# Patient Record
Sex: FEMALE | Race: WHITE | NOT HISPANIC OR LATINO | Employment: FULL TIME | URBAN - METROPOLITAN AREA
[De-identification: names, ages, dates, MRNs, and addresses within clinical notes are randomized per-mention and may not be internally consistent; named-entity substitution may affect disease eponyms.]

---

## 2024-03-25 DIAGNOSIS — I10 ESSENTIAL (PRIMARY) HYPERTENSION: Primary | ICD-10-CM

## 2024-03-25 DIAGNOSIS — J44.9 CHRONIC OBSTRUCTIVE PULMONARY DISEASE, UNSPECIFIED COPD TYPE (MULTI): ICD-10-CM

## 2024-03-25 RX ORDER — METOPROLOL TARTRATE 50 MG/1
50 TABLET ORAL 2 TIMES DAILY
COMMUNITY
Start: 2014-04-01 | End: 2024-03-25 | Stop reason: SDUPTHER

## 2024-03-25 RX ORDER — ALBUTEROL SULFATE 90 UG/1
1 AEROSOL, METERED RESPIRATORY (INHALATION) EVERY 4 HOURS PRN
COMMUNITY
Start: 2013-11-06 | End: 2024-03-25 | Stop reason: SDUPTHER

## 2024-03-25 RX ORDER — FLUTICASONE PROPIONATE AND SALMETEROL XINAFOATE 230; 21 UG/1; UG/1
2 AEROSOL, METERED RESPIRATORY (INHALATION)
COMMUNITY
Start: 2020-01-31 | End: 2024-03-25 | Stop reason: SDUPTHER

## 2024-03-25 RX ORDER — AMLODIPINE BESYLATE 10 MG/1
10 TABLET ORAL DAILY
COMMUNITY
Start: 2022-07-20 | End: 2024-03-25 | Stop reason: SDUPTHER

## 2024-03-25 NOTE — TELEPHONE ENCOUNTER
PATIENT CALLING IN FOR REFILLS ON AMLODIPINE, METOPROLOL, ALBUTEROL SULFATE, AND ADVAIR SENT TO [facility]

## 2024-03-29 RX ORDER — FLUTICASONE PROPIONATE AND SALMETEROL XINAFOATE 230; 21 UG/1; UG/1
2 AEROSOL, METERED RESPIRATORY (INHALATION)
Qty: 8 G | Refills: 0 | Status: SHIPPED | OUTPATIENT
Start: 2024-03-29 | End: 2024-04-05 | Stop reason: SDUPTHER

## 2024-03-29 RX ORDER — METOPROLOL TARTRATE 50 MG/1
50 TABLET ORAL 2 TIMES DAILY
Qty: 28 TABLET | Refills: 0 | Status: SHIPPED | OUTPATIENT
Start: 2024-03-29 | End: 2024-04-05 | Stop reason: SDUPTHER

## 2024-03-29 RX ORDER — ALBUTEROL SULFATE 90 UG/1
1 AEROSOL, METERED RESPIRATORY (INHALATION) EVERY 8 HOURS PRN
Qty: 6 G | Refills: 0 | Status: SHIPPED | OUTPATIENT
Start: 2024-03-29 | End: 2024-04-05 | Stop reason: SDUPTHER

## 2024-03-29 RX ORDER — AMLODIPINE BESYLATE 10 MG/1
10 TABLET ORAL DAILY
Qty: 14 TABLET | Refills: 0 | Status: SHIPPED | OUTPATIENT
Start: 2024-03-29 | End: 2024-04-05 | Stop reason: SDUPTHER

## 2024-04-05 ENCOUNTER — OFFICE VISIT (OUTPATIENT)
Dept: PRIMARY CARE | Facility: CLINIC | Age: 74
End: 2024-04-05
Payer: COMMERCIAL

## 2024-04-05 VITALS
WEIGHT: 97.4 LBS | SYSTOLIC BLOOD PRESSURE: 124 MMHG | HEIGHT: 62 IN | DIASTOLIC BLOOD PRESSURE: 70 MMHG | TEMPERATURE: 97.6 F | BODY MASS INDEX: 17.92 KG/M2 | OXYGEN SATURATION: 90 % | HEART RATE: 76 BPM

## 2024-04-05 DIAGNOSIS — R79.89 ABNORMAL THYROID BLOOD TEST: ICD-10-CM

## 2024-04-05 DIAGNOSIS — E55.9 VITAMIN D DEFICIENCY: ICD-10-CM

## 2024-04-05 DIAGNOSIS — Z13.29 SCREENING FOR THYROID DISORDER: ICD-10-CM

## 2024-04-05 DIAGNOSIS — Z23 NEED FOR VACCINATION: ICD-10-CM

## 2024-04-05 DIAGNOSIS — E78.2 MIXED HYPERLIPIDEMIA: ICD-10-CM

## 2024-04-05 DIAGNOSIS — R73.9 HYPERGLYCEMIA: ICD-10-CM

## 2024-04-05 DIAGNOSIS — Z00.00 ENCOUNTER FOR MEDICARE ANNUAL WELLNESS EXAM: Primary | ICD-10-CM

## 2024-04-05 DIAGNOSIS — D64.9 ANEMIA, UNSPECIFIED TYPE: ICD-10-CM

## 2024-04-05 DIAGNOSIS — I10 ESSENTIAL (PRIMARY) HYPERTENSION: ICD-10-CM

## 2024-04-05 DIAGNOSIS — J44.9 CHRONIC OBSTRUCTIVE PULMONARY DISEASE, UNSPECIFIED COPD TYPE (MULTI): ICD-10-CM

## 2024-04-05 DIAGNOSIS — D53.9 NUTRITIONAL ANEMIA, UNSPECIFIED: ICD-10-CM

## 2024-04-05 DIAGNOSIS — Z13.1 SCREENING FOR DIABETES MELLITUS: ICD-10-CM

## 2024-04-05 PROCEDURE — 1158F ADVNC CARE PLAN TLK DOCD: CPT | Performed by: INTERNAL MEDICINE

## 2024-04-05 PROCEDURE — 3074F SYST BP LT 130 MM HG: CPT | Performed by: INTERNAL MEDICINE

## 2024-04-05 PROCEDURE — 1170F FXNL STATUS ASSESSED: CPT | Performed by: INTERNAL MEDICINE

## 2024-04-05 PROCEDURE — 99214 OFFICE O/P EST MOD 30 MIN: CPT | Performed by: INTERNAL MEDICINE

## 2024-04-05 PROCEDURE — 1159F MED LIST DOCD IN RCRD: CPT | Performed by: INTERNAL MEDICINE

## 2024-04-05 PROCEDURE — G0439 PPPS, SUBSEQ VISIT: HCPCS | Performed by: INTERNAL MEDICINE

## 2024-04-05 PROCEDURE — G0444 DEPRESSION SCREEN ANNUAL: HCPCS | Performed by: INTERNAL MEDICINE

## 2024-04-05 PROCEDURE — 3078F DIAST BP <80 MM HG: CPT | Performed by: INTERNAL MEDICINE

## 2024-04-05 PROCEDURE — 1123F ACP DISCUSS/DSCN MKR DOCD: CPT | Performed by: INTERNAL MEDICINE

## 2024-04-05 PROCEDURE — 99397 PER PM REEVAL EST PAT 65+ YR: CPT | Performed by: INTERNAL MEDICINE

## 2024-04-05 RX ORDER — METOPROLOL TARTRATE 50 MG/1
50 TABLET ORAL 2 TIMES DAILY
Qty: 200 TABLET | Refills: 1 | Status: SHIPPED | OUTPATIENT
Start: 2024-04-05

## 2024-04-05 RX ORDER — AMLODIPINE BESYLATE 10 MG/1
10 TABLET ORAL DAILY
Qty: 100 TABLET | Refills: 1 | Status: SHIPPED | OUTPATIENT
Start: 2024-04-05

## 2024-04-05 RX ORDER — ALBUTEROL SULFATE 90 UG/1
1 AEROSOL, METERED RESPIRATORY (INHALATION) EVERY 8 HOURS PRN
Qty: 24 G | Refills: 1 | Status: SHIPPED | OUTPATIENT
Start: 2024-04-05

## 2024-04-05 RX ORDER — FLUTICASONE PROPIONATE AND SALMETEROL XINAFOATE 230; 21 UG/1; UG/1
2 AEROSOL, METERED RESPIRATORY (INHALATION)
Qty: 48 G | Refills: 1 | Status: SHIPPED | OUTPATIENT
Start: 2024-04-05

## 2024-04-05 ASSESSMENT — ENCOUNTER SYMPTOMS
OCCASIONAL FEELINGS OF UNSTEADINESS: 0
LOSS OF SENSATION IN FEET: 0
DEPRESSION: 0

## 2024-04-05 ASSESSMENT — COLUMBIA-SUICIDE SEVERITY RATING SCALE - C-SSRS
2. HAVE YOU ACTUALLY HAD ANY THOUGHTS OF KILLING YOURSELF?: NO
1. IN THE PAST MONTH, HAVE YOU WISHED YOU WERE DEAD OR WISHED YOU COULD GO TO SLEEP AND NOT WAKE UP?: NO
6. HAVE YOU EVER DONE ANYTHING, STARTED TO DO ANYTHING, OR PREPARED TO DO ANYTHING TO END YOUR LIFE?: NO

## 2024-04-05 ASSESSMENT — ACTIVITIES OF DAILY LIVING (ADL)
PATIENT'S MEMORY ADEQUATE TO SAFELY COMPLETE DAILY ACTIVITIES?: YES
GROCERY_SHOPPING: NEEDS ASSISTANCE
TOILETING: INDEPENDENT
DOING_HOUSEWORK: INDEPENDENT
USING TELEPHONE: INDEPENDENT
DRESSING: INDEPENDENT
FEEDING YOURSELF: INDEPENDENT
JUDGMENT_ADEQUATE_SAFELY_COMPLETE_DAILY_ACTIVITIES: YES
GROOMING: INDEPENDENT
PREPARING MEALS: INDEPENDENT
TAKING_MEDICATION: INDEPENDENT
STIL DRIVING: NO
ADEQUATE_TO_COMPLETE_ADL: YES
MANAGING_FINANCES: INDEPENDENT
EATING: INDEPENDENT
BATHING: INDEPENDENT
NEEDS ASSISTANCE WITH FOOD: INDEPENDENT

## 2024-04-05 ASSESSMENT — PATIENT HEALTH QUESTIONNAIRE - PHQ9
SUM OF ALL RESPONSES TO PHQ9 QUESTIONS 1 AND 2: 0
2. FEELING DOWN, DEPRESSED OR HOPELESS: NOT AT ALL
1. LITTLE INTEREST OR PLEASURE IN DOING THINGS: NOT AT ALL

## 2024-04-05 NOTE — PROGRESS NOTES
Primary Care Physician: Cruz Randolph MD    Date of Visit: 2024    Chief Complaint:     Chief Complaint   Patient presents with    Medicare Annual Wellness Visit Subsequent       Subjective:  Patient Elsa Hinson is a 74 y.o. female  that presents for Medicare Wellness    Her grandson is with her today.   HPI:  HPI   States that she is sometimes depressed b/c she is not able to do things she once did.  Due to copd, she does not go out much and she no longer drives.  Feels that she now has to be dependent on her daughter/grandchildren.   Her grandson, told her that she has done a lot for them. He will take her anywhere she needs to go and do whatever he needs to do to help her.    Does not drive  Living will/poa daughter    Past Medical History:  Past Medical History:   Diagnosis Date    Atherosclerotic heart disease of native coronary artery without angina pectoris 2021    CAD in native artery    Essential (primary) hypertension 2021    Hypertension    Hyperlipidemia, unspecified 2022    Hyperlipidemia    Old myocardial infarction 2020    Past myocardial infarction    Other abnormalities of breathing 2013    Difficulty breathing        Surgical History:  Past Surgical History:   Procedure Laterality Date     SECTION, CLASSIC  2013     Section    OTHER SURGICAL HISTORY  2013    Previous Stent Placement    TONSILLECTOMY  2013    Tonsillectomy        Immunizations:   Immunization History   Administered Date(s) Administered    Pfizer Gray Cap SARS-CoV-2 2022    Pfizer Purple Cap SARS-CoV-2 2021, 04/10/2021        Family History:  No family history on file.     Social History:  Social History     Socioeconomic History    Marital status:      Spouse name: Not on file    Number of children: Not on file    Years of education: Not on file    Highest education level: Not on file   Occupational History    Not on file   Tobacco Use     Smoking status: Former     Types: Cigarettes    Smokeless tobacco: Never   Substance and Sexual Activity    Alcohol use: Not on file    Drug use: Not on file    Sexual activity: Not on file   Other Topics Concern    Not on file   Social History Narrative    Not on file     Social Determinants of Health     Financial Resource Strain: Not on file   Food Insecurity: Not on file   Transportation Needs: Not on file   Physical Activity: Not on file   Stress: Not on file   Social Connections: Not on file   Intimate Partner Violence: Not on file   Housing Stability: Not on file        Medications:  Current Outpatient Medications   Medication Instructions    albuterol (Ventolin HFA) 90 mcg/actuation inhaler 1 puff, inhalation, Every 8 hours PRN    amLODIPine (NORVASC) 10 mg, oral, Daily    fluticasone propion-salmeteroL (Advair HFA) 230-21 mcg/actuation inhaler 2 puffs, inhalation, 2 times daily RT    metoprolol tartrate (LOPRESSOR) 50 mg, oral, 2 times daily        Allergies:  No Known Allergies     ROS:  Review of Systems   Constitutional:  Negative for activity change, chills, fatigue, fever and unexpected weight change.   HENT:  Negative for congestion, dental problem, ear pain, sinus pressure, sinus pain, sore throat and trouble swallowing.    Eyes:  Negative for photophobia, discharge, redness and visual disturbance.   Respiratory:  Positive for shortness of breath. Negative for cough, chest tightness and wheezing.    Cardiovascular:  Negative for chest pain, palpitations and leg swelling.   Gastrointestinal:  Negative for abdominal pain, blood in stool, constipation, diarrhea, nausea and vomiting.   Endocrine: Negative for cold intolerance, heat intolerance, polydipsia, polyphagia and polyuria.   Genitourinary:  Negative for difficulty urinating, dysuria, flank pain, frequency and urgency.   Musculoskeletal:  Negative for arthralgias, joint swelling and myalgias.   Skin:  Negative for color change and rash.  "  Allergic/Immunologic: Negative for environmental allergies, food allergies and immunocompromised state.   Neurological:  Negative for dizziness, weakness, light-headedness, numbness and headaches.   Hematological:  Does not bruise/bleed easily.   Psychiatric/Behavioral:  Negative for dysphoric mood and sleep disturbance. The patient is not nervous/anxious.         No anxiety.  No depression        Vitals:  /70 (BP Location: Left arm, Patient Position: Sitting, BP Cuff Size: Adult)   Pulse 76   Temp 36.4 °C (97.6 °F) (Temporal)   Ht 1.562 m (5' 1.5\")   Wt (!) 44.2 kg (97 lb 6.4 oz)   SpO2 90%   BMI 18.11 kg/m²   Wt Readings from Last 2 Encounters:   04/05/24 (!) 44.2 kg (97 lb 6.4 oz)       Physical Exam:  Physical Exam  Constitutional:       General: She is not in acute distress.     Appearance: Normal appearance. She is well-developed, well-groomed and underweight.   HENT:      Head: Normocephalic and atraumatic.      Right Ear: Tympanic membrane and ear canal normal.      Left Ear: Tympanic membrane and ear canal normal.      Nose: Nose normal.      Mouth/Throat:      Mouth: Mucous membranes are moist.      Pharynx: Oropharynx is clear.   Eyes:      Conjunctiva/sclera: Conjunctivae normal.      Pupils: Pupils are equal, round, and reactive to light.   Neck:      Thyroid: No thyromegaly.   Cardiovascular:      Rate and Rhythm: Normal rate and regular rhythm.      Heart sounds: Normal heart sounds, S1 normal and S2 normal. No murmur heard.  Pulmonary:      Effort: Pulmonary effort is normal.      Breath sounds: Normal breath sounds and air entry. No wheezing, rhonchi or rales.   Abdominal:      General: Bowel sounds are normal. There is no distension.      Palpations: Abdomen is soft.      Tenderness: There is no abdominal tenderness. There is no guarding or rebound.   Musculoskeletal:         General: No swelling or tenderness. Normal range of motion.      Cervical back: Normal, full passive range of " motion without pain, normal range of motion and neck supple.      Thoracic back: Normal.      Lumbar back: Normal.      Right lower leg: No edema.      Left lower leg: No edema.      Comments: Upper and lower extrems are wnl--inspection and palpation.   Strength is normal and symmetric.   Lymphadenopathy:      Cervical: No cervical adenopathy.   Skin:     General: Skin is warm and dry.      Findings: No bruising, erythema, lesion or rash.      Comments: Intact   Neurological:      General: No focal deficit present.      Mental Status: She is alert and oriented to person, place, and time.      Sensory: Sensation is intact.      Motor: Motor function is intact.      Deep Tendon Reflexes: Reflexes are normal and symmetric.   Psychiatric:         Mood and Affect: Mood and affect normal.         Speech: Speech normal.         Behavior: Behavior normal. Behavior is cooperative.           Orders:  Orders Placed This Encounter   Procedures    CBC and Auto Differential    Comprehensive Metabolic Panel    Lipid Panel    Urinalysis with Reflex Microscopic    Vitamin D 25-Hydroxy,Total (for eval of Vitamin D levels)    TSH with reflex to Free T4 if abnormal    Hemoglobin A1C         Future Appointments:  No future appointments.          Assessment/Plan:  Diagnoses and all orders for this visit:  Encounter for Medicare annual wellness exam  -     CBC and Auto Differential; Future  -     Comprehensive Metabolic Panel; Future  -     Lipid Panel; Future  -     Urinalysis with Reflex Microscopic  -     Vitamin D 25-Hydroxy,Total (for eval of Vitamin D levels); Future  -     TSH with reflex to Free T4 if abnormal; Future  -     Hemoglobin A1C; Future  Chronic obstructive pulmonary disease, unspecified COPD type (Multi)  -     CBC and Auto Differential; Future  -     Comprehensive Metabolic Panel; Future  -     Lipid Panel; Future  -     Urinalysis with Reflex Microscopic  -     Vitamin D 25-Hydroxy,Total (for eval of Vitamin D  levels); Future  -     TSH with reflex to Free T4 if abnormal; Future  -     Hemoglobin A1C; Future  -     albuterol (Ventolin HFA) 90 mcg/actuation inhaler; Inhale 1 puff every 8 hours if needed for wheezing.  -     fluticasone propion-salmeteroL (Advair HFA) 230-21 mcg/actuation inhaler; Inhale 2 puffs 2 times a day.  Essential (primary) hypertension  -     amLODIPine (Norvasc) 10 mg tablet; Take 1 tablet (10 mg) by mouth once daily.  -     metoprolol tartrate (Lopressor) 50 mg tablet; Take 1 tablet by mouth 2 times a day.  Anemia, unspecified type  -     CBC and Auto Differential; Future  Nutritional anemia, unspecified  -     TSH with reflex to Free T4 if abnormal; Future  Mixed hyperlipidemia  -     Lipid Panel; Future  Vitamin D deficiency  -     Vitamin D 25-Hydroxy,Total (for eval of Vitamin D levels); Future  Screening for thyroid disorder  -     TSH with reflex to Free T4 if abnormal; Future  Screening for diabetes mellitus  -     Hemoglobin A1C; Future  Hyperglycemia  -     Hemoglobin A1C; Future  Need for vaccination  Abnormal thyroid blood test  -     TSH with reflex to Free T4 if abnormal; Future                Cruz Randolph MD

## 2024-04-30 PROBLEM — I10 ESSENTIAL (PRIMARY) HYPERTENSION: Status: ACTIVE | Noted: 2024-04-30

## 2024-04-30 PROBLEM — D64.9 ANEMIA: Status: ACTIVE | Noted: 2024-04-30

## 2024-04-30 PROBLEM — J44.9 CHRONIC OBSTRUCTIVE PULMONARY DISEASE (MULTI): Status: ACTIVE | Noted: 2024-04-30

## 2024-04-30 ASSESSMENT — ENCOUNTER SYMPTOMS
COLOR CHANGE: 0
DIZZINESS: 0
WHEEZING: 0
DYSURIA: 0
UNEXPECTED WEIGHT CHANGE: 0
BRUISES/BLEEDS EASILY: 0
DIFFICULTY URINATING: 0
POLYDIPSIA: 0
COUGH: 0
NUMBNESS: 0
FREQUENCY: 0
SLEEP DISTURBANCE: 0
DYSPHORIC MOOD: 0
ABDOMINAL PAIN: 0
TROUBLE SWALLOWING: 0
PHOTOPHOBIA: 0
ACTIVITY CHANGE: 0
MYALGIAS: 0
FATIGUE: 0
CHEST TIGHTNESS: 0
ARTHRALGIAS: 0
DIARRHEA: 0
EYE REDNESS: 0
WEAKNESS: 0
CHILLS: 0
NAUSEA: 0
JOINT SWELLING: 0
SINUS PRESSURE: 0
SORE THROAT: 0
HEADACHES: 0
FLANK PAIN: 0
VOMITING: 0
PALPITATIONS: 0
LIGHT-HEADEDNESS: 0
CONSTIPATION: 0
SINUS PAIN: 0
BLOOD IN STOOL: 0
SHORTNESS OF BREATH: 1
EYE DISCHARGE: 0
POLYPHAGIA: 0
NERVOUS/ANXIOUS: 0
FEVER: 0

## 2024-07-22 DIAGNOSIS — J44.9 CHRONIC OBSTRUCTIVE PULMONARY DISEASE, UNSPECIFIED COPD TYPE (MULTI): ICD-10-CM

## 2024-07-23 RX ORDER — ALBUTEROL SULFATE 90 UG/1
AEROSOL, METERED RESPIRATORY (INHALATION)
Qty: 8.5 G | Refills: 0 | OUTPATIENT
Start: 2024-07-23

## 2024-07-23 RX ORDER — FLUTICASONE PROPIONATE AND SALMETEROL XINAFOATE 230; 21 UG/1; UG/1
2 AEROSOL, METERED RESPIRATORY (INHALATION) 2 TIMES DAILY
Qty: 12 G | Refills: 0 | OUTPATIENT
Start: 2024-07-23

## 2024-09-30 DIAGNOSIS — J44.9 CHRONIC OBSTRUCTIVE PULMONARY DISEASE, UNSPECIFIED COPD TYPE (MULTI): ICD-10-CM

## 2024-09-30 DIAGNOSIS — I10 ESSENTIAL (PRIMARY) HYPERTENSION: ICD-10-CM

## 2024-10-02 RX ORDER — FLUTICASONE PROPIONATE AND SALMETEROL XINAFOATE 230; 21 UG/1; UG/1
2 AEROSOL, METERED RESPIRATORY (INHALATION)
Qty: 48 G | Refills: 1 | Status: SHIPPED | OUTPATIENT
Start: 2024-10-02

## 2024-10-02 RX ORDER — ALBUTEROL SULFATE 90 UG/1
INHALANT RESPIRATORY (INHALATION)
Qty: 18 G | Refills: 1 | Status: SHIPPED | OUTPATIENT
Start: 2024-10-02

## 2024-10-02 RX ORDER — AMLODIPINE BESYLATE 10 MG/1
10 TABLET ORAL DAILY
Qty: 100 TABLET | Refills: 1 | Status: SHIPPED | OUTPATIENT
Start: 2024-10-02

## 2024-10-02 RX ORDER — METOPROLOL TARTRATE 50 MG/1
50 TABLET ORAL 2 TIMES DAILY
Qty: 200 TABLET | Refills: 1 | Status: SHIPPED | OUTPATIENT
Start: 2024-10-02

## 2025-01-01 ENCOUNTER — TELEPHONE (OUTPATIENT)
Dept: PULMONOLOGY | Facility: HOSPITAL | Age: 75
End: 2025-01-01
Payer: COMMERCIAL

## 2025-01-01 ENCOUNTER — APPOINTMENT (OUTPATIENT)
Dept: RADIOLOGY | Facility: HOSPITAL | Age: 75
End: 2025-01-01
Payer: COMMERCIAL

## 2025-01-01 ENCOUNTER — HOSPITAL ENCOUNTER (EMERGENCY)
Facility: HOSPITAL | Age: 75
End: 2025-06-15
Attending: EMERGENCY MEDICINE
Payer: COMMERCIAL

## 2025-01-01 ENCOUNTER — APPOINTMENT (OUTPATIENT)
Dept: CARDIOLOGY | Facility: HOSPITAL | Age: 75
End: 2025-01-01
Payer: COMMERCIAL

## 2025-01-01 VITALS
RESPIRATION RATE: 20 BRPM | TEMPERATURE: 97.5 F | SYSTOLIC BLOOD PRESSURE: 47 MMHG | BODY MASS INDEX: 16.07 KG/M2 | OXYGEN SATURATION: 76 % | WEIGHT: 85.1 LBS | HEIGHT: 61 IN | DIASTOLIC BLOOD PRESSURE: 31 MMHG | HEART RATE: 118 BPM

## 2025-01-01 DIAGNOSIS — J96.02 ACUTE RESPIRATORY FAILURE WITH HYPOXIA AND HYPERCAPNIA: ICD-10-CM

## 2025-01-01 DIAGNOSIS — J96.01 ACUTE RESPIRATORY FAILURE WITH HYPOXIA AND HYPERCAPNIA: ICD-10-CM

## 2025-01-01 DIAGNOSIS — J44.1 COPD EXACERBATION (MULTI): Primary | ICD-10-CM

## 2025-01-01 DIAGNOSIS — I46.9 CARDIAC ARREST: ICD-10-CM

## 2025-01-01 LAB
ALBUMIN SERPL BCP-MCNC: 3.7 G/DL (ref 3.4–5)
ALP SERPL-CCNC: 90 U/L (ref 33–136)
ALT SERPL W P-5'-P-CCNC: 21 U/L (ref 7–45)
ANION GAP BLDA CALCULATED.4IONS-SCNC: -6 MMO/L (ref 10–25)
ANION GAP SERPL CALC-SCNC: ABNORMAL MMOL/L
AST SERPL W P-5'-P-CCNC: 34 U/L (ref 9–39)
BASE EXCESS BLDA CALC-SCNC: 39.2 MMOL/L (ref -2–3)
BASOPHILS # BLD AUTO: 0.06 X10*3/UL (ref 0–0.1)
BASOPHILS NFR BLD AUTO: 0.3 %
BILIRUB SERPL-MCNC: 0.9 MG/DL (ref 0–1.2)
BODY TEMPERATURE: 37 DEGREES CELSIUS
BUN SERPL-MCNC: 20 MG/DL (ref 6–23)
CA-I BLDA-SCNC: 0.98 MMOL/L (ref 1.1–1.33)
CALCIUM SERPL-MCNC: 9.3 MG/DL (ref 8.6–10.3)
CHLORIDE BLDA-SCNC: 85 MMOL/L (ref 98–107)
CHLORIDE SERPL-SCNC: 80 MMOL/L (ref 98–107)
CO2 SERPL-SCNC: >45 MMOL/L (ref 21–32)
CREAT SERPL-MCNC: 0.53 MG/DL (ref 0.5–1.05)
EGFRCR SERPLBLD CKD-EPI 2021: >90 ML/MIN/1.73M*2
EOSINOPHIL # BLD AUTO: 0.05 X10*3/UL (ref 0–0.4)
EOSINOPHIL NFR BLD AUTO: 0.2 %
ERYTHROCYTE [DISTWIDTH] IN BLOOD BY AUTOMATED COUNT: 13.1 % (ref 11.5–14.5)
GLUCOSE BLDA-MCNC: 264 MG/DL (ref 74–99)
GLUCOSE SERPL-MCNC: 257 MG/DL (ref 74–99)
HCO3 BLDA-SCNC: 68.6 MMOL/L (ref 22–26)
HCT VFR BLD AUTO: 41.4 % (ref 36–46)
HCT VFR BLD EST: 33 % (ref 36–46)
HGB BLD-MCNC: 11.6 G/DL (ref 12–16)
HGB BLDA-MCNC: 11 G/DL (ref 12–16)
IMM GRANULOCYTES # BLD AUTO: 0.19 X10*3/UL (ref 0–0.5)
IMM GRANULOCYTES NFR BLD AUTO: 0.8 % (ref 0–0.9)
INHALED O2 CONCENTRATION: 100 %
INR PPP: 1 (ref 0.9–1.1)
LACTATE BLDA-SCNC: 10.8 MMOL/L (ref 0.4–2)
LYMPHOCYTES # BLD AUTO: 5.09 X10*3/UL (ref 0.8–3)
LYMPHOCYTES NFR BLD AUTO: 21.8 %
MAGNESIUM SERPL-MCNC: 2.52 MG/DL (ref 1.6–2.4)
MCH RBC QN AUTO: 30.2 PG (ref 26–34)
MCHC RBC AUTO-ENTMCNC: 28 G/DL (ref 32–36)
MCV RBC AUTO: 108 FL (ref 80–100)
MONOCYTES # BLD AUTO: 1.64 X10*3/UL (ref 0.05–0.8)
MONOCYTES NFR BLD AUTO: 7 %
NEUTROPHILS # BLD AUTO: 16.3 X10*3/UL (ref 1.6–5.5)
NEUTROPHILS NFR BLD AUTO: 69.9 %
NRBC BLD-RTO: 0 /100 WBCS (ref 0–0)
OXYHGB MFR BLDA: 98.1 % (ref 94–98)
PCO2 BLDA: 86 MM HG (ref 38–42)
PH BLDA: 7.51 PH (ref 7.38–7.42)
PHOSPHATE SERPL-MCNC: 4.5 MG/DL (ref 2.5–4.9)
PLATELET # BLD AUTO: 294 X10*3/UL (ref 150–450)
PO2 BLDA: 378 MM HG (ref 85–95)
POTASSIUM BLDA-SCNC: 3.4 MMOL/L (ref 3.5–5.3)
POTASSIUM SERPL-SCNC: 2.6 MMOL/L (ref 3.5–5.3)
PROT SERPL-MCNC: 6.2 G/DL (ref 6.4–8.2)
PROTHROMBIN TIME: 11.2 SECONDS (ref 9.8–12.4)
RBC # BLD AUTO: 3.84 X10*6/UL (ref 4–5.2)
SAO2 % BLDA: 100 % (ref 94–100)
SODIUM BLDA-SCNC: 144 MMOL/L (ref 136–145)
SODIUM SERPL-SCNC: 147 MMOL/L (ref 136–145)
WBC # BLD AUTO: 23.3 X10*3/UL (ref 4.4–11.3)

## 2025-01-01 PROCEDURE — 96360 HYDRATION IV INFUSION INIT: CPT

## 2025-01-01 PROCEDURE — 83735 ASSAY OF MAGNESIUM: CPT | Performed by: EMERGENCY MEDICINE

## 2025-01-01 PROCEDURE — 94002 VENT MGMT INPAT INIT DAY: CPT | Mod: 59

## 2025-01-01 PROCEDURE — 2500000005 HC RX 250 GENERAL PHARMACY W/O HCPCS: Performed by: EMERGENCY MEDICINE

## 2025-01-01 PROCEDURE — 85025 COMPLETE CBC W/AUTO DIFF WBC: CPT | Performed by: EMERGENCY MEDICINE

## 2025-01-01 PROCEDURE — 2500000004 HC RX 250 GENERAL PHARMACY W/ HCPCS (ALT 636 FOR OP/ED): Performed by: EMERGENCY MEDICINE

## 2025-01-01 PROCEDURE — 84132 ASSAY OF SERUM POTASSIUM: CPT | Mod: 59 | Performed by: EMERGENCY MEDICINE

## 2025-01-01 PROCEDURE — 36415 COLL VENOUS BLD VENIPUNCTURE: CPT | Performed by: EMERGENCY MEDICINE

## 2025-01-01 PROCEDURE — 99291 CRITICAL CARE FIRST HOUR: CPT | Mod: 25 | Performed by: EMERGENCY MEDICINE

## 2025-01-01 PROCEDURE — 31500 INSERT EMERGENCY AIRWAY: CPT | Performed by: EMERGENCY MEDICINE

## 2025-01-01 PROCEDURE — 85610 PROTHROMBIN TIME: CPT | Performed by: EMERGENCY MEDICINE

## 2025-01-01 PROCEDURE — 92960 CARDIOVERSION ELECTRIC EXT: CPT | Performed by: EMERGENCY MEDICINE

## 2025-01-01 PROCEDURE — 85018 HEMOGLOBIN: CPT | Mod: 59 | Performed by: EMERGENCY MEDICINE

## 2025-01-01 PROCEDURE — 93005 ELECTROCARDIOGRAM TRACING: CPT | Mod: 59

## 2025-01-01 PROCEDURE — 84100 ASSAY OF PHOSPHORUS: CPT | Performed by: EMERGENCY MEDICINE

## 2025-01-01 PROCEDURE — 92950 HEART/LUNG RESUSCITATION CPR: CPT

## 2025-01-01 RX ORDER — ROCURONIUM BROMIDE 10 MG/ML
INJECTION, SOLUTION INTRAVENOUS CODE/TRAUMA/SEDATION MEDICATION
Status: COMPLETED | OUTPATIENT
Start: 2025-01-01 | End: 2025-01-01

## 2025-01-01 RX ORDER — ETOMIDATE 2 MG/ML
INJECTION INTRAVENOUS
Status: COMPLETED
Start: 2025-01-01 | End: 2025-01-01

## 2025-01-01 RX ORDER — ETOMIDATE 2 MG/ML
INJECTION INTRAVENOUS CODE/TRAUMA/SEDATION MEDICATION
Status: COMPLETED | OUTPATIENT
Start: 2025-01-01 | End: 2025-01-01

## 2025-01-01 RX ORDER — INDOMETHACIN 25 MG/1
CAPSULE ORAL CODE/TRAUMA/SEDATION MEDICATION
Status: COMPLETED | OUTPATIENT
Start: 2025-01-01 | End: 2025-01-01

## 2025-01-01 RX ORDER — EPINEPHRINE 0.1 MG/ML
INJECTION INTRACARDIAC; INTRAVENOUS CODE/TRAUMA/SEDATION MEDICATION
Status: COMPLETED | OUTPATIENT
Start: 2025-01-01 | End: 2025-01-01

## 2025-01-01 RX ORDER — ROCURONIUM BROMIDE 10 MG/ML
INJECTION, SOLUTION INTRAVENOUS
Status: COMPLETED
Start: 2025-01-01 | End: 2025-01-01

## 2025-01-01 RX ADMIN — ETOMIDATE 20 MG: 2 INJECTION INTRAVENOUS at 09:15

## 2025-01-01 RX ADMIN — EPINEPHRINE 1 MG: 0.1 INJECTION INTRAVENOUS at 09:22

## 2025-01-01 RX ADMIN — EPINEPHRINE 1 MG: 0.1 INJECTION INTRAVENOUS at 09:17

## 2025-01-01 RX ADMIN — ROCURONIUM BROMIDE 70 MG: 10 INJECTION, SOLUTION INTRAVENOUS at 09:16

## 2025-01-01 RX ADMIN — AMIODARONE HYDROCHLORIDE 1 MG/MIN: 1.8 INJECTION, SOLUTION INTRAVENOUS at 09:41

## 2025-01-01 RX ADMIN — EPINEPHRINE 1 MG: 0.1 INJECTION INTRAVENOUS at 09:20

## 2025-01-01 RX ADMIN — SODIUM CHLORIDE 1000 ML: 0.9 INJECTION, SOLUTION INTRAVENOUS at 09:33

## 2025-01-01 RX ADMIN — SODIUM BICARBONATE 50 MEQ: 84 INJECTION, SOLUTION INTRAVENOUS at 09:20

## 2025-02-10 DIAGNOSIS — J44.9 CHRONIC OBSTRUCTIVE PULMONARY DISEASE, UNSPECIFIED COPD TYPE (MULTI): ICD-10-CM

## 2025-02-13 ENCOUNTER — HOSPITAL ENCOUNTER (INPATIENT)
Facility: HOSPITAL | Age: 75
DRG: 291 | End: 2025-02-13
Attending: EMERGENCY MEDICINE | Admitting: INTERNAL MEDICINE
Payer: COMMERCIAL

## 2025-02-13 ENCOUNTER — APPOINTMENT (OUTPATIENT)
Dept: RADIOLOGY | Facility: HOSPITAL | Age: 75
End: 2025-02-13
Payer: COMMERCIAL

## 2025-02-13 ENCOUNTER — APPOINTMENT (OUTPATIENT)
Dept: CARDIOLOGY | Facility: HOSPITAL | Age: 75
End: 2025-02-13
Payer: COMMERCIAL

## 2025-02-13 DIAGNOSIS — J44.9 CHRONIC OBSTRUCTIVE PULMONARY DISEASE, UNSPECIFIED COPD TYPE (MULTI): ICD-10-CM

## 2025-02-13 DIAGNOSIS — E78.5 HYPERLIPIDEMIA, UNSPECIFIED HYPERLIPIDEMIA TYPE: ICD-10-CM

## 2025-02-13 DIAGNOSIS — I48.0 PAROXYSMAL ATRIAL FIBRILLATION (MULTI): ICD-10-CM

## 2025-02-13 DIAGNOSIS — J96.01 ACUTE HYPOXIC RESPIRATORY FAILURE (MULTI): Primary | ICD-10-CM

## 2025-02-13 DIAGNOSIS — I10 ESSENTIAL (PRIMARY) HYPERTENSION: ICD-10-CM

## 2025-02-13 DIAGNOSIS — R06.09 DYSPNEA ON EXERTION: ICD-10-CM

## 2025-02-13 PROBLEM — I25.10 ATHEROSCLEROTIC HEART DISEASE OF NATIVE CORONARY ARTERY WITHOUT ANGINA PECTORIS: Status: ACTIVE | Noted: 2025-02-13

## 2025-02-13 PROBLEM — I25.2 PAST MYOCARDIAL INFARCTION: Status: ACTIVE | Noted: 2025-02-13

## 2025-02-13 PROBLEM — R91.1 LUNG NODULE: Status: ACTIVE | Noted: 2025-02-13

## 2025-02-13 PROBLEM — I73.9 PAD (PERIPHERAL ARTERY DISEASE) (CMS-HCC): Status: ACTIVE | Noted: 2025-02-13

## 2025-02-13 LAB
ALBUMIN SERPL BCP-MCNC: 4.2 G/DL (ref 3.4–5)
ALP SERPL-CCNC: 108 U/L (ref 33–136)
ALT SERPL W P-5'-P-CCNC: 11 U/L (ref 7–45)
ANION GAP BLDV CALCULATED.4IONS-SCNC: 7 MMOL/L (ref 10–25)
ANION GAP SERPL CALC-SCNC: 11 MMOL/L (ref 10–20)
AST SERPL W P-5'-P-CCNC: 18 U/L (ref 9–39)
BASE EXCESS BLDV CALC-SCNC: 7.3 MMOL/L (ref -2–3)
BASOPHILS # BLD AUTO: 0.05 X10*3/UL (ref 0–0.1)
BASOPHILS NFR BLD AUTO: 0.5 %
BILIRUB SERPL-MCNC: 0.9 MG/DL (ref 0–1.2)
BNP SERPL-MCNC: 137 PG/ML (ref 0–99)
BODY TEMPERATURE: 37 DEGREES CELSIUS
BUN SERPL-MCNC: 19 MG/DL (ref 6–23)
CA-I BLDV-SCNC: 1.17 MMOL/L (ref 1.1–1.33)
CALCIUM SERPL-MCNC: 8.9 MG/DL (ref 8.6–10.3)
CARDIAC TROPONIN I PNL SERPL HS: 8 NG/L (ref 0–13)
CARDIAC TROPONIN I PNL SERPL HS: 9 NG/L (ref 0–13)
CHLORIDE BLDV-SCNC: 98 MMOL/L (ref 98–107)
CHLORIDE SERPL-SCNC: 100 MMOL/L (ref 98–107)
CO2 SERPL-SCNC: 33 MMOL/L (ref 21–32)
CREAT SERPL-MCNC: 0.71 MG/DL (ref 0.5–1.05)
EGFRCR SERPLBLD CKD-EPI 2021: 89 ML/MIN/1.73M*2
EOSINOPHIL # BLD AUTO: 0.04 X10*3/UL (ref 0–0.4)
EOSINOPHIL NFR BLD AUTO: 0.4 %
ERYTHROCYTE [DISTWIDTH] IN BLOOD BY AUTOMATED COUNT: 15.2 % (ref 11.5–14.5)
FLUAV RNA RESP QL NAA+PROBE: NOT DETECTED
FLUBV RNA RESP QL NAA+PROBE: NOT DETECTED
GLUCOSE BLDV-MCNC: 117 MG/DL (ref 74–99)
GLUCOSE SERPL-MCNC: 114 MG/DL (ref 74–99)
HCO3 BLDV-SCNC: 35.9 MMOL/L (ref 22–26)
HCT VFR BLD AUTO: 48.7 % (ref 36–46)
HCT VFR BLD EST: 50 % (ref 36–46)
HGB BLD-MCNC: 15.5 G/DL (ref 12–16)
HGB BLDV-MCNC: 16.5 G/DL (ref 12–16)
IMM GRANULOCYTES # BLD AUTO: 0.03 X10*3/UL (ref 0–0.5)
IMM GRANULOCYTES NFR BLD AUTO: 0.3 % (ref 0–0.9)
INHALED O2 CONCENTRATION: 36 %
INR PPP: 1.1 (ref 0.9–1.1)
LACTATE BLDV-SCNC: 1.1 MMOL/L (ref 0.4–2)
LYMPHOCYTES # BLD AUTO: 1.29 X10*3/UL (ref 0.8–3)
LYMPHOCYTES NFR BLD AUTO: 13.3 %
MAGNESIUM SERPL-MCNC: 2.17 MG/DL (ref 1.6–2.4)
MCH RBC QN AUTO: 29.5 PG (ref 26–34)
MCHC RBC AUTO-ENTMCNC: 31.8 G/DL (ref 32–36)
MCV RBC AUTO: 93 FL (ref 80–100)
MONOCYTES # BLD AUTO: 0.6 X10*3/UL (ref 0.05–0.8)
MONOCYTES NFR BLD AUTO: 6.2 %
NEUTROPHILS # BLD AUTO: 7.7 X10*3/UL (ref 1.6–5.5)
NEUTROPHILS NFR BLD AUTO: 79.3 %
NRBC BLD-RTO: 0 /100 WBCS (ref 0–0)
OXYHGB MFR BLDV: 77.2 % (ref 45–75)
PCO2 BLDV: 65 MM HG (ref 41–51)
PH BLDV: 7.35 PH (ref 7.33–7.43)
PLATELET # BLD AUTO: 246 X10*3/UL (ref 150–450)
PO2 BLDV: 51 MM HG (ref 35–45)
POTASSIUM BLDV-SCNC: 3.8 MMOL/L (ref 3.5–5.3)
POTASSIUM SERPL-SCNC: 3.8 MMOL/L (ref 3.5–5.3)
PROT SERPL-MCNC: 7 G/DL (ref 6.4–8.2)
PROTHROMBIN TIME: 12.3 SECONDS (ref 9.8–12.8)
RBC # BLD AUTO: 5.25 X10*6/UL (ref 4–5.2)
SAO2 % BLDV: 79 % (ref 45–75)
SARS-COV-2 RNA RESP QL NAA+PROBE: NOT DETECTED
SODIUM BLDV-SCNC: 137 MMOL/L (ref 136–145)
SODIUM SERPL-SCNC: 140 MMOL/L (ref 136–145)
WBC # BLD AUTO: 9.7 X10*3/UL (ref 4.4–11.3)

## 2025-02-13 PROCEDURE — 82947 ASSAY GLUCOSE BLOOD QUANT: CPT | Performed by: EMERGENCY MEDICINE

## 2025-02-13 PROCEDURE — 82810 BLOOD GASES O2 SAT ONLY: CPT | Performed by: EMERGENCY MEDICINE

## 2025-02-13 PROCEDURE — 93005 ELECTROCARDIOGRAM TRACING: CPT

## 2025-02-13 PROCEDURE — 36415 COLL VENOUS BLD VENIPUNCTURE: CPT | Performed by: EMERGENCY MEDICINE

## 2025-02-13 PROCEDURE — 2500000001 HC RX 250 WO HCPCS SELF ADMINISTERED DRUGS (ALT 637 FOR MEDICARE OP)

## 2025-02-13 PROCEDURE — 2550000001 HC RX 255 CONTRASTS: Performed by: EMERGENCY MEDICINE

## 2025-02-13 PROCEDURE — 71275 CT ANGIOGRAPHY CHEST: CPT | Performed by: RADIOLOGY

## 2025-02-13 PROCEDURE — 99285 EMERGENCY DEPT VISIT HI MDM: CPT | Mod: 25 | Performed by: EMERGENCY MEDICINE

## 2025-02-13 PROCEDURE — 2500000004 HC RX 250 GENERAL PHARMACY W/ HCPCS (ALT 636 FOR OP/ED): Performed by: EMERGENCY MEDICINE

## 2025-02-13 PROCEDURE — 87636 SARSCOV2 & INF A&B AMP PRB: CPT | Performed by: EMERGENCY MEDICINE

## 2025-02-13 PROCEDURE — 2500000004 HC RX 250 GENERAL PHARMACY W/ HCPCS (ALT 636 FOR OP/ED)

## 2025-02-13 PROCEDURE — 84484 ASSAY OF TROPONIN QUANT: CPT | Performed by: EMERGENCY MEDICINE

## 2025-02-13 PROCEDURE — 83735 ASSAY OF MAGNESIUM: CPT | Performed by: EMERGENCY MEDICINE

## 2025-02-13 PROCEDURE — 99223 1ST HOSP IP/OBS HIGH 75: CPT

## 2025-02-13 PROCEDURE — 96374 THER/PROPH/DIAG INJ IV PUSH: CPT | Mod: 59

## 2025-02-13 PROCEDURE — 85610 PROTHROMBIN TIME: CPT | Performed by: EMERGENCY MEDICINE

## 2025-02-13 PROCEDURE — 96372 THER/PROPH/DIAG INJ SC/IM: CPT

## 2025-02-13 PROCEDURE — 94640 AIRWAY INHALATION TREATMENT: CPT

## 2025-02-13 PROCEDURE — 93308 TTE F-UP OR LMTD: CPT | Performed by: EMERGENCY MEDICINE

## 2025-02-13 PROCEDURE — 83880 ASSAY OF NATRIURETIC PEPTIDE: CPT | Performed by: EMERGENCY MEDICINE

## 2025-02-13 PROCEDURE — 85025 COMPLETE CBC W/AUTO DIFF WBC: CPT | Performed by: EMERGENCY MEDICINE

## 2025-02-13 PROCEDURE — 70450 CT HEAD/BRAIN W/O DYE: CPT

## 2025-02-13 PROCEDURE — 71275 CT ANGIOGRAPHY CHEST: CPT

## 2025-02-13 PROCEDURE — 70450 CT HEAD/BRAIN W/O DYE: CPT | Performed by: RADIOLOGY

## 2025-02-13 RX ORDER — METOPROLOL TARTRATE 50 MG/1
50 TABLET ORAL 2 TIMES DAILY
Status: DISCONTINUED | OUTPATIENT
Start: 2025-02-13 | End: 2025-02-15

## 2025-02-13 RX ORDER — ASPIRIN 81 MG/1
1 TABLET ORAL DAILY
COMMUNITY
Start: 2022-06-21

## 2025-02-13 RX ORDER — ONDANSETRON HYDROCHLORIDE 2 MG/ML
4 INJECTION, SOLUTION INTRAVENOUS EVERY 6 HOURS PRN
Status: DISCONTINUED | OUTPATIENT
Start: 2025-02-13 | End: 2025-02-19 | Stop reason: HOSPADM

## 2025-02-13 RX ORDER — IPRATROPIUM BROMIDE AND ALBUTEROL SULFATE 2.5; .5 MG/3ML; MG/3ML
3 SOLUTION RESPIRATORY (INHALATION)
Status: DISCONTINUED | OUTPATIENT
Start: 2025-02-14 | End: 2025-02-14

## 2025-02-13 RX ORDER — AMLODIPINE BESYLATE 5 MG/1
10 TABLET ORAL DAILY
Status: DISCONTINUED | OUTPATIENT
Start: 2025-02-14 | End: 2025-02-13

## 2025-02-13 RX ORDER — TALC
3 POWDER (GRAM) TOPICAL NIGHTLY PRN
Status: DISCONTINUED | OUTPATIENT
Start: 2025-02-13 | End: 2025-02-19 | Stop reason: HOSPADM

## 2025-02-13 RX ORDER — PANTOPRAZOLE SODIUM 40 MG/10ML
40 INJECTION, POWDER, LYOPHILIZED, FOR SOLUTION INTRAVENOUS
Status: DISCONTINUED | OUTPATIENT
Start: 2025-02-14 | End: 2025-02-19 | Stop reason: HOSPADM

## 2025-02-13 RX ORDER — POLYETHYLENE GLYCOL 3350 17 G/17G
17 POWDER, FOR SOLUTION ORAL DAILY PRN
Status: DISCONTINUED | OUTPATIENT
Start: 2025-02-13 | End: 2025-02-19 | Stop reason: HOSPADM

## 2025-02-13 RX ORDER — ACETAMINOPHEN 325 MG/1
650 TABLET ORAL EVERY 4 HOURS PRN
Status: DISCONTINUED | OUTPATIENT
Start: 2025-02-13 | End: 2025-02-19 | Stop reason: HOSPADM

## 2025-02-13 RX ORDER — ALBUTEROL SULFATE 90 UG/1
2 INHALANT RESPIRATORY (INHALATION) EVERY 6 HOURS PRN
Status: DISCONTINUED | OUTPATIENT
Start: 2025-02-13 | End: 2025-02-19 | Stop reason: HOSPADM

## 2025-02-13 RX ORDER — FUROSEMIDE 10 MG/ML
40 INJECTION INTRAMUSCULAR; INTRAVENOUS DAILY
Status: DISCONTINUED | OUTPATIENT
Start: 2025-02-14 | End: 2025-02-14

## 2025-02-13 RX ORDER — ASPIRIN 81 MG/1
81 TABLET ORAL DAILY
Status: DISCONTINUED | OUTPATIENT
Start: 2025-02-14 | End: 2025-02-19 | Stop reason: HOSPADM

## 2025-02-13 RX ORDER — PANTOPRAZOLE SODIUM 40 MG/1
40 TABLET, DELAYED RELEASE ORAL
Status: DISCONTINUED | OUTPATIENT
Start: 2025-02-14 | End: 2025-02-19 | Stop reason: HOSPADM

## 2025-02-13 RX ORDER — FLUTICASONE FUROATE AND VILANTEROL 200; 25 UG/1; UG/1
1 POWDER RESPIRATORY (INHALATION)
Status: DISCONTINUED | OUTPATIENT
Start: 2025-02-14 | End: 2025-02-19 | Stop reason: HOSPADM

## 2025-02-13 RX ORDER — FUROSEMIDE 10 MG/ML
40 INJECTION INTRAMUSCULAR; INTRAVENOUS ONCE
Status: COMPLETED | OUTPATIENT
Start: 2025-02-13 | End: 2025-02-13

## 2025-02-13 RX ORDER — HEPARIN SODIUM 5000 [USP'U]/ML
5000 INJECTION, SOLUTION INTRAVENOUS; SUBCUTANEOUS EVERY 8 HOURS SCHEDULED
Status: DISCONTINUED | OUTPATIENT
Start: 2025-02-13 | End: 2025-02-19 | Stop reason: HOSPADM

## 2025-02-13 RX ORDER — LISINOPRIL 20 MG/1
20 TABLET ORAL DAILY
Status: DISCONTINUED | OUTPATIENT
Start: 2025-02-14 | End: 2025-02-18

## 2025-02-13 RX ADMIN — METOPROLOL TARTRATE 50 MG: 50 TABLET, FILM COATED ORAL at 22:21

## 2025-02-13 RX ADMIN — HEPARIN SODIUM 5000 UNITS: 5000 INJECTION, SOLUTION INTRAVENOUS; SUBCUTANEOUS at 22:21

## 2025-02-13 RX ADMIN — IOHEXOL 50 ML: 350 INJECTION, SOLUTION INTRAVENOUS at 19:59

## 2025-02-13 RX ADMIN — FUROSEMIDE 40 MG: 10 INJECTION, SOLUTION INTRAVENOUS at 21:34

## 2025-02-13 SDOH — SOCIAL STABILITY: SOCIAL INSECURITY: WERE YOU ABLE TO COMPLETE ALL THE BEHAVIORAL HEALTH SCREENINGS?: YES

## 2025-02-13 SDOH — SOCIAL STABILITY: SOCIAL INSECURITY: HAVE YOU HAD THOUGHTS OF HARMING ANYONE ELSE?: NO

## 2025-02-13 SDOH — SOCIAL STABILITY: SOCIAL INSECURITY: ABUSE: ADULT

## 2025-02-13 ASSESSMENT — COGNITIVE AND FUNCTIONAL STATUS - GENERAL
PATIENT BASELINE BEDBOUND: NO
MOBILITY SCORE: 24
DAILY ACTIVITIY SCORE: 24

## 2025-02-13 ASSESSMENT — LIFESTYLE VARIABLES
AUDIT-C TOTAL SCORE: 0
HOW MANY STANDARD DRINKS CONTAINING ALCOHOL DO YOU HAVE ON A TYPICAL DAY: PATIENT DOES NOT DRINK
HOW OFTEN DO YOU HAVE A DRINK CONTAINING ALCOHOL: NEVER
AUDIT-C TOTAL SCORE: 0
HOW OFTEN DO YOU HAVE 6 OR MORE DRINKS ON ONE OCCASION: NEVER
SKIP TO QUESTIONS 9-10: 1

## 2025-02-13 ASSESSMENT — ACTIVITIES OF DAILY LIVING (ADL)
HEARING - RIGHT EAR: FUNCTIONAL
FEEDING YOURSELF: INDEPENDENT
HEARING - LEFT EAR: FUNCTIONAL
DRESSING YOURSELF: INDEPENDENT
PATIENT'S MEMORY ADEQUATE TO SAFELY COMPLETE DAILY ACTIVITIES?: YES
BATHING: INDEPENDENT
TOILETING: INDEPENDENT
WALKS IN HOME: INDEPENDENT
ADEQUATE_TO_COMPLETE_ADL: YES
GROOMING: INDEPENDENT
JUDGMENT_ADEQUATE_SAFELY_COMPLETE_DAILY_ACTIVITIES: YES

## 2025-02-13 ASSESSMENT — ENCOUNTER SYMPTOMS
FEVER: 0
LIGHT-HEADEDNESS: 1
HEADACHES: 0
ABDOMINAL PAIN: 0
FLANK PAIN: 0
PALPITATIONS: 0
VOMITING: 0
FATIGUE: 0
WEAKNESS: 0
JOINT SWELLING: 0
HEMATURIA: 0
CONSTIPATION: 0
WOUND: 0
DIARRHEA: 0
CHEST TIGHTNESS: 0
COUGH: 1
CHILLS: 1
TREMORS: 0
APPETITE CHANGE: 1
NAUSEA: 1
SHORTNESS OF BREATH: 1
BACK PAIN: 0
WHEEZING: 0

## 2025-02-13 ASSESSMENT — PAIN - FUNCTIONAL ASSESSMENT: PAIN_FUNCTIONAL_ASSESSMENT: 0-10

## 2025-02-13 ASSESSMENT — COLUMBIA-SUICIDE SEVERITY RATING SCALE - C-SSRS
1. IN THE PAST MONTH, HAVE YOU WISHED YOU WERE DEAD OR WISHED YOU COULD GO TO SLEEP AND NOT WAKE UP?: NO
6. HAVE YOU EVER DONE ANYTHING, STARTED TO DO ANYTHING, OR PREPARED TO DO ANYTHING TO END YOUR LIFE?: NO
2. HAVE YOU ACTUALLY HAD ANY THOUGHTS OF KILLING YOURSELF?: NO

## 2025-02-13 ASSESSMENT — PATIENT HEALTH QUESTIONNAIRE - PHQ9
SUM OF ALL RESPONSES TO PHQ9 QUESTIONS 1 & 2: 0
2. FEELING DOWN, DEPRESSED OR HOPELESS: NOT AT ALL
1. LITTLE INTEREST OR PLEASURE IN DOING THINGS: NOT AT ALL

## 2025-02-13 ASSESSMENT — PAIN SCALES - GENERAL: PAINLEVEL_OUTOF10: 0 - NO PAIN

## 2025-02-13 NOTE — ED TRIAGE NOTES
Pt presents from home with c/o SOB and pulse ox at home 50-60% on room air x3 weeks. Pt speaking in 3-4 word sentences, MM cyanotic. Placed on 6L NC in triage

## 2025-02-13 NOTE — ED PROVIDER NOTES
HPI   Chief Complaint   Patient presents with    Shortness of Breath       HPI    HISTORY OF PRESENT ILLNESS:  Patient is a 74-year-old female history of COPD not on baseline oxygen, hypertension hyperlipidemia, prior heart attack presenting to the emergency department for shortness of breath.  Patient has been feeling short of breath since the past 3 weeks.  This is in setting of having lower extremity swelling since [place].  Pulse ox at home has been reading in the 60s.  The patient has not had a new cough, fever, chills.  Has been compliant with medications.  States that she was having some chest discomfort that reminds her of her prior heart attack decades ago.    Past Medical History: CAD, hypertension hyperlipidemia, prior MI, COPD  Past Surgical History: Heart attack with PCI to circumflex artery    __________________________________________________________  PHYSICAL EXAM:    Appearance: Alert, oriented , cooperative   Skin: Scalp lesion measuring approximately 4 x 4 cm, irregular margins, picture below.  No surrounding erythema or cellulitis.      Eyes: PERRLA, EOMs intact,  Conjunctiva pink with no redness or exudates.    HENT: Normocephalic, atraumatic. Nares patent   Neck: Supple. Trachea at midline.   Pulmonary: Lung sounds are clear bilaterally.  There is no rales, rhonchi, or wheezing.  Cardiac: Regular rate and rhythm, no rubs, murmurs, or gallops. No JVD,   Abdomen: Abdomen is soft, nontender, and nondistended.  No palpable organomegaly.  No rebound or guarding.  No CVA tenderness. Nonsurgical abdomen  Genitourinary: Exam deferred.  Musculoskeletal: no edema, pain, cyanosis, or deformity in extremities. Pulses full and equal.   Neurological:  Cranial nerves are grossly intact, grossly normal sensation, no weakness, no focal findings identified.    __________________________________________________________  MEDICAL DECISION MAKING:    Patient was seen and examined. Differential diagnosis for  hypoxic respiratory failure includes COPD exacerbation, asthma exacerbation, pneumonia.  The patient has been having worsening lower extremity swelling.  She has not sought care.  She is on a new oxygen requirement.  Lung sounds were fairly clear.  The patient also has had a scalp lesion that has been present for a number of years had not sought care.  This was concerning for possible primary cancer with metastases.  This could also increase the patient's cancer risk.  Patient ordered cardiac labs, viral swabs, and CT head/CT angio.    Patient workup results showed no evidence of leukocytosis, hypercapnic respiratory failure.  Troponins 9 x 2.  Troponin mildly elevated at 137 indeterminant.  The patient CT head scan was showing a scalp lesion but otherwise negative.  The patient CT angio of the chest showed pulmonary nodules, no pulmonary embolisms.  I did independently review and did not note any obvious filling defect concerning for a saddle pulmonary embolism.  At this time, suspect patient may have a COPD/possible new heart failure.  Case discussed with IMS and patient was admitted for further evaluation hypoxic respiratory failure.      Chronic Medical Conditions Significantly Affecting Care: CAD, hypertension hyperlipidemia, prior MI, COPD    External Records Reviewed: I reviewed recent and relevant outside records including: Primary care physician visit from 2024    [facility]  Emergency Medicine    Patient History   Past Medical History:   Diagnosis Date    Atherosclerotic heart disease of native coronary artery without angina pectoris 2021    CAD in native artery    Essential (primary) hypertension 2021    Hypertension    Hyperlipidemia, unspecified 2022    Hyperlipidemia    Old myocardial infarction 2020    Past myocardial infarction    Other abnormalities of breathing 2013    Difficulty breathing     Past Surgical History:   Procedure Laterality Date      SECTION, CLASSIC  2013     Section    OTHER SURGICAL HISTORY  2013    Previous Stent Placement    TONSILLECTOMY  2013    Tonsillectomy     No family history on file.  Social History     Tobacco Use    Smoking status: Former     Types: Cigarettes    Smokeless tobacco: Never   Substance Use Topics    Alcohol use: Never    Drug use: Never       Physical Exam   ED Triage Vitals [25 1642]   Temperature Heart Rate Respirations BP   36 °C (96.8 °F) 85 (!) 24 116/69      Pulse Ox Temp Source Heart Rate Source Patient Position   (!) 66 % Tympanic Monitor Sitting      BP Location FiO2 (%)     Right arm --       Physical Exam      ED Course & [facility]   ED Course as of 25 1241   Thu 2025   1843 Patient twelve-lead EKG interpreted by myself shows sinus rhythm, sugar is 97, normal [place] interval, normal axis, normal QRS duration, normal QT, no STEMI. [WJ]      ED Course User Index  [WJ] Harrison Fernandes DO         Diagnoses as of 25 1241   Acute hypoxic respiratory failure (Multi)   Essential (primary) hypertension   Hyperlipidemia, unspecified hyperlipidemia type   Dyspnea on exertion                 No data recorded     [place] Coma Scale Score: 15 (25 1643 : Stella Crocker RN)                           Medical Decision Making      Procedure    Performed by: Harrison Fernandes DO  Authorized by: Harrison Fernandes DO                Respiratory Indications: shortness of breath and hypoxia  Procedure: Cardiac Ultrasound    Findings:   Views: parasternal long, parasternal short and subxiphoid  The pericardial space was visualized and was NEGATIVE for a significant pericardial effusion.  Activity: Ventricular contractions were visualized.  LV: LV systolic function was NORMAL.      Impression:  Cardiac: The focused cardiac ultrasound exam was NORMAL.    Comments: Difficult to obtain apical four-chamber view to evaluate right heart.  Appears grossly normal ejection fraction.        Harrison Fernandes,   02/14/25 1248

## 2025-02-14 ENCOUNTER — APPOINTMENT (OUTPATIENT)
Dept: CARDIOLOGY | Facility: HOSPITAL | Age: 75
DRG: 291 | End: 2025-02-14
Payer: COMMERCIAL

## 2025-02-14 LAB
ALBUMIN SERPL BCP-MCNC: 3.9 G/DL (ref 3.4–5)
ALP SERPL-CCNC: 102 U/L (ref 33–136)
ALT SERPL W P-5'-P-CCNC: 9 U/L (ref 7–45)
ANION GAP SERPL CALC-SCNC: 13 MMOL/L (ref 10–20)
AORTIC VALVE MEAN GRADIENT: 2 MMHG
AORTIC VALVE PEAK VELOCITY: 0.96 M/S
AST SERPL W P-5'-P-CCNC: 19 U/L (ref 9–39)
ATRIAL RATE: 77 BPM
ATRIAL RATE: 77 BPM
AV PEAK GRADIENT: 4 MMHG
AVA (PEAK VEL): 2.37 CM2
AVA (VTI): 2.41 CM2
BILIRUB SERPL-MCNC: 0.7 MG/DL (ref 0–1.2)
BUN SERPL-MCNC: 17 MG/DL (ref 6–23)
CALCIUM SERPL-MCNC: 8.3 MG/DL (ref 8.6–10.3)
CHLORIDE SERPL-SCNC: 97 MMOL/L (ref 98–107)
CHOLEST SERPL-MCNC: 183 MG/DL (ref 0–199)
CHOLESTEROL/HDL RATIO: 5.5
CO2 SERPL-SCNC: 36 MMOL/L (ref 21–32)
CREAT SERPL-MCNC: 0.69 MG/DL (ref 0.5–1.05)
EGFRCR SERPLBLD CKD-EPI 2021: >90 ML/MIN/1.73M*2
EJECTION FRACTION APICAL 4 CHAMBER: 59.4
EJECTION FRACTION: 63 %
ERYTHROCYTE [DISTWIDTH] IN BLOOD BY AUTOMATED COUNT: 15.1 % (ref 11.5–14.5)
EST. AVERAGE GLUCOSE BLD GHB EST-MCNC: 114 MG/DL
GLUCOSE SERPL-MCNC: 94 MG/DL (ref 74–99)
HBA1C MFR BLD: 5.6 %
HCT VFR BLD AUTO: 48 % (ref 36–46)
HDLC SERPL-MCNC: 33.2 MG/DL
HGB BLD-MCNC: 15 G/DL (ref 12–16)
LDLC SERPL CALC-MCNC: 125 MG/DL
LEFT ATRIUM VOLUME AREA LENGTH INDEX BSA: 13.7 ML/M2
LEFT VENTRICULAR OUTFLOW TRACT DIAMETER: 1.82 CM
LV EJECTION FRACTION BIPLANE: 68 %
MCH RBC QN AUTO: 29.6 PG (ref 26–34)
MCHC RBC AUTO-ENTMCNC: 31.3 G/DL (ref 32–36)
MCV RBC AUTO: 95 FL (ref 80–100)
MITRAL VALVE E/A RATIO: 0.9
MITRAL VALVE E/E' RATIO: 11.23
NON HDL CHOLESTEROL: 150 MG/DL (ref 0–149)
NRBC BLD-RTO: 0 /100 WBCS (ref 0–0)
P AXIS: 77 DEGREES
P AXIS: 78 DEGREES
PLATELET # BLD AUTO: 254 X10*3/UL (ref 150–450)
POTASSIUM SERPL-SCNC: 3.7 MMOL/L (ref 3.5–5.3)
PR INTERVAL: 119 MS
PR INTERVAL: 121 MS
PROT SERPL-MCNC: 6.6 G/DL (ref 6.4–8.2)
Q ONSET: 249 MS
Q ONSET: 249 MS
QRS COUNT: 12 BEATS
QRS COUNT: 12 BEATS
QRS DURATION: 156 MS
QRS DURATION: 92 MS
QT INTERVAL: 404 MS
QT INTERVAL: 444 MS
QTC CALCULATION(BAZETT): 458 MS
QTC CALCULATION(BAZETT): 503 MS
QTC FREDERICIA: 439 MS
QTC FREDERICIA: 482 MS
R AXIS: 86 DEGREES
R AXIS: 87 DEGREES
RBC # BLD AUTO: 5.07 X10*6/UL (ref 4–5.2)
RIGHT VENTRICLE FREE WALL PEAK S': 12.24 CM/S
RIGHT VENTRICLE PEAK SYSTOLIC PRESSURE: 47.4 MMHG
SODIUM SERPL-SCNC: 142 MMOL/L (ref 136–145)
T AXIS: 64 DEGREES
T AXIS: 80 DEGREES
T OFFSET: 451 MS
T OFFSET: 471 MS
TRICUSPID ANNULAR PLANE SYSTOLIC EXCURSION: 1.5 CM
TRIGL SERPL-MCNC: 124 MG/DL (ref 0–149)
TSH SERPL-ACNC: 3.29 MIU/L (ref 0.44–3.98)
VENTRICULAR RATE: 77 BPM
VENTRICULAR RATE: 77 BPM
VLDL: 25 MG/DL (ref 0–40)
WBC # BLD AUTO: 11.2 X10*3/UL (ref 4.4–11.3)

## 2025-02-14 PROCEDURE — 85027 COMPLETE CBC AUTOMATED: CPT

## 2025-02-14 PROCEDURE — 80053 COMPREHEN METABOLIC PANEL: CPT

## 2025-02-14 PROCEDURE — 99223 1ST HOSP IP/OBS HIGH 75: CPT | Performed by: STUDENT IN AN ORGANIZED HEALTH CARE EDUCATION/TRAINING PROGRAM

## 2025-02-14 PROCEDURE — 96376 TX/PRO/DX INJ SAME DRUG ADON: CPT

## 2025-02-14 PROCEDURE — 83036 HEMOGLOBIN GLYCOSYLATED A1C: CPT | Mod: PORLAB

## 2025-02-14 PROCEDURE — 2500000001 HC RX 250 WO HCPCS SELF ADMINISTERED DRUGS (ALT 637 FOR MEDICARE OP)

## 2025-02-14 PROCEDURE — 96372 THER/PROPH/DIAG INJ SC/IM: CPT

## 2025-02-14 PROCEDURE — 2500000002 HC RX 250 W HCPCS SELF ADMINISTERED DRUGS (ALT 637 FOR MEDICARE OP, ALT 636 FOR OP/ED)

## 2025-02-14 PROCEDURE — 80061 LIPID PANEL: CPT

## 2025-02-14 PROCEDURE — 1200000002 HC GENERAL ROOM WITH TELEMETRY DAILY

## 2025-02-14 PROCEDURE — 36415 COLL VENOUS BLD VENIPUNCTURE: CPT

## 2025-02-14 PROCEDURE — 84443 ASSAY THYROID STIM HORMONE: CPT

## 2025-02-14 PROCEDURE — 99233 SBSQ HOSP IP/OBS HIGH 50: CPT | Performed by: INTERNAL MEDICINE

## 2025-02-14 PROCEDURE — 93306 TTE W/DOPPLER COMPLETE: CPT | Performed by: STUDENT IN AN ORGANIZED HEALTH CARE EDUCATION/TRAINING PROGRAM

## 2025-02-14 PROCEDURE — 2500000004 HC RX 250 GENERAL PHARMACY W/ HCPCS (ALT 636 FOR OP/ED)

## 2025-02-14 PROCEDURE — 93306 TTE W/DOPPLER COMPLETE: CPT

## 2025-02-14 RX ORDER — IPRATROPIUM BROMIDE AND ALBUTEROL SULFATE 2.5; .5 MG/3ML; MG/3ML
3 SOLUTION RESPIRATORY (INHALATION) EVERY 2 HOUR PRN
Status: DISCONTINUED | OUTPATIENT
Start: 2025-02-14 | End: 2025-02-19 | Stop reason: HOSPADM

## 2025-02-14 RX ORDER — FUROSEMIDE 20 MG/1
20 TABLET ORAL DAILY
Status: DISCONTINUED | OUTPATIENT
Start: 2025-02-15 | End: 2025-02-16

## 2025-02-14 RX ORDER — ALBUTEROL SULFATE 90 UG/1
INHALANT RESPIRATORY (INHALATION)
Qty: 18 G | Refills: 0 | Status: ON HOLD | OUTPATIENT
Start: 2025-02-14

## 2025-02-14 RX ADMIN — IPRATROPIUM BROMIDE AND ALBUTEROL SULFATE 3 ML: 2.5; .5 SOLUTION RESPIRATORY (INHALATION) at 06:41

## 2025-02-14 RX ADMIN — LISINOPRIL 20 MG: 20 TABLET ORAL at 08:47

## 2025-02-14 RX ADMIN — HEPARIN SODIUM 5000 UNITS: 5000 INJECTION, SOLUTION INTRAVENOUS; SUBCUTANEOUS at 23:00

## 2025-02-14 RX ADMIN — PANTOPRAZOLE SODIUM 40 MG: 40 TABLET, DELAYED RELEASE ORAL at 06:35

## 2025-02-14 RX ADMIN — ALBUTEROL SULFATE 2 PUFF: 90 AEROSOL, METERED RESPIRATORY (INHALATION) at 00:41

## 2025-02-14 RX ADMIN — IPRATROPIUM BROMIDE AND ALBUTEROL SULFATE 3 ML: 2.5; .5 SOLUTION RESPIRATORY (INHALATION) at 00:29

## 2025-02-14 RX ADMIN — FLUTICASONE FUROATE AND VILANTEROL TRIFENATATE 1 PUFF: 200; 25 POWDER RESPIRATORY (INHALATION) at 07:29

## 2025-02-14 RX ADMIN — METOPROLOL TARTRATE 50 MG: 50 TABLET, FILM COATED ORAL at 20:37

## 2025-02-14 RX ADMIN — METOPROLOL TARTRATE 50 MG: 50 TABLET, FILM COATED ORAL at 08:47

## 2025-02-14 RX ADMIN — ASPIRIN 81 MG: 81 TABLET, COATED ORAL at 08:47

## 2025-02-14 RX ADMIN — ALBUTEROL SULFATE 2 PUFF: 90 AEROSOL, METERED RESPIRATORY (INHALATION) at 06:35

## 2025-02-14 RX ADMIN — EMPAGLIFLOZIN 10 MG: 10 TABLET, FILM COATED ORAL at 08:47

## 2025-02-14 RX ADMIN — HEPARIN SODIUM 5000 UNITS: 5000 INJECTION, SOLUTION INTRAVENOUS; SUBCUTANEOUS at 06:35

## 2025-02-14 RX ADMIN — FUROSEMIDE 40 MG: 10 INJECTION, SOLUTION INTRAVENOUS at 08:47

## 2025-02-14 RX ADMIN — HEPARIN SODIUM 5000 UNITS: 5000 INJECTION, SOLUTION INTRAVENOUS; SUBCUTANEOUS at 14:56

## 2025-02-14 SDOH — ECONOMIC STABILITY: HOUSING INSECURITY: IN THE PAST 12 MONTHS, HOW MANY TIMES HAVE YOU MOVED WHERE YOU WERE LIVING?: 0

## 2025-02-14 SDOH — SOCIAL STABILITY: SOCIAL INSECURITY
WITHIN THE LAST YEAR, HAVE YOU BEEN KICKED, HIT, SLAPPED, OR OTHERWISE PHYSICALLY HURT BY YOUR PARTNER OR EX-PARTNER?: NO

## 2025-02-14 SDOH — ECONOMIC STABILITY: HOUSING INSECURITY: AT ANY TIME IN THE PAST 12 MONTHS, WERE YOU HOMELESS OR LIVING IN A SHELTER (INCLUDING NOW)?: NO

## 2025-02-14 SDOH — ECONOMIC STABILITY: FOOD INSECURITY: WITHIN THE PAST 12 MONTHS, THE FOOD YOU BOUGHT JUST DIDN'T LAST AND YOU DIDN'T HAVE MONEY TO GET MORE.: NEVER TRUE

## 2025-02-14 SDOH — ECONOMIC STABILITY: FOOD INSECURITY: WITHIN THE PAST 12 MONTHS, YOU WORRIED THAT YOUR FOOD WOULD RUN OUT BEFORE YOU GOT THE MONEY TO BUY MORE.: NEVER TRUE

## 2025-02-14 SDOH — ECONOMIC STABILITY: FOOD INSECURITY: HOW HARD IS IT FOR YOU TO PAY FOR THE VERY BASICS LIKE FOOD, HOUSING, MEDICAL CARE, AND HEATING?: NOT VERY HARD

## 2025-02-14 SDOH — ECONOMIC STABILITY: INCOME INSECURITY: IN THE PAST 12 MONTHS HAS THE ELECTRIC, GAS, OIL, OR WATER COMPANY THREATENED TO SHUT OFF SERVICES IN YOUR HOME?: NO

## 2025-02-14 SDOH — SOCIAL STABILITY: SOCIAL INSECURITY
WITHIN THE LAST YEAR, HAVE YOU BEEN RAPED OR FORCED TO HAVE ANY KIND OF SEXUAL ACTIVITY BY YOUR PARTNER OR EX-PARTNER?: NO

## 2025-02-14 SDOH — SOCIAL STABILITY: SOCIAL INSECURITY: WITHIN THE LAST YEAR, HAVE YOU BEEN HUMILIATED OR EMOTIONALLY ABUSED IN OTHER WAYS BY YOUR PARTNER OR EX-PARTNER?: NO

## 2025-02-14 SDOH — SOCIAL STABILITY: SOCIAL INSECURITY: WITHIN THE LAST YEAR, HAVE YOU BEEN AFRAID OF YOUR PARTNER OR EX-PARTNER?: NO

## 2025-02-14 SDOH — ECONOMIC STABILITY: HOUSING INSECURITY: IN THE LAST 12 MONTHS, WAS THERE A TIME WHEN YOU WERE NOT ABLE TO PAY THE MORTGAGE OR RENT ON TIME?: NO

## 2025-02-14 SDOH — ECONOMIC STABILITY: TRANSPORTATION INSECURITY: IN THE PAST 12 MONTHS, HAS LACK OF TRANSPORTATION KEPT YOU FROM MEDICAL APPOINTMENTS OR FROM GETTING MEDICATIONS?: NO

## 2025-02-14 ASSESSMENT — COGNITIVE AND FUNCTIONAL STATUS - GENERAL
CLIMB 3 TO 5 STEPS WITH RAILING: A LITTLE
MOBILITY SCORE: 24
DAILY ACTIVITIY SCORE: 24
WALKING IN HOSPITAL ROOM: A LITTLE
MOBILITY SCORE: 20
MOVING TO AND FROM BED TO CHAIR: A LITTLE
DAILY ACTIVITIY SCORE: 24
STANDING UP FROM CHAIR USING ARMS: A LITTLE

## 2025-02-14 ASSESSMENT — PAIN SCALES - GENERAL
PAINLEVEL_OUTOF10: 0 - NO PAIN

## 2025-02-14 ASSESSMENT — PAIN - FUNCTIONAL ASSESSMENT: PAIN_FUNCTIONAL_ASSESSMENT: 0-10

## 2025-02-14 ASSESSMENT — ACTIVITIES OF DAILY LIVING (ADL)
LACK_OF_TRANSPORTATION: NO

## 2025-02-14 NOTE — PROGRESS NOTES
Occupational Therapy                 Therapy Communication Note    Patient Name: Elsa Hinson  MRN: [id number]  Department: [facility] 2 E  Room: [address] [place] [address]  Today's Date: 2/14/2025     Discipline: Occupational Therapy    OT Missed Visit: Yes     Missed Visit Reason: Missed Visit Reason: Patient placed on medical hold (RN just admitting pt to floor from ED, low BP, RN advised to defer therapy until pt stabilized)    Missed Time: Attempt    Comment:

## 2025-02-14 NOTE — CARE PLAN
The patient's goals for the shift include      The clinical goals for the shift include Pt will remain free from falls/injuries throughout shift    Over the shift, the patient did not make progress toward the following goals. Barriers to progression include   . Recommendations to address these barriers include   .

## 2025-02-14 NOTE — CONSULTS
Inpatient consult to Cardiology  Consult performed by: Cameron Benitez MD  Consult ordered by: Marino Reveles PA-C  Reason for consult: CHF  Assessment/Recommendations: See full note         History Of Present Illness:     Elsa Hinson is a 74 y.o. female with PMHx s/f CAD s/p PCI of mid LCx, HTN, HLD, COPD (not on baseline O2) presenting with shortness of breath.  She has a family history of pulmonary fibrosis.  She states she gets extremely short of breath with minimal exertion, such as ambulating from bed to bathroom or simply putting on a coat. Her shortness of breath is progressively worsening for the past couple of years and has been the worst past few days.  Her oxygen saturation was in 50s prior to ED arrival.  She has a pulse ox at home and has been taking it regularly.  She states she has been needing to use her emergency inhaler albuterol more than usual because her long-acting home inhaler ran out of prescription.  Patient is not following with pulmonary.  The inhaler has been out of prescription for a week and she admits that she has not seen a PCP for past 4 to 5 years.  She also is not following with cardiology.  She takes metoprolol and amlodipine for her blood pressure.  Her family also reports that she has decreased appetite/poor oral intake and has not been sleeping at all.   .     On arrival to hospital BNP elevated at 137, troponin flat at 8-9   VBG-pH 7.35, pCO2 65, pO2's 51, HCO3 35.9, lactate 1.1     CTA for PE-no PE. Severe emphysematous change suggestive of COPD. To 1.1 cm nodule opacities in the posterior left lower lobe. 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 Multiple solid noncalcified pulmonary nodules up to 8 mm.     Cardiology consulted due to concern for heart failure.    NSR, left atrial enlargement,     Last Recorded Vitals:  Vitals:    02/14/25 0330 02/14/25 0644 02/14/25 0724 02/14/25 1000   BP:  95/63  "117/70 (!) 107/43   Pulse: 67 67 86 74   Resp: 13 16 (!) 28 (!) 22   Temp:       TempSrc:       SpO2: 99% 99% 100% 96%   Weight:       Height:           Last Labs:  CBC - 2/14/2025:  4:25 AM  11.2 15.0 254    48.0      CMP - 2/14/2025:  4:25 AM  8.3 6.6 19 --- 0.7   _ 3.9 9 102      PTT - No results in last year.  1.1   12.3 _     Troponin I, High Sensitivity   Date/Time Value Ref Range Status   02/13/2025 06:46 PM 9 0 - 13 ng/L Final   02/13/2025 05:33 PM 8 0 - 13 ng/L Final     BNP   Date/Time Value Ref Range Status   02/13/2025 05:33  (H) 0 - 99 pg/mL Final     Hemoglobin A1C   Date/Time Value Ref Range Status   02/14/2025 04:25 AM 5.6 See comment % Final     LDL Calculated   Date/Time Value Ref Range Status   02/14/2025 04:25  (H) <=99 mg/dL Final     Comment:                                 Near   Borderline      AGE      Desirable  Optimal    High     High     Very High     0-19 Y     0 - 109     ---    110-129   >/= 130     ----    20-24 Y     0 - 119     ---    120-159   >/= 160     ----      >24 Y     0 -  99   100-129  130-159   160-189     >/=190       VLDL   Date/Time Value Ref Range Status   02/14/2025 04:25 AM 25 0 - 40 mg/dL Final   07/20/2022 03:07 PM 18 0 - 40 mg/dL Final   11/04/2019 12:06 PM 11 0 - 40 mg/dL Final   09/26/2018 03:09 PM 17 0 - 40 mg/dL Final      Last I/O:  No intake/output data recorded.    Past Cardiology Tests (Last 3 Years):  EKG:  ECG 12 Lead 02/13/2025 (Preliminary)      ECG 12 lead 02/13/2025 (Preliminary)    Echo:  No results found for this or any previous visit from the past 1095 days.    Ejection Fractions:  No results found for: \"EF\"  Cath:  No results found for this or any previous visit from the past 1095 days.    Stress Test:  No results found for this or any previous visit from the past 1095 days.    Cardiac Imaging:  No results found for this or any previous visit from the past 1095 days.      Past Medical History:  She has a past medical history of " Atherosclerotic heart disease of native coronary artery without angina pectoris (2021), Essential (primary) hypertension (2021), Hyperlipidemia, unspecified (2022), Old myocardial infarction (2020), and Other abnormalities of breathing (2013).    Past Surgical History:  She has a past surgical history that includes Other surgical history (2013);  section, classic (2013); and Tonsillectomy (2013).      Social History:  She reports that she has quit smoking. Her smoking use included cigarettes. She has never used smokeless tobacco. She reports that she does not drink alcohol and does not use drugs.    Family History:  No family history on file.     Allergies:  Sulfa (sulfonamide antibiotics)    Inpatient Medications:  Scheduled medications   Medication Dose Route Frequency    aspirin  81 mg oral Daily    empagliflozin  10 mg oral Daily    influenza  0.5 mL intramuscular During hospitalization    fluticasone furoate-vilanteroL  1 puff inhalation Daily    furosemide  40 mg intravenous Daily    heparin (porcine)  5,000 Units subcutaneous q8h USHA    ipratropium-albuteroL  3 mL nebulization q6h    lisinopril  20 mg oral Daily    metoprolol tartrate  50 mg oral BID    pantoprazole  40 mg oral Daily before breakfast    Or    pantoprazole  40 mg intravenous Daily before breakfast    perflutren protein A microsphere  0.5 mL intravenous Once in imaging     PRN medications   Medication    acetaminophen    albuterol    melatonin    ondansetron    polyethylene glycol     Continuous Medications   Medication Dose Last Rate     Outpatient Medications:  Current Outpatient Medications   Medication Instructions    albuterol 90 mcg/actuation inhaler INHALE ONE PUFF BY MOUTH EVERY 8 HOURS AS NEEDED FOR WHEEZING    amLODIPine (NORVASC) 10 mg, oral, Daily    aspirin (Aftab Low Dose Aspirin) 81 mg EC tablet 1 tablet, Daily    fluticasone propion-salmeteroL (Advair HFA) 230-21  mcg/actuation inhaler 2 puffs, inhalation, 2 times daily RT    metoprolol tartrate (LOPRESSOR) 50 mg, oral, 2 times daily       Physical Exam:  General: Alert and Oriented, No distress, cooperative  Head: Normocephalic without obvious abnormality, atraumatic  Eyes: Conjunctiva/corneas clear, EOM's grossly intact  Neck: Supple, trachea midline, No thyroid enlargement/tenderness/nodules; No JVD  Lungs: Reduced air entry bilaterally, no rhonchi, no wheeze, fine crackles lung base  Chest Wall: Barrel chest  Heart: Regular rhythm, normal S1/S2, no murmur appreciated  Abdomen: Soft, non-tender, Non-distended, bowel sounds active  Extremities: No edema, no cyanosis, no clubbing  Skin: Skin color, texture, turgor normal.  No rashes or lesions noted  Neurologic: Alert and oriented x 3, grossly moving all extremities, speech intact       Assessment/Plan   Worsening shortness of breath/COPD  Acute hypoxic respiratory failure  Concern for heart failure  CAD status post PCI  Essential hypertension    Plan:  -Presentation suggestive of COPD worsening possible undiagnosed pulm fibrosis. Obtain pulmonary consult.  There may be a component of underlying HFpEF.  -Will recommend to obtain TTE to rule out any major stress abnormalities.  -I will recommend to continue aspirin 81 mg daily.    -Will recommend to start on statin given history of CAD.  -Continue metoprolol tartrate as taking.  -Hold further diuretics, reassess volume status daily.  -Cardiology will follow please call with any questions    Peripheral IV 02/13/25 20 G Right Antecubital (Active)   Site Assessment Clean;Dry;Intact 02/14/25 1036   Line Status Blood return noted 02/14/25 1036   Dressing Intervention Dressing changed 02/14/25 1036   Number of days: 1       Code Status:  Full Code            Cameron Benitez MD

## 2025-02-14 NOTE — PROGRESS NOTES
Physical Therapy                 Therapy Communication Note    Patient Name: Elsa Hinson  MRN: [id number]  Department: [facility] 2 E  Room: [address] [place] [address]  Today's Date: 2/14/2025     Discipline: Physical Therapy          Missed Visit Reason: Patient placed on medical hold (RN just admitting pt to floor from ED, low BP, RN advised to defer PT until pt stabilized)    Missed Time: Attempt    Comment:

## 2025-02-14 NOTE — PROGRESS NOTES
Elsa Hinson is a 74 y.o. female admitted for Acute hypoxic respiratory failure (Multi). Pharmacy reviewed the patient's [id number] medications and allergies for accuracy.    The list below reflects the PTA list prior to pharmacy medication history. A summary a changes to the PTA medication list has been listed below. Please review each medication in order reconciliation for additional clarification and justification.    Source of information:  Pharmacy  No changes!  Medications added:    Medications modified:    Medications to be removed:    Medications of concern:      Prior to Admission Medications   Prescriptions Last Dose Informant Patient Reported? Taking?   albuterol 90 mcg/actuation inhaler   No No   Sig: INHALE ONE PUFF BY MOUTH EVERY 8 HOURS AS NEEDED FOR WHEEZING   amLODIPine (Norvasc) 10 mg tablet   No No   Sig: Take 1 tablet (10 mg) by mouth once daily.   aspirin (Aftab Low Dose Aspirin) 81 mg EC tablet   Yes Yes   Sig: Take 1 tablet (81 mg) by mouth once daily.   fluticasone propion-salmeteroL (Advair HFA) 230-21 mcg/actuation inhaler   No No   Sig: Inhale 2 puffs 2 times a day.   metoprolol tartrate (Lopressor) 50 mg tablet   No No   Sig: Take 1 tablet by mouth 2 times a day.      Facility-Administered Medications: None       Albertina Ford

## 2025-02-14 NOTE — H&P
[facility] - GENERAL MEDICINE HISTORY AND PHYSICAL    History Obtained From (Primary Source): Patient, family  Collateral History (Secondary Sources): D/w ED provider, chart review    History Of Present Illness (HPI):  Elsa Hinson is a 74 y.o. female with PMHx s/f CAD s/p PCI of mid LCx, history of STEMI, HTN, HLD, COPD (not on baseline O2) presenting with shortness of breath. She states she gets extremely short of breath with minimal exertion, such as ambulating from bed to bathroom or simply putting on a coat. Her shortness of breath is progressively worsening for the past couple of years and has been the worst past few days.  Her oxygen saturation was in 50s prior to ED arrival.  She states she has been needing to use her emergency inhaler albuterol more than usual because her long-acting home inhaler ran out of prescription.  The inhaler has been out of prescription for a week and she admits that she has not seen a PCP for past 4 to 5 years.  She takes metoprolol and amlodipine for her blood pressure.  Her family also reports that she has decreased appetite/poor oral intake and has not been sleeping at all. She takes naps throughout the day and never really laid down on the bed. Mostly spent her day in recliner. She notes of chills, nausea, lightheadedness, productive cough of clear sputum. Has bilateral LE edema for a couple weeks. She lives alone and her daughter has been urging her to go to the hospital but she has been refusing until today. Denies fever, vomiting, chest pain, abd pain, changes in urinary or bowel symptoms.    ED Course (Summary - please note all labs, imaging studies, and interventions noted below have been personally reviewed and/or interpreted on day of admission):   Vitals on presentation: 96.8 F, 85 bpm, 24 RR, 116/69, 66% on RA  >> 98% on 6 L nasal cannula >> 100% on 4 L nasal cannula  Labs: CMP largely unremarkable  , troponin 8-9  CBC-largely  unremarkable  Viral studies negative  VBG-pH 7.35, pCO2 65, pO2's 51, HCO3 35.9, lactate 1.1  EKG: Sinus rhythm at 77 bpm, no acute ST elevation/depression.  .  QTc 458.  Imaging: CTH - Approximately 4 x 4 cm irregular scalp thickening and ulceration involving the right parietal scalp.  No acute intracranial abnormality  CTA for PE-no PE.  Severe emphysematous change and pulmonary hyperexpansion suggestive of COPD.  To 1.1 cm nodule opacities in the posterior left lower lobe.  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  Multiple solid noncalcified pulmonary nodules up to 8 mm.  Interventions: Lasix 40 mg, admission for further management    12-point ROS reviewed and found to be negative aside from aforementioned positives in HPI and/or noted in dedicated ROS section below.     ED Course (From ED Provider):  ED Course as of 02/13/25 2158   Thu Feb 13, 2025   1843 Patient twelve-lead EKG interpreted by myself shows sinus rhythm, sugar is 97, normal CT interval, normal axis, normal QRS duration, normal QT, no STEMI. [WJ]      ED Course User Index  [WJ] Harrison Fernandes DO         Diagnoses as of 02/13/25 2158   Acute hypoxic respiratory failure (Multi)   Essential (primary) hypertension   Hyperlipidemia, unspecified hyperlipidemia type   Dyspnea on exertion     Relevant Results  Results for orders placed or performed during the hospital encounter of 02/13/25 (from the past 24 hours)   CBC and Auto Differential   Result Value Ref Range    WBC 9.7 4.4 - 11.3 x10*3/uL    nRBC 0.0 0.0 - 0.0 /100 WBCs    RBC 5.25 (H) 4.00 - 5.20 x10*6/uL    Hemoglobin 15.5 12.0 - 16.0 g/dL    Hematocrit 48.7 (H) 36.0 - 46.0 %    MCV 93 80 - 100 fL    MCH 29.5 26.0 - 34.0 pg    MCHC 31.8 (L) 32.0 - 36.0 g/dL    RDW 15.2 (H) 11.5 - 14.5 %    Platelets 246 150 - 450 x10*3/uL    Neutrophils % 79.3 40.0 - 80.0 %    Immature Granulocytes %, Automated 0.3 0.0 - 0.9 %     Lymphocytes % 13.3 13.0 - 44.0 %    Monocytes % 6.2 2.0 - 10.0 %    Eosinophils % 0.4 0.0 - 6.0 %    Basophils % 0.5 0.0 - 2.0 %    Neutrophils Absolute 7.70 (H) 1.60 - 5.50 x10*3/uL    Immature Granulocytes Absolute, Automated 0.03 0.00 - 0.50 x10*3/uL    Lymphocytes Absolute 1.29 0.80 - 3.00 x10*3/uL    Monocytes Absolute 0.60 0.05 - 0.80 x10*3/uL    Eosinophils Absolute 0.04 0.00 - 0.40 x10*3/uL    Basophils Absolute 0.05 0.00 - 0.10 x10*3/uL   Protime-INR   Result Value Ref Range    Protime 12.3 9.8 - 12.8 seconds    INR 1.1 0.9 - 1.1   Comprehensive Metabolic Panel   Result Value Ref Range    Glucose 114 (H) 74 - 99 mg/dL    Sodium 140 136 - 145 mmol/L    Potassium 3.8 3.5 - 5.3 mmol/L    Chloride 100 98 - 107 mmol/L    Bicarbonate 33 (H) 21 - 32 mmol/L    Anion Gap 11 10 - 20 mmol/L    Urea Nitrogen 19 6 - 23 mg/dL    Creatinine 0.71 0.50 - 1.05 mg/dL    eGFR 89 >60 mL/min/1.73m*2    Calcium 8.9 8.6 - 10.3 mg/dL    Albumin 4.2 3.4 - 5.0 g/dL    Alkaline Phosphatase 108 33 - 136 U/L    Total Protein 7.0 6.4 - 8.2 g/dL    AST 18 9 - 39 U/L    Bilirubin, Total 0.9 0.0 - 1.2 mg/dL    ALT 11 7 - 45 U/L   Magnesium   Result Value Ref Range    Magnesium 2.17 1.60 - 2.40 mg/dL   B-Type Natriuretic Peptide   Result Value Ref Range     (H) 0 - 99 pg/mL   BLOOD GAS VENOUS FULL PANEL   Result Value Ref Range    POCT pH, Venous 7.35 7.33 - 7.43 pH    POCT pCO2, Venous 65 (H) 41 - 51 mm Hg    POCT pO2, Venous 51 (H) 35 - 45 mm Hg    POCT SO2, Venous 79 (H) 45 - 75 %    POCT Oxy Hemoglobin, Venous 77.2 (H) 45.0 - 75.0 %    POCT Hematocrit Calculated, Venous 50.0 (H) 36.0 - 46.0 %    POCT Sodium, Venous 137 136 - 145 mmol/L    POCT Potassium, Venous 3.8 3.5 - 5.3 mmol/L    POCT Chloride, Venous 98 98 - 107 mmol/L    POCT Ionized Calicum, Venous 1.17 1.10 - 1.33 mmol/L    POCT Glucose, Venous 117 (H) 74 - 99 mg/dL    POCT Lactate, Venous 1.1 0.4 - 2.0 mmol/L    POCT Base Excess, Venous 7.3 (H) -2.0 - 3.0 mmol/L    POCT  HCO3 Calculated, Venous 35.9 (H) 22.0 - 26.0 mmol/L    POCT Hemoglobin, Venous 16.5 (H) 12.0 - 16.0 g/dL    POCT Anion Gap, Venous 7.0 (L) 10.0 - 25.0 mmol/L    Patient Temperature 37.0 degrees Celsius    FiO2 36 %   Sars-CoV-2 and Influenza A/B PCR   Result Value Ref Range    Flu A Result Not Detected Not Detected    Flu B Result Not Detected Not Detected    Coronavirus 2019, PCR Not Detected Not Detected   Troponin I, High Sensitivity, Initial   Result Value Ref Range    Troponin I, High Sensitivity 8 0 - 13 ng/L   Troponin, High Sensitivity, 1 Hour   Result Value Ref Range    Troponin I, High Sensitivity 9 0 - 13 ng/L      CT angio chest for pulmonary embolism    Result Date: 2/13/2025  Interpreted By:  Pancho Driscoll, STUDY: CT ANGIO CHEST FOR PULMONARY EMBOLISM;  2/13/2025 8:10 pm   INDICATION: Signs/Symptoms:leg swelling, hypoxia, sob, prior heart attack.     COMPARISON: CT chest 01/22/2018   ACCESSION NUMBER(S): [id number]   ORDERING CLINICIAN: ANYA VILLALTA   TECHNIQUE: Contiguous axial images of the chest were obtained after the intravenous administration of 50 mL Omnipaque 350 contrast using angiographic PE protocol. Coronal and sagittal reformatted images were reconstructed from the axial data. MIP images were created on an independent workstation and reviewed.   FINDINGS: PULMONARY ARTERIES: Adequate opacification to the level of the subsegmental arteries. No filling defect to suggest pulmonary embolus in the visualized pulmonary arteries. Borderline enlargement of the main pulmonary artery measuring up to 3 cm in diameter. No CT evidence of right heart strain.   HEART: Normal in size. Moderate triple-vessel coronary vascular calcifications. No significant pericardial effusion.   VESSELS: Normal caliber aorta without dissection. Small contour irregularity along the left posterior wall of the descending thoracic aorta at the 5 o'clock position (series 5, image 198) suggesting a small saccular aneurysm.  Heavy atherosclerotic calcifications of the aortic arch and moderate to heavy calcifications of the descending thoracic aorta.   MEDIASTINUM AND LYMPH NODES: Visualized thyroid is within normal limits. No enlarged intrathoracic or axillary lymph nodes by imaging criteria. No pneumomediastinum. The esophagus appears within normal limits.   LUNG, AIRWAYS, AND PLEURA: Trachea and proximal mainstem bronchi are patent. Saber sheath appearance of the trachea. Severe emphysematous changes are noted diffusely throughout the bilateral lungs with apparent centrilobular predominance in the mid to lower lung zones. The upper lobe and apical normal architecture is essentially replaced with bullous change. The lungs are hyperexpanded with increased AP diameter of the chest. 1.1 cm sub pleural nodule posteriorly in the left lower lobe. Slightly more inferiorly and medially there is a 1.2 cm subpleural nodular opacity. Left lower lobe calcified granuloma.   OSSEOUS STRUCTURES: No acute osseous abnormality.   CHEST WALL SOFT TISSUES: No discernible abnormality.   UPPER ABDOMEN/OTHER: No acute abnormality.       1. No pulmonary embolism identified. 2. Severe emphysematous changes and pulmonary hyperexpansion suggestive of obstructive pulmonary disease. 3. Two 1.1 cm nodular opacities in the posterior left lower lobe. 4. Contour irregularity suggestive of small saccular aneurysm along the left posterior wall of the descending thoracic aorta.   MACRO: Incidental Finding:  Multiple solid non-calcified pulmonary nodules measuring up to greater than 8 mm.  (**-YCF-**)   Instructions:  Consider follow up non contrast chest CT at 3-6 months, then consider CT chest at 18-24 months. (Gene Pritchett et al., Guidelines for management of incidental pulmonary nodules detected on CT images: From the Fleischner Society 2017, Radiology. 2017 Jul;284 (1):228-243.) [email or web address]   Signed by: Pancho Driscoll 2/13/2025 8:53 PM Dictation workstation:    [id number]    CT head wo IV contrast    Result Date: 2/13/2025  Interpreted By:  Frida Hill, STUDY: CT HEAD WO IV CONTRAST;  2/13/2025 8:10 pm   INDICATION: Signs/Symptoms:lesion of skin at top of head, suspect cancer, eval depth.   COMPARISON: None.   ACCESSION NUMBER(S): [id number]   ORDERING CLINICIAN: ANYA VILLALTA   TECHNIQUE: Noncontrast axial CT scan of head was performed. Angled reformats in brain and bone windows were generated. The images were reviewed in bone, brain, blood and soft tissue windows.   FINDINGS: CSF Spaces: The ventricles, sulci and cisterns are within normal limits for patient's age.  There is no extraaxial fluid collection.   Parenchyma: Confluent areas of subcortical and periventricular white matter changes which given patient's age are suggestive of chronic small vessel ischemic disease. The grey-white differentiation is intact. There is no mass effect or midline shift.  There is no intracranial hemorrhage.   Calvarium: There is irregular skin thickening within the right frontoparietal scalp in an area measuring up to 4 cm in diameter. No underlying osseous erosions are seen.   Paranasal sinuses and mastoids: Visualized paranasal sinuses and mastoids are clear.       1. Approximately 4 x 4 cm irregular scalp thickening and ulceration involving the right parietal scalp. The underlying skull appears intact. Further evaluation with contrast enhanced MRI can be performed for better assessment as clinically warranted.   2.       No evidence of acute cortical infarct or intracranial hemorrhage. No CT evidence for intracranial masses within limitation of this unenhanced exam.   MACRO: None   Signed by: Frida Hill 2/13/2025 8:41 PM Dictation workstation:   [id number]    Scheduled medications:  influenza, 0.5 mL, intramuscular, During hospitalization      Continuous medications:     PRN medications:       Past Medical History  She has a past medical history of Atherosclerotic heart  disease of native coronary artery without angina pectoris (2021), Essential (primary) hypertension (2021), Hyperlipidemia, unspecified (2022), Old myocardial infarction (2020), and Other abnormalities of breathing (2013).    Surgical History  She has a past surgical history that includes Other surgical history (2013);  section, classic (2013); and Tonsillectomy (2013).     Social History  She reports that she has quit smoking. Her smoking use included cigarettes. She has never used smokeless tobacco. She reports that she does not drink alcohol and does not use drugs.    Family History  No family history on file.    Allergies  Sulfa (sulfonamide antibiotics)    Code Status  No Order     Review of Systems   Constitutional:  Positive for appetite change and chills. Negative for fatigue and fever.   Respiratory:  Positive for cough and shortness of breath. Negative for chest tightness and wheezing.    Cardiovascular:  Positive for leg swelling. Negative for chest pain and palpitations.   Gastrointestinal:  Positive for nausea. Negative for abdominal pain, constipation, diarrhea and vomiting.   Genitourinary:  Negative for flank pain and hematuria.   Musculoskeletal:  Negative for back pain and joint swelling.   Skin:  Negative for rash and wound.   Neurological:  Positive for light-headedness. Negative for tremors, syncope, weakness and headaches.       Last Recorded Vitals  /61   Pulse 85   Temp 36 °C (96.8 °F) (Tympanic)   Resp (!) 25   Wt (!) 44.5 kg (98 lb)   SpO2 100%      Physical Exam:  Vital signs and nursing notes reviewed.   Constitutional: Pleasant and cooperative. Laying in bed in no acute distress. Conversant.   Skin: Warm and dry; lesion noted at the top of her head, see media section for better visualization.  Eyes: EOMI. Anicteric sclera.   ENT: Mucous membranes moist; no obvious injury or deformity appreciated.   Head and Neck:  Normocephalic, atraumatic. ROM preserved. Trachea midline. No appreciable JVD.   Respiratory: Nonlabored on 4L NC. Lungs diminished bilaterally without obvious adventitious sounds. Chest rise is equal.  Cardiovascular: RRR. No gross murmur, gallop, or rub. Extremities are warm and well-perfused with good capillary refill (< 3 seconds). No chest wall tenderness.   GI: Abdomen soft, nontender, nondistended. No obvious organomegaly appreciated. Bowel sounds are present.  : No CVA tenderness.   MSK: No gross abnormalities appreciated. No limitations to AROM/PROM appreciated.   Extremities: Edema in BLE. No cyanosis, or clubbing evident. Neurovascularly intact.   Neuro: A&Ox3. CN 2-12 grossly intact. Able to respond to questions appropriately and clearly. No acute focal neurologic deficits appreciated.  Psych: Appropriate mood and behavior.    Assessment/Plan     74 y.o. female with PMHx s/f CAD s/p PCI of mid LCx, history of STEMI, HTN, HLD, COPD (not on baseline O2) presenting with shortness of breath.    Acute CHF  Elevated BNP  -Evidenced by presenting sxs: NOVA, orthopnea, BLE edema  -TTE ordered, BNP of 137  -Initiate lasix 40 mg in ED, discontinued amlodipine  -started on jardiance 10 mg daily, lasix 40 mg daily, and lisinopril 20 mg   -Lipid panel, A1c, TSH pending  -Monitor renal function and fluid status closely with diuresis   -2g salt restriction, 2L fluid restriction   -Cardiology consult    COPD  -does not appear to be in acute exacerbation; seems to be progressing COPD  -Duonebs  -continue home inhalers  -to schedule with a pulmonology outpatient    Acute hypoxic respiratory failure  -2/2 above  -Currently requiring 4 L nasal cannula  -VBG largely unremarkable  -continue to monitor, might be the new baseline for her    CAD s/p PCI of mid Lcx  -denies anginal chest discomfort  -Continue home aspirin    HTN  -Stable and controlled  -Continue antihypertensives as outlined above    HLD  -Lipid panel  pending    1.1 cm nodule opacities in the posterior left lower lobe  Multiple solid noncalcified pulmonary nodules up to 8 mm  -follow-up with non contrast chest CT at 3-6 months  -to schedule with a pulmonology outpatient    Diet: Cardiac, fluid restriction  DVT Prophylaxis: Heparin  Code Status: Full code  Case Discussed With: ED provider  Additional Sources Reviewed:     Anticipated Length of Stay (LOS): Patient will require      Marino Reveles PA-C    Dragon dictation software was used to dictate this note and thus there may be minor errors in translation/transcription including garbled speech or misspellings. Please contact for clarification if needed.

## 2025-02-14 NOTE — PROGRESS NOTES
Elsa Hinson is a 74 y.o. female on day 0 of admission presenting with Acute hypoxic respiratory failure (Multi).    Subjective   Elsa Hinson is a 74 y.o. female with PMHx s/f CAD s/p PCI of mid LCx, history of STEMI, HTN, HLD, COPD (not on baseline O2) presenting with shortness of breath. She states she gets extremely short of breath with minimal exertion, such as ambulating from bed to bathroom or simply putting on a coat. Her shortness of breath is progressively worsening for the past couple of years and has been the worst past few days.  Her oxygen saturation was in 50s prior to ED arrival.  She states she has been needing to use her emergency inhaler albuterol more than usual because her long-acting home inhaler ran out of prescription.  The inhaler has been out of prescription for a week and she admits that she has not seen a PCP for past 4 to 5 years.  She takes metoprolol and amlodipine for her blood pressure.  Her family also reports that she has decreased appetite/poor oral intake and has not been sleeping at all. She takes naps throughout the day and never really laid down on the bed. Mostly spent her day in recliner. She notes of chills, nausea, lightheadedness, productive cough of clear sputum. Has bilateral LE edema for a couple weeks. She lives alone and her daughter has been urging her to go to the hospital but she has been refusing until today. Denies fever, vomiting, chest pain, abd pain, changes in urinary or bowel symptoms.     ED Course (Summary - please note all labs, imaging studies, and interventions noted below have been personally reviewed and/or interpreted on day of admission):   Vitals on presentation: 96.8 F, 85 bpm, 24 RR, 116/69, 66% on RA  >> 98% on 6 L nasal cannula >> 100% on 4 L nasal cannula  Labs: CMP largely unremarkable  , troponin 8-9  CBC-largely unremarkable  Viral studies negative  VBG-pH 7.35, pCO2 65, pO2's 51, HCO3 35.9, lactate 1.1  EKG: Sinus rhythm at 77  bpm, no acute ST elevation/depression.  .  QTc 458.  Imaging: CTH - Approximately 4 x 4 cm irregular scalp thickening and ulceration involving the right parietal scalp.  No acute intracranial abnormality  CTA for PE-no PE.  Severe emphysematous change and pulmonary hyperexpansion suggestive of COPD.  To 1.1 cm nodule opacities in the posterior left lower lobe.  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  Multiple solid noncalcified pulmonary nodules up to 8 mm.  Interventions: Lasix 40 mg, admission for further management     12-point ROS reviewed and found to be negative aside from aforementioned positives in HPI and/or noted in dedicated ROS section below.     2/14: SOB improving. Edema resolved.        Objective     onstitutional: Pleasant and cooperative. Laying in bed in no acute distress. Conversant.   Skin: Warm and dry; lesion noted at the top of her head, see media section for better visualization.  Eyes: EOMI. Anicteric sclera.   ENT: Mucous membranes moist; no obvious injury or deformity appreciated.   Head and Neck: Normocephalic, atraumatic. ROM preserved. Trachea midline. No appreciable JVD.   Respiratory: Nonlabored on 4L NC. Lungs diminished bilaterally without obvious adventitious sounds. Chest rise is equal.  Cardiovascular: RRR. No gross murmur, gallop, or rub. Extremities are warm and well-perfused with good capillary refill (< 3 seconds). No chest wall tenderness.   GI: Abdomen soft, nontender, nondistended. No obvious organomegaly appreciated. Bowel sounds are present.  : No CVA tenderness.   MSK: No gross abnormalities appreciated. No limitations to AROM/PROM appreciated.   Extremities: No Edema in BLE. No cyanosis, or clubbing evident. Neurovascularly intact.   Neuro: A&Ox3. CN 2-12 grossly intact. Able to respond to questions appropriately and clearly. No acute focal neurologic deficits appreciated.  Psych:  Appropriate mood and behavior.    Last Recorded Vitals  Blood pressure (!) 107/43, pulse 61, temperature 36 °C (96.8 °F), temperature source Tympanic, resp. rate 19, height 1.524 m (5'), weight (!) 44.5 kg (98 lb), SpO2 96%.  Intake/Output last 3 Shifts:  No intake/output data recorded.    Relevant Results                              Assessment/Plan   Acute CHF  Elevated BNP  -Evidenced by presenting sxs: NVOA, orthopnea, BLE edema  -TTE ordered, BNP of 137  -LE edema has resolved and SOB is improving (she remains on 4L but is saturating at 100% so her needs are likely less)   -started on jardiance 10 mg daily, lasix 20 mg PO daily, and lisinopril 20 mg   -Monitor renal function and fluid status closely with diuresis   -2g salt restriction, 2L fluid restriction   -Cardiology consult     COPD  -does not appear to be in acute exacerbation; seems to be progressing COPD  -Duonebs  -continue home inhalers  -to schedule with a pulmonology outpatient     Acute hypoxic respiratory failure  -2/2 above  -Currently on 4 L nasal cannula     CAD s/p PCI of mid Lcx  -denies anginal chest discomfort  -Continue home aspirin     HTN  -Stable and controlled  -Continue antihypertensives as outlined above     1.1 cm nodule opacities in the posterior left lower lobe  Multiple solid noncalcified pulmonary nodules up to 8 mm  -follow-up with non contrast chest CT at 3-6 months  -to schedule with a pulmonology outpatient    Kyle Baltazar MD PhD

## 2025-02-14 NOTE — PROGRESS NOTES
02/14/25 1535   Discharge Planning   Living Arrangements Alone   Support Systems Family members;Children   Assistance Needed Independent   Type of Residence Private residence   Home or Post Acute Services In home services   Type of Home Care Services Home nursing visits;Home OT;Home PT   Expected Discharge Disposition Home H   Does the patient need discharge transport arranged? Yes   RoundTrip coordination needed? Yes   Has discharge transport been arranged? No   Financial Resource Strain   How hard is it for you to pay for the very basics like food, housing, medical care, and heating? Not hard   Housing Stability   In the last 12 months, was there a time when you were not able to pay the mortgage or rent on time? N   At any time in the past 12 months, were you homeless or living in a shelter (including now)? N   Transportation Needs   In the past 12 months, has lack of transportation kept you from medical appointments or from getting medications? no   In the past 12 months, has lack of transportation kept you from meetings, work, or from getting things needed for daily living? No     PCP is Cruz Randolph MD. Patient is from home alone with support from her children and grandchildren. She states that she is independent with her cane but that is limited by her shortness of breath. She sponge baths due to limited endurance for a bath. She is able to cook and lightly clean. Her family assists with transportation. Patient is on oxygen, none at baseline, if unable to wean to room air, patient will need O2 evaluation prior to discharge. PT/ OT pending. Patient is open to C but uncertain regarding SNF placement if recommended. TCC  to follow.

## 2025-02-15 LAB
ANION GAP SERPL CALC-SCNC: 10 MMOL/L (ref 10–20)
BUN SERPL-MCNC: 19 MG/DL (ref 6–23)
CALCIUM SERPL-MCNC: 8.4 MG/DL (ref 8.6–10.3)
CHLORIDE SERPL-SCNC: 97 MMOL/L (ref 98–107)
CO2 SERPL-SCNC: 36 MMOL/L (ref 21–32)
CREAT SERPL-MCNC: 0.73 MG/DL (ref 0.5–1.05)
EGFRCR SERPLBLD CKD-EPI 2021: 86 ML/MIN/1.73M*2
GLUCOSE SERPL-MCNC: 77 MG/DL (ref 74–99)
MAGNESIUM SERPL-MCNC: 2.18 MG/DL (ref 1.6–2.4)
MAGNESIUM SERPL-MCNC: NORMAL MG/DL
POTASSIUM SERPL-SCNC: 3.3 MMOL/L (ref 3.5–5.3)
SODIUM SERPL-SCNC: 140 MMOL/L (ref 136–145)

## 2025-02-15 PROCEDURE — 99233 SBSQ HOSP IP/OBS HIGH 50: CPT | Performed by: STUDENT IN AN ORGANIZED HEALTH CARE EDUCATION/TRAINING PROGRAM

## 2025-02-15 PROCEDURE — 83735 ASSAY OF MAGNESIUM: CPT | Performed by: INTERNAL MEDICINE

## 2025-02-15 PROCEDURE — 36415 COLL VENOUS BLD VENIPUNCTURE: CPT | Performed by: INTERNAL MEDICINE

## 2025-02-15 PROCEDURE — 2500000004 HC RX 250 GENERAL PHARMACY W/ HCPCS (ALT 636 FOR OP/ED)

## 2025-02-15 PROCEDURE — 80048 BASIC METABOLIC PNL TOTAL CA: CPT | Performed by: INTERNAL MEDICINE

## 2025-02-15 PROCEDURE — 2500000001 HC RX 250 WO HCPCS SELF ADMINISTERED DRUGS (ALT 637 FOR MEDICARE OP): Performed by: INTERNAL MEDICINE

## 2025-02-15 PROCEDURE — 99233 SBSQ HOSP IP/OBS HIGH 50: CPT | Performed by: INTERNAL MEDICINE

## 2025-02-15 PROCEDURE — 2500000002 HC RX 250 W HCPCS SELF ADMINISTERED DRUGS (ALT 637 FOR MEDICARE OP, ALT 636 FOR OP/ED): Performed by: INTERNAL MEDICINE

## 2025-02-15 PROCEDURE — 1200000002 HC GENERAL ROOM WITH TELEMETRY DAILY

## 2025-02-15 PROCEDURE — 97163 PT EVAL HIGH COMPLEX 45 MIN: CPT | Mod: GP

## 2025-02-15 PROCEDURE — 97165 OT EVAL LOW COMPLEX 30 MIN: CPT | Mod: GO

## 2025-02-15 PROCEDURE — 2500000001 HC RX 250 WO HCPCS SELF ADMINISTERED DRUGS (ALT 637 FOR MEDICARE OP)

## 2025-02-15 RX ORDER — POTASSIUM CHLORIDE 750 MG/1
40 TABLET, FILM COATED, EXTENDED RELEASE ORAL ONCE
Status: COMPLETED | OUTPATIENT
Start: 2025-02-15 | End: 2025-02-15

## 2025-02-15 RX ORDER — METOPROLOL TARTRATE 25 MG/1
12.5 TABLET, FILM COATED ORAL 2 TIMES DAILY
Status: DISCONTINUED | OUTPATIENT
Start: 2025-02-15 | End: 2025-02-19 | Stop reason: HOSPADM

## 2025-02-15 RX ADMIN — METOPROLOL TARTRATE 50 MG: 50 TABLET, FILM COATED ORAL at 08:25

## 2025-02-15 RX ADMIN — METOPROLOL TARTRATE 12.5 MG: 25 TABLET, FILM COATED ORAL at 21:30

## 2025-02-15 RX ADMIN — FUROSEMIDE 20 MG: 20 TABLET ORAL at 08:25

## 2025-02-15 RX ADMIN — EMPAGLIFLOZIN 10 MG: 10 TABLET, FILM COATED ORAL at 08:25

## 2025-02-15 RX ADMIN — HEPARIN SODIUM 5000 UNITS: 5000 INJECTION, SOLUTION INTRAVENOUS; SUBCUTANEOUS at 21:31

## 2025-02-15 RX ADMIN — LISINOPRIL 20 MG: 20 TABLET ORAL at 08:25

## 2025-02-15 RX ADMIN — PANTOPRAZOLE SODIUM 40 MG: 40 TABLET, DELAYED RELEASE ORAL at 06:09

## 2025-02-15 RX ADMIN — POTASSIUM CHLORIDE 40 MEQ: 750 TABLET, FILM COATED, EXTENDED RELEASE ORAL at 10:58

## 2025-02-15 RX ADMIN — HEPARIN SODIUM 5000 UNITS: 5000 INJECTION, SOLUTION INTRAVENOUS; SUBCUTANEOUS at 06:46

## 2025-02-15 RX ADMIN — HEPARIN SODIUM 5000 UNITS: 5000 INJECTION, SOLUTION INTRAVENOUS; SUBCUTANEOUS at 14:02

## 2025-02-15 RX ADMIN — ASPIRIN 81 MG: 81 TABLET, COATED ORAL at 08:25

## 2025-02-15 RX ADMIN — FLUTICASONE FUROATE AND VILANTEROL TRIFENATATE 1 PUFF: 200; 25 POWDER RESPIRATORY (INHALATION) at 06:09

## 2025-02-15 ASSESSMENT — COGNITIVE AND FUNCTIONAL STATUS - GENERAL
HELP NEEDED FOR BATHING: A LOT
TOILETING: A LITTLE
MOVING TO AND FROM BED TO CHAIR: A LITTLE
MOBILITY SCORE: 20
EATING MEALS: A LITTLE
DRESSING REGULAR LOWER BODY CLOTHING: A LOT
STANDING UP FROM CHAIR USING ARMS: TOTAL
MOVING TO AND FROM BED TO CHAIR: TOTAL
MOBILITY SCORE: 10
DAILY ACTIVITIY SCORE: 15
CLIMB 3 TO 5 STEPS WITH RAILING: A LITTLE
WALKING IN HOSPITAL ROOM: A LITTLE
STANDING UP FROM CHAIR USING ARMS: A LITTLE
MOBILITY SCORE: 20
DAILY ACTIVITIY SCORE: 22
WALKING IN HOSPITAL ROOM: A LITTLE
DRESSING REGULAR UPPER BODY CLOTHING: A LITTLE
TOILETING: A LOT
HELP NEEDED FOR BATHING: A LITTLE
MOVING TO AND FROM BED TO CHAIR: A LITTLE
HELP NEEDED FOR BATHING: A LITTLE
PERSONAL GROOMING: A LITTLE
WALKING IN HOSPITAL ROOM: TOTAL
CLIMB 3 TO 5 STEPS WITH RAILING: A LITTLE
TOILETING: A LITTLE
CLIMB 3 TO 5 STEPS WITH RAILING: TOTAL
TURNING FROM BACK TO SIDE WHILE IN FLAT BAD: A LITTLE
MOVING FROM LYING ON BACK TO SITTING ON SIDE OF FLAT BED WITH BEDRAILS: A LITTLE
STANDING UP FROM CHAIR USING ARMS: A LITTLE
DAILY ACTIVITIY SCORE: 22

## 2025-02-15 ASSESSMENT — PAIN - FUNCTIONAL ASSESSMENT
PAIN_FUNCTIONAL_ASSESSMENT: 0-10

## 2025-02-15 ASSESSMENT — PAIN SCALES - GENERAL
PAINLEVEL_OUTOF10: 0 - NO PAIN

## 2025-02-15 ASSESSMENT — ACTIVITIES OF DAILY LIVING (ADL)
ADL_ASSISTANCE: INDEPENDENT
ADL_ASSISTANCE: INDEPENDENT
BATHING_ASSISTANCE: MODERATE

## 2025-02-15 NOTE — PROGRESS NOTES
Physical Therapy    Physical Therapy Evaluation    Patient Name: Elsa Hinson  MRN: [id number]  Department: [facility] E  Room: [address] [place] [address]  Today's Date: 2/15/2025   Time Calculation  Start Time: 1011  Stop Time: 1037  Time Calculation (min): 26 min    Assessment/Plan   PT Assessment  PT Assessment Results: Decreased strength, Decreased mobility, Impaired balance, Decreased endurance (Transfers/gait will need assessed when appropriate)  Rehab Prognosis: Fair  Evaluation/Treatment Tolerance: Other (Comment) (drop in O2 saturation)  Medical Staff Made Aware: Yes  End of Session Communication: Bedside nurse  End of Session Patient Position: Bed, 3 rail up, Alarm on  IP OR SWING BED PT PLAN  Inpatient or Swing Bed: Inpatient  PT Plan  Treatment/Interventions: Bed mobility, Transfer training, Gait training, Balance training, Strengthening, Therapeutic exercise  PT Plan: Ongoing PT  PT Frequency: 4 times per week  PT Discharge Recommendations: Moderate intensity level of continued care  PT - OK to Discharge: Yes    Subjective   General Visit Information:  General  Reason for Referral: impaired mobility/gait training  Referred By: Marino Reveles PA-C  Past Medical History Relevant to Rehab: Ad Dx: acute hypoxic respiratory failure, essential HTN, HLD, NOVA -- PMHx: HTN, HLD, CAD, previous MI, COPD  Family/Caregiver Present: Yes  Caregiver Feedback: niece present at end of session  Co-Treatment: OT  Co-Treatment Reason: to ensure patient safety  Prior to Session Communication: Bedside nurse  Patient Position Received: Alarm on, Bed, 3 rail up  Home Living:  Home Living  Type of Home: House  Lives With: Alone  Home Adaptive Equipment: Cane  Home Layout: One level  Home Access: No concerns  Prior Level of Function:  Prior Function Per Pt/Caregiver Report  Level of [place]: Independent with ADLs and functional transfers  Receives Help From: Family  ADL Assistance: Independent  Ambulatory Assistance:  "Independent  Precautions:  Precautions  Medical Precautions: Oxygen therapy device and L/min, Fall precautions (2L)       Vital Signs Comment: see next column  VS monitored throughout session; pt spO2 88% supine at rest while talking. RN informed and requested to maintain pt on 2L and agreeable to EOB sitting. able to improve to 91% with cuing. Seated EOB maintained between 88-89% x1 minute, improved to 91% then slow destating to 82% despite cuing. Requested pt to lay down, observed spO2 at 79% when returned to supine. Once supine spO2 observed at 65%, RN present at bedside. Increased to 3L. Requiring 2-3 minutes supine rest to improve to 90%.     Objective   Pain:  Pain Assessment  Pain Assessment: 0-10  0-10 (Numeric) Pain Score: 0 - No pain  Cognition:  Cognition  Overall Cognitive Status: Within Functional Limits  Orientation Level: Oriented X4    General Assessments:  General Observation  General Observation: IV connected, 2L O2 NC     Activity Tolerance  Endurance: Decreased tolerance for upright activites  Activity Tolerance Comments: see next column  EOB sitting ~ 4 minutes with CGA, destating to 82-79% while seated. Pt endorses \"anxiety symptoms\" which indicated to her the drop in spO2 initally at home. See vital signs/comments for details. RN aware during session and present at end of session due to dropping to 65% once returned to supine.     Functional Assessments:  Bed Mobility  Bed Mobility: Yes  Bed Mobility 1  Bed Mobility 1: Supine to sitting, Sitting to supine  Level of Assistance 1: Minimum assistance    Transfers  Transfer: No  Extremity/Trunk Assessments:  Strength RLE  R Knee Flexion: 3+/5  R Knee Extension: 3+/5  Strength LLE  L Knee Flexion: 3+/5  L Knee Extension: 3+/5  Outcome Measures:  [facility] Basic Mobility  Turning from your back to your side while in a flat bed without using bedrails: A little  Moving from lying on your back to sitting on the side of a flat bed without using bedrails: A " little  Moving to and from bed to chair (including a wheelchair): Total  Standing up from a chair using your arms (e.g. wheelchair or bedside chair): Total  To walk in hospital room: Total  Climbing 3-5 steps with railing: Total  Basic Mobility - Total Score: 10    Encounter Problems       Encounter Problems (Active)       Balance       STG - Maintains static sitting balance without upper extremity support (Progressing)       Start:  02/15/25    Expected End:  03/01/25       For >15 minutes for eating meals while maintain O2 saturation >90%            Mobility       Goal 1 (Progressing)       Start:  02/15/25    Expected End:  03/01/25       Patient will be able to perform all bed mobility while maintaining >90% O2 saturation            Pain - Adult          Safety       Goal 1 (Progressing)       Start:  02/15/25    Expected End:  03/01/25       Increase MINI knee strength to >4/5 to ease capability to perform transfers                Education Documentation  Precautions, taught by Adolfo Castorena, PT at 2/15/2025 12:52 PM.  Learner: Patient  Readiness: Acceptance  Method: Explanation  Response: Verbalizes Understanding, Demonstrated Understanding, Needs Reinforcement    ADL Training, taught by Adolfo Castorena, PT at 2/15/2025 12:52 PM.  Learner: Patient  Readiness: Acceptance  Method: Explanation  Response: Verbalizes Understanding, Demonstrated Understanding, Needs Reinforcement    Education Comments  No comments found.

## 2025-02-15 NOTE — PROGRESS NOTES
Elsa Hinson is a 74 y.o. female on day 1 of admission presenting with Acute hypoxic respiratory failure (Multi).    Subjective   Elsa Hinson is a 74 y.o. female with PMHx s/f CAD s/p PCI of mid LCx, history of STEMI, HTN, HLD, COPD (not on baseline O2) presenting with shortness of breath. She states she gets extremely short of breath with minimal exertion, such as ambulating from bed to bathroom or simply putting on a coat. Her shortness of breath is progressively worsening for the past couple of years and has been the worst past few days.  Her oxygen saturation was in 50s prior to ED arrival.  She states she has been needing to use her emergency inhaler albuterol more than usual because her long-acting home inhaler ran out of prescription.  The inhaler has been out of prescription for a week and she admits that she has not seen a PCP for past 4 to 5 years.  She takes metoprolol and amlodipine for her blood pressure.  Her family also reports that she has decreased appetite/poor oral intake and has not been sleeping at all. She takes naps throughout the day and never really laid down on the bed. Mostly spent her day in recliner. She notes of chills, nausea, lightheadedness, productive cough of clear sputum. Has bilateral LE edema for a couple weeks. She lives alone and her daughter has been urging her to go to the hospital but she has been refusing until today. Denies fever, vomiting, chest pain, abd pain, changes in urinary or bowel symptoms.     ED Course (Summary - please note all labs, imaging studies, and interventions noted below have been personally reviewed and/or interpreted on day of admission):   Vitals on presentation: 96.8 F, 85 bpm, 24 RR, 116/69, 66% on RA  >> 98% on 6 L nasal cannula >> 100% on 4 L nasal cannula  Labs: CMP largely unremarkable  , troponin 8-9  CBC-largely unremarkable  Viral studies negative  VBG-pH 7.35, pCO2 65, pO2's 51, HCO3 35.9, lactate 1.1  EKG: Sinus rhythm at 77  bpm, no acute ST elevation/depression.  .  QTc 458.  Imaging: CTH - Approximately 4 x 4 cm irregular scalp thickening and ulceration involving the right parietal scalp.  No acute intracranial abnormality  CTA for PE-no PE.  Severe emphysematous change and pulmonary hyperexpansion suggestive of COPD.  To 1.1 cm nodule opacities in the posterior left lower lobe.  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  Multiple solid noncalcified pulmonary nodules up to 8 mm.  Interventions: Lasix 40 mg, admission for further management     12-point ROS reviewed and found to be negative aside from aforementioned positives in HPI and/or noted in dedicated ROS section below.     2/14: SOB improving. Edema resolved.     2/15: She remains dyspneic and its likely from underlying chronic lung dz. She is euvolemic. She quickly desats with movement. BP was low after AM meds so I am holding lasix and lisinopril and substantially decreased metoprolol dose.          Objective     onstitutional: Pleasant and cooperative. Laying in bed in no acute distress. Conversant.   Skin: Warm and dry; lesion noted at the top of her head, see media section for better visualization.  Eyes: EOMI. Anicteric sclera.   ENT: Mucous membranes moist; no obvious injury or deformity appreciated.   Head and Neck: Normocephalic, atraumatic. ROM preserved. Trachea midline. No appreciable JVD.   Respiratory: Nonlabored on 4L NC. Lungs diminished bilaterally without obvious adventitious sounds. Chest rise is equal.  Cardiovascular: RRR. No gross murmur, gallop, or rub. Extremities are warm and well-perfused with good capillary refill (< 3 seconds). No chest wall tenderness.   GI: Abdomen soft, nontender, nondistended. No obvious organomegaly appreciated. Bowel sounds are present.  : No CVA tenderness.   MSK: No gross abnormalities appreciated. No limitations to AROM/PROM appreciated.    Extremities: No Edema in BLE. No cyanosis, or clubbing evident. Neurovascularly intact.   Neuro: A&Ox3. CN 2-12 grossly intact. Able to respond to questions appropriately and clearly. No acute focal neurologic deficits appreciated.  Psych: Appropriate mood and behavior.    Last Recorded Vitals  Blood pressure (!) 86/45, pulse 64, temperature 36.6 °C (97.8 °F), temperature source Temporal, resp. rate 18, height 1.524 m (5'), weight (!) 44.2 kg (97 lb 8 oz), SpO2 90%.  Intake/Output last 3 Shifts:  I/O last 3 completed shifts:  In: 237 (5.4 mL/kg) [P.O.:237]  Out: - (0 mL/kg)   Weight: 44.2 kg     Relevant Results                              Assessment/Plan   Acute Hypoxemic Respiratory Failure   Acute HFpEF/pHTN  Elevated BNP  -Evidenced by presenting sxs: NOVA, orthopnea, BLE edema  -TTE HFpEF mild/mod elevated RVSP, BNP of 137  -LE edema has resolved and SOB is improving (she remains on 2L and desalts with any movement, she likely has chronic need 2/2 COPD)   -started on jardiance 10 mg daily  -BP was low after AM meds 2/15 so I am holding lasix and lisinopril and substantially decreased metoprolol dose.   -Monitor renal function and fluid status closely with diuresis   -2g salt restriction, 2L fluid restriction   -Cardiology consult     COPD  -does not appear to be in acute exacerbation; seems to be progressing COPD  -Duonebs  -continue home inhalers  -she remains on 2L and desalts with any movement, she likely has chronic need 2/2 COPD  -to schedule with a pulmonology outpatient     CAD s/p PCI of mid Lcx  -denies anginal chest discomfort  -Continue home aspirin     HTN  -Stable and controlled  -Continue antihypertensives as outlined above     1.1 cm nodule opacities in the posterior left lower lobe  Multiple solid noncalcified pulmonary nodules up to 8 mm  -follow-up with non contrast chest CT at 3-6 months  -to schedule with a pulmonology outpatient    Kyle Baltazar MD PhD

## 2025-02-15 NOTE — CARE PLAN
The patient's goals for the shift include      The clinical goals for the shift include Safety managment and remain HDS throughout the shift.

## 2025-02-15 NOTE — CARE PLAN
Problem: Balance  Goal: STG - Maintains static sitting balance without upper extremity support  Description: For >15 minutes for eating meals while maintain O2 saturation >90%  Outcome: Progressing     Problem: Mobility  Goal: Goal 1  Description: Patient will be able to perform all bed mobility while maintaining >90% O2 saturation  Outcome: Progressing     Problem: Safety  Goal: Goal 1  Description: Increase MINI knee strength to >4/5 to ease capability to perform transfers  Outcome: Progressing

## 2025-02-15 NOTE — PROGRESS NOTES
Occupational Therapy    Evaluation    Patient Name: Elsa Hinson  MRN: [id number]  Department: [facility] E  Room: [address] [place] [address]-  Today's Date: 2/15/2025  Time Calculation  Start Time: 1012  Stop Time: 1037  Time Calculation (min): 25 min        Assessment:  OT Assessment: Pt is 74 year old female admitted with dyspnea and HTN. Pt requiring MIN A to seat EOB. Session limited due to destating. Pt would benefit from skilled OT services during hospital stay to address endurance, balance, mobility and ADLs.  Prognosis: Good  Barriers to Discharge Home: Caregiver assistance, Physical needs  Caregiver Assistance: Patient lives alone and/or does not have reliable caregiver assistance  Physical Needs: Ambulating household distances limited by function/safety, 24hr ADL assistance needed, Intermittent mobility assistance needed, High falls risk due to function or environment  Evaluation/Treatment Tolerance: Treatment limited secondary to medical complications (Comment) ([place]at[place] spO2)  End of Session Communication: Bedside nurse  End of Session Patient Position: Bed, 3 rail up, Alarm on  OT Assessment Results: Decreased ADL status, Decreased cognition, Decreased endurance, Decreased functional mobility, Decreased IADLs, Decreased upper extremity strength  Prognosis: Good  Evaluation/Treatment Tolerance: Treatment limited secondary to medical complications (Comment) ([place] spO2)  Plan:  Treatment Interventions: ADL retraining, Functional transfer training, UE strengthening/ROM, Endurance training, Patient/family training, Equipment evaluation/education, Compensatory technique education  OT Frequency: 3 times per week  OT Discharge Recommendations: Moderate intensity level of continued care  Equipment Recommended upon Discharge:  (TBD)  OT Recommended Transfer Status: Assist of 1  OT - OK to Discharge: Yes (When medically appropriate)  Treatment Interventions: ADL retraining, Functional transfer training, UE strengthening/ROM,  Endurance training, Patient/family training, Equipment evaluation/education, Compensatory technique education    Subjective   Current Problem:  1. Acute hypoxic respiratory failure (Multi)  Transthoracic Echo (TTE) Complete    Transthoracic Echo (TTE) Complete      2. Essential (primary) hypertension  Transthoracic Echo (TTE) Complete    Transthoracic Echo (TTE) Complete      3. Hyperlipidemia, unspecified hyperlipidemia type  Transthoracic Echo (TTE) Complete    Transthoracic Echo (TTE) Complete      4. Dyspnea on exertion  Transthoracic Echo (TTE) Complete    Transthoracic Echo (TTE) Complete      5. Paroxysmal atrial fibrillation (Multi)  Transthoracic Echo (TTE) Complete        General:  General  Reason for Referral: Dyspnea, HTN  Referred By: Abdirizak  Past Medical History Relevant to Rehab: PCI of mid LCx, history of STEMI, HTN, HLD, COPD (not on baseline O2)  Family/Caregiver Present: Yes  Caregiver Feedback: Niece present at end of session  Co-Treatment: PT  Co-Treatment Reason: to optimize mobility and safety while focusing on discipline specific needs  Prior to Session Communication: Bedside nurse (Cleared for therapy)  Patient Position Received: Bed, 3 rail up, Alarm on  General Comment: Pt supine with HOB elevated; Pleasant and cooperative with therapy.  Precautions:  Medical Precautions: Fall precautions, Oxygen therapy device and L/min  Precautions Comment: Destating with limited mobility; Requires Max cuing for breathing techniques     Date/Time Vitals Session Patient Position Pulse Resp SpO2 BP MAP (mmHg)    02/15/25 1031 --  --  --  --  68 %  --  --     02/15/25 1035 --  --  --  --  90 %  --  --           Vital Signs Comment: VS monitored throughout session; pt spO2 88% supine at rest while talking. RN informed and requested to maintain pt on 2L and agreeable to EOB sitting. able to improve to 91% with cuing. Seated EOB maintained between 88-89% x1 minute, improved to 91% then slow destating to 82%  "despite cuing. Requested pt to lay down, observed spO2 at 79% when returned to supine. Once supine spO2 observed at 65%, RN present at bedside. Increased to 3L. Requiring 2-3 minutes supine rest to improve to 90%.     Pain:  Pain Assessment  Pain Assessment: 0-10  0-10 (Numeric) Pain Score: 0 - No pain    Objective   Cognition:  Overall Cognitive Status: Within Functional Limits  Orientation Level: Oriented X4           Home Living:  Type of Home: House  Lives With: Alone  Home Adaptive Equipment: Cane  Home Layout: One level  Home Access: No concerns  Bathroom Shower/Tub: Tub/shower unit  Bathroom Toilet: Standard  Bathroom Equipment: None  Bathroom Accessibility: Sponge bathes at baseline  Home Living Comments: Sleeps on couch  Prior Function:  Level of [place]: Independent with ADLs and functional transfers  Receives Help From: Family  ADL Assistance: Independent  Homemaking Assistance: Needs assistance (Family comes over to assist with heavier household tasks; performs light tasks as able)  Ambulatory Assistance: Independent (INd without device in the house, intermittent use of surfaces for support depending on endurance; MOD I with cane in community)  Prior Function Comments: (-) falls  IADL History:  IADL Comments: (-) drive  ADL:  Eating Assistance: Stand by  Grooming Assistance: Stand by (Anticipated (seated))  Bathing Assistance: Moderate (Anticipated)  UE Dressing Assistance: Minimal (Anticipated)  LE Dressing Assistance: Total  LE Dressing Deficit: Don/doff R sock, Don/doff L sock  Toileting Assistance with Device: Moderate (Anticipated)  Activity Tolerance:  Endurance: Decreased tolerance for upright activites  Activity Tolerance Comments: EOB sitting ~ 4 minutes with CGA, destating to 82-79% while seated. Pt endorses \"anxiety symptoms\" which indicated to her the drop in spO2 initally at home. See vital signs/comments for details. RN aware during session and present at end of session due to dropping " to 65% once returned to supine.  Bed Mobility/Transfers: Bed Mobility  Bed Mobility: Yes  Bed Mobility 1  Bed Mobility 1: Supine to sitting, Sitting to supine  Level of Assistance 1: Minimum assistance  Bed Mobility Comments 1: HOB elevated    Transfers  Transfer: No    Sitting Balance:  Static Sitting Balance  Static Sitting-Balance Support: Bilateral upper extremity supported  Static Sitting-Level of Assistance: Contact guard  Static Sitting-Comment/Number of Minutes: EOB sitting ~ 4 minutes with CGA, spO2 monitored closely throughout session; MAX cuing for breathing techniques to promote VS   Modalities:     Vision:Vision - Basic Assessment  Current Vision: No visual deficits  Sensation:  Light Touch: No apparent deficits  Strength:  Strength Comments: BUE grossly 3+/5  Perception:     Coordination:      Hand Function:  Gross Grasp: Functional  Coordination: Functional      Outcome Measures:[facility] Daily Activity  Putting on and taking off regular lower body clothing: A lot  Bathing (including washing, rinsing, drying): A lot  Putting on and taking off regular upper body clothing: A little  Toileting, which includes using toilet, bedpan or urinal: A lot  Taking care of personal grooming such as brushing teeth: A little  Eating Meals: A little  Daily Activity - Total Score: 15        Education Documentation  Precautions, taught by Stephanie Todd OT at 2/15/2025 12:15 PM.  Learner: Patient  Readiness: Acceptance  Method: Explanation  Response: Verbalizes Understanding    ADL Training, taught by Stephanie Todd OT at 2/15/2025 12:15 PM.  Learner: Patient  Readiness: Acceptance  Method: Explanation  Response: Verbalizes Understanding    Education Comments  No comments found.        OP EDUCATION:  Education  Education Comment: Education on pursed lip breathing and cuing for inhale through nose. Pt tends to mouth breath.    Goals:  Encounter Problems       Encounter Problems (Active)       ADLs       Patient with  complete lower body dressing with modified stand by level of assistance  with PRN adaptive equipment (Progressing)       Start:  02/15/25    Expected End:  03/01/25            Patient will complete toileting including hygiene clothing management/hygiene with stand by assist level of assistance. (Progressing)       Start:  02/15/25    Expected End:  03/01/25               COGNITION/SAFETY       Pt able to integrate 1-2 energy conservation techniques into ADL or therapeutic activities without verbal cuing  (Progressing)       Start:  02/15/25    Expected End:  03/01/25            Pt demo IND integration of breathing techniques during ADL or therapeutic activity to promote VS with no more than 1 verbal cue (Progressing)       Start:  02/15/25    Expected End:  03/01/25               TRANSFERS       Patient will perform bed mobility stand by assist level of assistance and bed rails in order to improve safety and independence with mobility (Progressing)       Start:  02/15/25    Expected End:  03/01/25            Patient will complete functional transfers with least restrictive device with stand by assist level of assistance. (Progressing)       Start:  02/15/25    Expected End:  03/01/25

## 2025-02-15 NOTE — PROGRESS NOTES
Subjective Data:  Patient seen and evaluated today, reports feeling better but not at goal.    Overnight Events:    No acute event overnight.  Echocardiogram was done and reviewed.     Objective Data:  Last Recorded Vitals:  Vitals:    02/14/25 1731 02/14/25 1944 02/15/25 0137 02/15/25 0519   BP: 96/50 95/57 102/63 109/71   BP Location: Left arm Left arm Left arm Left arm   Patient Position:  Lying Lying Lying   Pulse:  73 65 70   Resp:  19 18 17   Temp:  36.5 °C (97.7 °F) 36.1 °C (97 °F) 36.2 °C (97.1 °F)   TempSrc:  Temporal Temporal Temporal   SpO2:  90% 93% (!) 88%   Weight:       Height:           Last Labs:  CBC - 2/14/2025:  4:25 AM  11.2 15.0 254    48.0      CMP - 2/15/2025:  6:42 AM  8.4 6.6 19 --- 0.7   _ 3.9 9 102      PTT - No results in last year.  1.1   12.3 _     TROPHS   Date/Time Value Ref Range Status   02/13/2025 06:46 PM 9 0 - 13 ng/L Final   02/13/2025 05:33 PM 8 0 - 13 ng/L Final     BNP   Date/Time Value Ref Range Status   02/13/2025 05:33  0 - 99 pg/mL Final     HGBA1C   Date/Time Value Ref Range Status   02/14/2025 04:25 AM 5.6 See comment % Final     LDLCALC   Date/Time Value Ref Range Status   02/14/2025 04:25  <=99 mg/dL Final     Comment:                                 Near   Borderline      AGE      Desirable  Optimal    High     High     Very High     0-19 Y     0 - 109     ---    110-129   >/= 130     ----    20-24 Y     0 - 119     ---    120-159   >/= 160     ----      >24 Y     0 -  99   100-129  130-159   160-189     >/=190       VLDL   Date/Time Value Ref Range Status   02/14/2025 04:25 AM 25 0 - 40 mg/dL Final   07/20/2022 03:07 PM 18 0 - 40 mg/dL Final   11/04/2019 12:06 PM 11 0 - 40 mg/dL Final   09/26/2018 03:09 PM 17 0 - 40 mg/dL Final      Last I/O:  I/O last 3 completed shifts:  In: 237 (5.4 mL/kg) [P.O.:237]  Out: - (0 mL/kg)   Weight: 44.2 kg     Past Cardiology Tests (Last 3 Years):  EKG:  ECG 12 Lead 02/13/2025 (Preliminary)      ECG 12 lead 02/13/2025  (Preliminary)    Echo:  Transthoracic Echo (TTE) Complete 02/14/2025    Ejection Fractions:  EF   Date/Time Value Ref Range Status   02/14/2025 10:46 AM 63 %    ONCLUSIONS:   1. Left ventricular ejection fraction is normal, by visual estimate at 60-65%.   2. Spectral Doppler shows a Grade I (impaired relaxation pattern) of left ventricular diastolic filling with normal left atrial filling pressure.   3. There is low normal right ventricular systolic function.   4. Mild to moderately elevated right ventricular systolic pressure.  Cath:  No results found for this or any previous visit from the past 1095 days.    Stress Test:  No results found for this or any previous visit from the past 1095 days.    Cardiac Imaging:  No results found for this or any previous visit from the past 1095 days.      Inpatient Medications:  Scheduled medications   Medication Dose Route Frequency    aspirin  81 mg oral Daily    empagliflozin  10 mg oral Daily    influenza  0.5 mL intramuscular During hospitalization    fluticasone furoate-vilanteroL  1 puff inhalation Daily    furosemide  20 mg oral Daily    heparin (porcine)  5,000 Units subcutaneous q8h USHA    lisinopril  20 mg oral Daily    metoprolol tartrate  50 mg oral BID    pantoprazole  40 mg oral Daily before breakfast    Or    pantoprazole  40 mg intravenous Daily before breakfast    perflutren protein A microsphere  0.5 mL intravenous Once in imaging     PRN medications   Medication    acetaminophen    albuterol    ipratropium-albuteroL    melatonin    ondansetron    polyethylene glycol     Continuous Medications   Medication Dose Last Rate       Physical Exam:  General: Alert and Oriented, No distress, cooperative  Head: Normocephalic without obvious abnormality, atraumatic  Eyes: Conjunctiva/corneas clear, EOM's grossly intact  Neck: Supple, trachea midline, No thyroid enlargement/tenderness/nodules; No JVD  Lungs: Reduced air entry bilaterally, no rhonchi, no wheeze, fine  crackles lung base  Chest Wall: Barrel chest  Heart: Regular rhythm, normal S1/S2, no murmur appreciated  Abdomen: Soft, non-tender, Non-distended, bowel sounds active  Extremities: No edema, no cyanosis, no clubbing  Skin: Skin color, texture, turgor normal.  No rashes or lesions noted  Neurologic: Alert and oriented x 3, grossly moving all extremities, speech intact        Assessment/Plan   Worsening shortness of breath/COPD  Acute hypoxic respiratory failure  Concern for heart failure  CAD status post PCI  Essential hypertension     Plan:  -Echocardiogram was reviewed.  There is mild RV dysfunction, pulmonary hypertension and mild diastolic dysfunction.  Filling pressures are not elevated based on E/e' ratio.  Right atrial pressure is normal and IVC is collapsible and small.      Suspect RV dysfunction/pulmonary hypertension in setting of COPD/lung disease.  Her current presentation suggestive of COPD worsening possible undiagnosed pulm fibrosis and not related to decompensated heart failure.  Please obtain pulmonary consult.      -I will recommend to continue aspirin 81 mg daily.      -Will recommend to start on statin given history of CAD.    -Continue metoprolol tartrate as taking. Recommend holding parameters for BP <90 SBP and <50 DBP or HR of <50      -I will recommend against any further diuretic at this time given euvolemic state, normal RA pressure, collapsible RA and normal filling pressure as seen on echocardiogram.      -Cardiology will sign off.  Further management as per primary team.        Peripheral IV 02/13/25 20 G Right Antecubital (Active)   Site Assessment Dry;Intact;Clean 02/14/25 2001   Dressing Type Tape;Transparent 02/14/25 2001   Line Status Blood return noted;Saline locked 02/14/25 2001   Dressing Status Clean;Dry 02/14/25 2001   Number of days: 2       Code Status:  Full Code            Cameron Benitez MD

## 2025-02-15 NOTE — PROGRESS NOTES
2/15/25 1343   02/15/25 1343   Discharge Planning   Home or Post Acute Services Post acute facilities (Rehab/SNF/etc)   Type of Post Acute Facility Services Skilled nursing   Expected Discharge Disposition SNF   Patient Choice   Provider Choice list and CMS website (https://medicare.gov/care-compare#search) for post-acute Quality and Resource Measure Data were provided and reviewed with: Family;Patient   Patient / Family choosing to utilize agency / facility established prior to hospitalization No   Intensity of Service   Intensity of Service 0-30 min     Spoke with pt and family at bedside. Discussed recommendation with pt and family. Pt very resistant to SNF at this time but is willing to consider after speaking further with pt and family. Family in agreement at this time and is strongly encouraging SNF. Provided skilled nursing list to PT and family from [facility] directory that includes facilities that are within  Post - Acute Quality Network, as well as meeting patient’s medical needs, and are in-network for patient’s insurance; while also in discharge geographic area patient prefers, and identifies each facilities CMS star rating. Pt and family will review list and has this CT Supervisors contact information.   Yuliya Mendoza RN, BSN, David/ Nikki CT Supervisor

## 2025-02-15 NOTE — CARE PLAN
Problem: Pain - Adult  Goal: Verbalizes/displays adequate comfort level or baseline comfort level  Outcome: Progressing     Problem: Safety - Adult  Goal: Free from fall injury  Outcome: Progressing     Problem: Discharge Planning  Goal: Discharge to home or other facility with appropriate resources  Outcome: Progressing     Problem: Chronic Conditions and Co-morbidities  Goal: Patient's chronic conditions and co-morbidity symptoms are monitored and maintained or improved  Outcome: Progressing     Problem: Nutrition  Goal: Nutrient intake appropriate for maintaining nutritional needs  Outcome: Progressing     Problem: Fall/Injury  Goal: Not fall by end of shift  Outcome: Progressing  Goal: Be free from injury by end of the shift  Outcome: Progressing  Goal: Verbalize understanding of personal risk factors for fall in the hospital  Outcome: Progressing  Goal: Verbalize understanding of risk factor reduction measures to prevent injury from fall in the home  Outcome: Progressing  Goal: Use assistive devices by end of the shift  Outcome: Progressing  Goal: Pace activities to prevent fatigue by end of the shift  Outcome: Progressing     Problem: Skin  Goal: Decreased wound size/increased tissue granulation at next dressing change  Outcome: Progressing  Flowsheets (Taken 2/15/2025 0904)  Decreased wound size/increased tissue granulation at next dressing change: Promote sleep for wound healing  Goal: Participates in plan/prevention/treatment measures  Outcome: Progressing  Flowsheets (Taken 2/15/2025 0904)  Participates in plan/prevention/treatment measures:   Discuss with provider PT/OT consult   Increase activity/out of bed for meals  Goal: Prevent/manage excess moisture  Outcome: Progressing  Goal: Prevent/minimize sheer/friction injuries  Outcome: Progressing  Flowsheets (Taken 2/15/2025 0904)  Prevent/minimize sheer/friction injuries:   Increase activity/out of bed for meals   Use pull sheet  Goal: Promote/optimize  nutrition  Outcome: Progressing  Flowsheets (Taken 2/15/2025 0904)  Promote/optimize nutrition:   Offer water/supplements/favorite foods   Consume > 50% meals/supplements   Monitor/record intake including meals  Goal: Promote skin healing  Outcome: Progressing  Flowsheets (Taken 2/15/2025 0904)  Promote skin healing: Protective dressings over bony prominences       The clinical goals for the shift include patient will have no falls or injuries throughout this shift.

## 2025-02-16 VITALS
BODY MASS INDEX: 19.14 KG/M2 | WEIGHT: 97.5 LBS | OXYGEN SATURATION: 99 % | SYSTOLIC BLOOD PRESSURE: 108 MMHG | DIASTOLIC BLOOD PRESSURE: 53 MMHG | RESPIRATION RATE: 20 BRPM | TEMPERATURE: 98 F | HEART RATE: 90 BPM | HEIGHT: 60 IN

## 2025-02-16 LAB
ANION GAP SERPL CALC-SCNC: 9 MMOL/L (ref 10–20)
BUN SERPL-MCNC: 16 MG/DL (ref 6–23)
CALCIUM SERPL-MCNC: 8.4 MG/DL (ref 8.6–10.3)
CHLORIDE SERPL-SCNC: 96 MMOL/L (ref 98–107)
CO2 SERPL-SCNC: 40 MMOL/L (ref 21–32)
CREAT SERPL-MCNC: 0.81 MG/DL (ref 0.5–1.05)
EGFRCR SERPLBLD CKD-EPI 2021: 76 ML/MIN/1.73M*2
GLUCOSE SERPL-MCNC: 88 MG/DL (ref 74–99)
MAGNESIUM SERPL-MCNC: 2.15 MG/DL (ref 1.6–2.4)
POTASSIUM SERPL-SCNC: 3.2 MMOL/L (ref 3.5–5.3)
SODIUM SERPL-SCNC: 142 MMOL/L (ref 136–145)

## 2025-02-16 PROCEDURE — 83735 ASSAY OF MAGNESIUM: CPT | Performed by: INTERNAL MEDICINE

## 2025-02-16 PROCEDURE — 2500000002 HC RX 250 W HCPCS SELF ADMINISTERED DRUGS (ALT 637 FOR MEDICARE OP, ALT 636 FOR OP/ED): Performed by: INTERNAL MEDICINE

## 2025-02-16 PROCEDURE — 2500000004 HC RX 250 GENERAL PHARMACY W/ HCPCS (ALT 636 FOR OP/ED)

## 2025-02-16 PROCEDURE — 2500000001 HC RX 250 WO HCPCS SELF ADMINISTERED DRUGS (ALT 637 FOR MEDICARE OP): Performed by: INTERNAL MEDICINE

## 2025-02-16 PROCEDURE — 80048 BASIC METABOLIC PNL TOTAL CA: CPT | Performed by: INTERNAL MEDICINE

## 2025-02-16 PROCEDURE — 2500000001 HC RX 250 WO HCPCS SELF ADMINISTERED DRUGS (ALT 637 FOR MEDICARE OP)

## 2025-02-16 PROCEDURE — 36415 COLL VENOUS BLD VENIPUNCTURE: CPT | Performed by: INTERNAL MEDICINE

## 2025-02-16 PROCEDURE — 99233 SBSQ HOSP IP/OBS HIGH 50: CPT | Performed by: INTERNAL MEDICINE

## 2025-02-16 PROCEDURE — 1200000002 HC GENERAL ROOM WITH TELEMETRY DAILY

## 2025-02-16 RX ORDER — POTASSIUM CHLORIDE 750 MG/1
40 TABLET, FILM COATED, EXTENDED RELEASE ORAL ONCE
Status: COMPLETED | OUTPATIENT
Start: 2025-02-16 | End: 2025-02-16

## 2025-02-16 RX ADMIN — PANTOPRAZOLE SODIUM 40 MG: 40 TABLET, DELAYED RELEASE ORAL at 05:25

## 2025-02-16 RX ADMIN — HEPARIN SODIUM 5000 UNITS: 5000 INJECTION, SOLUTION INTRAVENOUS; SUBCUTANEOUS at 13:01

## 2025-02-16 RX ADMIN — HEPARIN SODIUM 5000 UNITS: 5000 INJECTION, SOLUTION INTRAVENOUS; SUBCUTANEOUS at 21:37

## 2025-02-16 RX ADMIN — EMPAGLIFLOZIN 10 MG: 10 TABLET, FILM COATED ORAL at 08:13

## 2025-02-16 RX ADMIN — FLUTICASONE FUROATE AND VILANTEROL TRIFENATATE 1 PUFF: 200; 25 POWDER RESPIRATORY (INHALATION) at 08:14

## 2025-02-16 RX ADMIN — METOPROLOL TARTRATE 12.5 MG: 25 TABLET, FILM COATED ORAL at 08:13

## 2025-02-16 RX ADMIN — METOPROLOL TARTRATE 12.5 MG: 25 TABLET, FILM COATED ORAL at 21:37

## 2025-02-16 RX ADMIN — POTASSIUM CHLORIDE 40 MEQ: 750 TABLET, FILM COATED, EXTENDED RELEASE ORAL at 12:17

## 2025-02-16 RX ADMIN — ASPIRIN 81 MG: 81 TABLET, COATED ORAL at 08:13

## 2025-02-16 RX ADMIN — HEPARIN SODIUM 5000 UNITS: 5000 INJECTION, SOLUTION INTRAVENOUS; SUBCUTANEOUS at 05:25

## 2025-02-16 ASSESSMENT — PAIN SCALES - GENERAL
PAINLEVEL_OUTOF10: 0 - NO PAIN
PAINLEVEL_OUTOF10: 0 - NO PAIN

## 2025-02-16 ASSESSMENT — PAIN - FUNCTIONAL ASSESSMENT: PAIN_FUNCTIONAL_ASSESSMENT: 0-10

## 2025-02-16 ASSESSMENT — COGNITIVE AND FUNCTIONAL STATUS - GENERAL
HELP NEEDED FOR BATHING: A LITTLE
WALKING IN HOSPITAL ROOM: A LITTLE
CLIMB 3 TO 5 STEPS WITH RAILING: A LOT
TOILETING: A LITTLE
DAILY ACTIVITIY SCORE: 22
MOBILITY SCORE: 19
HELP NEEDED FOR BATHING: A LITTLE
DAILY ACTIVITIY SCORE: 22
STANDING UP FROM CHAIR USING ARMS: A LITTLE
TOILETING: A LITTLE
MOVING TO AND FROM BED TO CHAIR: A LITTLE
CLIMB 3 TO 5 STEPS WITH RAILING: A LOT
WALKING IN HOSPITAL ROOM: A LOT
STANDING UP FROM CHAIR USING ARMS: A LITTLE
MOBILITY SCORE: 18
MOVING TO AND FROM BED TO CHAIR: A LITTLE

## 2025-02-16 NOTE — PROGRESS NOTES
Elsa Hinson is a 74 y.o. female on day 2 of admission presenting with Acute hypoxic respiratory failure (Multi).    Subjective   Elsa Hinson is a 74 y.o. female with PMHx s/f CAD s/p PCI of mid LCx, history of STEMI, HTN, HLD, COPD (not on baseline O2) presenting with shortness of breath. She states she gets extremely short of breath with minimal exertion, such as ambulating from bed to bathroom or simply putting on a coat. Her shortness of breath is progressively worsening for the past couple of years and has been the worst past few days.  Her oxygen saturation was in 50s prior to ED arrival.  She states she has been needing to use her emergency inhaler albuterol more than usual because her long-acting home inhaler ran out of prescription.  The inhaler has been out of prescription for a week and she admits that she has not seen a PCP for past 4 to 5 years.  She takes metoprolol and amlodipine for her blood pressure.  Her family also reports that she has decreased appetite/poor oral intake and has not been sleeping at all. She takes naps throughout the day and never really laid down on the bed. Mostly spent her day in recliner. She notes of chills, nausea, lightheadedness, productive cough of clear sputum. Has bilateral LE edema for a couple weeks. She lives alone and her daughter has been urging her to go to the hospital but she has been refusing until today. Denies fever, vomiting, chest pain, abd pain, changes in urinary or bowel symptoms.     ED Course (Summary - please note all labs, imaging studies, and interventions noted below have been personally reviewed and/or interpreted on day of admission):   Vitals on presentation: 96.8 F, 85 bpm, 24 RR, 116/69, 66% on RA  >> 98% on 6 L nasal cannula >> 100% on 4 L nasal cannula  Labs: CMP largely unremarkable  , troponin 8-9  CBC-largely unremarkable  Viral studies negative  VBG-pH 7.35, pCO2 65, pO2's 51, HCO3 35.9, lactate 1.1  EKG: Sinus rhythm at 77  bpm, no acute ST elevation/depression.  .  QTc 458.  Imaging: CTH - Approximately 4 x 4 cm irregular scalp thickening and ulceration involving the right parietal scalp.  No acute intracranial abnormality  CTA for PE-no PE.  Severe emphysematous change and pulmonary hyperexpansion suggestive of COPD.  To 1.1 cm nodule opacities in the posterior left lower lobe.  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  Multiple solid noncalcified pulmonary nodules up to 8 mm.  Interventions: Lasix 40 mg, admission for further management     12-point ROS reviewed and found to be negative aside from aforementioned positives in HPI and/or noted in dedicated ROS section below.     2/14: SOB improving. Edema resolved.     2/15: She remains dyspneic and its likely from underlying chronic lung dz. She is euvolemic. She quickly desats with movement. BP was low after AM meds so I am holding lasix and lisinopril and substantially decreased metoprolol dose.     2/16: Unchanged. POT/OT rec SNF. She reluctantly agreed yesterday as her kids wanted this for her.          Objective     onstitutional: Pleasant and cooperative. Laying in bed in no acute distress. Conversant.   Skin: Warm and dry; lesion noted at the top of her head, see media section for better visualization.  Eyes: EOMI. Anicteric sclera.   ENT: Mucous membranes moist; no obvious injury or deformity appreciated.   Head and Neck: Normocephalic, atraumatic. ROM preserved. Trachea midline. No appreciable JVD.   Respiratory: Nonlabored on 4L NC. Lungs diminished bilaterally without obvious adventitious sounds. Chest rise is equal.  Cardiovascular: RRR. No gross murmur, gallop, or rub. Extremities are warm and well-perfused with good capillary refill (< 3 seconds). No chest wall tenderness.   GI: Abdomen soft, nontender, nondistended. No obvious organomegaly appreciated. Bowel sounds are present.  : No CVA  tenderness.   MSK: No gross abnormalities appreciated. No limitations to AROM/PROM appreciated.   Extremities: No Edema in BLE. No cyanosis, or clubbing evident. Neurovascularly intact.   Neuro: A&Ox3. CN 2-12 grossly intact. Able to respond to questions appropriately and clearly. No acute focal neurologic deficits appreciated.  Psych: Appropriate mood and behavior.    Last Recorded Vitals  Blood pressure 117/63, pulse 85, temperature 36.4 °C (97.5 °F), temperature source Temporal, resp. rate 17, height 1.524 m (5'), weight (!) 44.2 kg (97 lb 8 oz), SpO2 95%.  Intake/Output last 3 Shifts:  I/O last 3 completed shifts:  In: 120 (2.7 mL/kg) [P.O.:120]  Out: 0 (0 mL/kg)   Weight: 44.2 kg     Relevant Results                              Assessment/Plan   Acute Hypoxemic Respiratory Failure   Acute HFpEF/pHTN  Elevated BNP  -Evidenced by presenting sxs: NOVA, orthopnea, BLE edema  -TTE HFpEF mild/mod elevated RVSP, BNP of 137  -LE edema has resolved and SOB is improving (she remains on 2L and desalts with any movement, she likely has chronic need 2/2 COPD)   -started on jardiance 10 mg daily  -BP was low after AM meds 2/15 so I am holding lisinopril and substantially decreased metoprolol dose.   -2g salt restriction, 2L fluid restriction   -Cardiology consult     COPD  -does not appear to be in acute exacerbation; seems to be progressing COPD  -Duonebs  -continue home inhalers  -she remains on 2L and desalts with any movement, she likely has chronic need 2/2 COPD  -to schedule with a pulmonology outpatient     CAD s/p PCI of mid Lcx  -denies anginal chest discomfort  -Continue home aspirin     HTN  -Stable and controlled  -Continue antihypertensives as outlined above     1.1 cm nodule opacities in the posterior left lower lobe  Multiple solid noncalcified pulmonary nodules up to 8 mm  -follow-up with non contrast chest CT at 3-6 months  -to schedule with a pulmonology outpatient    Kyle Baltazar MD PhD

## 2025-02-16 NOTE — PROGRESS NOTES
SW following up for SNF choices. SW met with pt and pt's daughter, Neris at bedside to assess needs and provide support, introduced self and my role as  with care transition team. Pt refusing SNF at this time and prefers to return home with HHC. SW provided HHC choice list from [facility] that is network with pt's insurance, displays CMS star rating and in preferred discharge geographic location. Pt interested in POA and Living Will paperwork. SW to follow up tomorrow and provide requested POA/LW documents.    ANTON Porter ([id number])   Care Transitions

## 2025-02-16 NOTE — CARE PLAN
The patient's goals for the shift include      Problem: Pain - Adult  Goal: Verbalizes/displays adequate comfort level or baseline comfort level  Outcome: Progressing     Problem: Safety - Adult  Goal: Free from fall injury  Outcome: Progressing     Problem: Chronic Conditions and Co-morbidities  Goal: Patient's chronic conditions and co-morbidity symptoms are monitored and maintained or improved  Outcome: Progressing     Problem: Nutrition  Goal: Nutrient intake appropriate for maintaining nutritional needs  Outcome: Progressing     Problem: Discharge Planning  Goal: Discharge to home or other facility with appropriate resources  Outcome: Progressing     The clinical goals for the shift include Maintain pt safety/comfort; monitor labs/vitals

## 2025-02-16 NOTE — CARE PLAN
Problem: Pain - Adult  Goal: Verbalizes/displays adequate comfort level or baseline comfort level  Outcome: Progressing     Problem: Safety - Adult  Goal: Free from fall injury  Outcome: Progressing     Problem: Discharge Planning  Goal: Discharge to home or other facility with appropriate resources  Outcome: Progressing     Problem: Chronic Conditions and Co-morbidities  Goal: Patient's chronic conditions and co-morbidity symptoms are monitored and maintained or improved  Outcome: Progressing     Problem: Nutrition  Goal: Nutrient intake appropriate for maintaining nutritional needs  Outcome: Progressing     Problem: Fall/Injury  Goal: Not fall by end of shift  Outcome: Progressing  Goal: Be free from injury by end of the shift  Outcome: Progressing  Goal: Verbalize understanding of personal risk factors for fall in the hospital  Outcome: Progressing  Goal: Verbalize understanding of risk factor reduction measures to prevent injury from fall in the home  Outcome: Progressing  Goal: Use assistive devices by end of the shift  Outcome: Progressing  Goal: Pace activities to prevent fatigue by end of the shift  Outcome: Progressing     Problem: Skin  Goal: Decreased wound size/increased tissue granulation at next dressing change  Outcome: Progressing  Flowsheets (Taken 2/16/2025 0927)  Decreased wound size/increased tissue granulation at next dressing change: Promote sleep for wound healing  Goal: Participates in plan/prevention/treatment measures  Outcome: Progressing  Flowsheets (Taken 2/16/2025 0927)  Participates in plan/prevention/treatment measures:   Discuss with provider PT/OT consult   Increase activity/out of bed for meals  Goal: Prevent/manage excess moisture  Outcome: Progressing  Flowsheets (Taken 2/16/2025 0927)  Prevent/manage excess moisture:   Follow provider orders for dressing changes   Cleanse incontinence/protect with barrier cream  Goal: Prevent/minimize sheer/friction injuries  Outcome:  Progressing  Flowsheets (Taken 2/16/2025 0927)  Prevent/minimize sheer/friction injuries:   HOB 30 degrees or less   Increase activity/out of bed for meals   Turn/reposition every 2 hours/use positioning/transfer devices   Use pull sheet  Goal: Promote/optimize nutrition  Outcome: Progressing  Flowsheets (Taken 2/16/2025 0927)  Promote/optimize nutrition:   Offer water/supplements/favorite foods   Consume > 50% meals/supplements   Monitor/record intake including meals  Goal: Promote skin healing  Outcome: Progressing  Flowsheets (Taken 2/16/2025 0927)  Promote skin healing:   Turn/reposition every 2 hours/use positioning/transfer devices   Protective dressings over bony prominences       The clinical goals for the shift include patient will remain free from falls or injuries throughout this shift.

## 2025-02-17 ENCOUNTER — PHARMACY VISIT (OUTPATIENT)
Dept: PHARMACY | Facility: CLINIC | Age: 75
End: 2025-02-17
Payer: COMMERCIAL

## 2025-02-17 PROBLEM — J96.01 ACUTE HYPOXIC RESPIRATORY FAILURE (MULTI): Status: RESOLVED | Noted: 2025-02-13 | Resolved: 2025-02-17

## 2025-02-17 LAB
ANION GAP SERPL CALC-SCNC: <7 MMOL/L (ref 10–20)
BUN SERPL-MCNC: 13 MG/DL (ref 6–23)
CALCIUM SERPL-MCNC: 8.7 MG/DL (ref 8.6–10.3)
CHLORIDE SERPL-SCNC: 98 MMOL/L (ref 98–107)
CO2 SERPL-SCNC: 41 MMOL/L (ref 21–32)
CREAT SERPL-MCNC: 0.66 MG/DL (ref 0.5–1.05)
EGFRCR SERPLBLD CKD-EPI 2021: >90 ML/MIN/1.73M*2
GLUCOSE SERPL-MCNC: 100 MG/DL (ref 74–99)
MAGNESIUM SERPL-MCNC: 2.37 MG/DL (ref 1.6–2.4)
POTASSIUM SERPL-SCNC: 3.5 MMOL/L (ref 3.5–5.3)
SODIUM SERPL-SCNC: 141 MMOL/L (ref 136–145)

## 2025-02-17 PROCEDURE — 2500000005 HC RX 250 GENERAL PHARMACY W/O HCPCS: Performed by: INTERNAL MEDICINE

## 2025-02-17 PROCEDURE — 99233 SBSQ HOSP IP/OBS HIGH 50: CPT | Performed by: INTERNAL MEDICINE

## 2025-02-17 PROCEDURE — 94761 N-INVAS EAR/PLS OXIMETRY MLT: CPT

## 2025-02-17 PROCEDURE — 97530 THERAPEUTIC ACTIVITIES: CPT | Mod: GP,CQ | Performed by: PHYSICAL THERAPY ASSISTANT

## 2025-02-17 PROCEDURE — 80048 BASIC METABOLIC PNL TOTAL CA: CPT | Performed by: INTERNAL MEDICINE

## 2025-02-17 PROCEDURE — 1210000001 HC SEMI-PRIVATE ROOM DAILY

## 2025-02-17 PROCEDURE — 2500000004 HC RX 250 GENERAL PHARMACY W/ HCPCS (ALT 636 FOR OP/ED)

## 2025-02-17 PROCEDURE — RXMED WILLOW AMBULATORY MEDICATION CHARGE

## 2025-02-17 PROCEDURE — 2500000001 HC RX 250 WO HCPCS SELF ADMINISTERED DRUGS (ALT 637 FOR MEDICARE OP): Performed by: INTERNAL MEDICINE

## 2025-02-17 PROCEDURE — 36415 COLL VENOUS BLD VENIPUNCTURE: CPT | Performed by: INTERNAL MEDICINE

## 2025-02-17 PROCEDURE — 97116 GAIT TRAINING THERAPY: CPT | Mod: GP,CQ | Performed by: PHYSICAL THERAPY ASSISTANT

## 2025-02-17 PROCEDURE — 83735 ASSAY OF MAGNESIUM: CPT | Performed by: INTERNAL MEDICINE

## 2025-02-17 PROCEDURE — 2500000001 HC RX 250 WO HCPCS SELF ADMINISTERED DRUGS (ALT 637 FOR MEDICARE OP)

## 2025-02-17 RX ORDER — ATORVASTATIN CALCIUM 40 MG/1
40 TABLET, FILM COATED ORAL DAILY
Qty: 30 TABLET | Refills: 11 | Status: SHIPPED | OUTPATIENT
Start: 2025-02-17 | End: 2025-02-19

## 2025-02-17 RX ORDER — FLUTICASONE FUROATE, UMECLIDINIUM BROMIDE AND VILANTEROL TRIFENATATE 100; 62.5; 25 UG/1; UG/1; UG/1
1 POWDER RESPIRATORY (INHALATION) DAILY
Qty: 60 EACH | Refills: 11 | Status: SHIPPED | OUTPATIENT
Start: 2025-02-17 | End: 2025-02-19

## 2025-02-17 RX ORDER — METOPROLOL TARTRATE 25 MG/1
12.5 TABLET, FILM COATED ORAL 2 TIMES DAILY
Qty: 30 TABLET | Refills: 11 | Status: SHIPPED | OUTPATIENT
Start: 2025-02-17 | End: 2025-02-19

## 2025-02-17 RX ORDER — ALBUTEROL SULFATE 90 UG/1
2 INHALANT RESPIRATORY (INHALATION) EVERY 4 HOURS PRN
Qty: 6.7 G | Refills: 3 | Status: SHIPPED | OUTPATIENT
Start: 2025-02-17 | End: 2025-02-19

## 2025-02-17 RX ADMIN — FLUTICASONE FUROATE AND VILANTEROL TRIFENATATE 1 PUFF: 200; 25 POWDER RESPIRATORY (INHALATION) at 06:22

## 2025-02-17 RX ADMIN — Medication 4 L/MIN: at 20:49

## 2025-02-17 RX ADMIN — EMPAGLIFLOZIN 10 MG: 10 TABLET, FILM COATED ORAL at 10:47

## 2025-02-17 RX ADMIN — ASPIRIN 81 MG: 81 TABLET, COATED ORAL at 10:46

## 2025-02-17 RX ADMIN — Medication 4 L/MIN: at 11:17

## 2025-02-17 RX ADMIN — Medication 4 L/MIN: at 12:18

## 2025-02-17 RX ADMIN — HEPARIN SODIUM 5000 UNITS: 5000 INJECTION, SOLUTION INTRAVENOUS; SUBCUTANEOUS at 20:41

## 2025-02-17 RX ADMIN — HEPARIN SODIUM 5000 UNITS: 5000 INJECTION, SOLUTION INTRAVENOUS; SUBCUTANEOUS at 06:22

## 2025-02-17 RX ADMIN — PANTOPRAZOLE SODIUM 40 MG: 40 TABLET, DELAYED RELEASE ORAL at 06:22

## 2025-02-17 RX ADMIN — METOPROLOL TARTRATE 12.5 MG: 25 TABLET, FILM COATED ORAL at 20:41

## 2025-02-17 RX ADMIN — METOPROLOL TARTRATE 12.5 MG: 25 TABLET, FILM COATED ORAL at 10:46

## 2025-02-17 ASSESSMENT — COGNITIVE AND FUNCTIONAL STATUS - GENERAL
STANDING UP FROM CHAIR USING ARMS: A LITTLE
HELP NEEDED FOR BATHING: A LITTLE
DAILY ACTIVITIY SCORE: 22
MOVING FROM LYING ON BACK TO SITTING ON SIDE OF FLAT BED WITH BEDRAILS: A LITTLE
CLIMB 3 TO 5 STEPS WITH RAILING: TOTAL
TOILETING: A LITTLE
MOVING TO AND FROM BED TO CHAIR: A LITTLE
MOBILITY SCORE: 14
TOILETING: A LITTLE
CLIMB 3 TO 5 STEPS WITH RAILING: A LOT
DAILY ACTIVITIY SCORE: 22
STANDING UP FROM CHAIR USING ARMS: A LITTLE
CLIMB 3 TO 5 STEPS WITH RAILING: A LOT
WALKING IN HOSPITAL ROOM: A LOT
WALKING IN HOSPITAL ROOM: A LOT
STANDING UP FROM CHAIR USING ARMS: A LITTLE
TURNING FROM BACK TO SIDE WHILE IN FLAT BAD: A LITTLE
MOBILITY SCORE: 18
MOVING TO AND FROM BED TO CHAIR: A LITTLE
MOVING TO AND FROM BED TO CHAIR: A LOT
HELP NEEDED FOR BATHING: A LITTLE
WALKING IN HOSPITAL ROOM: A LOT
MOBILITY SCORE: 18

## 2025-02-17 ASSESSMENT — PAIN SCALES - GENERAL
PAINLEVEL_OUTOF10: 0 - NO PAIN
PAINLEVEL_OUTOF10: 0 - NO PAIN

## 2025-02-17 ASSESSMENT — PAIN - FUNCTIONAL ASSESSMENT: PAIN_FUNCTIONAL_ASSESSMENT: 0-10

## 2025-02-17 NOTE — CARE PLAN
The patient's goals for the shift include      The clinical goals for the shift include Patient safety management and HDS throughout the shift.

## 2025-02-17 NOTE — CONSULTS
Nutrition Initial Assessment:   Nutrition Assessment    Reason for Assessment: Admission nursing screening    Medical history per chart:   Elsa Hinson is a 74 y.o. female with PMHx s/f CAD s/p PCI of mid LCx, history of STEMI, HTN, HLD, COPD (not on baseline O2) presenting with shortness of breath. She states she gets extremely short of breath with minimal exertion, such as ambulating from bed to bathroom or simply putting on a coat. Her shortness of breath is progressively worsening for the past couple of years and has been the worst past few days. Her oxygen saturation was in 50s prior to ED arrival. She states she has been needing to use her emergency inhaler albuterol more than usual because her long-acting home inhaler ran out of prescription. The inhaler has been out of prescription for a week and she admits that she has not seen a PCP for past 4 to 5 years. She takes metoprolol and amlodipine for her blood pressure. Her family also reports that she has decreased appetite/poor oral intake and has not been sleeping at all. She takes naps throughout the day and never really laid down on the bed. Mostly spent her day in recliner. She notes of chills, nausea, lightheadedness, productive cough of clear sputum. Has bilateral LE edema for a couple weeks. She lives alone and her daughter has been urging her to go to the hospital but she has been refusing until today. Denies fever, vomiting, chest pain, abd pain, changes in urinary or bowel symptoms.   ~Cardio on consult: Suspect RV dysfunction/pulmonary hypertension in setting of COPD/lung disease.  Her current presentation suggestive of COPD worsening possible undiagnosed pulm fibrosis and not related to decompensated heart failure.  Please obtain pulmonary consult.   ~LE edema resolved   ~Discussed with MD about removing sodium/fluid restriction and to allow for Regular diet, which he agreed-as long as patient does not consume excess amounts (conveyed this with  patient)      Nutrition History:  Energy Intake: Poor < 50 %  Food and Nutrient History: Patient reports she eats small meals throughout the day, but meal preparation has been difficult due to breathing issues, fatigue. Discussed ways to increase calories and protein; COPD Nutrition Therapy handout was provided. She was agreeable to Magic cup, but not ensure. Discussed other ONS to trial once home. CBW: 97 lbs, reports she has been this way for a long time.       Dietary Orders (From admission, onward)       Start     Ordered    02/17/25 1401  Oral nutritional supplements  Until discontinued        Comments: Chocolate at lunch  Wildberry at dinner   Question Answer Comment   Deliver with Lunch    Deliver with Dinner    Select supplement: Magic Cup        02/17/25 1401    02/17/25 0923  Adult diet Regular  Diet effective now        Question:  Diet type  Answer:  Regular    02/17/25 0922    02/13/25 2126  May Participate in Room Service  ( ROOM SERVICE MAY PARTICIPATE)  Once        Question:  .  Answer:  Yes    02/13/25 2125                    Anthropometrics:  Height: 152.4 cm (5')   Weight: (!) 44.2 kg (97 lb 8 oz)   BMI (Calculated): 19.04           Weight History:   Wt Readings from Last 10 Encounters:   02/14/25 (!) 44.2 kg (97 lb 8 oz)   04/05/24 (!) 44.2 kg (97 lb 6.4 oz)       Weight Change %:  Weight History / % Weight Change: Pt reports UBW: 97 lbs, denies weight loss  Significant Weight Loss: No    Nutrition Focused Physical Exam Findings:  Subcutaneous Fat Loss:   Orbital Fat Pads: Mild-Moderate (slight dark circles and slight hollowing)  Buccal Fat Pads: Mild-Moderate (flat cheeks, minimal bounce)  Triceps: Mild-Moderate (less than ample fat tissue)  Muscle Wasting:  Temporalis: Mild-Moderate (slight depression)  Pectoralis (Clavicular Region): Mild-Moderate (some protrusion of clavicle)  Deltoid/Trapezius: Mild-Moderate (slight protrusion of acromion process)  Edema:  Edema: none  Physical  Findings:  Respiratory : Negative    Nutrition Significant Labs:  Results from last 7 days   Lab Units 02/17/25  0431 02/16/25  0539 02/15/25  0642   GLUCOSE mg/dL 100* 88 77   SODIUM mmol/L 141 142 140   POTASSIUM mmol/L 3.5 3.2* 3.3*   CHLORIDE mmol/L 98 96* 97*   CO2 mmol/L 41* 40* 36*   BUN mg/dL 13 16 19   CREATININE mg/dL 0.66 0.81 0.73   EGFR mL/min/1.73m*2 >90 76 86   CALCIUM mg/dL 8.7 8.4* 8.4*   MAGNESIUM mg/dL 2.37 2.15 2.18     Lab Results   Component Value Date    HGBA1C 5.6 02/14/2025           Nutrition Specific Medications:  Meds reviewed/noted.   aspirin, 81 mg, oral, Daily  empagliflozin, 10 mg, oral, Daily  influenza, 0.5 mL, intramuscular, During hospitalization  fluticasone furoate-vilanteroL, 1 puff, inhalation, Daily  heparin (porcine), 5,000 Units, subcutaneous, q8h USHA  [Held by provider] lisinopril, 20 mg, oral, Daily  metoprolol tartrate, 12.5 mg, oral, BID  oxygen, , inhalation, Continuous - Inhalation  pantoprazole, 40 mg, oral, Daily before breakfast   Or  pantoprazole, 40 mg, intravenous, Daily before breakfast  perflutren protein A microsphere, 0.5 mL, intravenous, Once in imaging               Estimated Needs:   Total Energy Estimated Needs in 24 hours (kCal): 1550 kCal  Method for Estimating Needs: 35 kcal/kg CBW  Total Protein Estimated Needs in 24 Hours (g):  (53+)  Method for Estimating 24 Hour Protein Needs: 1.2 g/kg  Total Fluid Estimated Needs in 24 Hours (mL):  (other)  Method for Estimating 24 Hour Fluid Needs: 1 mL/kcal or per physician        Nutrition Diagnosis   Malnutrition Diagnosis  Patient has Malnutrition Diagnosis: Yes  Diagnosis Status: New  Malnutrition Diagnosis: Moderate malnutrition related to chronic disease or condition  Related to: COPD  As Evidenced by: mild to moderate muscl/fat losses, <75% of estimated needs x 1 month    Nutrition Diagnosis  Patient has Nutrition Diagnosis: Yes  Diagnosis Status (1): New  Nutrition Diagnosis 1: Inadequate oral  "intake  Related to (1): COPD  As Evidenced by (1): pt reports, muscle and fat losses       Nutrition Interventions/Recommendations   Nutrition prescription for oral nutrition    Nutrition Recommendations:  Individualized Nutrition Prescription Provided for : Continue Regular diet. Added magic cup BID    Nutrition Interventions/Goals:   Interventions: Meals and snacks, Medical food supplement  Meals and Snacks: General healthful diet  Goal: consume >50%  Medical Food Supplement: Commercial beverage medical food supplement therapy  Goal: consume >75%      Education Documentation  Nutrition Care Manual, taught by Deanna M McGuire Lombardo, RDN, LD at 2/17/2025  2:01 PM.  Learner: Patient  Readiness: Acceptance  Method: Explanation, Handout  Response: Verbalizes Understanding  Comment: Provided \"COPD Nutrition Therapy\" handout              Nutrition Monitoring and Evaluation   Food/Nutrient Related History Monitoring  Monitoring and Evaluation Plan: Estimated Energy Intake  Estimated Energy Intake: Energy intake greater or equal to 75% of estimated energy needs    Anthropometric Measurements  Monitoring and Evaluation Plan: Body weight  Body Weight: Body weight - Maintain stable weight    Biochemical Data, Medical Tests and Procedures  Monitoring and Evaluation Plan: Electrolyte/renal panel, Glucose/endocrine profile  Electrolyte and Renal Panel: Electrolytes within normal limits  Glucose/Endocrine Profile: Glucose within normal limits ( mg/dL)    Physical Exam Findings  Monitoring and Evaluation Plan: Skin  Skin Finding: Promote intact skin - Promote skin integrity    Goal Status: New goal(s) identified    Time Spent (min): 45 minutes         "

## 2025-02-17 NOTE — CARE PLAN
The patient's goals for the shift include      The clinical goals for the shift include Patient safety management and HDS throughout the shift.    Over the shift, the patient did not make progress toward the following goals. Barriers to progression include n/a. Recommendations to address these barriers include n/a.

## 2025-02-17 NOTE — DISCHARGE SUMMARY
DISCHARGE SUMMARY     Discharge Diagnosis  Acute hypoxic respiratory failure (Multi)    This discharge took greater than 35 minutes.    Test Results Pending At Discharge  Pending Labs       No current pending labs.            Hospital Course  Elsa Hinson is a 74 y.o. female with PMHx s/f CAD s/p PCI of mid LCx, history of STEMI, HTN, HLD, COPD (not on baseline O2) presenting with shortness of breath. She states she gets extremely short of breath with minimal exertion, such as ambulating from bed to bathroom or simply putting on a coat. Her shortness of breath is progressively worsening for the past couple of years and has been the worst past few days.  Her oxygen saturation was in 50s prior to ED arrival.  She states she has been needing to use her emergency inhaler albuterol more than usual because her long-acting home inhaler ran out of prescription.  The inhaler has been out of prescription for a week and she admits that she has not seen a PCP for past 4 to 5 years.  She takes metoprolol and amlodipine for her blood pressure.  Her family also reports that she has decreased appetite/poor oral intake and has not been sleeping at all. She takes naps throughout the day and never really laid down on the bed. Mostly spent her day in recliner. She notes of chills, nausea, lightheadedness, productive cough of clear sputum. Has bilateral LE edema for a couple weeks. She lives alone and her daughter has been urging her to go to the hospital but she has been refusing until today. Denies fever, vomiting, chest pain, abd pain, changes in urinary or bowel symptoms.     ED Course (Summary - please note all labs, imaging studies, and interventions noted below have been personally reviewed and/or interpreted on day of admission):   Vitals on presentation: 96.8 F, 85 bpm, 24 RR, 116/69, 66% on RA  >> 98% on 6 L nasal cannula >> 100% on 4 L nasal cannula  Labs: CMP largely unremarkable  , troponin 8-9  CBC-largely  unremarkable  Viral studies negative  VBG-pH 7.35, pCO2 65, pO2's 51, HCO3 35.9, lactate 1.1  EKG: Sinus rhythm at 77 bpm, no acute ST elevation/depression.  .  QTc 458.  Imaging: CTH - Approximately 4 x 4 cm irregular scalp thickening and ulceration involving the right parietal scalp.  No acute intracranial abnormality  CTA for PE-no PE.  Severe emphysematous change and pulmonary hyperexpansion suggestive of COPD.  To 1.1 cm nodule opacities in the posterior left lower lobe.  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  Multiple solid noncalcified pulmonary nodules up to 8 mm.  Interventions: Lasix 40 mg, admission for further management     12-point ROS reviewed and found to be negative aside from aforementioned positives in HPI and/or noted in dedicated ROS section below.      2/14: SOB improving. Edema resolved.      2/15: She remains dyspneic and its likely from underlying chronic lung dz. She is euvolemic. She quickly desats with movement. BP was low after AM meds so I am holding lasix and lisinopril and substantially decreased metoprolol dose.      2/16: Unchanged. POT/OT rec SNF. She reluctantly agreed yesterday as her kids wanted this for her.     Of note: myself, the physical therapist, the occupational therapist, multiple , the , multiple nurses and patients children worked to convince her of the need for aggressive rehabilitation at a SNF to try to regain her independence. She refused and insisted upon going home. When she no longer had inpatient needs she was discharged and then she appealed the discharge.     Acute Hypoxemic Respiratory Failure   Acute HFpEF/pHTN  Elevated BNP  -Evidenced by presenting sxs: NOVA, orthopnea, BLE edema  -TTE HFpEF mild/mod elevated RVSP, BNP of 137  -LE edema has resolved entirely and SOB is improving (she remains on 2L and desalts with any movement, this is a chronic  need 2/2 COPD)   -started on jardiance 10 mg daily  -BP was low after AM meds 2/15 so I decreased home metoprolol 12.5 mg BID.   -2g salt restriction, 2L fluid restriction   -Cardiology consult     COPD  -she is not wheezing and is not in exacerbation  -start Trelegy  -home O2 eval revealed chronic need of 3 L NC at rest and 6 L NC with ambulation  -OP pulmonology referral was placed     CAD s/p PCI of mid Lcx  -denies anginal chest discomfort  -Continue home aspirin and start HI statin     HTN  -Stable and controlled  -Continue antihypertensives as outlined above     1.1 cm nodule opacities in the posterior left lower lobe  Multiple solid noncalcified pulmonary nodules up to 8 mm  -follow-up with non contrast chest CT at 3-6 months  -OP pulmonology referral was placed    Pertinent Physical Exam At Time of Discharge  Constitutional: Pleasant and cooperative. Laying in bed in no acute distress. Conversant.   Skin: Warm and dry; lesion noted at the top of her head, see media section for better visualization.  Eyes: EOMI. Anicteric sclera.   ENT: Mucous membranes moist; no obvious injury or deformity appreciated.   Head and Neck: Normocephalic, atraumatic. ROM preserved. Trachea midline. No appreciable JVD.   Respiratory: Nonlabored on 4L NC. Lungs diminished bilaterally without obvious adventitious sounds. Chest rise is equal.  Cardiovascular: RRR. No gross murmur, gallop, or rub. Extremities are warm and well-perfused with good capillary refill (< 3 seconds). No chest wall tenderness.   GI: Abdomen soft, nontender, nondistended. No obvious organomegaly appreciated. Bowel sounds are present.  : No CVA tenderness.   MSK: No gross abnormalities appreciated. No limitations to AROM/PROM appreciated.   Extremities: No Edema in BLE. No cyanosis, or clubbing evident. Neurovascularly intact.   Neuro: A&Ox3. CN 2-12 grossly intact. Able to respond to questions appropriately and clearly. No acute focal neurologic deficits  appreciated.  Psych: Appropriate mood and behavior.    Home Medications     Medication List      START taking these medications     atorvastatin 40 mg tablet; Commonly known as: Lipitor; Take 1 tablet (40   mg) by mouth once daily.   empagliflozin 10 mg; Commonly known as: Jardiance; Take 1 tablet (10 mg)   by mouth once daily.; Start taking on: February 18, 2025   Trelegy Ellipta 100-62.5-25 mcg blister with device; Generic drug:   fluticasone-umeclidin-vilanter; Inhale 1 puff once daily.     CHANGE how you take these medications     metoprolol tartrate 25 mg tablet; Commonly known as: Lopressor; Take 0.5   tablets (12.5 mg) by mouth 2 times a day.; What changed: medication   strength, how much to take     CONTINUE taking these medications     albuterol 90 mcg/actuation inhaler; INHALE ONE PUFF BY MOUTH EVERY 8   HOURS AS NEEDED FOR WHEEZING   Aftab Low Dose Aspirin 81 mg EC tablet; Generic drug: aspirin     STOP taking these medications     amLODIPine 10 mg tablet; Commonly known as: Norvasc   fluticasone propion-salmeteroL 230-21 mcg/actuation inhaler; Commonly   known as: Advair HFA       Outpatient Follow-Up  No follow-ups on file.     yKle Baltazar MD PhD  2/17/2025  12:40 PM

## 2025-02-17 NOTE — CARE PLAN
Home O2 Evaluation:    SpO2 on room air at rest:     80%    SpO2 on room air with exertion:     %    SpO2 on oxygen at rest:     94% 3l AT REST.    SpO2 on oxygen with exertion:     90%    SpO2 on 4L/min O2 with exertion:    86%    Ambulation distance:      20ft    Recommended O2 device: 3L @ REST, 6L WHEN AMBULATING    Recommended O2 L/min: 3L @ REST 6L WHEN AMBULATING    Recommended FiO2 %:    (Jennifer Leo at [facility] going O2.)      Pt siting in chair, on RA at rest 80% HR 70.  When ambulating to an approximation of how much she would walk in one single period at home, which wasn't much per the pt, pt required 6l to maintain >88%.  Approx 20 ft pt low spo2 on 90% and maximal HR was 108.  Pt reported mild dyspnea with ambulation recovered with rest.

## 2025-02-17 NOTE — PROGRESS NOTES
Physical Therapy    Physical Therapy Treatment    Patient Name: Elsa Hinson  MRN: [id number]  Department: [facility]  Room: [address] [place] [address]  Today's Date: 2/18/2025  Time Calculation  Start Time: 1041  Stop Time: 1104  Time Calculation (min): 23 min         Assessment/Plan   PT Assessment  End of Session Communication: Bedside nurse  Assessment Comment: pt is impulsive and unsteady. She would benefit from AD if agreeable. pt would benefit from further skilled PT services.  End of Session Patient Position: Bed, 3 rail up, Alarm on     PT Plan  Treatment/Interventions: Bed mobility, Transfer training, Gait training, Balance training, Strengthening, Therapeutic exercise  PT Plan: Ongoing PT  PT Frequency: 4 times per week  PT Discharge Recommendations: Moderate intensity level of continued care  PT - OK to Discharge: Yes      General Visit Information:   PT  Visit  PT Received On: 02/17/25  Response to Previous Treatment: Patient with no complaints from previous session.  General  Reason for Referral: Dyspnea, HTN  Referred By: Abdirizak  Past Medical History Relevant to Rehab: PCI of mid LCx, history of STEMI, HTN, HLD, COPD (not on baseline O2)  Prior to Session Communication: Bedside nurse  Patient Position Received: Bed, 3 rail up, Alarm on  Preferred Learning Style: verbal  General Comment: pt agreeable to treatment and cleared by RN. pt reporting she hopes to dc soon.    Precautions:  Precautions  Medical Precautions: Fall precautions, Oxygen therapy device and L/min  Precautions Comment: Destating with limited mobility; Requires Max cuing for breathing techniques            Pain:  Pain Assessment  Pain Assessment: 0-10  0-10 (Numeric) Pain Score: 0 - No pain  Cognition:  Cognition  Overall Cognitive Status: Within Functional Limits            Treatments:  Bed Mobility  Bed Mobility: Yes  Bed Mobility 1  Bed Mobility 1: Supine to sitting  Level of Assistance 1: Minimum assistance  Bed Mobility Comments 1: cues for safety and  improved technique  Bed Mobility 2  Bed Mobility  2: Scooting  Level of Assistance 2: Minimum assistance  Bed Mobility Comments 2: cues for technique    Ambulation/Gait Training  Ambulation/Gait Training Performed: Yes  Ambulation/Gait Training 1  Surface 1: Level tile  Assistance 1: Moderate assistance, Moderate verbal cues, Arm in arm assistance  Quality of Gait 1: Narrow base of support, Inconsistent stride length, Diminished heel strike  Comments/Distance (ft) 1: 10 feet with very unsteady gait and pt reaching for objects for support.  Transfers  Transfer: Yes  Transfer 1  Technique 1: Sit to stand, Stand to sit  Transfer Level of Assistance 1: Arm in arm assistance, Minimum assistance, Minimal verbal cues  Trials/Comments 1: cues for hand placement  Transfers 2  Technique 2: Stand pivot  Transfer Level of Assistance 2: Arm in arm assistance, Moderate assistance, Minimal verbal cues  Trials/Comments 2: pt with cues for safety. pt unsteady and at fall risk.      [facility] Basic Mobility  Turning from your back to your side while in a flat bed without using bedrails: A little  Moving from lying on your back to sitting on the side of a flat bed without using bedrails: A little  Moving to and from bed to chair (including a wheelchair): A lot  Standing up from a chair using your arms (e.g. wheelchair or bedside chair): A little  To walk in hospital room: A lot  Climbing 3-5 steps with railing: Total  Basic Mobility - Total Score: 14    Education Documentation  No documentation found.  Education Comments  No comments found.               Encounter Problems       Encounter Problems (Active)       Balance       STG - Maintains static sitting balance without upper extremity support (Progressing)       Start:  02/15/25    Expected End:  03/01/25       For >15 minutes for eating meals while maintain O2 saturation >90%            Mobility       Goal 1 (Progressing)       Start:  02/15/25    Expected End:  03/01/25       Patient  will be able to perform all bed mobility while maintaining >90% O2 saturation            Pain - Adult          Safety       Goal 1 (Progressing)       Start:  02/15/25    Expected End:  03/01/25       Increase MINI knee strength to >4/5 to ease capability to perform transfers

## 2025-02-17 NOTE — PROGRESS NOTES
Chart reviewed, patient was seen by onsite  Frida Mathis yesterday afternoon in regards to SNF choice, patient was refusing at that time. She was given [facility] list. SW to follow this patient for discharge planning.

## 2025-02-17 NOTE — PROGRESS NOTES
SW following up with pt regarding HHC choice. Pt requested referral to [facility]Critical access hospital [facility]. SW to request provider to place external HHC order. SW following.    1540  Pt discharge order in. Pt interested in POA and Living Will paperwork. SW provided requested paperwork and educated pt re: utilization and completion, including needing a notary or two witnesses for signatures. This writer and ANTON Perez witnessed for signatures. Pt named her daughter, Neris, as healthcare agent. Copy placed in chart and original returned to pt. Pt expressed feeling anxious about returning home today and asked if able to stay an extra day. Provided IMM, pt declined to sign at this time.

## 2025-02-18 PROCEDURE — 99233 SBSQ HOSP IP/OBS HIGH 50: CPT | Performed by: INTERNAL MEDICINE

## 2025-02-18 PROCEDURE — 1210000001 HC SEMI-PRIVATE ROOM DAILY

## 2025-02-18 PROCEDURE — 2500000004 HC RX 250 GENERAL PHARMACY W/ HCPCS (ALT 636 FOR OP/ED)

## 2025-02-18 PROCEDURE — 2500000005 HC RX 250 GENERAL PHARMACY W/O HCPCS: Performed by: INTERNAL MEDICINE

## 2025-02-18 PROCEDURE — 97112 NEUROMUSCULAR REEDUCATION: CPT | Mod: GP,CQ | Performed by: PHYSICAL THERAPY ASSISTANT

## 2025-02-18 PROCEDURE — 97116 GAIT TRAINING THERAPY: CPT | Mod: GP,CQ | Performed by: PHYSICAL THERAPY ASSISTANT

## 2025-02-18 PROCEDURE — 97530 THERAPEUTIC ACTIVITIES: CPT | Mod: GO,CO

## 2025-02-18 PROCEDURE — 2500000001 HC RX 250 WO HCPCS SELF ADMINISTERED DRUGS (ALT 637 FOR MEDICARE OP)

## 2025-02-18 PROCEDURE — 97535 SELF CARE MNGMENT TRAINING: CPT | Mod: GO,CO

## 2025-02-18 PROCEDURE — 2500000001 HC RX 250 WO HCPCS SELF ADMINISTERED DRUGS (ALT 637 FOR MEDICARE OP): Performed by: INTERNAL MEDICINE

## 2025-02-18 RX ADMIN — METOPROLOL TARTRATE 12.5 MG: 25 TABLET, FILM COATED ORAL at 21:04

## 2025-02-18 RX ADMIN — PANTOPRAZOLE SODIUM 40 MG: 40 TABLET, DELAYED RELEASE ORAL at 06:10

## 2025-02-18 RX ADMIN — HEPARIN SODIUM 5000 UNITS: 5000 INJECTION, SOLUTION INTRAVENOUS; SUBCUTANEOUS at 14:31

## 2025-02-18 RX ADMIN — HEPARIN SODIUM 5000 UNITS: 5000 INJECTION, SOLUTION INTRAVENOUS; SUBCUTANEOUS at 21:04

## 2025-02-18 RX ADMIN — HEPARIN SODIUM 5000 UNITS: 5000 INJECTION, SOLUTION INTRAVENOUS; SUBCUTANEOUS at 06:10

## 2025-02-18 RX ADMIN — METOPROLOL TARTRATE 12.5 MG: 25 TABLET, FILM COATED ORAL at 09:56

## 2025-02-18 RX ADMIN — FLUTICASONE FUROATE AND VILANTEROL TRIFENATATE 1 PUFF: 200; 25 POWDER RESPIRATORY (INHALATION) at 06:10

## 2025-02-18 RX ADMIN — ASPIRIN 81 MG: 81 TABLET, COATED ORAL at 09:56

## 2025-02-18 RX ADMIN — Medication 3 L/MIN: at 20:00

## 2025-02-18 RX ADMIN — EMPAGLIFLOZIN 10 MG: 10 TABLET, FILM COATED ORAL at 09:56

## 2025-02-18 RX ADMIN — Medication 3 L/MIN: at 10:00

## 2025-02-18 ASSESSMENT — COGNITIVE AND FUNCTIONAL STATUS - GENERAL
STANDING UP FROM CHAIR USING ARMS: A LITTLE
STANDING UP FROM CHAIR USING ARMS: A LITTLE
TOILETING: A LITTLE
EATING MEALS: A LITTLE
MOVING TO AND FROM BED TO CHAIR: A LOT
WALKING IN HOSPITAL ROOM: A LOT
TURNING FROM BACK TO SIDE WHILE IN FLAT BAD: A LITTLE
MOVING TO AND FROM BED TO CHAIR: A LITTLE
TOILETING: A LOT
MOBILITY SCORE: 18
DAILY ACTIVITIY SCORE: 15
CLIMB 3 TO 5 STEPS WITH RAILING: A LOT
MOVING FROM LYING ON BACK TO SITTING ON SIDE OF FLAT BED WITH BEDRAILS: A LITTLE
MOBILITY SCORE: 15
WALKING IN HOSPITAL ROOM: A LOT
PERSONAL GROOMING: A LITTLE
DRESSING REGULAR UPPER BODY CLOTHING: A LITTLE
HELP NEEDED FOR BATHING: A LOT
DAILY ACTIVITIY SCORE: 22
CLIMB 3 TO 5 STEPS WITH RAILING: A LOT
DRESSING REGULAR LOWER BODY CLOTHING: A LOT
HELP NEEDED FOR BATHING: A LITTLE

## 2025-02-18 ASSESSMENT — PAIN - FUNCTIONAL ASSESSMENT
PAIN_FUNCTIONAL_ASSESSMENT: 0-10

## 2025-02-18 ASSESSMENT — ACTIVITIES OF DAILY LIVING (ADL): HOME_MANAGEMENT_TIME_ENTRY: 10

## 2025-02-18 ASSESSMENT — PAIN SCALES - GENERAL
PAINLEVEL_OUTOF10: 0 - NO PAIN

## 2025-02-18 NOTE — PROGRESS NOTES
Elsa Hinson is a 74 y.o. female on day 4 of admission presenting with Acute hypoxic respiratory failure (Multi).    Subjective   Elsa Hinson is a 74 y.o. female with PMHx s/f CAD s/p PCI of mid LCx, history of STEMI, HTN, HLD, COPD (not on baseline O2) presenting with shortness of breath. She states she gets extremely short of breath with minimal exertion, such as ambulating from bed to bathroom or simply putting on a coat. Her shortness of breath is progressively worsening for the past couple of years and has been the worst past few days.  Her oxygen saturation was in 50s prior to ED arrival.  She states she has been needing to use her emergency inhaler albuterol more than usual because her long-acting home inhaler ran out of prescription.  The inhaler has been out of prescription for a week and she admits that she has not seen a PCP for past 4 to 5 years.  She takes metoprolol and amlodipine for her blood pressure.  Her family also reports that she has decreased appetite/poor oral intake and has not been sleeping at all. She takes naps throughout the day and never really laid down on the bed. Mostly spent her day in recliner. She notes of chills, nausea, lightheadedness, productive cough of clear sputum. Has bilateral LE edema for a couple weeks. She lives alone and her daughter has been urging her to go to the hospital but she has been refusing until today. Denies fever, vomiting, chest pain, abd pain, changes in urinary or bowel symptoms.     ED Course (Summary - please note all labs, imaging studies, and interventions noted below have been personally reviewed and/or interpreted on day of admission):   Vitals on presentation: 96.8 F, 85 bpm, 24 RR, 116/69, 66% on RA  >> 98% on 6 L nasal cannula >> 100% on 4 L nasal cannula  Labs: CMP largely unremarkable  , troponin 8-9  CBC-largely unremarkable  Viral studies negative  VBG-pH 7.35, pCO2 65, pO2's 51, HCO3 35.9, lactate 1.1  EKG: Sinus rhythm at 77  bpm, no acute ST elevation/depression.  .  QTc 458.  Imaging: CTH - Approximately 4 x 4 cm irregular scalp thickening and ulceration involving the right parietal scalp.  No acute intracranial abnormality  CTA for PE-no PE.  Severe emphysematous change and pulmonary hyperexpansion suggestive of COPD.  To 1.1 cm nodule opacities in the posterior left lower lobe.  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  Multiple solid noncalcified pulmonary nodules up to 8 mm.  Interventions: Lasix 40 mg, admission for further management     12-point ROS reviewed and found to be negative aside from aforementioned positives in HPI and/or noted in dedicated ROS section below.      2/14: SOB improving. Edema resolved.      2/15: She remains dyspneic and its likely from underlying chronic lung dz. She is euvolemic. She quickly desats with movement. BP was low after AM meds so I am holding lasix and lisinopril and substantially decreased metoprolol dose.      2/16: Unchanged. POT/OT rec SNF. She reluctantly agreed yesterday as her kids wanted this for her.     2/17: Patient refused SNF. I Dcd her. She appealed.     2/18: She changed her mind and is now going to SNF.       Objective     Constitutional: Pleasant and cooperative. Laying in bed in no acute distress. Conversant.   Skin: Warm and dry; lesion noted at the top of her head, see media section for better visualization.  Eyes: EOMI. Anicteric sclera.   ENT: Mucous membranes moist; no obvious injury or deformity appreciated.   Head and Neck: Normocephalic, atraumatic. ROM preserved. Trachea midline. No appreciable JVD.   Respiratory: Nonlabored on 3L NC. Lungs diminished bilaterally without obvious adventitious sounds. Chest rise is equal.  Cardiovascular: RRR. No gross murmur, gallop, or rub. Extremities are warm and well-perfused with good capillary refill (< 3 seconds). No chest wall tenderness.   GI:  Abdomen soft, nontender, nondistended. No obvious organomegaly appreciated. Bowel sounds are present.  : No CVA tenderness.   MSK: No gross abnormalities appreciated. No limitations to AROM/PROM appreciated.   Extremities: No Edema in BLE. No cyanosis, or clubbing evident. Neurovascularly intact.   Neuro: A&Ox3. CN 2-12 grossly intact. Able to respond to questions appropriately and clearly. No acute focal neurologic deficits appreciated.  Psych: Appropriate mood and behavior.    Last Recorded Vitals  Blood pressure 121/69, pulse 71, temperature 36.6 °C (97.8 °F), temperature source Temporal, resp. rate 17, height 1.524 m (5'), weight (!) 44.2 kg (97 lb 8 oz), SpO2 100%.  Intake/Output last 3 Shifts:  I/O last 3 completed shifts:  In: 600 (13.6 mL/kg) [P.O.:600]  Out: 200 (4.5 mL/kg) [Urine:200 (0.1 mL/kg/hr)]  Weight: 44.2 kg     Relevant Results                          Malnutrition Diagnosis Status: New  Malnutrition Diagnosis: Moderate malnutrition related to chronic disease or condition  Related to: COPD  As Evidenced by: mild to moderate muscl/fat losses, <75% of estimated needs x 1 month  I agree with the dietitian's malnutrition diagnosis.      Assessment/Plan      Acute Hypoxemic Respiratory Failure   Acute HFpEF/pHTN  Elevated BNP  -Evidenced by presenting sxs: NOVA, orthopnea, BLE edema  -TTE HFpEF mild/mod elevated RVSP, BNP of 137  -LE edema has resolved entirely and SOB is improving (she remains on 2L and desalts with any movement, this is a chronic need 2/2 COPD)   -started on jardiance 10 mg daily  -BP was low after AM meds 2/15 so I decreased home metoprolol 12.5 mg BID.   -2g salt restriction, 2L fluid restriction   -Cardiology consult     COPD  -she is not wheezing and is not in exacerbation  -start Trelegy  -home O2 eval revealed chronic need of 3 L NC at rest and 6 L NC with ambulation  -OP pulmonology referral was placed     CAD s/p PCI of mid Lcx  -denies anginal chest discomfort  -Continue  home aspirin and start HI statin     HTN  -Stable and controlled  -Continue antihypertensives as outlined above     1.1 cm nodule opacities in the posterior left lower lobe  Multiple solid noncalcified pulmonary nodules up to 8 mm  -follow-up with non contrast chest CT at 3-6 months  -OP pulmonology referral was placed      Kyle Baltazar MD PhD

## 2025-02-18 NOTE — DOCUMENTATION CLARIFICATION NOTE
"    PATIENT:               SARAH LANGFORD  ACCT #:                  [id number]  MRN:                       [id number]  :                       1950  ADMIT DATE:       2025 4:50 PM  DISCH DATE:  RESPONDING PROVIDER #:        [id number]          PROVIDER RESPONSE TEXT:    I agree with dietician diagnosis of Moderate malnutrition on 25    CDI QUERY TEXT:    Clarification    Instruction:    Based on your assessment of the patient and the clinical information, please provide the requested documentation by clicking on the appropriate radio button and enter any additional information if prompted.    Question: Please further clarify this patient nutritional status as    When answering this query, please exercise your independent professional judgment. The fact that a question is being asked, does not imply that any particular answer is desired or expected.    The patient's clinical indicators include:  Clinical Information: Patient with COPD admitted with NOVA and acute respiratory failure    Clinical Indicators: Per nutrition consult, \"Patient has Malnutrition Diagnosis: Yes  Diagnosis Status: New  Malnutrition Diagnosis: Moderate malnutrition related to chronic disease or condition  Related to: COPD  As Evidenced by: mild to moderate muscl/fat losses, <75% of estimated needs x 1 month.\"    Treatment: nutrition consult, magic cup BID    Risk Factors: 74yof with COPD admitted with NOVA and acute respiratory failure  Options provided:  -- I agree with dietician diagnosis of Moderate malnutrition on 25  -- Other - I will add my own diagnosis  -- Refer to Clinical Documentation Reviewer    Query created by: Ninoska Beckman on 2025 3:50 PM      Electronically signed by:  MARSHA WHITE MD PhD 2025 7:06 AM          "

## 2025-02-18 NOTE — PROGRESS NOTES
Physical Therapy    Physical Therapy Treatment    Patient Name: Elsa Hinson  MRN: [id number]  Department: [facility]  Room: [address] [place] [address]  Today's Date: 2/18/2025  Time Calculation  Start Time: 1020  Stop Time: 1045  Time Calculation (min): 25 min         Assessment/Plan   PT Assessment  End of Session Communication: Bedside nurse  Assessment Comment: pt demonstrating improved balance with walker. pt requiring 2 assist with stand pivot and gait She would benefit from further skilled PT services.  End of Session Patient Position: Up in chair, Alarm on     PT Plan  Treatment/Interventions: Bed mobility, Transfer training, Gait training, Balance training, Strengthening, Therapeutic exercise  PT Plan: Ongoing PT  PT Frequency: 4 times per week  PT Discharge Recommendations: Moderate intensity level of continued care  PT - OK to Discharge: Yes      General Visit Information:   PT  Visit  PT Received On: 02/18/25  Response to Previous Treatment: Patient with no complaints from previous session.  General  Reason for Referral: Dyspnea, HTN  Referred By: Abdirizak  Past Medical History Relevant to Rehab: PCI of mid LCx, history of STEMI, HTN, HLD, COPD (not on baseline O2)  Prior to Session Communication: Bedside nurse  Patient Position Received: Bed, 3 rail up, Alarm on  Preferred Learning Style: verbal  General Comment: pt agreeable to treatment and cleared by RN. pt reporting she hopes to dc soon.    Precautions:  Precautions  Medical Precautions: Fall precautions, Oxygen therapy device and L/min      Pain:  Pain Assessment  Pain Assessment: 0-10  0-10 (Numeric) Pain Score: 0 - No pain  Cognition:  Cognition  Overall Cognitive Status: Within Functional Limits            Treatments:  Balance/Neuromuscular Re-Education  Balance/Neuromuscular Re-Education Activity Performed: Yes  Balance/Neuromuscular Re-Education Activity 1: pt stood multiple attempts 1>2 minutes with Min Ax 1 and FWW. pt with no overt LOB but is unsteady at times. pt  sat EOB x CGA x 8 minutes with no LOB.    Bed Mobility  Bed Mobility: Yes  Bed Mobility 1  Bed Mobility 1: Supine to sitting  Level of Assistance 1: Minimum assistance  Bed Mobility Comments 1: cues for improved technique.  Bed Mobility 2  Bed Mobility  2: Scooting  Level of Assistance 2: Minimum assistance    Ambulation/Gait Training  Ambulation/Gait Training Performed: Yes  Ambulation/Gait Training 1  Surface 1: Level tile  Device 1: Rolling walker  Assistance 1: Minimum assistance, Minimal verbal cues (Ax 2 with cues for walker safety and technique)  Quality of Gait 1: Narrow base of support, Inconsistent stride length, Diminished heel strike  Comments/Distance (ft) 1: 25'1 with cues for walker safety and technique. pt is impulsive at times.  Transfers  Transfer: Yes  Transfer 1  Technique 1: Sit to stand, Stand to sit  Transfer Device 1: Walker  Transfer Level of Assistance 1: Minimum assistance  Trials/Comments 1: cues for safety and hand placement.  Transfers 2  Technique 2: Stand pivot  Transfer Device 2: Walker  Transfer Level of Assistance 2: Minimum assistance, +2, Minimal verbal cues  Trials/Comments 2: cues for walker safety and technique    Outcome Measures:  [facility] Basic Mobility  Turning from your back to your side while in a flat bed without using bedrails: A little  Moving from lying on your back to sitting on the side of a flat bed without using bedrails: A little  Moving to and from bed to chair (including a wheelchair): A lot  Standing up from a chair using your arms (e.g. wheelchair or bedside chair): A little  To walk in hospital room: A lot  Climbing 3-5 steps with railing: A lot  Basic Mobility - Total Score: 15    Education Documentation  Precautions, taught by Leora Freire PTA at 2/18/2025 11:50 AM.  Learner: Patient  Readiness: Acceptance  Method: Explanation  Response: Verbalizes Understanding, Needs Reinforcement    Education Comments  No comments found.               Encounter Problems        Encounter Problems (Active)       Balance       STG - Maintains static sitting balance without upper extremity support (Progressing)       Start:  02/15/25    Expected End:  03/01/25       For >15 minutes for eating meals while maintain O2 saturation >90%            Mobility       Goal 1 (Progressing)       Start:  02/15/25    Expected End:  03/01/25       Patient will be able to perform all bed mobility while maintaining >90% O2 saturation            Pain - Adult          Safety       Goal 1 (Progressing)       Start:  02/15/25    Expected End:  03/01/25       Increase MINI knee strength to >4/5 to ease capability to perform transfers

## 2025-02-18 NOTE — PROGRESS NOTES
02/18/25 0843   Discharge Planning   Home or Post Acute Services Post acute facilities (Rehab/SNF/etc)   Type of Post Acute Facility Services Skilled nursing   Expected Discharge Disposition SNF   Does the patient need discharge transport arranged? Yes   RoundTrip coordination needed? Yes   Patient Choice   Provider Choice list and CMS website (https://medicare.gov/care-compare#search) for post-acute Quality and Resource Measure Data were provided and reviewed with: Patient   Intensity of Service   Intensity of Service >30 min     Patient has appealed her DC to home yesterday afternoon. Frida WALTON met with patient at bedside and inquired on her concerns so that we can start the appeal process with Medicare. Patient now wanting to go to SNF[facility]-for her rehab stay. Dr Baltazar is aware, DISCHARGE ORDER REMOVED at this time.  Will start working on that process now. Patient will call Desirae and explain that she no longer wants to appeal the discharge, as we are now changing the DC plan. CNC to send [facility] referral. If able to accept, will have auth started today.

## 2025-02-18 NOTE — CARE PLAN
The patient's goals for the shift include      The clinical goals for the shift include patient will remian safe and free from falls during this shift      Problem: Pain - Adult  Goal: Verbalizes/displays adequate comfort level or baseline comfort level  Outcome: Progressing     Problem: Safety - Adult  Goal: Free from fall injury  Outcome: Progressing     Problem: Discharge Planning  Goal: Discharge to home or other facility with appropriate resources  Outcome: Progressing     Problem: Chronic Conditions and Co-morbidities  Goal: Patient's chronic conditions and co-morbidity symptoms are monitored and maintained or improved  Outcome: Progressing     Problem: Nutrition  Goal: Nutrient intake appropriate for maintaining nutritional needs  Outcome: Progressing     Problem: Fall/Injury  Goal: Not fall by end of shift  Outcome: Progressing  Goal: Be free from injury by end of the shift  Outcome: Progressing  Goal: Verbalize understanding of personal risk factors for fall in the hospital  Outcome: Progressing  Goal: Verbalize understanding of risk factor reduction measures to prevent injury from fall in the home  Outcome: Progressing  Goal: Use assistive devices by end of the shift  Outcome: Progressing  Goal: Pace activities to prevent fatigue by end of the shift  Outcome: Progressing     Problem: Skin  Goal: Decreased wound size/increased tissue granulation at next dressing change  Outcome: Progressing  Flowsheets (Taken 2/17/2025 2050)  Decreased wound size/increased tissue granulation at next dressing change: Promote sleep for wound healing  Goal: Participates in plan/prevention/treatment measures  Outcome: Progressing  Flowsheets (Taken 2/17/2025 2050)  Participates in plan/prevention/treatment measures: Elevate heels  Goal: Prevent/manage excess moisture  Outcome: Progressing  Flowsheets (Taken 2/17/2025 2050)  Prevent/manage excess moisture:   Cleanse incontinence/protect with barrier cream   Moisturize dry  skin  Goal: Prevent/minimize sheer/friction injuries  Outcome: Progressing  Goal: Promote/optimize nutrition  Outcome: Progressing  Goal: Promote skin healing  Outcome: Progressing

## 2025-02-18 NOTE — CARE PLAN
The patient's goals for the shift include      The clinical goals for the shift include patient will remian safe and free from falls during this shift    Over the shift, the patient did not make progress toward the following goals. Barriers to progression include   . Recommendations to address these barriers include   .

## 2025-02-18 NOTE — PROGRESS NOTES
Elsa Hinson is a 74 y.o. female on day 4 of admission presenting with Acute hypoxic respiratory failure (Multi).    Subjective   Elsa Hinson is a 74 y.o. female with PMHx s/f CAD s/p PCI of mid LCx, history of STEMI, HTN, HLD, COPD (not on baseline O2) presenting with shortness of breath. She states she gets extremely short of breath with minimal exertion, such as ambulating from bed to bathroom or simply putting on a coat. Her shortness of breath is progressively worsening for the past couple of years and has been the worst past few days.  Her oxygen saturation was in 50s prior to ED arrival.  She states she has been needing to use her emergency inhaler albuterol more than usual because her long-acting home inhaler ran out of prescription.  The inhaler has been out of prescription for a week and she admits that she has not seen a PCP for past 4 to 5 years.  She takes metoprolol and amlodipine for her blood pressure.  Her family also reports that she has decreased appetite/poor oral intake and has not been sleeping at all. She takes naps throughout the day and never really laid down on the bed. Mostly spent her day in recliner. She notes of chills, nausea, lightheadedness, productive cough of clear sputum. Has bilateral LE edema for a couple weeks. She lives alone and her daughter has been urging her to go to the hospital but she has been refusing until today. Denies fever, vomiting, chest pain, abd pain, changes in urinary or bowel symptoms.     ED Course (Summary - please note all labs, imaging studies, and interventions noted below have been personally reviewed and/or interpreted on day of admission):   Vitals on presentation: 96.8 F, 85 bpm, 24 RR, 116/69, 66% on RA  >> 98% on 6 L nasal cannula >> 100% on 4 L nasal cannula  Labs: CMP largely unremarkable  , troponin 8-9  CBC-largely unremarkable  Viral studies negative  VBG-pH 7.35, pCO2 65, pO2's 51, HCO3 35.9, lactate 1.1  EKG: Sinus rhythm at 77  bpm, no acute ST elevation/depression.  .  QTc 458.  Imaging: CTH - Approximately 4 x 4 cm irregular scalp thickening and ulceration involving the right parietal scalp.  No acute intracranial abnormality  CTA for PE-no PE.  Severe emphysematous change and pulmonary hyperexpansion suggestive of COPD.  To 1.1 cm nodule opacities in the posterior left lower lobe.  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  Multiple solid noncalcified pulmonary nodules up to 8 mm.  Interventions: Lasix 40 mg, admission for further management     12-point ROS reviewed and found to be negative aside from aforementioned positives in HPI and/or noted in dedicated ROS section below.      2/14: SOB improving. Edema resolved.      2/15: She remains dyspneic and its likely from underlying chronic lung dz. She is euvolemic. She quickly desats with movement. BP was low after AM meds so I am holding lasix and lisinopril and substantially decreased metoprolol dose.      2/16: Unchanged. POT/OT rec SNF. She reluctantly agreed yesterday as her kids wanted this for her.     2/17: Patient refused SNF. I Dcd her. She appealed.                Objective     Constitutional: Pleasant and cooperative. Laying in bed in no acute distress. Conversant.   Skin: Warm and dry; lesion noted at the top of her head, see media section for better visualization.  Eyes: EOMI. Anicteric sclera.   ENT: Mucous membranes moist; no obvious injury or deformity appreciated.   Head and Neck: Normocephalic, atraumatic. ROM preserved. Trachea midline. No appreciable JVD.   Respiratory: Nonlabored on 3L NC. Lungs diminished bilaterally without obvious adventitious sounds. Chest rise is equal.  Cardiovascular: RRR. No gross murmur, gallop, or rub. Extremities are warm and well-perfused with good capillary refill (< 3 seconds). No chest wall tenderness.   GI: Abdomen soft, nontender, nondistended. No  obvious organomegaly appreciated. Bowel sounds are present.  : No CVA tenderness.   MSK: No gross abnormalities appreciated. No limitations to AROM/PROM appreciated.   Extremities: No Edema in BLE. No cyanosis, or clubbing evident. Neurovascularly intact.   Neuro: A&Ox3. CN 2-12 grossly intact. Able to respond to questions appropriately and clearly. No acute focal neurologic deficits appreciated.  Psych: Appropriate mood and behavior.    Last Recorded Vitals  Blood pressure 121/69, pulse 71, temperature 36.6 °C (97.8 °F), temperature source Temporal, resp. rate 17, height 1.524 m (5'), weight (!) 44.2 kg (97 lb 8 oz), SpO2 100%.  Intake/Output last 3 Shifts:  I/O last 3 completed shifts:  In: 600 (13.6 mL/kg) [P.O.:600]  Out: 200 (4.5 mL/kg) [Urine:200 (0.1 mL/kg/hr)]  Weight: 44.2 kg     Relevant Results                         Malnutrition Diagnosis Status: New  Malnutrition Diagnosis: Moderate malnutrition related to chronic disease or condition  Related to: COPD  As Evidenced by: mild to moderate muscl/fat losses, <75% of estimated needs x 1 month  I agree with the dietitian's malnutrition diagnosis.      Assessment/Plan      Acute Hypoxemic Respiratory Failure   Acute HFpEF/pHTN  Elevated BNP  -Evidenced by presenting sxs: NOVA, orthopnea, BLE edema  -TTE HFpEF mild/mod elevated RVSP, BNP of 137  -LE edema has resolved entirely and SOB is improving (she remains on 2L and desalts with any movement, this is a chronic need 2/2 COPD)   -started on jardiance 10 mg daily  -BP was low after AM meds 2/15 so I decreased home metoprolol 12.5 mg BID.   -2g salt restriction, 2L fluid restriction   -Cardiology consult     COPD  -she is not wheezing and is not in exacerbation  -start Trelegy  -home O2 eval revealed chronic need of 3 L NC at rest and 6 L NC with ambulation  -OP pulmonology referral was placed     CAD s/p PCI of mid Lcx  -denies anginal chest discomfort  -Continue home aspirin and start HI statin      HTN  -Stable and controlled  -Continue antihypertensives as outlined above     1.1 cm nodule opacities in the posterior left lower lobe  Multiple solid noncalcified pulmonary nodules up to 8 mm  -follow-up with non contrast chest CT at 3-6 months  -OP pulmonology referral was placed      Kyle Baltazar MD PhD

## 2025-02-18 NOTE — DOCUMENTATION CLARIFICATION NOTE
"    PATIENT:               SARAH LANGFORD  ACCT #:                  [id number]  MRN:                       [id number]  :                       1950  ADMIT DATE:       2025 4:50 PM  DISCH DATE:  RESPONDING PROVIDER #:        [id number]          PROVIDER RESPONSE TEXT:    Acute Diastolic CHF ruled in for this admission    CDI QUERY TEXT:    Clarification    Instruction:    Based on your assessment of the patient and the clinical information, please provide the requested documentation by clicking on the appropriate radio button and enter any additional information if prompted.    Question: Please further clarify the diagnosis of Acute Diastolic CHF as    When answering this query, please exercise your independent professional judgment. The fact that a question is being asked, does not imply that any particular answer is desired or expected.    The patient's clinical indicators include:  Clinical Information: Patient presenting with NOVA, BLE edema, and elevated BNP    Clinical Indicators: Per ED, \"Dyspnea on exertion.\"    Per H and P, \"Acute CHF  Elevated BNP  -Evidenced by presenting sxs: NOVA, orthopnea, BLE edema.\"    Per cardiac consult, \"Concern for heart failure...  There may be a component of underlying HFpEF.\"    Per 02/15 cardiac PN, \"Concern for heart failure  Echocardiogram was reviewed.  There is mild RV dysfunction, pulmonary hypertension and mild diastolic dysfunction.  Filling pressures are not elevated based on E/e' ratio.  Right atrial pressure is normal and IVC is collapsible and small.  Suspect RV dysfunction/pulmonary hypertension in setting of COPD/lung disease.  Her current presentation suggestive of COPD worsening possible undiagnosed pulm fibrosis and not related to decompensated heart failure.  Please obtain pulmonary consult.\"    Per d/c sum, \"Acute HFpEF/pHTN  Elevated BNP  -Evidenced by presenting sxs: NOVA, orthopnea, BLE edema  -TTE HFpEF mild/mod elevated RVSP, BNP of 137  -LE edema has " "resolved and SOB is improving (she remains on 2L and desalts with any movement, she likely has chronic need 2/2 COPD)  -started on jardiance 10 mg daily  -BP was low after AM meds 2/15 so I decreased home metoprolol 12.5 mg BID.  -2g salt restriction, 2L fluid restriction  -Cardiology consult.\"    02/13 BNP: 137    02/14 echo, \"Left ventricular ejection fraction is normal, by visual estimate at 60-65%.\"    Treatment: echo, cardiac consult, IV lasix 40, CTA, BNP    Risk Factors: 74yof presenting with NOVA, BLE edema, and elevated BNP  Options provided:  -- Acute Diastolic CHF ruled in for this admission  -- Acute Diastolic CHF ruled out after workup  -- Other - I will add my own diagnosis  -- Refer to Clinical Documentation Reviewer    Query created by: Ninoska Beckman on 2/17/2025 3:47 PM      Electronically signed by:  MARSHA WHITE MD PhD 2/18/2025 7:06 AM          "

## 2025-02-18 NOTE — PROGRESS NOTES
[facility] able to accept. Facility to start auth. Unlikely auth will come back today. SW to follow.

## 2025-02-18 NOTE — PROGRESS NOTES
Occupational Therapy    OT Treatment    Patient Name: Elsa Hinson  MRN: [id number]  Department: [facility]  Room: [address] [place] [address]  Today's Date: 2/18/2025  Time Calculation  Start Time: 1021  Stop Time: 1045  Time Calculation (min): 24 min        Assessment:  OT Assessment: Pt progressed activity tolerance to completing functional mobility min household distance using FWW. Pt fatigues and becomes SOB quickly with activity requiring frequent rest breaks and additional time to recover.  Barriers to Discharge Home: Caregiver assistance, Physical needs  End of Session Communication: Bedside nurse  End of Session Patient Position: Up in chair, Alarm on     Plan:  Treatment Interventions: ADL retraining, Functional transfer training, UE strengthening/ROM, Endurance training, Patient/family training, Equipment evaluation/education, Compensatory technique education  OT Frequency: 3 times per week  OT Discharge Recommendations: Moderate intensity level of continued care  Equipment Recommended upon Discharge:  (TBD)  OT Recommended Transfer Status: Assist of 1  OT - OK to Discharge: Yes (When medically appropriate)  Treatment Interventions: ADL retraining, Functional transfer training, UE strengthening/ROM, Endurance training, Patient/family training, Equipment evaluation/education, Compensatory technique education    Subjective   Previous Visit Info:  OT Last Visit  OT Received On: 02/18/25  General:  General  Prior to Session Communication: Bedside nurse  Patient Position Received: Bed, 3 rail up, Alarm on  General Comment: Pt agreeable and cooperative to therapy.  Precautions:  Medical Precautions: Fall precautions, Oxygen therapy device and L/min      Pain:  Pain Assessment  Pain Assessment: 0-10  0-10 (Numeric) Pain Score: 0 - No pain    Objective    Cognition:  Cognition  Overall Cognitive Status: Within Functional Limits    Activities of Daily Living:             LE Dressing  LE Dressing: Yes  Adult Briefs Level of  Assistance: Moderate assistance, Minimal verbal cues       Functional Standing Tolerance:  Activity: Pt completed x3 stands tolerating for 1-2 minutes with intervals of minimally dyn activity and min A using FWW for stability.  Bed Mobility/Transfers: Bed Mobility 1  Bed Mobility 1: Supine to sitting  Level of Assistance 1: Minimum assistance    Transfer 1  Technique 1: Sit to stand, Stand to sit  Transfer Device 1: Walker  Transfer Level of Assistance 1: Minimum assistance  Transfers 2  Transfer From 2: Bed to  Transfer to 2: Chair with arms  Technique 2: Sit to stand, Stand to sit  Transfer Device 2: Walker  Transfer Level of Assistance 2: Minimum assistance, +2, Minimal verbal cues            Functional Mobility:  Functional Mobility  Functional Mobility Performed: Yes  Functional Mobility 1  Comments 1: Pt participated in functional mobility min household distance x2 trials with min A x2 using FWW.       Therapy/Activity:      Therapeutic Activity  Therapeutic Activity Performed: Yes  Therapeutic Activity 1: Pt tolerated sitting EOB for 8 minutes with SBA.      Outcome Measures:[facility] Daily Activity  Putting on and taking off regular lower body clothing: A lot  Bathing (including washing, rinsing, drying): A lot  Putting on and taking off regular upper body clothing: A little  Toileting, which includes using toilet, bedpan or urinal: A lot  Taking care of personal grooming such as brushing teeth: A little  Eating Meals: A little  Daily Activity - Total Score: 15        Education Documentation  Body Mechanics, taught by JAMES Wilson at 2/18/2025 12:00 PM.  Learner: Patient  Readiness: Acceptance  Method: Explanation  Response: Needs Reinforcement    ADL Training, taught by JAMES Wilson at 2/18/2025 12:00 PM.  Learner: Patient  Readiness: Acceptance  Method: Explanation  Response: Needs Reinforcement    Education Comments  No comments found.          Goals:  Encounter Problems       Encounter  Problems (Active)       ADLs       Patient with complete lower body dressing with modified stand by level of assistance  with PRN adaptive equipment (Progressing)       Start:  02/15/25    Expected End:  03/01/25            Patient will complete toileting including hygiene clothing management/hygiene with stand by assist level of assistance. (Progressing)       Start:  02/15/25    Expected End:  03/01/25               COGNITION/SAFETY       Pt able to integrate 1-2 energy conservation techniques into ADL or therapeutic activities without verbal cuing  (Progressing)       Start:  02/15/25    Expected End:  03/01/25            Pt demo IND integration of breathing techniques during ADL or therapeutic activity to promote VS with no more than 1 verbal cue (Progressing)       Start:  02/15/25    Expected End:  03/01/25               TRANSFERS       Patient will perform bed mobility stand by assist level of assistance and bed rails in order to improve safety and independence with mobility (Progressing)       Start:  02/15/25    Expected End:  03/01/25            Patient will complete functional transfers with least restrictive device with stand by assist level of assistance. (Progressing)       Start:  02/15/25    Expected End:  03/01/25

## 2025-02-19 VITALS
BODY MASS INDEX: 19.14 KG/M2 | HEIGHT: 60 IN | OXYGEN SATURATION: 95 % | WEIGHT: 97.5 LBS | HEART RATE: 96 BPM | SYSTOLIC BLOOD PRESSURE: 103 MMHG | TEMPERATURE: 98.8 F | DIASTOLIC BLOOD PRESSURE: 64 MMHG | RESPIRATION RATE: 16 BRPM

## 2025-02-19 PROCEDURE — 2500000001 HC RX 250 WO HCPCS SELF ADMINISTERED DRUGS (ALT 637 FOR MEDICARE OP)

## 2025-02-19 PROCEDURE — 99239 HOSP IP/OBS DSCHRG MGMT >30: CPT | Performed by: INTERNAL MEDICINE

## 2025-02-19 PROCEDURE — 2500000005 HC RX 250 GENERAL PHARMACY W/O HCPCS: Performed by: INTERNAL MEDICINE

## 2025-02-19 PROCEDURE — 2500000004 HC RX 250 GENERAL PHARMACY W/ HCPCS (ALT 636 FOR OP/ED)

## 2025-02-19 PROCEDURE — 2500000001 HC RX 250 WO HCPCS SELF ADMINISTERED DRUGS (ALT 637 FOR MEDICARE OP): Performed by: INTERNAL MEDICINE

## 2025-02-19 RX ORDER — ATORVASTATIN CALCIUM 40 MG/1
40 TABLET, FILM COATED ORAL DAILY
Start: 2025-02-19

## 2025-02-19 RX ORDER — FLUTICASONE FUROATE, UMECLIDINIUM BROMIDE AND VILANTEROL TRIFENATATE 100; 62.5; 25 UG/1; UG/1; UG/1
1 POWDER RESPIRATORY (INHALATION) DAILY
Start: 2025-02-19

## 2025-02-19 RX ORDER — METOPROLOL TARTRATE 25 MG/1
12.5 TABLET, FILM COATED ORAL 2 TIMES DAILY
Start: 2025-02-19

## 2025-02-19 RX ORDER — ALBUTEROL SULFATE 90 UG/1
2 INHALANT RESPIRATORY (INHALATION) EVERY 4 HOURS PRN
Start: 2025-02-19

## 2025-02-19 RX ADMIN — PANTOPRAZOLE SODIUM 40 MG: 40 TABLET, DELAYED RELEASE ORAL at 05:41

## 2025-02-19 RX ADMIN — ASPIRIN 81 MG: 81 TABLET, COATED ORAL at 09:51

## 2025-02-19 RX ADMIN — FLUTICASONE FUROATE AND VILANTEROL TRIFENATATE 1 PUFF: 200; 25 POWDER RESPIRATORY (INHALATION) at 09:51

## 2025-02-19 RX ADMIN — METOPROLOL TARTRATE 12.5 MG: 25 TABLET, FILM COATED ORAL at 09:51

## 2025-02-19 RX ADMIN — HEPARIN SODIUM 5000 UNITS: 5000 INJECTION, SOLUTION INTRAVENOUS; SUBCUTANEOUS at 05:41

## 2025-02-19 RX ADMIN — ACETAMINOPHEN 650 MG: 325 TABLET ORAL at 09:52

## 2025-02-19 RX ADMIN — Medication 3 L/MIN: at 09:59

## 2025-02-19 RX ADMIN — EMPAGLIFLOZIN 10 MG: 10 TABLET, FILM COATED ORAL at 09:51

## 2025-02-19 ASSESSMENT — COGNITIVE AND FUNCTIONAL STATUS - GENERAL
MOVING TO AND FROM BED TO CHAIR: A LITTLE
HELP NEEDED FOR BATHING: A LITTLE
WALKING IN HOSPITAL ROOM: A LOT
DAILY ACTIVITIY SCORE: 22
MOBILITY SCORE: 18
TOILETING: A LITTLE
CLIMB 3 TO 5 STEPS WITH RAILING: A LOT
STANDING UP FROM CHAIR USING ARMS: A LITTLE

## 2025-02-19 ASSESSMENT — PAIN SCALES - GENERAL: PAINLEVEL_OUTOF10: 4

## 2025-02-19 ASSESSMENT — PAIN - FUNCTIONAL ASSESSMENT: PAIN_FUNCTIONAL_ASSESSMENT: 0-10

## 2025-02-19 NOTE — DISCHARGE SUMMARY
DISCHARGE SUMMARY     Discharge Diagnosis  Acute hypoxic respiratory failure (Multi)    This discharge took greater than 35 minutes.    Test Results Pending At Discharge  Pending Labs       No current pending labs.            Hospital Course   Elsa Hinson is a 74 y.o. female with PMHx s/f CAD s/p PCI of mid LCx, history of STEMI, HTN, HLD, COPD (not on baseline O2) presenting with shortness of breath. She states she gets extremely short of breath with minimal exertion, such as ambulating from bed to bathroom or simply putting on a coat. Her shortness of breath is progressively worsening for the past couple of years and has been the worst past few days.  Her oxygen saturation was in 50s prior to ED arrival.  She states she has been needing to use her emergency inhaler albuterol more than usual because her long-acting home inhaler ran out of prescription.  The inhaler has been out of prescription for a week and she admits that she has not seen a PCP for past 4 to 5 years.  She takes metoprolol and amlodipine for her blood pressure.  Her family also reports that she has decreased appetite/poor oral intake and has not been sleeping at all. She takes naps throughout the day and never really laid down on the bed. Mostly spent her day in recliner. She notes of chills, nausea, lightheadedness, productive cough of clear sputum. Has bilateral LE edema for a couple weeks. She lives alone and her daughter has been urging her to go to the hospital but she has been refusing until today. Denies fever, vomiting, chest pain, abd pain, changes in urinary or bowel symptoms.     ED Course (Summary - please note all labs, imaging studies, and interventions noted below have been personally reviewed and/or interpreted on day of admission):   Vitals on presentation: 96.8 F, 85 bpm, 24 RR, 116/69, 66% on RA  >> 98% on 6 L nasal cannula >> 100% on 4 L nasal cannula  Labs: CMP largely unremarkable  , troponin 8-9  CBC-largely  unremarkable  Viral studies negative  VBG-pH 7.35, pCO2 65, pO2's 51, HCO3 35.9, lactate 1.1  EKG: Sinus rhythm at 77 bpm, no acute ST elevation/depression.  .  QTc 458.  Imaging: CTH - Approximately 4 x 4 cm irregular scalp thickening and ulceration involving the right parietal scalp.  No acute intracranial abnormality  CTA for PE-no PE.  Severe emphysematous change and pulmonary hyperexpansion suggestive of COPD.  To 1.1 cm nodule opacities in the posterior left lower lobe.  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  Multiple solid noncalcified pulmonary nodules up to 8 mm.  Interventions: Lasix 40 mg, admission for further management     12-point ROS reviewed and found to be negative aside from aforementioned positives in HPI and/or noted in dedicated ROS section below.      2/14: SOB improving. Edema resolved.      2/15: She remains dyspneic and its likely from underlying chronic lung dz. She is euvolemic. She quickly desats with movement. BP was low after AM meds so I am holding lasix and lisinopril and substantially decreased metoprolol dose.      2/16: Unchanged. POT/OT rec SNF. She reluctantly agreed yesterday as her kids wanted this for her.      2/17: Patient refused SNF. I Dcd her. She appealed.      2/18: She changed her mind and is now going to SNF    Malnutrition Diagnosis Status: New  Malnutrition Diagnosis: Moderate malnutrition related to chronic disease or condition  Related to: COPD  As Evidenced by: mild to moderate muscl/fat losses, <75% of estimated needs x 1 month  I agree with the dietitian's malnutrition diagnosis.           Assessment/Plan  Acute Hypoxemic Respiratory Failure   Acute HFpEF/pHTN  Elevated BNP  -Evidenced by presenting sxs: NOVA, orthopnea, BLE edema  -TTE HFpEF mild/mod elevated RVSP, BNP of 137  -LE edema has resolved entirely and SOB is improving (she remains on 2L and desalts with any movement,  this is a chronic need 2/2 COPD)   -started on jardiance 10 mg daily  -BP was low after AM meds 2/15 so I decreased home metoprolol 12.5 mg BID.   -2g salt restriction, 2L fluid restriction   -Cardiology consult     COPD  -she is not wheezing and is not in exacerbation  -start Trelegy  -home O2 eval revealed chronic need of 3 L NC at rest and 6 L NC with ambulation  -OP pulmonology referral was placed     CAD s/p PCI of mid Lcx  -denies anginal chest discomfort  -Continue home aspirin and start HI statin     HTN  -Stable and controlled  -Continue antihypertensives as outlined above     1.1 cm nodule opacities in the posterior left lower lobe  Multiple solid noncalcified pulmonary nodules up to 8 mm  -follow-up with non contrast chest CT at 3-6 months  -OP pulmonology referral was placed    Pertinent Physical Exam At Time of Discharge  Constitutional: Pleasant and cooperative. Laying in bed in no acute distress. Conversant.   Skin: Warm and dry; lesion noted at the top of her head, see media section for better visualization.  Eyes: EOMI. Anicteric sclera.   ENT: Mucous membranes moist; no obvious injury or deformity appreciated.   Head and Neck: Normocephalic, atraumatic. ROM preserved. Trachea midline. No appreciable JVD.   Respiratory: Nonlabored on 3L NC. Lungs diminished bilaterally without obvious adventitious sounds. Chest rise is equal.  Cardiovascular: RRR. No gross murmur, gallop, or rub. Extremities are warm and well-perfused with good capillary refill (< 3 seconds). No chest wall tenderness.   GI: Abdomen soft, nontender, nondistended. No obvious organomegaly appreciated. Bowel sounds are present.  : No CVA tenderness.   MSK: No gross abnormalities appreciated. No limitations to AROM/PROM appreciated.   Extremities: No Edema in BLE. No cyanosis, or clubbing evident. Neurovascularly intact.   Neuro: A&Ox3. CN 2-12 grossly intact. Able to respond to questions appropriately and clearly. No acute focal  neurologic deficits appreciated.  Psych: Appropriate mood and behavior.    Home Medications     Medication List      START taking these medications     atorvastatin 40 mg tablet; Commonly known as: Lipitor; Take 1 tablet (40   mg) by mouth once daily.   empagliflozin 10 mg; Commonly known as: Jardiance; Take 1 tablet (10 mg)   by mouth once daily.   Trelegy Ellipta 100-62.5-25 mcg blister with device; Generic drug:   fluticasone-umeclidin-vilanter; Inhale 1 puff by mouth once daily.     CHANGE how you take these medications     albuterol 90 mcg/actuation inhaler; Commonly known as: ProAir HFA;   Inhale 2 puffs every 4 hours if needed for wheezing or shortness of   breath.; What changed: See the new instructions.   metoprolol tartrate 25 mg tablet; Commonly known as: Lopressor; Take 0.5   tablets (12.5 mg) by mouth 2 times a day.; What changed: medication   strength, how much to take     CONTINUE taking these medications     Aftab Low Dose Aspirin 81 mg EC tablet; Generic drug: aspirin     STOP taking these medications     amLODIPine 10 mg tablet; Commonly known as: Norvasc   fluticasone propion-salmeteroL 230-21 mcg/actuation inhaler; Commonly   known as: Advair HFA       Outpatient Follow-Up  No follow-ups on file.     Kyle Baltazar MD PhD  2/19/2025  9:32 AM

## 2025-02-19 NOTE — CARE PLAN
The patient's goals for the shift include      The clinical goals for the shift include Pt will remain HDS throughout shift    Over the shift, the patient did not make progress toward the following goals. Barriers to progression include   . Recommendations to address these barriers include   .

## 2025-02-19 NOTE — CONSULTS
Wound Care Consult     Visit Date: 2/19/2025      Patient Name: Elsa Hinson         MRN: [id number]           YOB: 1950     Patient seen for skin tears and healed healed head wound(present on admission) complicated by PMH: CAD s/p PCI of mid LCx, history of STEMI, HTN, HLD, COPD. Patient getting ready to be discharged. See detailed assessment below from flowsheet. Recommendations below, reviewed with .     Wound location: Arm skin tears    Treatment protocol recommended:  Cleanse with NSS and pat dry.  Apply xeroform and dry dressing wrap with kerlix  Continue to off load, turning at least every 2 hours. Offload heels.     Pertinent Labs:   Albumin   Date Value Ref Range Status   02/14/2025 3.9 3.4 - 5.0 g/dL Final       Wound Assessment:  Wound 02/14/25 Skin Tear Elbow Dorsal;Left (Active)   Wound Image    02/14/25 1347   Shape curved, linear, with skin flap 02/14/25 1347   Wound Length (cm) 3 cm 02/14/25 1347   Wound Width (cm) 0.5 cm 02/14/25 1347   Wound Surface Area (cm^2) 1.5 cm^2 02/14/25 1347   Margins Well-defined edges 02/14/25 1347   Drainage Description Serosanguineous 02/18/25 2101   Drainage Amount Scant 02/18/25 2101   Dressing Non adherent 02/18/25 2101   Dressing Changed New 02/18/25 2101   Dressing Status Clean;Dry;Occlusive 02/18/25 2101       Wound 02/14/25 Skin Tear Arm Distal;Dorsal;Left;Lower (Active)   Wound Image   02/14/25 1350   Shape C-shaped 02/14/25 1347   Wound Length (cm) 2 cm 02/14/25 1347   Wound Width (cm) 2 cm 02/14/25 1347   Wound Surface Area (cm^2) 4 cm^2 02/14/25 1347   Margins Well-defined edges 02/14/25 1347   Drainage Description Serosanguineous 02/18/25 2101   Drainage Amount Scant 02/18/25 2101   Dressing Non adherent;Gauze;Kerlix/rolled gauze 02/18/25 2101   Dressing Changed New 02/18/25 2101   Dressing Status Clean;Dry;Occlusive 02/18/25 2101     Patient to be discharged  back to facility. Patient able to reposition self in bed. Offload heels.       Nursing updated, continue pressure injury preventions, wound care to be completed by nursing per orders. and re-consult wound RN if needed.    See above recommendations for treatment.   Maria Luisa Driscoll RN  2/19/2025  11:09 AM          Maria Luisa Driscoll RN

## 2025-02-19 NOTE — PROGRESS NOTES
SW notified by [facility] auth was approved and good through tomorrow 2/20. SW to update provider.    [id number]  Per provider, patient medically ready for discharge to [facility]. Discharge transport confirmed for 1130AM via [facility]. Transport time and report # sent to nursing via secure chat. SW also updated pt of transport time.

## 2025-02-19 NOTE — NURSING NOTE
Attempted to call report to [facility]. After initial answer, this RN was transferred. Phone rang several times before sending me to a voicemail box. Brief message left with return phone number.   Simple / Intermediate / Complex Repair - Final Wound Length In Cm: 4

## 2025-02-27 LAB
ATRIAL RATE: 77 BPM
ATRIAL RATE: 77 BPM
P AXIS: 77 DEGREES
P AXIS: 78 DEGREES
PR INTERVAL: 119 MS
PR INTERVAL: 121 MS
Q ONSET: 249 MS
Q ONSET: 249 MS
QRS COUNT: 12 BEATS
QRS COUNT: 12 BEATS
QRS DURATION: 156 MS
QRS DURATION: 92 MS
QT INTERVAL: 404 MS
QT INTERVAL: 444 MS
QTC CALCULATION(BAZETT): 458 MS
QTC CALCULATION(BAZETT): 503 MS
QTC FREDERICIA: 439 MS
QTC FREDERICIA: 482 MS
R AXIS: 86 DEGREES
R AXIS: 87 DEGREES
T AXIS: 64 DEGREES
T AXIS: 80 DEGREES
T OFFSET: 451 MS
T OFFSET: 471 MS
VENTRICULAR RATE: 77 BPM
VENTRICULAR RATE: 77 BPM

## 2025-03-06 ENCOUNTER — TELEPHONE (OUTPATIENT)
Dept: PRIMARY CARE | Facility: CLINIC | Age: 75
End: 2025-03-06
Payer: COMMERCIAL

## 2025-03-06 NOTE — TELEPHONE ENCOUNTER
KWAME CALLING FROM [facility] CALLING TO REQUEST VERBAL ORDERS FOR HOME HEALTH CARE  DISCHARGED YESTERDAY FROM [facility] WITH [facility] FOR HOME HEALTH CARE SERVICES, PT, OT, AND  HOME HEALTH AID  PHONE: [phone]   OK TO LEAVE VOICEMAIL

## 2025-03-12 ENCOUNTER — TELEPHONE (OUTPATIENT)
Dept: PRIMARY CARE | Facility: CLINIC | Age: 75
End: 2025-03-12
Payer: COMMERCIAL

## 2025-03-12 NOTE — TELEPHONE ENCOUNTER
Will from pt with [facility]  is calling you to let you know they are going to do 2 x a week for 4 weeks and then 1 x a week for 4 weeks     The patient was unable 03/14/2025 today to do her Evaluate she will do it next week. They just wanted to update you.

## 2025-03-18 ENCOUNTER — TELEPHONE (OUTPATIENT)
Dept: PRIMARY CARE | Facility: CLINIC | Age: 75
End: 2025-03-18
Payer: COMMERCIAL

## 2025-03-18 NOTE — TELEPHONE ENCOUNTER
Patient would benefit from a 3 in 1 commode for her bedside, could you please place an order-      Fax to [facility]- [phone]

## 2025-03-24 ENCOUNTER — APPOINTMENT (OUTPATIENT)
Dept: CARDIOLOGY | Facility: HOSPITAL | Age: 75
DRG: 480 | End: 2025-03-24
Payer: COMMERCIAL

## 2025-03-24 ENCOUNTER — HOSPITAL ENCOUNTER (OUTPATIENT)
Dept: RADIOLOGY | Facility: CLINIC | Age: 75
Discharge: HOME | End: 2025-03-24
Payer: COMMERCIAL

## 2025-03-24 ENCOUNTER — APPOINTMENT (OUTPATIENT)
Dept: RADIOLOGY | Facility: HOSPITAL | Age: 75
DRG: 480 | End: 2025-03-24
Payer: COMMERCIAL

## 2025-03-24 ENCOUNTER — HOSPITAL ENCOUNTER (INPATIENT)
Facility: HOSPITAL | Age: 75
Discharge: NURSING, REHAB OR LONG-TERM CARE | DRG: 480 | End: 2025-03-24
Attending: EMERGENCY MEDICINE | Admitting: INTERNAL MEDICINE
Payer: COMMERCIAL

## 2025-03-24 ENCOUNTER — APPOINTMENT (OUTPATIENT)
Dept: PRIMARY CARE | Facility: CLINIC | Age: 75
End: 2025-03-24
Payer: COMMERCIAL

## 2025-03-24 VITALS
BODY MASS INDEX: 16.99 KG/M2 | WEIGHT: 87 LBS | DIASTOLIC BLOOD PRESSURE: 59 MMHG | SYSTOLIC BLOOD PRESSURE: 94 MMHG | TEMPERATURE: 97.3 F | HEART RATE: 75 BPM | OXYGEN SATURATION: 98 %

## 2025-03-24 DIAGNOSIS — J44.9 CHRONIC OBSTRUCTIVE PULMONARY DISEASE, UNSPECIFIED COPD TYPE (MULTI): Primary | ICD-10-CM

## 2025-03-24 DIAGNOSIS — S72.142A DISPLACED INTERTROCHANTERIC FRACTURE OF LEFT FEMUR, INITIAL ENCOUNTER FOR CLOSED FRACTURE: Primary | ICD-10-CM

## 2025-03-24 DIAGNOSIS — S72.002A CLOSED FRACTURE OF LEFT HIP, INITIAL ENCOUNTER: ICD-10-CM

## 2025-03-24 DIAGNOSIS — I50.42 CHRONIC COMBINED SYSTOLIC (CONGESTIVE) AND DIASTOLIC (CONGESTIVE) HEART FAILURE: ICD-10-CM

## 2025-03-24 DIAGNOSIS — D50.9 IRON DEFICIENCY ANEMIA, UNSPECIFIED IRON DEFICIENCY ANEMIA TYPE: ICD-10-CM

## 2025-03-24 DIAGNOSIS — M25.552 LEFT HIP PAIN: ICD-10-CM

## 2025-03-24 DIAGNOSIS — J44.9 CHRONIC OBSTRUCTIVE PULMONARY DISEASE, UNSPECIFIED COPD TYPE (MULTI): ICD-10-CM

## 2025-03-24 DIAGNOSIS — I25.10 ATHEROSCLEROSIS OF NATIVE CORONARY ARTERY OF NATIVE HEART WITHOUT ANGINA PECTORIS: ICD-10-CM

## 2025-03-24 LAB
ABO GROUP (TYPE) IN BLOOD: NORMAL
ALBUMIN SERPL BCP-MCNC: 3.7 G/DL (ref 3.4–5)
ALP SERPL-CCNC: 142 U/L (ref 33–136)
ALT SERPL W P-5'-P-CCNC: 9 U/L (ref 7–45)
ANION GAP SERPL CALC-SCNC: 12 MMOL/L (ref 10–20)
ANTIBODY SCREEN: NORMAL
AST SERPL W P-5'-P-CCNC: 16 U/L (ref 9–39)
BASOPHILS # BLD AUTO: 0.04 X10*3/UL (ref 0–0.1)
BASOPHILS NFR BLD AUTO: 0.5 %
BILIRUB SERPL-MCNC: 0.8 MG/DL (ref 0–1.2)
BUN SERPL-MCNC: 21 MG/DL (ref 6–23)
CALCIUM SERPL-MCNC: 9.3 MG/DL (ref 8.6–10.3)
CHLORIDE SERPL-SCNC: 97 MMOL/L (ref 98–107)
CO2 SERPL-SCNC: 36 MMOL/L (ref 21–32)
CREAT SERPL-MCNC: 0.64 MG/DL (ref 0.5–1.05)
EGFRCR SERPLBLD CKD-EPI 2021: >90 ML/MIN/1.73M*2
EOSINOPHIL # BLD AUTO: 0.1 X10*3/UL (ref 0–0.4)
EOSINOPHIL NFR BLD AUTO: 1.2 %
ERYTHROCYTE [DISTWIDTH] IN BLOOD BY AUTOMATED COUNT: 15.9 % (ref 11.5–14.5)
GLUCOSE SERPL-MCNC: 108 MG/DL (ref 74–99)
HCT VFR BLD AUTO: 39.6 % (ref 36–46)
HGB BLD-MCNC: 12.5 G/DL (ref 12–16)
IMM GRANULOCYTES # BLD AUTO: 0.02 X10*3/UL (ref 0–0.5)
IMM GRANULOCYTES NFR BLD AUTO: 0.2 % (ref 0–0.9)
INR PPP: 1 (ref 0.9–1.1)
LYMPHOCYTES # BLD AUTO: 1.86 X10*3/UL (ref 0.8–3)
LYMPHOCYTES NFR BLD AUTO: 21.4 %
MCH RBC QN AUTO: 29.5 PG (ref 26–34)
MCHC RBC AUTO-ENTMCNC: 31.6 G/DL (ref 32–36)
MCV RBC AUTO: 93 FL (ref 80–100)
MONOCYTES # BLD AUTO: 0.63 X10*3/UL (ref 0.05–0.8)
MONOCYTES NFR BLD AUTO: 7.3 %
NEUTROPHILS # BLD AUTO: 6.03 X10*3/UL (ref 1.6–5.5)
NEUTROPHILS NFR BLD AUTO: 69.4 %
NRBC BLD-RTO: 0 /100 WBCS (ref 0–0)
PLATELET # BLD AUTO: 211 X10*3/UL (ref 150–450)
POTASSIUM SERPL-SCNC: 4 MMOL/L (ref 3.5–5.3)
PROT SERPL-MCNC: 6.2 G/DL (ref 6.4–8.2)
PROTHROMBIN TIME: 11.5 SECONDS (ref 9.8–12.4)
RBC # BLD AUTO: 4.24 X10*6/UL (ref 4–5.2)
RH FACTOR (ANTIGEN D): NORMAL
SODIUM SERPL-SCNC: 141 MMOL/L (ref 136–145)
WBC # BLD AUTO: 8.7 X10*3/UL (ref 4.4–11.3)

## 2025-03-24 PROCEDURE — 93005 ELECTROCARDIOGRAM TRACING: CPT

## 2025-03-24 PROCEDURE — 1124F ACP DISCUSS-NO DSCNMKR DOCD: CPT | Performed by: INTERNAL MEDICINE

## 2025-03-24 PROCEDURE — 2500000005 HC RX 250 GENERAL PHARMACY W/O HCPCS: Performed by: EMERGENCY MEDICINE

## 2025-03-24 PROCEDURE — 1159F MED LIST DOCD IN RCRD: CPT | Performed by: INTERNAL MEDICINE

## 2025-03-24 PROCEDURE — 3078F DIAST BP <80 MM HG: CPT | Performed by: INTERNAL MEDICINE

## 2025-03-24 PROCEDURE — 86850 RBC ANTIBODY SCREEN: CPT | Performed by: EMERGENCY MEDICINE

## 2025-03-24 PROCEDURE — 73502 X-RAY EXAM HIP UNI 2-3 VIEWS: CPT | Mod: LT

## 2025-03-24 PROCEDURE — 36415 COLL VENOUS BLD VENIPUNCTURE: CPT | Performed by: EMERGENCY MEDICINE

## 2025-03-24 PROCEDURE — G2211 COMPLEX E/M VISIT ADD ON: HCPCS | Performed by: INTERNAL MEDICINE

## 2025-03-24 PROCEDURE — 2500000001 HC RX 250 WO HCPCS SELF ADMINISTERED DRUGS (ALT 637 FOR MEDICARE OP): Performed by: NURSE PRACTITIONER

## 2025-03-24 PROCEDURE — 1100000001 HC PRIVATE ROOM DAILY

## 2025-03-24 PROCEDURE — 85610 PROTHROMBIN TIME: CPT | Performed by: EMERGENCY MEDICINE

## 2025-03-24 PROCEDURE — 99214 OFFICE O/P EST MOD 30 MIN: CPT | Performed by: INTERNAL MEDICINE

## 2025-03-24 PROCEDURE — 96375 TX/PRO/DX INJ NEW DRUG ADDON: CPT

## 2025-03-24 PROCEDURE — 85025 COMPLETE CBC W/AUTO DIFF WBC: CPT | Performed by: EMERGENCY MEDICINE

## 2025-03-24 PROCEDURE — 2500000004 HC RX 250 GENERAL PHARMACY W/ HCPCS (ALT 636 FOR OP/ED): Performed by: NURSE PRACTITIONER

## 2025-03-24 PROCEDURE — 71045 X-RAY EXAM CHEST 1 VIEW: CPT | Performed by: RADIOLOGY

## 2025-03-24 PROCEDURE — 99222 1ST HOSP IP/OBS MODERATE 55: CPT | Performed by: NURSE PRACTITIONER

## 2025-03-24 PROCEDURE — 71045 X-RAY EXAM CHEST 1 VIEW: CPT

## 2025-03-24 PROCEDURE — 3074F SYST BP LT 130 MM HG: CPT | Performed by: INTERNAL MEDICINE

## 2025-03-24 PROCEDURE — 96374 THER/PROPH/DIAG INJ IV PUSH: CPT

## 2025-03-24 PROCEDURE — 99285 EMERGENCY DEPT VISIT HI MDM: CPT | Mod: 25 | Performed by: EMERGENCY MEDICINE

## 2025-03-24 PROCEDURE — 80053 COMPREHEN METABOLIC PANEL: CPT | Performed by: EMERGENCY MEDICINE

## 2025-03-24 PROCEDURE — 2500000004 HC RX 250 GENERAL PHARMACY W/ HCPCS (ALT 636 FOR OP/ED): Performed by: EMERGENCY MEDICINE

## 2025-03-24 PROCEDURE — 1157F ADVNC CARE PLAN IN RCRD: CPT | Performed by: INTERNAL MEDICINE

## 2025-03-24 RX ORDER — METOPROLOL TARTRATE 25 MG/1
25 TABLET, FILM COATED ORAL DAILY
Qty: 90 TABLET | Refills: 1 | Status: ON HOLD | OUTPATIENT
Start: 2025-03-24

## 2025-03-24 RX ORDER — ONDANSETRON HYDROCHLORIDE 2 MG/ML
4 INJECTION, SOLUTION INTRAVENOUS ONCE
Status: COMPLETED | OUTPATIENT
Start: 2025-03-24 | End: 2025-03-24

## 2025-03-24 RX ORDER — FLUTICASONE FUROATE AND VILANTEROL 100; 25 UG/1; UG/1
1 POWDER RESPIRATORY (INHALATION)
Status: DISCONTINUED | OUTPATIENT
Start: 2025-03-25 | End: 2025-03-31

## 2025-03-24 RX ORDER — ACETAMINOPHEN 325 MG/1
650 TABLET ORAL EVERY 4 HOURS PRN
Status: DISCONTINUED | OUTPATIENT
Start: 2025-03-24 | End: 2025-04-02 | Stop reason: HOSPADM

## 2025-03-24 RX ORDER — ATORVASTATIN CALCIUM 40 MG/1
40 TABLET, FILM COATED ORAL NIGHTLY
Status: DISCONTINUED | OUTPATIENT
Start: 2025-03-24 | End: 2025-04-02 | Stop reason: HOSPADM

## 2025-03-24 RX ORDER — TRANEXAMIC ACID 100 MG/ML
1000 INJECTION, SOLUTION INTRAVENOUS ONCE
Status: COMPLETED | OUTPATIENT
Start: 2025-03-24 | End: 2025-03-24

## 2025-03-24 RX ORDER — METOPROLOL TARTRATE 25 MG/1
12.5 TABLET, FILM COATED ORAL 2 TIMES DAILY
Status: DISCONTINUED | OUTPATIENT
Start: 2025-03-24 | End: 2025-04-02 | Stop reason: HOSPADM

## 2025-03-24 RX ORDER — ONDANSETRON HYDROCHLORIDE 2 MG/ML
4 INJECTION, SOLUTION INTRAVENOUS EVERY 6 HOURS PRN
Status: DISCONTINUED | OUTPATIENT
Start: 2025-03-24 | End: 2025-04-02 | Stop reason: HOSPADM

## 2025-03-24 RX ORDER — AMOXICILLIN 250 MG
1 CAPSULE ORAL NIGHTLY
Status: DISCONTINUED | OUTPATIENT
Start: 2025-03-24 | End: 2025-04-02 | Stop reason: HOSPADM

## 2025-03-24 RX ORDER — MORPHINE SULFATE 2 MG/ML
2 INJECTION, SOLUTION INTRAMUSCULAR; INTRAVENOUS EVERY 4 HOURS PRN
Status: DISCONTINUED | OUTPATIENT
Start: 2025-03-24 | End: 2025-04-02 | Stop reason: HOSPADM

## 2025-03-24 RX ORDER — MELOXICAM 15 MG/1
15 TABLET ORAL DAILY
Qty: 30 TABLET | Refills: 11 | Status: ON HOLD | OUTPATIENT
Start: 2025-03-24 | End: 2026-03-24

## 2025-03-24 RX ORDER — FENTANYL CITRATE 50 UG/ML
50 INJECTION, SOLUTION INTRAMUSCULAR; INTRAVENOUS ONCE
Status: COMPLETED | OUTPATIENT
Start: 2025-03-24 | End: 2025-03-24

## 2025-03-24 RX ORDER — ASPIRIN 81 MG/1
81 TABLET ORAL DAILY
Status: ON HOLD
Start: 2025-03-24

## 2025-03-24 RX ORDER — OXYCODONE AND ACETAMINOPHEN 5; 325 MG/1; MG/1
2 TABLET ORAL EVERY 6 HOURS PRN
Status: DISCONTINUED | OUTPATIENT
Start: 2025-03-24 | End: 2025-04-02 | Stop reason: HOSPADM

## 2025-03-24 RX ORDER — HEPARIN SODIUM 5000 [USP'U]/ML
5000 INJECTION, SOLUTION INTRAVENOUS; SUBCUTANEOUS EVERY 8 HOURS SCHEDULED
Status: DISCONTINUED | OUTPATIENT
Start: 2025-03-24 | End: 2025-04-02 | Stop reason: HOSPADM

## 2025-03-24 RX ORDER — FLUTICASONE FUROATE, UMECLIDINIUM BROMIDE AND VILANTEROL TRIFENATATE 100; 62.5; 25 UG/1; UG/1; UG/1
1 POWDER RESPIRATORY (INHALATION) DAILY
Qty: 180 EACH | Refills: 1 | Status: ON HOLD | OUTPATIENT
Start: 2025-03-24

## 2025-03-24 RX ADMIN — TRANEXAMIC ACID 1000 MG: 1 INJECTION, SOLUTION INTRAVENOUS at 18:09

## 2025-03-24 RX ADMIN — ONDANSETRON 4 MG: 2 INJECTION INTRAMUSCULAR; INTRAVENOUS at 17:13

## 2025-03-24 RX ADMIN — FENTANYL CITRATE 50 MCG: 50 INJECTION INTRAMUSCULAR; INTRAVENOUS at 17:14

## 2025-03-24 RX ADMIN — SENNOSIDES AND DOCUSATE SODIUM 1 TABLET: 50; 8.6 TABLET ORAL at 23:56

## 2025-03-24 RX ADMIN — MORPHINE SULFATE 2 MG: 2 INJECTION, SOLUTION INTRAMUSCULAR; INTRAVENOUS at 18:47

## 2025-03-24 RX ADMIN — OXYCODONE HYDROCHLORIDE AND ACETAMINOPHEN 2 TABLET: 5; 325 TABLET ORAL at 23:56

## 2025-03-24 SDOH — ECONOMIC STABILITY: FOOD INSECURITY: WITHIN THE PAST 12 MONTHS, THE FOOD YOU BOUGHT JUST DIDN'T LAST AND YOU DIDN'T HAVE MONEY TO GET MORE.: NEVER TRUE

## 2025-03-24 SDOH — ECONOMIC STABILITY: INCOME INSECURITY: IN THE PAST 12 MONTHS HAS THE ELECTRIC, GAS, OIL, OR WATER COMPANY THREATENED TO SHUT OFF SERVICES IN YOUR HOME?: NO

## 2025-03-24 SDOH — ECONOMIC STABILITY: HOUSING INSECURITY: AT ANY TIME IN THE PAST 12 MONTHS, WERE YOU HOMELESS OR LIVING IN A SHELTER (INCLUDING NOW)?: NO

## 2025-03-24 SDOH — SOCIAL STABILITY: SOCIAL INSECURITY: ARE THERE ANY APPARENT SIGNS OF INJURIES/BEHAVIORS THAT COULD BE RELATED TO ABUSE/NEGLECT?: NO

## 2025-03-24 SDOH — ECONOMIC STABILITY: HOUSING INSECURITY: IN THE PAST 12 MONTHS, HOW MANY TIMES HAVE YOU MOVED WHERE YOU WERE LIVING?: 0

## 2025-03-24 SDOH — SOCIAL STABILITY: SOCIAL INSECURITY: WITHIN THE LAST YEAR, HAVE YOU BEEN HUMILIATED OR EMOTIONALLY ABUSED IN OTHER WAYS BY YOUR PARTNER OR EX-PARTNER?: NO

## 2025-03-24 SDOH — ECONOMIC STABILITY: FOOD INSECURITY: WITHIN THE PAST 12 MONTHS, YOU WORRIED THAT YOUR FOOD WOULD RUN OUT BEFORE YOU GOT THE MONEY TO BUY MORE.: NEVER TRUE

## 2025-03-24 SDOH — HEALTH STABILITY: PHYSICAL HEALTH: ON AVERAGE, HOW MANY MINUTES DO YOU ENGAGE IN EXERCISE AT THIS LEVEL?: 0 MIN

## 2025-03-24 SDOH — ECONOMIC STABILITY: TRANSPORTATION INSECURITY: IN THE PAST 12 MONTHS, HAS LACK OF TRANSPORTATION KEPT YOU FROM MEDICAL APPOINTMENTS OR FROM GETTING MEDICATIONS?: NO

## 2025-03-24 SDOH — ECONOMIC STABILITY: HOUSING INSECURITY: IN THE LAST 12 MONTHS, WAS THERE A TIME WHEN YOU WERE NOT ABLE TO PAY THE MORTGAGE OR RENT ON TIME?: NO

## 2025-03-24 SDOH — HEALTH STABILITY: PHYSICAL HEALTH: ON AVERAGE, HOW MANY DAYS PER WEEK DO YOU ENGAGE IN MODERATE TO STRENUOUS EXERCISE (LIKE A BRISK WALK)?: 0 DAYS

## 2025-03-24 SDOH — SOCIAL STABILITY: SOCIAL INSECURITY: WITHIN THE LAST YEAR, HAVE YOU BEEN AFRAID OF YOUR PARTNER OR EX-PARTNER?: NO

## 2025-03-24 SDOH — SOCIAL STABILITY: SOCIAL INSECURITY: HAS ANYONE EVER THREATENED TO HURT YOUR FAMILY OR YOUR PETS?: NO

## 2025-03-24 SDOH — SOCIAL STABILITY: SOCIAL INSECURITY: DO YOU FEEL ANYONE HAS EXPLOITED OR TAKEN ADVANTAGE OF YOU FINANCIALLY OR OF YOUR PERSONAL PROPERTY?: NO

## 2025-03-24 SDOH — SOCIAL STABILITY: SOCIAL INSECURITY: HAVE YOU HAD ANY THOUGHTS OF HARMING ANYONE ELSE?: NO

## 2025-03-24 SDOH — SOCIAL STABILITY: SOCIAL INSECURITY: HAVE YOU HAD THOUGHTS OF HARMING ANYONE ELSE?: NO

## 2025-03-24 SDOH — SOCIAL STABILITY: SOCIAL INSECURITY: ABUSE: ADULT

## 2025-03-24 SDOH — SOCIAL STABILITY: SOCIAL INSECURITY: ARE YOU OR HAVE YOU BEEN THREATENED OR ABUSED PHYSICALLY, EMOTIONALLY, OR SEXUALLY BY ANYONE?: NO

## 2025-03-24 SDOH — ECONOMIC STABILITY: FOOD INSECURITY: HOW HARD IS IT FOR YOU TO PAY FOR THE VERY BASICS LIKE FOOD, HOUSING, MEDICAL CARE, AND HEATING?: NOT HARD AT ALL

## 2025-03-24 SDOH — SOCIAL STABILITY: SOCIAL INSECURITY: WERE YOU ABLE TO COMPLETE ALL THE BEHAVIORAL HEALTH SCREENINGS?: YES

## 2025-03-24 SDOH — SOCIAL STABILITY: SOCIAL INSECURITY: DO YOU FEEL UNSAFE GOING BACK TO THE PLACE WHERE YOU ARE LIVING?: NO

## 2025-03-24 SDOH — SOCIAL STABILITY: SOCIAL INSECURITY: DOES ANYONE TRY TO KEEP YOU FROM HAVING/CONTACTING OTHER FRIENDS OR DOING THINGS OUTSIDE YOUR HOME?: NO

## 2025-03-24 ASSESSMENT — COGNITIVE AND FUNCTIONAL STATUS - GENERAL
DRESSING REGULAR LOWER BODY CLOTHING: A LITTLE
DRESSING REGULAR UPPER BODY CLOTHING: A LITTLE
CLIMB 3 TO 5 STEPS WITH RAILING: A LITTLE
TURNING FROM BACK TO SIDE WHILE IN FLAT BAD: A LITTLE
TOILETING: A LITTLE
PATIENT BASELINE BEDBOUND: NO
PATIENT BASELINE BEDBOUND: NO
DRESSING REGULAR UPPER BODY CLOTHING: A LITTLE
MOVING FROM LYING ON BACK TO SITTING ON SIDE OF FLAT BED WITH BEDRAILS: A LITTLE
STANDING UP FROM CHAIR USING ARMS: A LITTLE
CLIMB 3 TO 5 STEPS WITH RAILING: A LITTLE
HELP NEEDED FOR BATHING: A LITTLE
MOVING TO AND FROM BED TO CHAIR: A LITTLE
MOVING TO AND FROM BED TO CHAIR: A LITTLE
WALKING IN HOSPITAL ROOM: A LITTLE
DAILY ACTIVITIY SCORE: 20
WALKING IN HOSPITAL ROOM: A LITTLE
TOILETING: A LITTLE
DAILY ACTIVITIY SCORE: 21
MOBILITY SCORE: 18
MOBILITY SCORE: 18
TURNING FROM BACK TO SIDE WHILE IN FLAT BAD: A LITTLE
STANDING UP FROM CHAIR USING ARMS: A LITTLE
MOVING FROM LYING ON BACK TO SITTING ON SIDE OF FLAT BED WITH BEDRAILS: A LITTLE
DRESSING REGULAR LOWER BODY CLOTHING: A LITTLE

## 2025-03-24 ASSESSMENT — LIFESTYLE VARIABLES
HAVE YOU EVER FELT YOU SHOULD CUT DOWN ON YOUR DRINKING: NO
HAVE PEOPLE ANNOYED YOU BY CRITICIZING YOUR DRINKING: NO
EVER HAD A DRINK FIRST THING IN THE MORNING TO STEADY YOUR NERVES TO GET RID OF A HANGOVER: NO
AUDIT-C TOTAL SCORE: 0
EVER FELT BAD OR GUILTY ABOUT YOUR DRINKING: NO
TOTAL SCORE: 0
HOW MANY STANDARD DRINKS CONTAINING ALCOHOL DO YOU HAVE ON A TYPICAL DAY: PATIENT DOES NOT DRINK
AUDIT-C TOTAL SCORE: 0
HOW OFTEN DO YOU HAVE A DRINK CONTAINING ALCOHOL: NEVER
HOW OFTEN DO YOU HAVE 6 OR MORE DRINKS ON ONE OCCASION: NEVER
SKIP TO QUESTIONS 9-10: 1

## 2025-03-24 ASSESSMENT — ACTIVITIES OF DAILY LIVING (ADL)
JUDGMENT_ADEQUATE_SAFELY_COMPLETE_DAILY_ACTIVITIES: YES
LACK_OF_TRANSPORTATION: NO
GROOMING: INDEPENDENT
DRESSING YOURSELF: INDEPENDENT
HEARING - RIGHT EAR: FUNCTIONAL
PATIENT'S MEMORY ADEQUATE TO SAFELY COMPLETE DAILY ACTIVITIES?: YES
HEARING - RIGHT EAR: FUNCTIONAL
HEARING - LEFT EAR: FUNCTIONAL
ADEQUATE_TO_COMPLETE_ADL: YES
BATHING: INDEPENDENT
GROOMING: INDEPENDENT
DRESSING YOURSELF: NEEDS ASSISTANCE
FEEDING YOURSELF: INDEPENDENT
PATIENT'S MEMORY ADEQUATE TO SAFELY COMPLETE DAILY ACTIVITIES?: YES
ADEQUATE_TO_COMPLETE_ADL: YES
HEARING - LEFT EAR: FUNCTIONAL
WALKS IN HOME: INDEPENDENT
LACK_OF_TRANSPORTATION: NO
TOILETING: INDEPENDENT
FEEDING YOURSELF: INDEPENDENT
BATHING: NEEDS ASSISTANCE
TOILETING: NEEDS ASSISTANCE
JUDGMENT_ADEQUATE_SAFELY_COMPLETE_DAILY_ACTIVITIES: YES

## 2025-03-24 ASSESSMENT — PATIENT HEALTH QUESTIONNAIRE - PHQ9
SUM OF ALL RESPONSES TO PHQ9 QUESTIONS 1 AND 2: 2
2. FEELING DOWN, DEPRESSED OR HOPELESS: NOT AT ALL
1. LITTLE INTEREST OR PLEASURE IN DOING THINGS: SEVERAL DAYS
10. IF YOU CHECKED OFF ANY PROBLEMS, HOW DIFFICULT HAVE THESE PROBLEMS MADE IT FOR YOU TO DO YOUR WORK, TAKE CARE OF THINGS AT HOME, OR GET ALONG WITH OTHER PEOPLE: VERY DIFFICULT
SUM OF ALL RESPONSES TO PHQ9 QUESTIONS 1 & 2: 0
2. FEELING DOWN, DEPRESSED OR HOPELESS: SEVERAL DAYS
1. LITTLE INTEREST OR PLEASURE IN DOING THINGS: NOT AT ALL

## 2025-03-24 ASSESSMENT — PAIN SCALES - GENERAL
PAINLEVEL_OUTOF10: 8
PAINLEVEL_OUTOF10: 9
PAINLEVEL_OUTOF10: 9
PAINLEVEL_OUTOF10: 10 - WORST POSSIBLE PAIN

## 2025-03-24 ASSESSMENT — PAIN DESCRIPTION - ORIENTATION
ORIENTATION: LEFT
ORIENTATION: LEFT

## 2025-03-24 ASSESSMENT — PAIN - FUNCTIONAL ASSESSMENT: PAIN_FUNCTIONAL_ASSESSMENT: 0-10

## 2025-03-24 ASSESSMENT — PAIN DESCRIPTION - LOCATION
LOCATION: HIP
LOCATION: HIP

## 2025-03-24 ASSESSMENT — PAIN DESCRIPTION - PAIN TYPE: TYPE: ACUTE PAIN

## 2025-03-24 NOTE — H&P
[facility] MEDICINE HISTORY AND PHYSICAL    History Of Present Illness     Elsa Hinson is a 75 y.o. female with PMHx of COPD on 4L home O2  and CAD s/p PCI of mid LCx who presented with left hip pain, which she has had since leaving her rehab facility close to a month ago. She fell just prior to leaving; no x-ray was done. She has had significant pain with ambulation since then. Today she went to see her PCP who ordered x-rays, which showed a left-sided hip fracture. Ortho surgery recommended surgery tomorrow.   ED labwork was benign. CXR was negative. VSS.  Remainder of ROS reviewed and negative except as indicated in HPI.     Objective     Past Medical History  She has a past medical history of CAD (coronary artery disease), COPD (chronic obstructive pulmonary disease) (Multi), Hyperlipidemia, unspecified, and Old myocardial infarction.    Surgical History  She has a past surgical history that includes  section, classic; Tonsillectomy; and Coronary angioplasty with stent.    Social History     Tobacco Use    Smoking status: Former     Types: Cigarettes    Smokeless tobacco: Never   Substance Use Topics    Alcohol use: Never    Drug use: Never       Family History  No family history on file.    Allergies  Sulfa (sulfonamide antibiotics)    Vitals:    25 1649   BP:    Pulse:    Resp:    SpO2: 100%       Vitals:    25 1637   Weight: (!) 39.5 kg (87 lb)       Scheduled medications  heparin (porcine), 5,000 Units, subcutaneous, q8h USHA  sennosides-docusate sodium, 1 tablet, oral, Nightly  tranexamic acid, 1,000 mg, intravenous, Once      Continuous medications     PRN medications  PRN medications: acetaminophen, morphine, ondansetron, oxyCODONE-acetaminophen    Results for orders placed or performed during the hospital encounter of 25 (from the past 24 hours)   CBC and Auto Differential   Result Value Ref Range    WBC 8.7 4.4 - 11.3 x10*3/uL    nRBC 0.0 0.0 - 0.0 /100 WBCs     RBC 4.24 4.00 - 5.20 x10*6/uL    Hemoglobin 12.5 12.0 - 16.0 g/dL    Hematocrit 39.6 36.0 - 46.0 %    MCV 93 80 - 100 fL    MCH 29.5 26.0 - 34.0 pg    MCHC 31.6 (L) 32.0 - 36.0 g/dL    RDW 15.9 (H) 11.5 - 14.5 %    Platelets 211 150 - 450 x10*3/uL    Neutrophils % 69.4 40.0 - 80.0 %    Immature Granulocytes %, Automated 0.2 0.0 - 0.9 %    Lymphocytes % 21.4 13.0 - 44.0 %    Monocytes % 7.3 2.0 - 10.0 %    Eosinophils % 1.2 0.0 - 6.0 %    Basophils % 0.5 0.0 - 2.0 %    Neutrophils Absolute 6.03 (H) 1.60 - 5.50 x10*3/uL    Immature Granulocytes Absolute, Automated 0.02 0.00 - 0.50 x10*3/uL    Lymphocytes Absolute 1.86 0.80 - 3.00 x10*3/uL    Monocytes Absolute 0.63 0.05 - 0.80 x10*3/uL    Eosinophils Absolute 0.10 0.00 - 0.40 x10*3/uL    Basophils Absolute 0.04 0.00 - 0.10 x10*3/uL   Comprehensive metabolic panel   Result Value Ref Range    Glucose 108 (H) 74 - 99 mg/dL    Sodium 141 136 - 145 mmol/L    Potassium 4.0 3.5 - 5.3 mmol/L    Chloride 97 (L) 98 - 107 mmol/L    Bicarbonate 36 (H) 21 - 32 mmol/L    Anion Gap 12 10 - 20 mmol/L    Urea Nitrogen 21 6 - 23 mg/dL    Creatinine 0.64 0.50 - 1.05 mg/dL    eGFR >90 >60 mL/min/1.73m*2    Calcium 9.3 8.6 - 10.3 mg/dL    Albumin 3.7 3.4 - 5.0 g/dL    Alkaline Phosphatase 142 (H) 33 - 136 U/L    Total Protein 6.2 (L) 6.4 - 8.2 g/dL    AST 16 9 - 39 U/L    Bilirubin, Total 0.8 0.0 - 1.2 mg/dL    ALT 9 7 - 45 U/L   Protime-INR   Result Value Ref Range    Protime 11.5 9.8 - 12.4 seconds    INR 1.0 0.9 - 1.1   Type and Screen   Result Value Ref Range    ABO TYPE A     Rh TYPE POS     ANTIBODY SCREEN NEG          I personally reviewed all pertinent labwork, imaging and vital signs, as well as medications, nursing, therapy, discharge planning and consult notes.     Constitutional: Thin, awake, alert, calm, oriented x4, no acute distress, cooperative   Eyes: EOMI, clear sclerae   ENMT: mucous membranes moist, no lesions seen   Head/Neck: Neck supple, no apparent injury, head  atraumatic   Respiratory/Thorax: CTAB, good chest expansion, respirations even and unlabored, pt on 4L O2   Cardiovascular: Regular rate and rhythm, no murmurs/rubs/gallops, normal S1 and S 2   Gastrointestinal: Abdomen nondistended, soft, nontender, +BS, no bruits   Musculoskeletal: ROM intact, no joint swelling, normal  strength   Extremities: no cyanosis, contusions or clubbing, or edema   Neurological: no focal deficit, pt alert and oriented x4   Psychological: Appropriate affect and behavior, pleasant   Skin: Warm and dry, no lesions, no rashes       Assessment/Plan     Acute left-sided hip fracture 2/2 fall about a month PTA  Consult ortho surgery for planned intervention tomorrow   Continue pain and nausea regimen  Pt denies recent cardiac and respiratory symptoms, COPD is stable, EKG shows no evidence of myocardial ischemia (limited read 2/2 artifact), echo 2/14/25 revealed LVEF 60-65%, mild RVSP and no valvulopathy  Pt has 1 risk factor for this procedure and 0.9% risk of a major cardiac event per the Revised Cardiac Risk Index    COPD on 4L home O2 24/7  Continue home inhalers, pt is not hypoxic below baseline  CXR reveals no acute changes    Hx CAD s/p PCI of mid LCx   Continue Lipitor and metoprolol, will hold ASA preoperatively    DVT ppx: SubQ heparin    Discharge disposition  Pending postop PT/OT louise Mckeon, CNP  [facility]

## 2025-03-24 NOTE — ED TRIAGE NOTES
Pt brought by squad from home. Pt has been walking on a fractured hip x1 month. Complains of left hip pain. Has hx of COPD.   
done

## 2025-03-24 NOTE — ED PROVIDER NOTES
HPI   Chief Complaint   Patient presents with    Hip Pain     Left hip pain since fall from nursing facility x1 month       Patient presents to the emergency department secondary to left hip pain.  Patient has had left hip pain since leaving her rehab facility which is close to a month ago.  The patient states that she fell just prior to leaving.  They did not do an x-ray.  She has had significant pain with ambulation since then.  Today she went to see her primary care doctor because Tylenol was not helping with the pain.  Her primary care doctor ordered outpatient x-rays.  Outpatient x-ray shows evidence of a left-sided hip fracture.  Patient was sent by ambulance to the emergency department.  Patient has no other injuries or complaints.  She does chronically wear oxygen.  She is on 4 L nasal cannula at all time for history of COPD.                          David Coma Scale Score: 15                  Patient History   Past Medical History:   Diagnosis Date    CAD (coronary artery disease)     COPD (chronic obstructive pulmonary disease) (Multi)     Hyperlipidemia, unspecified     Old myocardial infarction      Past Surgical History:   Procedure Laterality Date     SECTION, CLASSIC      CORONARY ANGIOPLASTY WITH STENT PLACEMENT      TONSILLECTOMY       No family history on file.  Social History     Tobacco Use    Smoking status: Former     Types: Cigarettes    Smokeless tobacco: Never   Substance Use Topics    Alcohol use: Never    Drug use: Never       Physical Exam   ED Triage Vitals [25 1637]   Temp Heart Rate Respirations BP   -- 74 16 131/62      Pulse Ox Temp src Heart Rate Source Patient Position   100 % -- Monitor --      BP Location FiO2 (%)     -- --       Physical Exam  Constitutional:       Comments: Thin elderly chronically ill-appearing female.   HENT:      Head: Normocephalic.      Nose: Nose normal.      Mouth/Throat:      Mouth: Mucous membranes are moist.   Eyes:      Extraocular  Movements: Extraocular movements intact.      Pupils: Pupils are equal, round, and reactive to light.   Cardiovascular:      Rate and Rhythm: Normal rate and regular rhythm.      Pulses: Normal pulses.      Heart sounds: Normal heart sounds.   Pulmonary:      Effort: Pulmonary effort is normal.      Breath sounds: No rales.      Comments: Diminished breath sounds throughout.  No rhonchi or rales.  Slight wheezing.  Abdominal:      General: Abdomen is flat. Bowel sounds are normal.      Palpations: Abdomen is soft.   Musculoskeletal:      Cervical back: Normal range of motion.      Comments: Pain with range of motion of the left hip.  Left leg is shortened and externally rotated.   Skin:     General: Skin is warm and dry.      Capillary Refill: Capillary refill takes less than 2 seconds.   Neurological:      General: No focal deficit present.      Mental Status: She is alert and oriented to person, place, and time.   Psychiatric:         Mood and Affect: Mood normal.         Behavior: Behavior normal.       Labs Reviewed   CBC WITH AUTO DIFFERENTIAL - Abnormal       Result Value    WBC 8.7      nRBC 0.0      RBC 4.24      Hemoglobin 12.5      Hematocrit 39.6      MCV 93      MCH 29.5      MCHC 31.6 (*)     RDW 15.9 (*)     Platelets 211      Neutrophils % 69.4      Immature Granulocytes %, Automated 0.2      Lymphocytes % 21.4      Monocytes % 7.3      Eosinophils % 1.2      Basophils % 0.5      Neutrophils Absolute 6.03 (*)     Immature Granulocytes Absolute, Automated 0.02      Lymphocytes Absolute 1.86      Monocytes Absolute 0.63      Eosinophils Absolute 0.10      Basophils Absolute 0.04     COMPREHENSIVE METABOLIC PANEL - Abnormal    Glucose 108 (*)     Sodium 141      Potassium 4.0      Chloride 97 (*)     Bicarbonate 36 (*)     Anion Gap 12      Urea Nitrogen 21      Creatinine 0.64      eGFR >90      Calcium 9.3      Albumin 3.7      Alkaline Phosphatase 142 (*)     Total Protein 6.2 (*)     AST 16       Bilirubin, Total 0.8      ALT 9     PROTIME-INR - Normal    Protime 11.5      INR 1.0     TYPE AND SCREEN    ABO TYPE A      Rh TYPE POS      ANTIBODY SCREEN NEG     BASIC METABOLIC PANEL   CBC     Pain Management Panel           No data to display              XR chest 1 view   Final Result   Hyperinflated lungs with emphysematous changes. No evidence of acute   cardiopulmonary process.             MACRO:   None        Signed by: Evaristo Byrnes 3/24/2025 5:44 PM   Dictation workstation:   [id number]        ED Course & MDM   Diagnoses as of 03/24/25 1918   Displaced intertrochanteric fracture of left femur, initial encounter for closed fracture       Medical Decision Making  I reviewed the patient's outpatient x-ray.  This shows an angulated intertrochanteric femur fracture.  Patient is evaluated for preop clearance.  EKG is obtained at 4:48 PM.  It is sinus rhythm rate of 78.  No acute ST elevation.  NJ interval is 115 and QTc is 463.  Laboratory workup was obtained including CBC CMP PT/INR and type and screen.  Chest x-ray for clearance is obtained.  Patient is given 50 mcg of fentanyl and 4 mg of Zofran for pain and nausea.  Laboratory workup is performed and negative for acute process.  Patient is currently hemodynamically stable.  Patient was discussed with the [facility] physician and will be admitted for definitive treatment of her left femur fracture.        Procedure  Procedures     Lisa Maria MD  03/24/25 1919

## 2025-03-24 NOTE — PROGRESS NOTES
Date of Visit: 03/24/2025     Chief Complaint:   Chief Complaint   Patient presents with    Hospital Follow-up       HPI:  HPI  Subjective:   Elsa Hinson is a 75 y.o. female presents   Grad  prog sob 3.5l/min    Pox down to 57-62% for2 weeks!!!!!   Finally to the ed for eval.   Copd and acute chf (likely 2/2 hypoxemia)  Admitted from 2/13--2/20  Now on 3.5L continuous O2 via nc  States that she is using trelegy daily.  Has not had to use the rescue inhaler since she has been home.      Rehab   2/25/ fell   on on left hip.  Did not do xray.  States that PT did not progress much.     Developed spasms in the thigh muscles. Was given a muscle relaxant and tylenol prn.   3/5/25---d/c'd from [facility].   Pt had not been walking much.  Daughter is here from [place].  She started to help her and pt states that she is moving better in the past 3 days than she had been moving while in rehab.  PT is coming twice a week.    ROS:   Review of Systems   Respiratory:  Positive for shortness of breath. Negative for cough and wheezing.    Musculoskeletal:         Pain left hip and thigh         Outpatient Medications:  Current Outpatient Medications   Medication Instructions    albuterol (ProAir HFA) 90 mcg/actuation inhaler 2 puffs, inhalation, Every 4 hours PRN    aspirin (DAREN LOW DOSE ASPIRIN) 81 mg, oral, Daily    atorvastatin (LIPITOR) 40 mg, oral, Daily    empagliflozin (JARDIANCE) 10 mg, oral, Daily    fluticasone-umeclidin-vilanter (Trelegy Ellipta) 100-62.5-25 mcg blister with device 1 puff, inhalation, Daily    meloxicam (MOBIC) 15 mg, oral, Daily    metoprolol tartrate (LOPRESSOR) 25 mg, oral, Daily       Allergies:  Allergies   Allergen Reactions    Sulfa (Sulfonamide Antibiotics) Anaphylaxis       Past Medical History:   Diagnosis Date    CAD (coronary artery disease)     COPD (chronic obstructive pulmonary disease) (Multi)     Hyperlipidemia, unspecified     Old myocardial infarction         Health Maintenance   Topic  Date Due    Bone Density Scan  Never done    Hepatitis C Screening  Never done    DTaP/Tdap/Td Vaccines (1 - Tdap) Never done    Zoster Vaccines (1 of 2) Never done    RSV High Risk: (Elderly (60+) or Pregnant Population) (1 - 1-dose 75+ series) Never done    Influenza Vaccine (1) 2024    COVID-19 Vaccine (2 - -25 season) 2024    Medicare Annual Wellness Visit (AWV)  2025    Echocardiogram  2026    Diabetes Screening  2026    Creatinine Level  2026    Potassium Level  2026    Lipid Panel  2030    Pneumococcal Vaccine  Completed    HIB Vaccines  Aged Out    Hepatitis B Vaccines  Aged Out    IPV Vaccines  Aged Out    Hepatitis A Vaccines  Aged Out    Meningococcal Vaccine  Aged Out    Rotavirus Vaccines  Aged Out    HPV Vaccines  Aged Out    Welcome to Medicare Visit  Discontinued       Past Surgical History:   Procedure Laterality Date     SECTION, CLASSIC      CORONARY ANGIOPLASTY WITH STENT PLACEMENT      TONSILLECTOMY         No family history on file.  Several sibs had interstitial pulmonary fibrosis      Social History     Socioeconomic History    Marital status:    Tobacco Use    Smoking status: Former     Types: Cigarettes    Smokeless tobacco: Never   Substance and Sexual Activity    Alcohol use: Never    Drug use: Never     Social Drivers of Health     Financial Resource Strain: Low Risk  (3/24/2025)    Overall Financial Resource Strain (CARDIA)     Difficulty of Paying Living Expenses: Not hard at all   Food Insecurity: No Food Insecurity (3/24/2025)    Hunger Vital Sign     Worried About Running Out of Food in the Last Year: Never true     Ran Out of Food in the Last Year: Never true   Transportation Needs: No Transportation Needs (3/24/2025)    PRAPARE - Transportation     Lack of Transportation (Medical): No     Lack of Transportation (Non-Medical): No   Physical Activity: Inactive (3/24/2025)    Exercise Vital Sign     Days of Exercise  per Week: 0 days     Minutes of Exercise per Session: 0 min   Intimate Partner Violence: Not At Risk (3/24/2025)    Humiliation, Afraid, Rape, and Kick questionnaire     Fear of Current or Ex-Partner: No     Emotionally Abused: No     Physically Abused: No     Sexually Abused: No   Housing Stability: Low Risk  (3/24/2025)    Housing Stability Vital Sign     Unable to Pay for Housing in the Last Year: No     Number of Times Moved in the Last Year: 0     Homeless in the Last Year: No          Vitals:  BP 94/59 (BP Location: Left arm, Patient Position: Sitting, BP Cuff Size: Adult)   Pulse 75   Temp 36.3 °C (97.3 °F) (Temporal)   Wt (!) 39.5 kg (87 lb)   SpO2 98%   BMI 16.99 kg/m²   Wt Readings from Last 3 Encounters:   03/24/25 (!) 39.5 kg (87 lb)   03/24/25 (!) 39.5 kg (87 lb)   02/14/25 (!) 44.2 kg (97 lb 8 oz)     BP Readings from Last 3 Encounters:   03/25/25 121/66   03/24/25 94/59   02/19/25 103/64        Physical Exam:  Physical Exam  Constitutional:       Appearance: She is underweight.   Pulmonary:      Effort: No tachypnea, bradypnea, accessory muscle usage or respiratory distress.      Breath sounds: Decreased air movement present. Decreased breath sounds present. No wheezing, rhonchi or rales.      Comments: Bs are very quiet, but clear.  Abdominal:      General: Abdomen is flat. Bowel sounds are normal.      Palpations: Abdomen is soft.   Musculoskeletal:      Right ankle: Swelling present.      Left ankle: Swelling present.      Comments: The left leg is shorter than the right leg.    There is mild swelling of bilat ankles.   Neurological:      Mental Status: She is alert.   Psychiatric:         Behavior: Behavior is cooperative.          Assessment/Plan   Diagnoses and all orders for this visit:  Chronic obstructive pulmonary disease, unspecified COPD type (Multi)  -     fluticasone-umeclidin-vilanter (Trelegy Ellipta) 100-62.5-25 mcg blister with device; Inhale 1 puff once daily.  -     metoprolol  tartrate (Lopressor) 25 mg tablet; Take 1 tablet (25 mg) by mouth once daily.  -     Referral to Pulmonology; Future  Chronic combined systolic (congestive) and diastolic (congestive) heart failure  -     empagliflozin (Jardiance) 10 mg; Take 1 tablet (10 mg) by mouth once daily.  Atherosclerosis of native coronary artery of native heart without angina pectoris  -     aspirin (Aftab Low Dose Aspirin) 81 mg EC tablet; Take 1 tablet (81 mg) by mouth once daily.  Left hip pain  -     XR hip left with pelvis when performed 2 or 3 views; Future  -     meloxicam (Mobic) 15 mg tablet; Take 1 tablet (15 mg) by mouth once daily.  Closed fracture of left hip, initial encounter      Pt had xray or left hip.  +fracture.  Admitted to  [facility].   Orders:  Orders Placed This Encounter   Procedures    XR hip left with pelvis when performed 2 or 3 views        Followup Appts:  No future appointments.        ____________________________________________________________  Cruz Randolph MD

## 2025-03-25 ENCOUNTER — ANESTHESIA (OUTPATIENT)
Dept: OPERATING ROOM | Facility: HOSPITAL | Age: 75
End: 2025-03-25
Payer: COMMERCIAL

## 2025-03-25 ENCOUNTER — APPOINTMENT (OUTPATIENT)
Dept: RADIOLOGY | Facility: HOSPITAL | Age: 75
DRG: 480 | End: 2025-03-25
Payer: COMMERCIAL

## 2025-03-25 ENCOUNTER — ANESTHESIA EVENT (OUTPATIENT)
Dept: OPERATING ROOM | Facility: HOSPITAL | Age: 75
End: 2025-03-25
Payer: COMMERCIAL

## 2025-03-25 PROBLEM — I50.42 CHRONIC COMBINED SYSTOLIC (CONGESTIVE) AND DIASTOLIC (CONGESTIVE) HEART FAILURE: Status: ACTIVE | Noted: 2025-03-25

## 2025-03-25 LAB
ANION GAP SERPL CALC-SCNC: 10 MMOL/L (ref 10–20)
ATRIAL RATE: 79 BPM
BUN SERPL-MCNC: 18 MG/DL (ref 6–23)
CALCIUM SERPL-MCNC: 8.8 MG/DL (ref 8.6–10.3)
CHLORIDE SERPL-SCNC: 99 MMOL/L (ref 98–107)
CO2 SERPL-SCNC: 31 MMOL/L (ref 21–32)
CREAT SERPL-MCNC: 0.64 MG/DL (ref 0.5–1.05)
EGFRCR SERPLBLD CKD-EPI 2021: >90 ML/MIN/1.73M*2
ERYTHROCYTE [DISTWIDTH] IN BLOOD BY AUTOMATED COUNT: 15.9 % (ref 11.5–14.5)
GLUCOSE SERPL-MCNC: 68 MG/DL (ref 74–99)
HCT VFR BLD AUTO: 38.4 % (ref 36–46)
HGB BLD-MCNC: 11.8 G/DL (ref 12–16)
MCH RBC QN AUTO: 29.2 PG (ref 26–34)
MCHC RBC AUTO-ENTMCNC: 30.7 G/DL (ref 32–36)
MCV RBC AUTO: 95 FL (ref 80–100)
NRBC BLD-RTO: 0 /100 WBCS (ref 0–0)
P AXIS: 84 DEGREES
PLATELET # BLD AUTO: 190 X10*3/UL (ref 150–450)
POTASSIUM SERPL-SCNC: 3.6 MMOL/L (ref 3.5–5.3)
PR INTERVAL: 115 MS
Q ONSET: 249 MS
QRS COUNT: 13 BEATS
QRS DURATION: 104 MS
QT INTERVAL: 406 MS
QTC CALCULATION(BAZETT): 463 MS
QTC FREDERICIA: 443 MS
R AXIS: 85 DEGREES
RBC # BLD AUTO: 4.04 X10*6/UL (ref 4–5.2)
SODIUM SERPL-SCNC: 136 MMOL/L (ref 136–145)
T AXIS: 77 DEGREES
T OFFSET: 452 MS
VENTRICULAR RATE: 78 BPM
WBC # BLD AUTO: 8 X10*3/UL (ref 4.4–11.3)

## 2025-03-25 PROCEDURE — 2500000001 HC RX 250 WO HCPCS SELF ADMINISTERED DRUGS (ALT 637 FOR MEDICARE OP): Performed by: NURSE PRACTITIONER

## 2025-03-25 PROCEDURE — C1713 ANCHOR/SCREW BN/BN,TIS/BN: HCPCS | Performed by: ORTHOPAEDIC SURGERY

## 2025-03-25 PROCEDURE — C1769 GUIDE WIRE: HCPCS | Performed by: ORTHOPAEDIC SURGERY

## 2025-03-25 PROCEDURE — 3600000009 HC OR TIME - EACH INCREMENTAL 1 MINUTE - PROCEDURE LEVEL FOUR: Performed by: ORTHOPAEDIC SURGERY

## 2025-03-25 PROCEDURE — 2500000004 HC RX 250 GENERAL PHARMACY W/ HCPCS (ALT 636 FOR OP/ED): Performed by: ORTHOPAEDIC SURGERY

## 2025-03-25 PROCEDURE — 7100000002 HC RECOVERY ROOM TIME - EACH INCREMENTAL 1 MINUTE: Performed by: ORTHOPAEDIC SURGERY

## 2025-03-25 PROCEDURE — 2500000004 HC RX 250 GENERAL PHARMACY W/ HCPCS (ALT 636 FOR OP/ED): Performed by: NURSE ANESTHETIST, CERTIFIED REGISTERED

## 2025-03-25 PROCEDURE — 2500000005 HC RX 250 GENERAL PHARMACY W/O HCPCS: Performed by: NURSE ANESTHETIST, CERTIFIED REGISTERED

## 2025-03-25 PROCEDURE — 3700000002 HC GENERAL ANESTHESIA TIME - EACH INCREMENTAL 1 MINUTE: Performed by: ORTHOPAEDIC SURGERY

## 2025-03-25 PROCEDURE — 0QS736Z REPOSITION LEFT UPPER FEMUR WITH INTRAMEDULLARY INTERNAL FIXATION DEVICE, PERCUTANEOUS APPROACH: ICD-10-PCS | Performed by: ORTHOPAEDIC SURGERY

## 2025-03-25 PROCEDURE — 99233 SBSQ HOSP IP/OBS HIGH 50: CPT | Performed by: FAMILY MEDICINE

## 2025-03-25 PROCEDURE — 2500000004 HC RX 250 GENERAL PHARMACY W/ HCPCS (ALT 636 FOR OP/ED): Performed by: NURSE PRACTITIONER

## 2025-03-25 PROCEDURE — 36415 COLL VENOUS BLD VENIPUNCTURE: CPT | Performed by: NURSE PRACTITIONER

## 2025-03-25 PROCEDURE — 3700000001 HC GENERAL ANESTHESIA TIME - INITIAL BASE CHARGE: Performed by: ORTHOPAEDIC SURGERY

## 2025-03-25 PROCEDURE — 99222 1ST HOSP IP/OBS MODERATE 55: CPT | Performed by: ORTHOPAEDIC SURGERY

## 2025-03-25 PROCEDURE — 80048 BASIC METABOLIC PNL TOTAL CA: CPT | Performed by: NURSE PRACTITIONER

## 2025-03-25 PROCEDURE — 2780000003 HC OR 278 NO HCPCS: Performed by: ORTHOPAEDIC SURGERY

## 2025-03-25 PROCEDURE — 7100000001 HC RECOVERY ROOM TIME - INITIAL BASE CHARGE: Performed by: ORTHOPAEDIC SURGERY

## 2025-03-25 PROCEDURE — 1100000001 HC PRIVATE ROOM DAILY

## 2025-03-25 PROCEDURE — 3600000004 HC OR TIME - INITIAL BASE CHARGE - PROCEDURE LEVEL FOUR: Performed by: ORTHOPAEDIC SURGERY

## 2025-03-25 PROCEDURE — 2720000007 HC OR 272 NO HCPCS: Performed by: ORTHOPAEDIC SURGERY

## 2025-03-25 PROCEDURE — 2500000001 HC RX 250 WO HCPCS SELF ADMINISTERED DRUGS (ALT 637 FOR MEDICARE OP): Performed by: ORTHOPAEDIC SURGERY

## 2025-03-25 PROCEDURE — 2500000004 HC RX 250 GENERAL PHARMACY W/ HCPCS (ALT 636 FOR OP/ED): Performed by: ANESTHESIOLOGY

## 2025-03-25 PROCEDURE — 27245 TREAT THIGH FRACTURE: CPT | Performed by: ORTHOPAEDIC SURGERY

## 2025-03-25 PROCEDURE — 2500000005 HC RX 250 GENERAL PHARMACY W/O HCPCS: Performed by: ORTHOPAEDIC SURGERY

## 2025-03-25 PROCEDURE — 76000 FLUOROSCOPY <1 HR PHYS/QHP: CPT

## 2025-03-25 PROCEDURE — 85027 COMPLETE CBC AUTOMATED: CPT | Performed by: NURSE PRACTITIONER

## 2025-03-25 DEVICE — IMPLANTABLE DEVICE: Type: IMPLANTABLE DEVICE | Site: HIP | Status: FUNCTIONAL

## 2025-03-25 DEVICE — NAIL, TFN ADV SHORT, 170MML, DIAMETER 11, 130 DEG: Type: IMPLANTABLE DEVICE | Site: HIP | Status: FUNCTIONAL

## 2025-03-25 DEVICE — BLADE, TFN-ADVANCED HELICAL, 80MM, STERILE: Type: IMPLANTABLE DEVICE | Site: HIP | Status: FUNCTIONAL

## 2025-03-25 RX ORDER — ONDANSETRON HYDROCHLORIDE 2 MG/ML
INJECTION, SOLUTION INTRAVENOUS AS NEEDED
Status: DISCONTINUED | OUTPATIENT
Start: 2025-03-25 | End: 2025-03-25

## 2025-03-25 RX ORDER — CEFAZOLIN SODIUM 2 G/50ML
2 SOLUTION INTRAVENOUS ONCE
Status: COMPLETED | OUTPATIENT
Start: 2025-03-25 | End: 2025-03-25

## 2025-03-25 RX ORDER — LABETALOL HYDROCHLORIDE 5 MG/ML
5 INJECTION, SOLUTION INTRAVENOUS ONCE AS NEEDED
Status: DISCONTINUED | OUTPATIENT
Start: 2025-03-25 | End: 2025-03-25 | Stop reason: HOSPADM

## 2025-03-25 RX ORDER — MEPERIDINE HYDROCHLORIDE 25 MG/ML
12.5 INJECTION INTRAMUSCULAR; INTRAVENOUS; SUBCUTANEOUS EVERY 10 MIN PRN
Status: DISCONTINUED | OUTPATIENT
Start: 2025-03-25 | End: 2025-03-25 | Stop reason: HOSPADM

## 2025-03-25 RX ORDER — ONDANSETRON HYDROCHLORIDE 2 MG/ML
4 INJECTION, SOLUTION INTRAVENOUS ONCE AS NEEDED
Status: DISCONTINUED | OUTPATIENT
Start: 2025-03-25 | End: 2025-03-25 | Stop reason: HOSPADM

## 2025-03-25 RX ORDER — HYDRALAZINE HYDROCHLORIDE 20 MG/ML
5 INJECTION INTRAMUSCULAR; INTRAVENOUS EVERY 30 MIN PRN
Status: DISCONTINUED | OUTPATIENT
Start: 2025-03-25 | End: 2025-03-25 | Stop reason: HOSPADM

## 2025-03-25 RX ORDER — MIDAZOLAM HYDROCHLORIDE 1 MG/ML
INJECTION, SOLUTION INTRAMUSCULAR; INTRAVENOUS AS NEEDED
Status: DISCONTINUED | OUTPATIENT
Start: 2025-03-25 | End: 2025-03-25

## 2025-03-25 RX ORDER — ALBUTEROL SULFATE 0.83 MG/ML
2.5 SOLUTION RESPIRATORY (INHALATION) ONCE AS NEEDED
Status: DISCONTINUED | OUTPATIENT
Start: 2025-03-25 | End: 2025-03-25 | Stop reason: HOSPADM

## 2025-03-25 RX ORDER — LIDOCAINE HYDROCHLORIDE AND EPINEPHRINE 10; 10 UG/ML; MG/ML
INJECTION, SOLUTION INFILTRATION; PERINEURAL AS NEEDED
Status: DISCONTINUED | OUTPATIENT
Start: 2025-03-25 | End: 2025-03-25

## 2025-03-25 RX ORDER — TRANEXAMIC ACID 100 MG/ML
INJECTION, SOLUTION INTRAVENOUS AS NEEDED
Status: DISCONTINUED | OUTPATIENT
Start: 2025-03-25 | End: 2025-03-25

## 2025-03-25 RX ORDER — CEFAZOLIN SODIUM 2 G/50ML
2 SOLUTION INTRAVENOUS EVERY 8 HOURS
Status: COMPLETED | OUTPATIENT
Start: 2025-03-25 | End: 2025-03-26

## 2025-03-25 RX ORDER — DROPERIDOL 2.5 MG/ML
0.62 INJECTION, SOLUTION INTRAMUSCULAR; INTRAVENOUS ONCE AS NEEDED
Status: DISCONTINUED | OUTPATIENT
Start: 2025-03-25 | End: 2025-03-25 | Stop reason: HOSPADM

## 2025-03-25 RX ORDER — FENTANYL CITRATE 50 UG/ML
INJECTION, SOLUTION INTRAMUSCULAR; INTRAVENOUS AS NEEDED
Status: DISCONTINUED | OUTPATIENT
Start: 2025-03-25 | End: 2025-03-25

## 2025-03-25 RX ORDER — SODIUM CHLORIDE, SODIUM LACTATE, POTASSIUM CHLORIDE, CALCIUM CHLORIDE 600; 310; 30; 20 MG/100ML; MG/100ML; MG/100ML; MG/100ML
100 INJECTION, SOLUTION INTRAVENOUS CONTINUOUS
Status: DISCONTINUED | OUTPATIENT
Start: 2025-03-25 | End: 2025-03-25 | Stop reason: HOSPADM

## 2025-03-25 RX ORDER — LIDOCAINE HYDROCHLORIDE 10 MG/ML
0.1 INJECTION, SOLUTION EPIDURAL; INFILTRATION; INTRACAUDAL; PERINEURAL ONCE
Status: DISCONTINUED | OUTPATIENT
Start: 2025-03-25 | End: 2025-03-25 | Stop reason: HOSPADM

## 2025-03-25 RX ORDER — MORPHINE SULFATE 2 MG/ML
2 INJECTION, SOLUTION INTRAMUSCULAR; INTRAVENOUS EVERY 5 MIN PRN
Status: DISCONTINUED | OUTPATIENT
Start: 2025-03-25 | End: 2025-03-25 | Stop reason: HOSPADM

## 2025-03-25 RX ORDER — LIDOCAINE HCL/PF 100 MG/5ML
SYRINGE (ML) INTRAVENOUS AS NEEDED
Status: DISCONTINUED | OUTPATIENT
Start: 2025-03-25 | End: 2025-03-25

## 2025-03-25 RX ORDER — BUPIVACAINE HCL/EPINEPHRINE 0.5-1:200K
VIAL (ML) INJECTION AS NEEDED
Status: DISCONTINUED | OUTPATIENT
Start: 2025-03-25 | End: 2025-03-25 | Stop reason: HOSPADM

## 2025-03-25 RX ORDER — FAMOTIDINE 10 MG/ML
20 INJECTION, SOLUTION INTRAVENOUS ONCE
Status: COMPLETED | OUTPATIENT
Start: 2025-03-25 | End: 2025-03-25

## 2025-03-25 RX ORDER — BUPIVACAINE HYDROCHLORIDE 2.5 MG/ML
INJECTION, SOLUTION EPIDURAL; INFILTRATION; INTRACAUDAL; PERINEURAL AS NEEDED
Status: DISCONTINUED | OUTPATIENT
Start: 2025-03-25 | End: 2025-03-25

## 2025-03-25 RX ORDER — ASPIRIN 81 MG/1
81 TABLET ORAL DAILY
Status: DISCONTINUED | OUTPATIENT
Start: 2025-03-26 | End: 2025-04-02 | Stop reason: HOSPADM

## 2025-03-25 RX ORDER — SODIUM CHLORIDE, SODIUM LACTATE, POTASSIUM CHLORIDE, CALCIUM CHLORIDE 600; 310; 30; 20 MG/100ML; MG/100ML; MG/100ML; MG/100ML
75 INJECTION, SOLUTION INTRAVENOUS CONTINUOUS
Status: ACTIVE | OUTPATIENT
Start: 2025-03-25 | End: 2025-03-25

## 2025-03-25 RX ORDER — DIPHENHYDRAMINE HYDROCHLORIDE 50 MG/ML
12.5 INJECTION, SOLUTION INTRAMUSCULAR; INTRAVENOUS ONCE AS NEEDED
Status: DISCONTINUED | OUTPATIENT
Start: 2025-03-25 | End: 2025-03-25 | Stop reason: HOSPADM

## 2025-03-25 RX ORDER — PROPOFOL 10 MG/ML
INJECTION, EMULSION INTRAVENOUS AS NEEDED
Status: DISCONTINUED | OUTPATIENT
Start: 2025-03-25 | End: 2025-03-25

## 2025-03-25 RX ADMIN — TRANEXAMIC ACID 1000 MG: 1 INJECTION, SOLUTION INTRAVENOUS at 13:03

## 2025-03-25 RX ADMIN — TRANEXAMIC ACID 1000 MG: 1 INJECTION, SOLUTION INTRAVENOUS at 11:21

## 2025-03-25 RX ADMIN — Medication 4 L/MIN: at 15:25

## 2025-03-25 RX ADMIN — FAMOTIDINE 20 MG: 10 INJECTION, SOLUTION INTRAVENOUS at 10:30

## 2025-03-25 RX ADMIN — CEFAZOLIN SODIUM 2 G: 2 SOLUTION INTRAVENOUS at 11:00

## 2025-03-25 RX ADMIN — FENTANYL CITRATE 25 MCG: 50 INJECTION INTRAMUSCULAR; INTRAVENOUS at 11:34

## 2025-03-25 RX ADMIN — MORPHINE SULFATE 2 MG: 2 INJECTION, SOLUTION INTRAMUSCULAR; INTRAVENOUS at 07:45

## 2025-03-25 RX ADMIN — FENTANYL CITRATE 50 MCG: 50 INJECTION INTRAMUSCULAR; INTRAVENOUS at 11:06

## 2025-03-25 RX ADMIN — FENTANYL CITRATE 50 MCG: 50 INJECTION INTRAMUSCULAR; INTRAVENOUS at 10:11

## 2025-03-25 RX ADMIN — PROPOFOL 100 MG: 10 INJECTION, EMULSION INTRAVENOUS at 11:06

## 2025-03-25 RX ADMIN — TIOTROPIUM BROMIDE INHALATION SPRAY 2 PUFF: 3.12 SPRAY, METERED RESPIRATORY (INHALATION) at 07:45

## 2025-03-25 RX ADMIN — LIDOCAINE HYDROCHLORIDE,EPINEPHRINE BITARTRATE 5 ML: 10; .01 INJECTION, SOLUTION INFILTRATION; PERINEURAL at 10:20

## 2025-03-25 RX ADMIN — ONDANSETRON 4 MG: 2 INJECTION INTRAMUSCULAR; INTRAVENOUS at 12:54

## 2025-03-25 RX ADMIN — LIDOCAINE HYDROCHLORIDE 40 MG: 20 INJECTION INTRAVENOUS at 11:06

## 2025-03-25 RX ADMIN — CEFAZOLIN SODIUM 2 G: 2 SOLUTION INTRAVENOUS at 18:14

## 2025-03-25 RX ADMIN — METOPROLOL TARTRATE 12.5 MG: 25 TABLET, FILM COATED ORAL at 21:06

## 2025-03-25 RX ADMIN — BUPIVACAINE HYDROCHLORIDE 15 ML: 2.5 INJECTION, SOLUTION EPIDURAL; INFILTRATION; INTRACAUDAL; PERINEURAL at 10:20

## 2025-03-25 RX ADMIN — MIDAZOLAM 1 MG: 1 INJECTION INTRAMUSCULAR; INTRAVENOUS at 10:11

## 2025-03-25 RX ADMIN — DEXAMETHASONE SODIUM PHOSPHATE 4 MG: 4 INJECTION, SOLUTION INTRAMUSCULAR; INTRAVENOUS at 11:24

## 2025-03-25 RX ADMIN — SODIUM CHLORIDE, POTASSIUM CHLORIDE, SODIUM LACTATE AND CALCIUM CHLORIDE 75 ML/HR: 600; 310; 30; 20 INJECTION, SOLUTION INTRAVENOUS at 10:31

## 2025-03-25 RX ADMIN — HEPARIN SODIUM 5000 UNITS: 5000 INJECTION, SOLUTION INTRAVENOUS; SUBCUTANEOUS at 21:06

## 2025-03-25 RX ADMIN — DEXAMETHASONE SODIUM PHOSPHATE 4 MG: 4 INJECTION, SOLUTION INTRAMUSCULAR; INTRAVENOUS at 10:20

## 2025-03-25 RX ADMIN — FLUTICASONE FUROATE AND VILANTEROL TRIFENATATE 1 PUFF: 100; 25 POWDER RESPIRATORY (INHALATION) at 07:45

## 2025-03-25 SDOH — HEALTH STABILITY: MENTAL HEALTH: CURRENT SMOKER: 0

## 2025-03-25 ASSESSMENT — COGNITIVE AND FUNCTIONAL STATUS - GENERAL
MOBILITY SCORE: 10
TOILETING: A LOT
DRESSING REGULAR LOWER BODY CLOTHING: A LOT
MOVING TO AND FROM BED TO CHAIR: A LOT
HELP NEEDED FOR BATHING: A LITTLE
DAILY ACTIVITIY SCORE: 17
MOVING FROM LYING ON BACK TO SITTING ON SIDE OF FLAT BED WITH BEDRAILS: A LOT
STANDING UP FROM CHAIR USING ARMS: A LOT
CLIMB 3 TO 5 STEPS WITH RAILING: TOTAL
DRESSING REGULAR UPPER BODY CLOTHING: A LITTLE
WALKING IN HOSPITAL ROOM: TOTAL
PERSONAL GROOMING: A LITTLE
TURNING FROM BACK TO SIDE WHILE IN FLAT BAD: A LOT

## 2025-03-25 ASSESSMENT — PAIN SCALES - GENERAL
PAIN_LEVEL: 0
PAINLEVEL_OUTOF10: 0 - NO PAIN
PAINLEVEL_OUTOF10: 5 - MODERATE PAIN
PAINLEVEL_OUTOF10: 0 - NO PAIN
PAINLEVEL_OUTOF10: 0 - NO PAIN
PAINLEVEL_OUTOF10: 6
PAINLEVEL_OUTOF10: 8
PAINLEVEL_OUTOF10: 0 - NO PAIN
PAINLEVEL_OUTOF10: 0 - NO PAIN
PAINLEVEL_OUTOF10: 8
PAINLEVEL_OUTOF10: 0 - NO PAIN

## 2025-03-25 ASSESSMENT — ENCOUNTER SYMPTOMS
WHEEZING: 0
COUGH: 0
SHORTNESS OF BREATH: 1

## 2025-03-25 ASSESSMENT — PAIN DESCRIPTION - DESCRIPTORS
DESCRIPTORS: ACHING;DISCOMFORT
DESCRIPTORS: ACHING;DISCOMFORT
DESCRIPTORS: ACHING;SHOOTING
DESCRIPTORS: ACHING;DISCOMFORT

## 2025-03-25 ASSESSMENT — PAIN DESCRIPTION - LOCATION: LOCATION: HIP

## 2025-03-25 ASSESSMENT — PAIN DESCRIPTION - ORIENTATION: ORIENTATION: LEFT

## 2025-03-25 NOTE — ANESTHESIA PREPROCEDURE EVALUATION
Patient: Elsa Hinson    Procedure Information       Date/Time: 03/25/25 1147    Procedure: LEFT HIP INTERTROCHANTERIC NAILING (SHORT NAIL) *C-ARM* SYNTHES (Left: Hip)    Location: [facility] OR 07 / Virtual [facility] OR    Surgeons: Olaf العراقي, DO            Relevant Problems   Cardiac   (+) Atherosclerotic heart disease of native coronary artery without angina pectoris   (+) Essential (primary) hypertension   (+) Hyperlipidemia   (+) PAD (peripheral artery disease) (CMS-HCC)      Pulmonary   (+) Chronic obstructive pulmonary disease (Multi)      Hematology   (+) Anemia       Clinical information reviewed:   Tobacco  Allergies  Meds  Problems  Med Hx  Surg Hx   Fam Hx          NPO Detail:  NPO/Void Status  Date of Last Liquid: 03/25/25  Time of Last Liquid: 0000         Physical Exam    Airway  Mallampati: III  TM distance: >3 FB  Neck ROM: full     Cardiovascular - normal exam     Dental   (+) upper dentures, lower dentures     Pulmonary - normal exam     Abdominal - normal exam         Anesthesia Plan    History of general anesthesia?: yes  History of complications of general anesthesia?: no    ASA 3     general and regional   (Left PENG Block  GA)  The patient is not a current smoker.    intravenous induction   Postoperative administration of opioids is intended.  Anesthetic plan and risks discussed with patient.    Plan discussed with CRNA.

## 2025-03-25 NOTE — ANESTHESIA PROCEDURE NOTES
Airway  Date/Time: 3/25/2025 11:09 AM  Urgency: elective      Staffing  Performed: CRNA   Authorized by: KAVEH Baron    Performed by: KAVEH Baron  Patient location during procedure: OR    Indications and Patient Condition  Indications for airway management: anesthesia  Spontaneous Ventilation: absent  Sedation level: deep  Preoxygenated: yes  Patient position: sniffing  Mask difficulty assessment: 0 - not attempted  Planned trial extubation    Final Airway Details  Final airway type: supraglottic airway      Successful airway: Supraglottic airway: I-gel.  Size 3     Number of attempts at approach: 1    Additional Comments  Atraumatic LMA insertion.

## 2025-03-25 NOTE — ANESTHESIA PROCEDURE NOTES
Peripheral Block    Patient location during procedure: pre-op  Start time: 3/25/2025 10:15 AM  End time: 3/25/2025 10:20 AM  Reason for block: procedure for pain, at surgeon's request and post-op pain management  Staffing  Performed: CRNA   Authorized by: KAVEH Cross    Performed by: KAVEH Cross  Preanesthetic Checklist  Completed: patient identified, IV checked, site marked, risks and benefits discussed, surgical consent, monitors and equipment checked, pre-op evaluation and timeout performed   Timeout performed at: 3/25/2025 10:10 AM  Peripheral Block  Patient position: laying flat  Prep: ChloraPrep  Block type: PENG  Laterality: left  Injection technique: single-shot  Local infiltration: bupivicaine  Infiltration strength: 0.3 %  Dose: 20 mL  Needle  Needle gauge: 20 G  Needle localization: ultrasound guidance  Test dose: negative  Assessment  Injection assessment: negative aspiration for heme, no paresthesia on injection, incremental injection and local visualized surrounding nerve on ultrasound  Paresthesia pain: none  Heart rate change: no  Slow fractionated injection: yes  Additional Notes  Block requested by surgeon. Risks benefits discussed. Patient agreeable to DAVID Block. Site marked. Monitors on time out complete. Sterile prep and drape. 1 mg versed and 50 mcg fentanyl iv for sedation. Ultrasound guided. 20g 4 inch stimuplex needle used. Anatomy id. Attempt x1 needle tip visualized entire procedure. 5 ml 1% lidocaine with epinephrine 1:100,000 15 ml 0.25% bupivacaine with 4 mg decadron preservative free administered in divided doses. Aspiration ev jun 3-5ml. Negative heme negative paresthesia. Pt tolerated procedure well.

## 2025-03-25 NOTE — CONSULTS
Wound Care Consult     Visit Date: 3/25/2025      Patient Name: Elsa Hinson         MRN: [id number]           YOB: 1950      Pertinent Labs:   Albumin   Date Value Ref Range Status   03/24/2025 3.7 3.4 - 5.0 g/dL Final     Wound Assessment:  Wound 03/24/25 Pressure Injury Sacrum Medial (Active)   Wound Image   03/25/25 0856   Site Assessment Blanchable erythema;Pink;Red;Purple 03/25/25 0856   Fior-Wound Assessment Blanchable erythema 03/25/25 0856   Wound Length (cm) 2 cm 03/25/25 0856   Wound Width (cm) 1 cm 03/25/25 0856   Wound Surface Area (cm^2) 2 cm^2 03/25/25 0856   Drainage Description None 03/25/25 0856   Drainage Amount None 03/25/25 0856   Dressing Foam 03/25/25 0856   Dressing Changed New 03/25/25 0856   Dressing Status Clean;Dry;Occlusive 03/25/25 0856       Wound 03/25/25 Incision Leg Left;Proximal;Upper;Anterior (Active)   Drainage Amount None 03/25/25 1449   Dressing Other (Comment) 03/25/25 1449   Dressing Status Clean;Dry 03/25/25 1449       Wound 03/25/25 Skin Tear Ankle Left;Anterior (Active)   Wound Image    03/25/25 1615   Site Assessment Pale;Pink;Red;Fragile 03/25/25 1615   Non-staged Wound Description Full thickness 03/25/25 1615   Shape total flap loss 03/25/25 1615   Wound Length (cm) 5 cm 03/25/25 1615   Wound Width (cm) 8.7 cm 03/25/25 1615   Wound Surface Area (cm^2) 43.5 cm^2 03/25/25 1615   Drainage Description Sanguineous 03/25/25 1615   Drainage Amount Small 03/25/25 1615   Dressing Xeroform;ABD;Kerlix/rolled gauze 03/25/25 1615   Dressing Changed Changed 03/25/25 1615     Patient seen for report of sacral pressure injury (present on admission) complicated by PMH: COPD on 4L home O2 24/7 and CAD s/p PCI of mid LCx per chart. Exam conducted with bedside KATHY Shelton. Patients daughters at bedside, they report patient discharged from rehab facility 4 weeks ago, states she fell prior to leaving the SNF, since being home patient has been in a lot of pain causing her to lay  around and has been incontinent. Patient states she is aware of wound to sacrum, states “I have it from sitting on my ass”. Patient refused to allow this RN to assess sacral wound at this time as she returned from surgery earlier and wants to rest. Photo reviewed taken by bedside RN Cat today, suspected cluster of stage 2 pressure injuries noted. Bedside RN states patient obtained skin tear while in surgery (was communicated in report with OR nurse). Large skin tear noted to left ankle with total flap loss.  Skin hygiene and dressing care preformed. See detailed assessment above from flowsheet. Recommendations below, reviewed with Dr. Norman.     Wound Treatment protocols recommended:  Sacrum: Cleanse with NS. Apply mepilex border foam. Change every 3 days and PRN.  Left ankle- Cleanse with vashe, apply xeroform, ABD and wrap with kerlix. Change daily/prn.  Outpatient wound care upon discharge.   Continue to off load, turning at least every 2 hours. Offload heels.    Therapeutic surface: Patient on Centrella standard pressure relieving mattress during exam. Recommend waffle overlay to mattress and waffle cushion when up in chair, placed at bedside as patient refused to turn at this time (bedside staff to place to mattress when patient is willing). Preventative foam placed to right heel, left offloaded. Staff to continue to reposition patient atleast every 2 hours, offload heels, limit layers beneath patient and keep skin clean and dry.       Nursing updated, continue pressure injury preventions, wound care to be completed by nursing per orders and re-consult wound RN if needed as wound care is not following.     Please contact me with questions or changes in patient condition.   Danielle Sadler. RN  Wound/Ostomy Care  [phone]

## 2025-03-25 NOTE — PROGRESS NOTES
Elsa Hinson is a 75 y.o. female on day 1 of admission presenting with Displaced intertrochanteric fracture of left femur, initial encounter for closed fracture.      Subjective   75 y.o. female with PMHx of COPD on 4L home O2 24/7 and CAD s/p PCI of mid LCx who presented with left hip pain, which she has had since leaving her rehab facility close to a month ago. She fell just prior to leaving; no x-ray was done. She has had significant pain with ambulation since then. Today she went to see her PCP who ordered x-rays, which showed a left-sided hip fracture. Ortho surgery recommended surgery tomorrow.   ED labwork was benign. CXR was negative. VSS      3/25:                Today the patient is seen operative day postoperatively from her left hip fracture repair.  Seen in the presence of multiple family members.  She is awake alert and interactive appropriately complaining of only discomfort really in the sacral pressure injury area.  Orthopedic surgery describes WBAT and follow-up in 2 weeks.  At this point the patient appears to be refusing to consider SNF due to her recent experience where she fell before she left the SNF and was not evaluated.  It appears her daughters feel she likely would benefit from SNF however.  Otherwise denies interval new symptoms or complaints including chest pain palpitations pleuritic type pain unusual shortness of breath cough sputum nausea abdominal pain flank pain headache visual disturbances fever or chills.         Objective     Last Recorded Vitals  BP 91/60 (BP Location: Right arm, Patient Position: Lying)   Pulse 70   Temp 37.1 °C (98.8 °F) (Temporal)   Resp 17   Wt (!) 39.5 kg (87 lb)   SpO2 93%   Intake/Output last 3 Shifts:    Intake/Output Summary (Last 24 hours) at 3/25/2025 1834  Last data filed at 3/25/2025 1814  Gross per 24 hour   Intake 1520 ml   Output 615 ml   Net 905 ml       Admission Weight  Weight: (!) 39.5 kg (87 lb) (03/24/25 1637)    Daily  Weight  03/24/25 : (!) 39.5 kg (87 lb)    Image Results  XR hip left with pelvis when performed 2 or 3 views  Narrative: Interpreted By:  Evaristo Byrnes,   STUDY:  XR HIP LEFT WITH PELVIS WHEN PERFORMED 2 OR 3 VIEWS; ;  3/24/2025  3:56 pm      INDICATION:  Signs/Symptoms:left hip pain s/p fall.      ,M25.552 Pain in left hip      COMPARISON:  None.      ACCESSION NUMBER(S):  [id number]      ORDERING CLINICIAN:  KEENAN ORDAZ      FINDINGS:  Left hip, three views      There is a comminuted markedly angulated and moderately displaced the  left femoral intertrochanteric fracture. Subsequent studies  demonstrate fixation of these. Mild degenerative changes in the hips  bilaterally. There is no dislocation      Impression: Markedly angulated and moderately displaced left femoral  intertrochanteric fracture          MACRO:  None      Signed by: Evaristo Byrnes 3/25/2025 6:08 PM  Dictation workstation:   [id number]  FL less than 1 hour  These images are not reportable by radiology and will not be interpreted   by  Radiologists.  ECG 12 lead  Sinus rhythm  Borderline short ME interval  Borderline right axis deviation  ST elevation, consider inferior injury    See ED provider note for full interpretation and clinical correlation  Confirmed by Shey Osorio ([id number]) on 3/25/2025 10:49:51 AM      Physical Exam  Constitutional: Slender elderly  female, awake, alert, calm, oriented x4, no acute distress, cooperative   Eyes: EOMI, clear sclerae   ENMT: mucous membranes moist, no lesions seen   Head/Neck: Neck supple, no apparent injury, head atraumatic   Respiratory/Thorax: CTAB, good chest expansion, respirations even and unlabored, pt on 4L O2   Cardiovascular: Regular rate and rhythm, no murmurs/rubs/gallops, normal S1 and S 2   Gastrointestinal: Abdomen nondistended, soft, nontender, +BS, no bruits   Musculoskeletal: ROM intact except left lower extremity/hip, no joint swelling, normal  strength    Extremities: no cyanosis, contusions or clubbing, or edema   Neurological: no focal deficit, pt alert and oriented x4   Psychological: Appropriate affect and behavior, pleasant   Skin: Warm and dry, pressure injury sacrum, no rashes     Relevant Results               Assessment/Plan          This patient has a urinary catheter   Reason for the urinary catheter remaining today? perioperative use for selected surgical procedures          Assessment & Plan  Displaced intertrochanteric fracture of left femur, initial encounter for closed fracture    Acute left-sided hip fracture 2/2 fall about a month PTA  Consult ortho surgery for planned intervention tomorrow   Continue pain and nausea regimen  Pt denies recent cardiac and respiratory symptoms, COPD is stable, EKG shows no evidence of myocardial ischemia (limited read 2/2 artifact), echo 2/14/25 revealed LVEF 60-65%, mild RVSP and no valvulopathy  Pt has 1 risk factor for this procedure and 0.9% risk of a major cardiac event per the Revised Cardiac Risk Index  3/25: Operative day for left intertrochanteric hip fracture repair.  No apparent perioperative complications and doing well at this point.  However patient presently is fairly adamant about refusing SNF.     COPD on 4L home O2 24/7  Continue home inhalers, pt is not hypoxic below baseline  CXR reveals no acute changes     Hx CAD s/p PCI of mid LCx   Continue Lipitor and metoprolol, will hold ASA preoperatively     DVT ppx: SubQ heparin              Olaf Norman MD

## 2025-03-25 NOTE — DISCHARGE INSTRUCTIONS
Wound Treatment protocols recommended:  Sacrum: Cleanse with NS. Apply mepilex border foam. Change every 3 days and PRN.  Left ankle- Cleanse with vashe, apply xeroform, ABD and wrap with kerlix. Change daily/prn.  Outpatient wound care upon discharge.   Continue to off load, turning at least every 2 hours. Offload heels.

## 2025-03-25 NOTE — CARE PLAN
The patient's goals for the shift include get hip fixed    The clinical goals for the shift include pt remain free from falls and injuries, vitals remain stable, control pain, participate in pressure redistribution, tolerate surgery      Problem: Fall/Injury  Goal: Not fall by end of shift  Outcome: Progressing  Goal: Be free from injury by end of the shift  Outcome: Progressing  Goal: Verbalize understanding of personal risk factors for fall in the hospital  Outcome: Progressing  Goal: Verbalize understanding of risk factor reduction measures to prevent injury from fall in the home  Outcome: Progressing  Goal: Use assistive devices by end of the shift  Outcome: Progressing  Goal: Pace activities to prevent fatigue by end of the shift  Outcome: Progressing     Problem: Pain  Goal: Takes deep breaths with improved pain control throughout the shift  Outcome: Progressing  Goal: Turns in bed with improved pain control throughout the shift  Outcome: Progressing  Goal: Walks with improved pain control throughout the shift  Outcome: Progressing  Goal: Performs ADL's with improved pain control throughout shift  Outcome: Progressing  Goal: Participates in PT with improved pain control throughout the shift  Outcome: Progressing  Goal: Free from opioid side effects throughout the shift  Outcome: Progressing  Goal: Free from acute confusion related to pain meds throughout the shift  Outcome: Progressing     Problem: Pain - Adult  Goal: Verbalizes/displays adequate comfort level or baseline comfort level  Outcome: Progressing     Problem: Safety - Adult  Goal: Free from fall injury  Outcome: Progressing     Problem: Discharge Planning  Goal: Discharge to home or other facility with appropriate resources  Outcome: Progressing     Problem: Chronic Conditions and Co-morbidities  Goal: Patient's chronic conditions and co-morbidity symptoms are monitored and maintained or improved  Outcome: Progressing     Problem: Nutrition  Goal:  Nutrient intake appropriate for maintaining nutritional needs  Outcome: Progressing     Problem: Skin  Goal: Decreased wound size/increased tissue granulation at next dressing change  Outcome: Progressing  Flowsheets (Taken 3/25/2025 0726)  Decreased wound size/increased tissue granulation at next dressing change:   Promote sleep for wound healing   Protective dressings over bony prominences  Goal: Participates in plan/prevention/treatment measures  Outcome: Progressing  Flowsheets (Taken 3/25/2025 0726)  Participates in plan/prevention/treatment measures:   Discuss with provider PT/OT consult   Increase activity/out of bed for meals   Elevate heels  Goal: Prevent/manage excess moisture  Outcome: Progressing  Flowsheets (Taken 3/25/2025 0726)  Prevent/manage excess moisture:   Follow provider orders for dressing changes   Monitor for/manage infection if present  Goal: Prevent/minimize sheer/friction injuries  Outcome: Progressing  Flowsheets (Taken 3/25/2025 0726)  Prevent/minimize sheer/friction injuries:   HOB 30 degrees or less   Increase activity/out of bed for meals   Turn/reposition every 2 hours/use positioning/transfer devices   Use pull sheet  Goal: Promote/optimize nutrition  Outcome: Progressing  Flowsheets (Taken 3/25/2025 0726)  Promote/optimize nutrition:   Consume > 50% meals/supplements   Monitor/record intake including meals   Offer water/supplements/favorite foods  Goal: Promote skin healing  Outcome: Progressing  Flowsheets (Taken 3/25/2025 0726)  Promote skin healing:   Assess skin/pad under line(s)/device(s)   Ensure correct size (line/device) and apply per  instructions   Protective dressings over bony prominences   Rotate device position/do not position patient on device   Turn/reposition every 2 hours/use positioning/transfer devices     Problem: Respiratory  Goal: Clear secretions with interventions this shift  Outcome: Progressing  Goal: Minimize anxiety/maximize coping  throughout shift  Outcome: Progressing  Goal: Minimal/no exertional discomfort or dyspnea this shift  Outcome: Progressing  Goal: No signs of respiratory distress (eg. Use of accessory muscles. Peds grunting)  Outcome: Progressing  Goal: Patent airway maintained this shift  Outcome: Progressing

## 2025-03-25 NOTE — CONSULTS
"Reason For Consult  Left hip fracture    History Of Present Illness  Elsa Hinson is a 75 y.o. female presenting with left hip pain after fall.  Patient states almost a month ago she was in a nursing home fell and landed on her left side.  She states she continued to try to ambulate with the use of a walker but continued to have pain.  Her symptoms have persisted, and worsened.  She came in to [place] yesterday got an x-ray that showed a left intertrochanteric hip fracture.  She was then taken to [facility] and admitted through the emergency room.  Prior to the fall she could walk at times without assistive devices and also use a walker.  And prior to the nursing home she did live at home.  She reports pain worse with motion or use, better at rest.  No previous surgeries, injections, physical therapy to the left hip..     Past Medical History  She has a past medical history of CAD (coronary artery disease), COPD (chronic obstructive pulmonary disease) (Multi), Hyperlipidemia, unspecified, and Old myocardial infarction.    Surgical History  She has a past surgical history that includes  section, classic; Tonsillectomy; and Coronary angioplasty with stent.     Social History  She reports that she has quit smoking. Her smoking use included cigarettes. She has never used smokeless tobacco. She reports that she does not drink alcohol and does not use drugs.    Family History  No family history on file.     Allergies  Sulfa (sulfonamide antibiotics)    Review of Systems  Per H&P     Physical Exam  Left lower extremity  Sensation intact to light touch  Warm foot with brisk cap refill  5 out of 5 plantarflexion, dorsiflexion, EHL  Leg shortened and externally rotated  Painful logroll and heel strike     Last Recorded Vitals  Blood pressure 165/66, pulse 105, temperature 37.5 °C (99.5 °F), temperature source Tympanic, resp. rate 18, height 1.549 m (5' 1\"), weight (!) 39.5 kg (87 lb), SpO2 " 100%.    Relevant Results      Scheduled medications  [Transfer Hold] atorvastatin, 40 mg, oral, Nightly  ceFAZolin, 2 g, intravenous, Once  [Transfer Hold] empagliflozin, 10 mg, oral, Daily  famotidine, 20 mg, intravenous, Once  [Transfer Hold] tiotropium, 2 puff, inhalation, Daily   And  [Transfer Hold] fluticasone furoate-vilanteroL, 1 puff, inhalation, Daily  [Transfer Hold] heparin (porcine), 5,000 Units, subcutaneous, q8h USHA  [Transfer Hold] metoprolol tartrate, 12.5 mg, oral, BID  [Transfer Hold] sennosides-docusate sodium, 1 tablet, oral, Nightly      Continuous medications  lactated Ringer's, 75 mL/hr      PRN medications  PRN medications: [Transfer Hold] acetaminophen, [Transfer Hold] morphine, [Transfer Hold] ondansetron, [Transfer Hold] oxyCODONE-acetaminophen  Results for orders placed or performed during the hospital encounter of 03/24/25 (from the past 24 hours)   CBC and Auto Differential   Result Value Ref Range    WBC 8.7 4.4 - 11.3 x10*3/uL    nRBC 0.0 0.0 - 0.0 /100 WBCs    RBC 4.24 4.00 - 5.20 x10*6/uL    Hemoglobin 12.5 12.0 - 16.0 g/dL    Hematocrit 39.6 36.0 - 46.0 %    MCV 93 80 - 100 fL    MCH 29.5 26.0 - 34.0 pg    MCHC 31.6 (L) 32.0 - 36.0 g/dL    RDW 15.9 (H) 11.5 - 14.5 %    Platelets 211 150 - 450 x10*3/uL    Neutrophils % 69.4 40.0 - 80.0 %    Immature Granulocytes %, Automated 0.2 0.0 - 0.9 %    Lymphocytes % 21.4 13.0 - 44.0 %    Monocytes % 7.3 2.0 - 10.0 %    Eosinophils % 1.2 0.0 - 6.0 %    Basophils % 0.5 0.0 - 2.0 %    Neutrophils Absolute 6.03 (H) 1.60 - 5.50 x10*3/uL    Immature Granulocytes Absolute, Automated 0.02 0.00 - 0.50 x10*3/uL    Lymphocytes Absolute 1.86 0.80 - 3.00 x10*3/uL    Monocytes Absolute 0.63 0.05 - 0.80 x10*3/uL    Eosinophils Absolute 0.10 0.00 - 0.40 x10*3/uL    Basophils Absolute 0.04 0.00 - 0.10 x10*3/uL   Comprehensive metabolic panel   Result Value Ref Range    Glucose 108 (H) 74 - 99 mg/dL    Sodium 141 136 - 145 mmol/L    Potassium 4.0 3.5 - 5.3  mmol/L    Chloride 97 (L) 98 - 107 mmol/L    Bicarbonate 36 (H) 21 - 32 mmol/L    Anion Gap 12 10 - 20 mmol/L    Urea Nitrogen 21 6 - 23 mg/dL    Creatinine 0.64 0.50 - 1.05 mg/dL    eGFR >90 >60 mL/min/1.73m*2    Calcium 9.3 8.6 - 10.3 mg/dL    Albumin 3.7 3.4 - 5.0 g/dL    Alkaline Phosphatase 142 (H) 33 - 136 U/L    Total Protein 6.2 (L) 6.4 - 8.2 g/dL    AST 16 9 - 39 U/L    Bilirubin, Total 0.8 0.0 - 1.2 mg/dL    ALT 9 7 - 45 U/L   Protime-INR   Result Value Ref Range    Protime 11.5 9.8 - 12.4 seconds    INR 1.0 0.9 - 1.1   Type and Screen   Result Value Ref Range    ABO TYPE A     Rh TYPE POS     ANTIBODY SCREEN NEG    CBC   Result Value Ref Range    WBC 8.0 4.4 - 11.3 x10*3/uL    nRBC 0.0 0.0 - 0.0 /100 WBCs    RBC 4.04 4.00 - 5.20 x10*6/uL    Hemoglobin 11.8 (L) 12.0 - 16.0 g/dL    Hematocrit 38.4 36.0 - 46.0 %    MCV 95 80 - 100 fL    MCH 29.2 26.0 - 34.0 pg    MCHC 30.7 (L) 32.0 - 36.0 g/dL    RDW 15.9 (H) 11.5 - 14.5 %    Platelets 190 150 - 450 x10*3/uL   Basic Metabolic Panel   Result Value Ref Range    Glucose 68 (L) 74 - 99 mg/dL    Sodium 136 136 - 145 mmol/L    Potassium 3.6 3.5 - 5.3 mmol/L    Chloride 99 98 - 107 mmol/L    Bicarbonate 31 21 - 32 mmol/L    Anion Gap 10 10 - 20 mmol/L    Urea Nitrogen 18 6 - 23 mg/dL    Creatinine 0.64 0.50 - 1.05 mg/dL    eGFR >90 >60 mL/min/1.73m*2    Calcium 8.8 8.6 - 10.3 mg/dL     XR chest 1 view    Result Date: 3/24/2025  Interpreted By:  Evaristo Byrnes, STUDY: XR CHEST 1 VIEW;  3/24/2025 5:26 pm   INDICATION: Signs/Symptoms:pre op clearance.     COMPARISON: None.   ACCESSION NUMBER(S): [id number]   ORDERING CLINICIAN: YANNA MCCLOUD   FINDINGS:         CARDIOMEDIASTINAL SILHOUETTE: Cardiomediastinal silhouette is normal in size and configuration.   LUNGS: The lungs are hyperinflated. There is biapical pleural thickening. No consolidation or effusion seen. There is no edema   ABDOMEN: No remarkable upper abdominal findings.   BONES: No acute osseous  changes.       Hyperinflated lungs with emphysematous changes. No evidence of acute cardiopulmonary process.     MACRO: None   Signed by: Evaristo Byrnes 3/24/2025 5:44 PM Dictation workstation:   [id number]      X-ray of the left hip shows a shortening varus angulated externally rotated intertrochanteric hip fracture normal obliquity     Assessment/Plan   Left intertrochanteric hip fracture  Surgery discussion: I discussed the diagnosis and treatment options with the patient today along with their associated risks and benefits. After thorough discussion, the patient has elected to proceed with surgical intervention. The patient understands the risks of,including, but not limited to, bleeding, infection, loss of life or limb, pain, permanent numbness, tingling, weakness, loss of motion, failure of the goals of surgery or the potential need for additional surgery. The patient would like to accept these risks and proceed. All questions were answered to the patients satisfaction and the patient seems satisfied with the plan.    Plan left hip intramedullary nailing  N.p.oRj Ferris  Will follow      Olaf العراقي,

## 2025-03-25 NOTE — PROGRESS NOTES
Elsa Hinson is a 75 y.o. female admitted for Displaced intertrochanteric fracture of left femur, initial encounter for closed fracture. Pharmacy reviewed the patient's [id number] medications and allergies for accuracy.    The list below reflects the PTA list prior to pharmacy medication history. A summary a changes to the PTA medication list has been listed below. Please review each medication in order reconciliation for additional clarification and justification.    Source of information: t2p    Medications added:    Medications modified:    Medications to be removed:  Lipitor 40mg     Medications of concern:  Jardiance 10mg- last filled 2/17 x30days  Lopressor 25mg- last filled 2/17 x30days      Prior to Admission Medications   Prescriptions Last Dose Informant Patient Reported? Taking?   albuterol (ProAir HFA) 90 mcg/actuation inhaler   No No   Sig: Inhale 2 puffs every 4 hours if needed for wheezing or shortness of breath.   aspirin (Aftab Low Dose Aspirin) 81 mg EC tablet   No No   Sig: Take 1 tablet (81 mg) by mouth once daily.   atorvastatin (Lipitor) 40 mg tablet   No No   Sig: Take 1 tablet (40 mg) by mouth once daily.   Patient not taking: Reported on 3/24/2025   empagliflozin (Jardiance) 10 mg   No No   Sig: Take 1 tablet (10 mg) by mouth once daily.   fluticasone-umeclidin-vilanter (Trelegy Ellipta) 100-62.5-25 mcg blister with device   No No   Sig: Inhale 1 puff once daily.   meloxicam (Mobic) 15 mg tablet   No No   Sig: Take 1 tablet (15 mg) by mouth once daily.   metoprolol tartrate (Lopressor) 25 mg tablet   No No   Sig: Take 1 tablet (25 mg) by mouth once daily.      Facility-Administered Medications: None       MICHELLE KAUR

## 2025-03-25 NOTE — ANESTHESIA POSTPROCEDURE EVALUATION
Patient: Elsa Hinson    Procedure Summary       Date: 03/25/25 Room / Location: [facility] OR 07 / Virtual [facility] OR    Anesthesia Start: 1100 Anesthesia Stop: 1334    Procedure: LEFT HIP INTERTROCHANTERIC NAILING (SHORT NAIL) *C-ARM* SYNTHES (Left: Hip) Diagnosis:       Displaced intertrochanteric fracture of left femur, initial encounter for closed fracture      (Displaced intertrochanteric fracture of left femur, initial encounter for closed fracture [S72.142A])    Surgeons: Olaf العراقي DO Responsible Provider: KAVEH Baron    Anesthesia Type: general, regional ASA Status: 3            Anesthesia Type: general, regional    Vitals Value Taken Time   /66 03/25/25 1449   Temp 36.1 °C (97 °F) 03/25/25 1449   Pulse 105 03/25/25 1449   Resp 13 03/25/25 1449   SpO2 100 % 03/25/25 1450       Anesthesia Post Evaluation    Patient location during evaluation: PACU  Patient participation: complete - patient participated  Level of consciousness: awake  Pain score: 0  Pain management: adequate  Airway patency: patent  Cardiovascular status: acceptable  Respiratory status: acceptable  Hydration status: acceptable  Postoperative Nausea and Vomiting: none    There were no known notable events for this encounter.

## 2025-03-25 NOTE — OP NOTE
ORTHOPEDIC OPERATIVE NOTE    Name: Elsa Hinson  : 1950  Surgeon: Aftab العراقي DO  Facility: [facility]  Date of Surgery: 25     SURGEON:    Aftab العراقي DO  PREOP DX:    Left Intertrochanteric Femur Fracture  POSTOP DX:    Same  PROCEDURES:   Intramedullary nailing of left intertrochanteric femur fracture  COMPLICATIONS:   None  BLOOD LOSS:                                150 mL  ANESTHESIA:   General  ASSISTANT:  Wai Whitfield MD   IMPLANTS USED:  11 mm x 170 mm - 130 degree Synthes TFN  80 mm x 11 mm helical blade  32 mm x 5 mm locking screw  CONDITION:    Satisfactory to PACU.    INDICATION FOR PROCEDURE: This patient is an 75 y.o. year old female and was seen in the ED with a left intertrochanteric femur fracture after a ground level fall about 1 month ago.  She tried to walk with a walker and was at her nursing facility but symptoms fail to improve over that time.  So then she came to [facility] radiology and got an x-ray which showed the fracture.  She was transferred to the emergency department and admitted.. We had a discussion about treatment options including operative and non operative care. The patient elected for surgical fixation to allow for early mobilization. We talked about risks, benefits, complications, and alternatives to the procedure and prior to agreeing to proceed.    PROCEDURE IN DETAIL: The patient was seen in pre op and the extremity was marked. The consent was reviewed and the patient was taken to the operating suite and placed in the supine position on the fracture table. General endotracheal intubation was administered. At this point the patient was positioned with the operative leg and torso slightly adducted and the foot internally rotated. Closed reduction was performed under fluoroscopy which yielded anatomic alignment in the AP radiograph but on the lateral there was an anterior angulation of the neck.  Multiple maneuvers were tried in order to reduce  that but close reduction did not improve the alignment much.. The patient was prepped and draped in a sterile fashion and a time out was performed.    The greater trochanter was marked under fluoroscopy and a lateral incision was made just proximal and posterior to the greater trochanter. The guidewire was introduced and placed at the tip of the greater trochanter and at the midpoint on the lateral view, confirmed with c-arm. The wire was malleted into place to the level of the lesser trochanter. The opening reamer was used with a soft tissue guide. Instrument removed.  The 11 mm x 170 mm monika was placed in good position, confirmed with c-arm. After the nail was placed, the threaded guidewire was placed as close to the center-center position in the femoral head as possible.  This ended up being very difficult with the anterior neck angulation.  I did put a Larson elevator to try to improve it which gave some improvement.  Also tried using a mallet to push down from anterior to posterior which gave some improvement.  However it was still very difficult and did not believe that the wire was getting into the neck even though it was getting into the head.  At this point I called my partner Dr. Wai Valente and he scrubbed in with the case also.  We then tried to manipulate the fracture more and got a clamp that was able to cinch down the neck.  We then redrilled more anterior in the greater trochanter to put our nail and.  This then provided a better angle to get through the neck and into the head.  We then put the wire in the center center position.  This measured 82 mm. The near cortex was drilled and then we drilled over the wire. The 80 mm x 11 mm helical blade was placed and locked into position. We then turned our attention distally to the end of the monika where a small incision was made with the drill guide. The near cortex was drilled under fluoroscopy and the bit was finished at the far cortex, measured, and a 32  mm locking screw placed. At this point we had achieved anatomic reduction and the wounds were irrigated and final films obtained with jigs removed.    The incisions were closed in a layered fashion using 0 vicryl, 2-0 vicryl, and staples for the skin. A soft sterile dressing (Aquacel) was placed and the patient was taken to PACU without complications.     Plan: The patient will be able to weight bear as tolerated and will follow up in the office in 2 weeks.  They will be monitored in the hospital, receiving PT and OT, appropriate analgesia for pain and lab work to monitor blood loss and electrolytes.      Electronically signed  Aftab العراقي DO

## 2025-03-25 NOTE — CONSULTS
"Nutrition Initial Assessment:   Nutrition Assessment    Reason for Assessment: Admission nursing screening    Medical history per chart: COPD on 4L home O2 24/7 and CAD s/p PCI of mid LCx, HLD, MI     Admitted for displaced intertrochanteric fracture of left femur     3/25: Pt in OR. RN stated will weight pt upon return to floor. Weight loss, decreased PO intake, wound(s) indicated on MST. Will send Magic Cup BID when diet advances. Pt admission Dx, PMH, labs, medications, allergies, diet orders, weight history, skin integrity reviewed. RD to follow per POC, protocol.     Nutrition History:  Food and Nutrient History: 2/17/25 RD note: Patient reports she eats small meals throughout the day, but meal preparation has been difficult due to breathing issues, fatigue. Discussed ways to increase calories and protein; COPD Nutrition Therapy handout was provided. She was agreeable to Magic cup, but not ensure. Discussed other ONS to trial once home. CBW: 97 lbs, reports she has been this way for a long time.  Vitamin/Herbal Supplement Use: None per Home Meds list.       Average meal Intake during admission: NPO   Dietary Orders (From admission, onward)       Start     Ordered    03/25/25 1328  Oral nutritional supplements  Until discontinued        Comments: When diet advances   Question Answer Comment   Deliver with Breakfast    Deliver with Dinner    Select supplement: Magic Cup        03/25/25 1330    03/25/25 0913  NPO Diet Except: Sips with meds; Effective now  Diet effective now        Comments: NPO in preprocedure space   Question:  Except:  Answer:  Sips with meds    03/25/25 0912    03/24/25 2226  May Participate in Room Service  ( ROOM SERVICE MAY PARTICIPATE)  Once        Question:  .  Answer:  Yes    03/24/25 2225                    Anthropometrics:  Height: 154.9 cm (5' 1\")   Weight: (!) 39.5 kg (87 lb)   BMI (Calculated): 16.45  IBW/kg (Dietitian Calculated): 47.7 kg        Weight History:   Wt Readings from " Last 10 Encounters:   03/24/25 (!) 39.5 kg (87 lb)   03/24/25 (!) 39.5 kg (87 lb)   02/14/25 (!) 44.2 kg (97 lb 8 oz)   04/05/24 (!) 44.2 kg (97 lb 6.4 oz)       Weight Change %:  Weight History / % Weight Change: Pt in OR, RN stated will weigh when returns to floor. Chart review weight 39.5 (3/24 outpatient record) indicates 10.7% loss since 44.2 kg (2/14/25), also 44.2 kg (4/5/24), no other weights from previous 12 months available. Per 2/17/25 RD note, UBW of 97 pounds. Will monitor weights.  Significant Weight Loss: Yes  Interpretation of Weight Loss: >7.5% in 3 months    Nutrition Focused Physical Exam Findings:  Subcutaneous Fat Loss:   Defer Subcutaneous Fat Loss Assessment: Defer all  Defer All Reason: Pt in OR  Muscle Wasting:  Defer Muscle Wasting Assessment: Defer all  Defer All Reason: Pt in OR  Edema:  Edema:  (non-pitting)  Edema Location: RLE and LLE  Physical Findings:  Skin: Positive  Positive Skin Findings:  (Pressure injury, stage not stated, on sacrum)    Nutrition Significant Labs:    Results from last 7 days   Lab Units 03/25/25  0655 03/24/25  1651   GLUCOSE mg/dL 68* 108*   SODIUM mmol/L 136 141   POTASSIUM mmol/L 3.6 4.0   CHLORIDE mmol/L 99 97*   CO2 mmol/L 31 36*   BUN mg/dL 18 21   CREATININE mg/dL 0.64 0.64   EGFR mL/min/1.73m*2 >90 >90   CALCIUM mg/dL 8.8 9.3     Lab Results   Component Value Date    HGBA1C 5.6 02/14/2025           Nutrition Specific Medications:  Meds reviewed/noted.   [Transfer Hold] atorvastatin, 40 mg, oral, Nightly  [Transfer Hold] empagliflozin, 10 mg, oral, Daily  [Transfer Hold] tiotropium, 2 puff, inhalation, Daily   And  [Transfer Hold] fluticasone furoate-vilanteroL, 1 puff, inhalation, Daily  [Transfer Hold] heparin (porcine), 5,000 Units, subcutaneous, q8h USHA  lidocaine, 0.1 mL, subcutaneous, Once  [Transfer Hold] metoprolol tartrate, 12.5 mg, oral, BID  [Transfer Hold] sennosides-docusate sodium, 1 tablet, oral, Nightly       lactated Ringer's, 100  mL/hr  lactated Ringer's, 75 mL/hr, Last Rate: 75 mL/hr (03/25/25 1100)         I/O:    ;      Estimated Needs:   Total Energy Estimated Needs in 24 hours (kCal):  (8060-8661)  Method for Estimating Needs: 35-40 kcal/kg actual body weight  Total Protein Estimated Needs in 24 Hours (g):  (40-60)  Method for Estimating 24 Hour Protein Needs: 1.1-1.5 g/kg actual body weight  Total Fluid Estimated Needs in 24 Hours (mL):  (1682-7780)  Method for Estimating 24 Hour Fluid Needs: 1 ml/kcal or per physician        Nutrition Diagnosis   Malnutrition Diagnosis  Patient has Malnutrition Diagnosis:  (Unablt to determine at this time)    Nutrition Diagnosis  Patient has Nutrition Diagnosis: Yes  Diagnosis Status (1): New  Nutrition Diagnosis 1: Inadequate oral intake  Related to (1): COPD, left femur fracture  As Evidenced by (1): NPO/CL status, weight loss >7.5% within 3 months       Nutrition Interventions/Recommendations    (Diet advancement, tolerance)    Nutrition Recommendations:  Individualized Nutrition Prescription Provided for : Recommend to advance diet when able and as tolerated.    Nutrition Interventions/Goals:   Interventions: Medical food supplement (monitor for diet advancement, tolerance)  Medical Food Supplement: Commercial beverage medical food supplement therapy  Goal: consume Magic Cup BID when diet advanced  Coordination of Care with Providers: Nursing (Cat, KATHY)      Education Documentation  No documentation found.            Nutrition Monitoring and Evaluation   Food/Nutrient Related History Monitoring  Monitoring and Evaluation Plan:  (follow for diet advancement, tolerance)    Anthropometric Measurements  Monitoring and Evaluation Plan: Body weight  Body Weight: Body weight - Promote weight restoration    Biochemical Data, Medical Tests and Procedures  Monitoring and Evaluation Plan: Electrolyte/renal panel, Glucose/endocrine profile  Electrolyte and Renal Panel: Electrolytes within normal  limits  Glucose/Endocrine Profile: Glucose within normal limits ( mg/dL)    Physical Exam Findings  Monitoring and Evaluation Plan: Adipose, Muscles, Skin  Adipose Finding: Other (Comment) (monitor for loss)  Criteria: WDL  Muscle Finding: Other (Comment) (monitor for loss)  Criteria: WDL  Skin Finding: Impaired wound healing - Improved wound healing    Goal Status: New goal(s) identified    Time Spent (min): 45 minutes

## 2025-03-25 NOTE — PROGRESS NOTES
Discharge planning assessment attempted. Patient is currently off the floor in OR. She is admitted with a hip fracture. TCC following, will attempt to meet with patient once she is out of surgery and appropriate for assessment.

## 2025-03-26 LAB
ERYTHROCYTE [DISTWIDTH] IN BLOOD BY AUTOMATED COUNT: 16.1 % (ref 11.5–14.5)
HCT VFR BLD AUTO: 31.7 % (ref 36–46)
HGB BLD-MCNC: 9.7 G/DL (ref 12–16)
MCH RBC QN AUTO: 29.1 PG (ref 26–34)
MCHC RBC AUTO-ENTMCNC: 30.6 G/DL (ref 32–36)
MCV RBC AUTO: 95 FL (ref 80–100)
NRBC BLD-RTO: 0 /100 WBCS (ref 0–0)
PLATELET # BLD AUTO: 233 X10*3/UL (ref 150–450)
RBC # BLD AUTO: 3.33 X10*6/UL (ref 4–5.2)
WBC # BLD AUTO: 18.1 X10*3/UL (ref 4.4–11.3)

## 2025-03-26 PROCEDURE — 99233 SBSQ HOSP IP/OBS HIGH 50: CPT | Performed by: FAMILY MEDICINE

## 2025-03-26 PROCEDURE — 2500000001 HC RX 250 WO HCPCS SELF ADMINISTERED DRUGS (ALT 637 FOR MEDICARE OP): Performed by: ORTHOPAEDIC SURGERY

## 2025-03-26 PROCEDURE — 36415 COLL VENOUS BLD VENIPUNCTURE: CPT | Performed by: ORTHOPAEDIC SURGERY

## 2025-03-26 PROCEDURE — 97161 PT EVAL LOW COMPLEX 20 MIN: CPT | Mod: GP | Performed by: PHYSICAL THERAPIST

## 2025-03-26 PROCEDURE — 97166 OT EVAL MOD COMPLEX 45 MIN: CPT | Mod: GO

## 2025-03-26 PROCEDURE — 97530 THERAPEUTIC ACTIVITIES: CPT | Mod: GP | Performed by: PHYSICAL THERAPIST

## 2025-03-26 PROCEDURE — 1100000001 HC PRIVATE ROOM DAILY

## 2025-03-26 PROCEDURE — 2500000004 HC RX 250 GENERAL PHARMACY W/ HCPCS (ALT 636 FOR OP/ED): Performed by: ORTHOPAEDIC SURGERY

## 2025-03-26 PROCEDURE — 85027 COMPLETE CBC AUTOMATED: CPT | Performed by: ORTHOPAEDIC SURGERY

## 2025-03-26 RX ADMIN — METOPROLOL TARTRATE 12.5 MG: 25 TABLET, FILM COATED ORAL at 09:20

## 2025-03-26 RX ADMIN — ASPIRIN 81 MG: 81 TABLET, COATED ORAL at 09:20

## 2025-03-26 RX ADMIN — FLUTICASONE FUROATE AND VILANTEROL TRIFENATATE 1 PUFF: 100; 25 POWDER RESPIRATORY (INHALATION) at 05:48

## 2025-03-26 RX ADMIN — TIOTROPIUM BROMIDE INHALATION SPRAY 2 PUFF: 3.12 SPRAY, METERED RESPIRATORY (INHALATION) at 05:47

## 2025-03-26 RX ADMIN — EMPAGLIFLOZIN 10 MG: 10 TABLET, FILM COATED ORAL at 09:21

## 2025-03-26 RX ADMIN — METOPROLOL TARTRATE 12.5 MG: 25 TABLET, FILM COATED ORAL at 21:16

## 2025-03-26 RX ADMIN — HEPARIN SODIUM 5000 UNITS: 5000 INJECTION, SOLUTION INTRAVENOUS; SUBCUTANEOUS at 14:50

## 2025-03-26 RX ADMIN — OXYCODONE HYDROCHLORIDE AND ACETAMINOPHEN 2 TABLET: 5; 325 TABLET ORAL at 09:20

## 2025-03-26 RX ADMIN — HEPARIN SODIUM 5000 UNITS: 5000 INJECTION, SOLUTION INTRAVENOUS; SUBCUTANEOUS at 05:45

## 2025-03-26 RX ADMIN — HEPARIN SODIUM 5000 UNITS: 5000 INJECTION, SOLUTION INTRAVENOUS; SUBCUTANEOUS at 21:15

## 2025-03-26 RX ADMIN — CEFAZOLIN SODIUM 2 G: 2 SOLUTION INTRAVENOUS at 02:32

## 2025-03-26 ASSESSMENT — COGNITIVE AND FUNCTIONAL STATUS - GENERAL
DAILY ACTIVITIY SCORE: 17
TOILETING: TOTAL
PERSONAL GROOMING: A LITTLE
WALKING IN HOSPITAL ROOM: A LOT
DRESSING REGULAR UPPER BODY CLOTHING: A LITTLE
HELP NEEDED FOR BATHING: A LITTLE
MOVING TO AND FROM BED TO CHAIR: A LOT
MOVING FROM LYING ON BACK TO SITTING ON SIDE OF FLAT BED WITH BEDRAILS: A LOT
MOVING TO AND FROM BED TO CHAIR: TOTAL
TOILETING: A LOT
STANDING UP FROM CHAIR USING ARMS: A LOT
DRESSING REGULAR UPPER BODY CLOTHING: A LITTLE
HELP NEEDED FOR BATHING: A LITTLE
PERSONAL GROOMING: A LITTLE
DAILY ACTIVITIY SCORE: 12
CLIMB 3 TO 5 STEPS WITH RAILING: TOTAL
MOBILITY SCORE: 11
DAILY ACTIVITIY SCORE: 17
TOILETING: A LOT
DRESSING REGULAR LOWER BODY CLOTHING: TOTAL
TURNING FROM BACK TO SIDE WHILE IN FLAT BAD: A LOT
PERSONAL GROOMING: A LITTLE
MOBILITY SCORE: 10
CLIMB 3 TO 5 STEPS WITH RAILING: TOTAL
WALKING IN HOSPITAL ROOM: A LOT
TURNING FROM BACK TO SIDE WHILE IN FLAT BAD: A LOT
MOVING FROM LYING ON BACK TO SITTING ON SIDE OF FLAT BED WITH BEDRAILS: A LOT
MOVING TO AND FROM BED TO CHAIR: A LOT
EATING MEALS: A LITTLE
DRESSING REGULAR LOWER BODY CLOTHING: A LOT
STANDING UP FROM CHAIR USING ARMS: A LOT
DRESSING REGULAR UPPER BODY CLOTHING: A LOT
STANDING UP FROM CHAIR USING ARMS: A LOT
DRESSING REGULAR LOWER BODY CLOTHING: A LOT
TURNING FROM BACK TO SIDE WHILE IN FLAT BAD: A LOT
MOBILITY SCORE: 11
CLIMB 3 TO 5 STEPS WITH RAILING: TOTAL
HELP NEEDED FOR BATHING: A LOT
MOVING FROM LYING ON BACK TO SITTING ON SIDE OF FLAT BED WITH BEDRAILS: A LOT
WALKING IN HOSPITAL ROOM: A LOT

## 2025-03-26 ASSESSMENT — PAIN DESCRIPTION - ORIENTATION: ORIENTATION: LEFT

## 2025-03-26 ASSESSMENT — PAIN DESCRIPTION - LOCATION: LOCATION: HIP

## 2025-03-26 ASSESSMENT — PAIN SCALES - GENERAL
PAINLEVEL_OUTOF10: 7
PAINLEVEL_OUTOF10: 4
PAINLEVEL_OUTOF10: 5 - MODERATE PAIN
PAINLEVEL_OUTOF10: 5 - MODERATE PAIN

## 2025-03-26 ASSESSMENT — ACTIVITIES OF DAILY LIVING (ADL)
BATHING_ASSISTANCE: MAXIMAL
ADL_ASSISTANCE: INDEPENDENT

## 2025-03-26 ASSESSMENT — PAIN - FUNCTIONAL ASSESSMENT
PAIN_FUNCTIONAL_ASSESSMENT: 0-10
PAIN_FUNCTIONAL_ASSESSMENT: 0-10

## 2025-03-26 NOTE — PROGRESS NOTES
Elsa Hinson is a 75 y.o. female on day 2 of admission presenting with Displaced intertrochanteric fracture of left femur, initial encounter for closed fracture.      Subjective   75 y.o. female with PMHx of COPD on 4L home O2 24/7 and CAD s/p PCI of mid LCx who presented with left hip pain, which she has had since leaving her rehab facility close to a month ago. She fell just prior to leaving; no x-ray was done. She has had significant pain with ambulation since then. Today she went to see her PCP who ordered x-rays, which showed a left-sided hip fracture. Ortho surgery recommended surgery tomorrow.   ED labwork was benign. CXR was negative. VSS      3/25:                Today the patient is seen operative day postoperatively from her left hip fracture repair.  Seen in the presence of multiple family members.  She is awake alert and interactive appropriately complaining of only discomfort really in the sacral pressure injury area.  Orthopedic surgery describes WBAT and follow-up in 2 weeks.  At this point the patient appears to be refusing to consider SNF due to her recent experience where she fell before she left the SNF and was not evaluated.  It appears her daughters feel she likely would benefit from SNF however.  Otherwise denies interval new symptoms or complaints including chest pain palpitations pleuritic type pain unusual shortness of breath cough sputum nausea abdominal pain flank pain headache visual disturbances fever or chills.    3/26:                  Today the patient is POD #1 and seen once again in the presence of family members.  She appears much more awake alert and interactive today.  Voices no specific complaints and no acute events overnight.  However she continues to states she does not want to go to SNF.  AM-PAC 10.  Once again it appears patient's daughters feel she should go to SNF.  Awaiting transitional care coordinator interaction.  Orthopedic surgery describes WBAT, ASA 81 mg daily  for 6 weeks and follow-up with Dr. العراقي in 2 weeks. Otherwise denies interval new symptoms or complaints including chest pain palpitations pleuritic type pain unusual shortness of breath cough sputum nausea abdominal pain flank pain headache visual disturbances fever or chills.         Objective     Last Recorded Vitals  /60 (BP Location: Right arm, Patient Position: Lying)   Pulse 85   Temp 35.9 °C (96.6 °F) (Temporal)   Resp 18   Wt (!) 39.5 kg (87 lb)   SpO2 100%   Intake/Output last 3 Shifts:    Intake/Output Summary (Last 24 hours) at 3/26/2025 1624  Last data filed at 3/26/2025 1305  Gross per 24 hour   Intake 50 ml   Output 350 ml   Net -300 ml       Admission Weight  Weight: (!) 39.5 kg (87 lb) (03/24/25 1637)    Daily Weight  03/24/25 : (!) 39.5 kg (87 lb)    Image Results  XR hip left with pelvis when performed 2 or 3 views  Narrative: Interpreted By:  Evaristo Byrnes,   STUDY:  XR HIP LEFT WITH PELVIS WHEN PERFORMED 2 OR 3 VIEWS; ;  3/24/2025  3:56 pm      INDICATION:  Signs/Symptoms:left hip pain s/p fall.      ,M25.552 Pain in left hip      COMPARISON:  None.      ACCESSION NUMBER(S):  [id number]      ORDERING CLINICIAN:  KEENAN ORDAZ      FINDINGS:  Left hip, three views      There is a comminuted markedly angulated and moderately displaced the  left femoral intertrochanteric fracture. Subsequent studies  demonstrate fixation of these. Mild degenerative changes in the hips  bilaterally. There is no dislocation      Impression: Markedly angulated and moderately displaced left femoral  intertrochanteric fracture          MACRO:  None      Signed by: Evaristo Byrnes 3/25/2025 6:08 PM  Dictation workstation:   [id number]  FL less than 1 hour  These images are not reportable by radiology and will not be interpreted   by  Radiologists.  ECG 12 lead  Sinus rhythm  Borderline short CO interval  Borderline right axis deviation  ST elevation, consider inferior injury    See ED provider note  for full interpretation and clinical correlation  Confirmed by Shey Osorio ([id number]) on 3/25/2025 10:49:51 AM      Physical Exam  Constitutional: Slender elderly  female, awake, alert, calm, oriented x4, no acute distress, cooperative   Eyes: EOMI, clear sclerae   ENMT: mucous membranes moist, no lesions seen   Head/Neck: Neck supple, no apparent injury, head atraumatic   Respiratory/Thorax: CTAB, good chest expansion, respirations even and unlabored, pt on 4L O2   Cardiovascular: Regular rate and rhythm, no murmurs/rubs/gallops, normal S1 and S 2   Gastrointestinal: Abdomen nondistended, soft, nontender, +BS, no bruits   Musculoskeletal: ROM intact except left lower extremity/hip, no joint swelling, normal  strength   Extremities: no cyanosis, contusions or clubbing, or edema   Neurological: no focal deficit, pt alert and oriented x4   Psychological: Appropriate affect and behavior, pleasant   Skin: Warm and dry, pressure injury sacrum, no rashes     Relevant Results               Assessment/Plan          This patient has a urinary catheter   Reason for the urinary catheter remaining today? perioperative use for selected surgical procedures          Assessment & Plan  Displaced intertrochanteric fracture of left femur, initial encounter for closed fracture    Acute left-sided hip fracture 2/2 fall about a month PTA  Consult ortho surgery for planned intervention tomorrow   Continue pain and nausea regimen  Pt denies recent cardiac and respiratory symptoms, COPD is stable, EKG shows no evidence of myocardial ischemia (limited read 2/2 artifact), echo 2/14/25 revealed LVEF 60-65%, mild RVSP and no valvulopathy  Pt has 1 risk factor for this procedure and 0.9% risk of a major cardiac event per the Revised Cardiac Risk Index  3/25: Operative day for left intertrochanteric hip fracture repair.  No apparent perioperative complications and doing well at this point.  However patient presently is fairly  adamant about refusing SNF.  3/26: POD #1.  Hemoglobin trended down slightly as expected.  WBC lovely felt likely reactive in nature.  Continue to monitor.  Awaiting patient and family decision for discharge disposition.     COPD on 4L home O2 24/7  Continue home inhalers, pt is not hypoxic below baseline  CXR reveals no acute changes     Hx CAD s/p PCI of mid LCx   Continue Lipitor and metoprolol, will hold ASA preoperatively     DVT ppx: SubQ heparin              Olaf Norman MD

## 2025-03-26 NOTE — PROGRESS NOTES
Occupational Therapy  Evaluation    Patient Name: Elsa Hinson  MRN: [id number]  Today's Date: 3/26/2025  Time Calculation  Start Time: 1008  Stop Time: 1038  Time Calculation (min): 30 min    Current Problem:   1. Displaced intertrochanteric fracture of left femur, initial encounter for closed fracture        OT order: OT eval and treat   Referred by: Damian MCCARTNEY  Reason for referral: Dx: L hip fx due to fall (while recently at NH), s/p nail repair  Past medical history related to rehab: COPD/on O2, CAD, MI, recent SNF stay due to respiratory failure    Precautions:   Hearing/Visual Limitations: intact  LE Weight Bearing Status: Weight Bearing as Tolerated  Medical Precautions: Fall precautions, Oxygen therapy device and L/min    ASSESSMENT  OT Assessment: OT eval completed. The patient is functioning below baseline for ADLs and mobility. can benefit from continued OT. Pt with Decreased ADL status, Decreased upper extremity strength, Decreased safe judgment during ADL, Decreased cognition, Decreased endurance, Decreased functional mobility, Decreased gross motor control, Decreased IADLs  Prognosis:    Barriers to discharge home: Physical needs, Cognition needs, Caregiver assistance           Tolerance:      PLAN  Frequency: 4 times per week  Treatment Interventions: ADL retraining, Functional transfer training, UE strengthening/ROM, Endurance training, Cognitive reorientation, Patient/family training, Equipment evaluation/education, Neuromuscular reeducation  Discharge Recommendations: Moderate intensity level of continued care  OT OK to discharge: Yes    GENERAL VISIT INFORMATION   Start of session communication: Bedside nurse  End of session communication: Bedside nurse  Family/caregiver present: Yes  Caregiver feedback: daughters  Co-Treatment: PT  Reason for co-treatment: to optimize safety and mobility, while focusing on discipline specific goals   Position Pt Received:  Bed, 3 rail up, Alarm on  End of  session position: Bed, 3 rail up, Alarm on    SUBJECTIVE  Home Living:  Home Living Comments: Lives alone in 1 level home, 1 step to enter. Patient normally fairly indepedent, family recently obtained rollator for patient to use. Patient was fairly immobile after DC from SNF/recent fall.     Prior Level of Function:  Receives Help From: Family  ADL Assistance: Independent  Homemaking Assistance: Needs assistance  Ambulatory Assistance: Independent (rollator recently)    IADL History:       Pain:  Assessment: 0-10  Score: 5 - Moderate pain  Type: Acute pain, Surgical pain  Location: Hip  Interventions: Repositioned, Rest  Response to pain interventions:      OBJECTIVE  Vital Signs:  Vitals Session: During OT  BP: 96/56  BP Location: Right arm  BP Method: Automatic  Patient Position: Sitting  Vital Signs Comment: patient symptomatic. dizziness, room spinning sensation    Cognition:  Overall Cognitive Status: Within Functional Limits  Orientation Level: Oriented X4  Insight: Mild  Impulsive: Mildly             Current ADL function:   EATING:  Stand by     GROOMING: Minimal     BATHING: Maximal     UB DRESSING: Maximal     LB DRESSING: Total Don/doff L sock, Don/doff R sock   TOILETING: Total    ADL comments:       Activity Tolerance:  Endurance: Decreased tolerance for upright activites    Bed Mobility/Transfers:   Bed Mobility  Bed Mobility: Yes  Bed Mobility 1  Bed Mobility 1: Supine to sitting, Sitting to supine  Level of Assistance 1: Moderate assistance, +2  Transfers  Transfer:  (sit<>stand min A x2 FWW)    Ambulation/Gait Training:  Functional Mobility  Functional Mobility Performed:  (pt took 1-2 steps towards HOB with min A x2 FWW. max VC. too fatigued and dizzy to continue. returned to sitting then supine)    Sitting Balance:  Static Sitting Balance  Static Sitting-Level of Assistance: Close supervision  Dynamic Sitting Balance  Dynamic Sitting-Level of Assistance: Close supervision    Standing  Balance:  Static Standing Balance  Static Standing-Level of Assistance: Minimum assistance, Contact guard  Dynamic Standing Balance  Dynamic Standing-Level of Assistance: Minimum assistance, Moderate assistance    Vision: Vision - Basic Assessment  Current Vision: No visual deficits   and      Sensation:  Light Touch: No apparent deficits    Strength:  Strength Comments: BUE grossly 4-/5    Perception:  Inattention/Neglect: Appears intact    Coordination:  Movements are Fluid and Coordinated: Yes     Hand Function:  Hand Function  Gross Grasp: Functional    Extremities: RUE   RUE : Within Functional Limits and LUE   LUE: Within Functional Limits    Outcome Measures: [facility] Daily Activity   Putting on and taking off regular lower body clothing: Total  Bathing (including washing, rinsing, drying): A lot  Putting on and taking off regular upper body clothing: A lot  Toileting, which includes using toilet, bedpan or urinal: Total  Taking care of personal grooming such as brushing teeth: A little   Eating Meals: A little   Daily Activity - Total Score: 12    EDUCATION:     Education Documentation  ADL Training, taught by Tessa Fuentes OT at 3/26/2025  1:37 PM.  Learner: Patient  Readiness: Acceptance  Method: Explanation  Response: Needs Reinforcement    Education Comments  No comments found.        Goals:   Encounter Problems       Encounter Problems (Active)       ADLs       Patient with complete lower body dressing with modified independent level of assistance donning and doffing all LE clothes  with PRN adaptive equipment while supported sitting and standing (Progressing)       Start:  03/26/25    Expected End:  04/16/25               TRANSFERS       Patient will complete functional transfers with least restrictive device with modified independent level of assistance. (Progressing)       Start:  03/26/25    Expected End:  04/16/25

## 2025-03-26 NOTE — PROGRESS NOTES
"POST-OPERATIVE PROGRESS NOTE      ============================  IMPRESSION/PLAN:  ============================  75 y.o. female s/p left hip ORIF completed on 3/25/25     PLAN:  -Weightbearing: Weight bearing as tolerated  -DVT Prophylaxis: Aspirin 81 mg daily for 6 weeks postoperatively  -Continue PT/OT Eval  -Disposition: Home with home health  -Discharge Instructions in EMR  -Follow-up: In 2 weeks with Dr. العراقي for staple removal        Elsa Hinson seen and examined at bedside. Doing well, no issues overnight. Pain controlled with current treatment plan.     Review of Systems:   Constitutional: See HPI for pain assessment, No significant weight loss, recent trauma. Denies fevers/chills  Cardiovascular: No chest pain, shortness of breath  Respiratory: No difficulty breathing, cough  Gastrointestinal: No nausea, vomiting, diarrhea, constipation  Musculoskeletal: Noted in HPI, no arthralgias   Integumentary: No rashes, easy bruising, redness   Neurological: no numbness or tingling in extremities, no gait disturbances     Last Recorded Vitals  Blood pressure 107/60, pulse 85, temperature 35.9 °C (96.6 °F), temperature source Temporal, resp. rate 18, height 1.549 m (5' 1\"), weight (!) 39.5 kg (87 lb), SpO2 100%.      Patient Active Problem List   Diagnosis    Chronic obstructive pulmonary disease (Multi)    Essential (primary) hypertension    Anemia    Atherosclerotic heart disease of native coronary artery without angina pectoris    Hyperlipidemia    PAD (peripheral artery disease) (CMS-[facility])    Past myocardial infarction    Lung nodule    Displaced intertrochanteric fracture of left femur, initial encounter for closed fracture    Chronic combined systolic (congestive) and diastolic (congestive) heart failure       =================================  EXAM  =================================  Constitutional   General appearance: Alert and in no acute distress. Well developed, well nourished.    Eyes   External Eye, " Conjunctiva and lids: Normal external exam - extraocular movements intact (EOMI).   Ears, Nose, Mouth, and Throat   Hearing: Normal.   Neck   Neck: No neck mass was observed. Supple.   Pulmonary   Respiratory effort: No respiratory distress.   Cardiovascular   Examination of extremities: No peripheral edema.   Psychiatric   Judgment and insight: Intact.   Orientation to person, place, and time: Alert and oriented x 3.   Mood and affect: Normal.   Musculoskeletal:   Operative lower extremity in neutral alignment.  Hip dressing is clean, dry, intact.  Passive range of motion with mild discomfort, appropriate for postoperative timeframe.  Dermis is intact.  Neurovascular status is intact.  Dorsalis pedis pulses 2+, capillary refill <2 seconds.  Minimal swelling, no signs of compartment syndrome.  Negative Homans.        Pricilla George PA-C

## 2025-03-26 NOTE — CARE PLAN
The patient's goals for the shift include get some rest    The clinical goals for the shift include remain free from falls and to remain comfortable      Problem: Fall/Injury  Goal: Not fall by end of shift  Outcome: Progressing  Goal: Be free from injury by end of the shift  Outcome: Progressing  Goal: Verbalize understanding of personal risk factors for fall in the hospital  Outcome: Progressing  Goal: Verbalize understanding of risk factor reduction measures to prevent injury from fall in the home  Outcome: Progressing  Goal: Use assistive devices by end of the shift  Outcome: Progressing  Goal: Pace activities to prevent fatigue by end of the shift  Outcome: Progressing     Problem: Pain  Goal: Takes deep breaths with improved pain control throughout the shift  Outcome: Progressing  Goal: Turns in bed with improved pain control throughout the shift  Outcome: Progressing  Goal: Walks with improved pain control throughout the shift  Outcome: Progressing  Goal: Performs ADL's with improved pain control throughout shift  Outcome: Progressing  Goal: Participates in PT with improved pain control throughout the shift  Outcome: Progressing  Goal: Free from opioid side effects throughout the shift  Outcome: Progressing  Goal: Free from acute confusion related to pain meds throughout the shift  Outcome: Progressing     Problem: Pain - Adult  Goal: Verbalizes/displays adequate comfort level or baseline comfort level  Outcome: Progressing     Problem: Safety - Adult  Goal: Free from fall injury  Outcome: Progressing     Problem: Discharge Planning  Goal: Discharge to home or other facility with appropriate resources  Outcome: Progressing     Problem: Chronic Conditions and Co-morbidities  Goal: Patient's chronic conditions and co-morbidity symptoms are monitored and maintained or improved  Outcome: Progressing     Problem: Nutrition  Goal: Nutrient intake appropriate for maintaining nutritional needs  Outcome: Progressing      Problem: Skin  Goal: Decreased wound size/increased tissue granulation at next dressing change  Outcome: Progressing  Goal: Participates in plan/prevention/treatment measures  Outcome: Progressing  Goal: Prevent/manage excess moisture  Outcome: Progressing  Goal: Prevent/minimize sheer/friction injuries  Outcome: Progressing  Flowsheets (Taken 3/25/2025 2120)  Prevent/minimize sheer/friction injuries:   HOB 30 degrees or less   Turn/reposition every 2 hours/use positioning/transfer devices  Goal: Promote/optimize nutrition  Outcome: Progressing  Goal: Promote skin healing  Outcome: Progressing     Problem: Respiratory  Goal: Clear secretions with interventions this shift  Outcome: Progressing  Goal: Minimize anxiety/maximize coping throughout shift  Outcome: Progressing  Goal: Minimal/no exertional discomfort or dyspnea this shift  Outcome: Progressing  Goal: No signs of respiratory distress (eg. Use of accessory muscles. Peds grunting)  Outcome: Progressing  Goal: Patent airway maintained this shift  Outcome: Progressing

## 2025-03-26 NOTE — PROGRESS NOTES
Physical Therapy    Physical Therapy Evaluation    Patient Name: Elsa Hinson  MRN: [id number]  Today's Date: 3/26/2025   Time Calculation  Start Time: 1011  Stop Time: 1037  Time Calculation (min): 26 min  3324/3324-A    Assessment/Plan   PT Assessment  PT Assessment Results: Decreased strength, Decreased endurance, Impaired balance, Decreased mobility, Pain, Orthopedic restrictions  Rehab Prognosis: Good  Barriers to Discharge Home: Caregiver assistance, Physical needs  Caregiver Assistance: Patient lives alone and/or does not have reliable caregiver assistance  Evaluation/Treatment Tolerance: Patient limited by pain, Patient limited by fatigue (dizziness)  Medical Staff Made Aware: Yes  End of Session Communication: Bedside nurse, Care Coordinator  Assessment Comment: Patient requires 2 assist for minimal activity, limited some by dizziness. Would benefit from continued therapy at discharge to improve functional independence.  End of Session Patient Position: Bed, 3 rail up, Alarm on  IP OR SWING BED PT PLAN  Inpatient or Swing Bed: Inpatient  PT Plan  Treatment/Interventions: Bed mobility, Transfer training, Gait training, Stair training, Balance training, Strengthening, Endurance training, Therapeutic exercise, Therapeutic activity, Home exercise program  PT Plan: Ongoing PT  PT Frequency: 6 times per week  PT Discharge Recommendations: Moderate intensity level of continued care  PT - OK to Discharge: Yes (when medically cleared)      General Visit Information:  General  Reason for Referral: Dx: L hip fx due to fall (while recently at NH), s/p nail repair  Referred By: Damian MCCARTNEY  Past Medical History Relevant to Rehab: COPD/on O2, CAD, MI, recent SNF stay due to respiratory failure  Co-Treatment:  (with OT for safety and mobility)  Prior to Session Communication: Bedside nurse, Physician  Patient Position Received: Bed, 3 rail up, Alarm on  General Comment: Patient seen in room 3324, family present, pain meds  given prior to eval. Patient with dressig on L foot (? skin tear), O2 on 3-4L/min    Home Living:  Home Living  Type of Home:  (Lives alone in 1 level home, 1 step to enter. Patient normally fairly indepedent, family recently obtained rollator for patient to use. Patient was fairly immobile after DC from SNF/recent fall.)    Prior Level of Function:       Precautions:  Precautions  Precautions Comment: falls, O2    Vital Signs:     Objective     Pain:  Pain Assessment  0-10 (Numeric) Pain Score: 5 - Moderate pain  Pain Type:  (L hip)    Cognition:  Cognition  Overall Cognitive Status: Within Functional Limits (needs occasional refocusing to task)    General Assessments:  General Observation  General Observation: Completed few reps of ankle pumps/long arc quads sitting EOB. Patient with significant dizziness in sitting/standing limiting activity today.   Activity Tolerance  Endurance: Decreased tolerance for upright activites     Strength  Strength Comments: R LE quads grossly 4+/5, L quads decreased due to pain/surgery        Postural Control  Postural Control:  (Sitting balance fair, uses UE's for added support)          Functional Assessments:     Bed Mobility  Bed Mobility:  (Supine to/from sit with Mod assist x 2; cues for sequencing, safety, balance, posture, wt shifting)  Transfers  Transfer:  (Sit to stand with Min/Mod to Min assistx 2 and FWW; cues for sequencing, safety, balance, posture, wt shifting, AD use, WBAT)  Ambulation/Gait Training  Ambulation/Gait Training Performed:  (Amb 1-2 side steps with FWW and Min assist x 2; cues for sequencing, safety, balance, posture, wt shifting, AD use, WBAT)          Extremity/Trunk Assessments:                Outcome Measures:     [facility] Basic Mobility  Turning from your back to your side while in a flat bed without using bedrails: A lot  Moving from lying on your back to sitting on the side of a flat bed without using bedrails: A lot  Moving to and from bed to chair  (including a wheelchair): Total  Standing up from a chair using your arms (e.g. wheelchair or bedside chair): A lot  To walk in hospital room: A lot  Climbing 3-5 steps with railing: Total  Basic Mobility - Total Score: 10                                                             Goals:  Encounter Problems       Encounter Problems (Active)       PT Problem       transfers       Start:  03/26/25    Expected End:  04/09/25       Patient will perform all transfers with CGA x1          gait       Start:  03/26/25    Expected End:  04/09/25       Patient will amb 50+ feet with Min assist x 1          strengthening        Start:  03/26/25    Expected End:  04/09/25       Patient will perform 20+ reps of AROM/RROM for MINI LE's to improve safety and functional independence              Pain - Adult            Education Documentation  Mobility Training, taught by Heather North, PT at 3/26/2025 11:49 AM.  Learner: Patient  Readiness: Acceptance  Method: Explanation  Response: Needs Reinforcement    Precautions, taught by Heather North, PT at 3/26/2025 11:49 AM.  Learner: Patient  Readiness: Acceptance  Method: Explanation  Response: Needs Reinforcement    Education Comments  No comments found.

## 2025-03-26 NOTE — CARE PLAN
The patient's goals for the shift include get some rest    The clinical goals for the shift include remain free from falls and to remain comfortable    Over the shift, the patient did not make progress toward the following goals. Barriers to progression include n/a. Recommendations to address these barriers include n/a.

## 2025-03-27 LAB
ERYTHROCYTE [DISTWIDTH] IN BLOOD BY AUTOMATED COUNT: 16.5 % (ref 11.5–14.5)
HCT VFR BLD AUTO: 25 % (ref 36–46)
HGB BLD-MCNC: 7.7 G/DL (ref 12–16)
MCH RBC QN AUTO: 28.7 PG (ref 26–34)
MCHC RBC AUTO-ENTMCNC: 30.8 G/DL (ref 32–36)
MCV RBC AUTO: 93 FL (ref 80–100)
NRBC BLD-RTO: 0 /100 WBCS (ref 0–0)
PLATELET # BLD AUTO: 204 X10*3/UL (ref 150–450)
RBC # BLD AUTO: 2.68 X10*6/UL (ref 4–5.2)
WBC # BLD AUTO: 11.3 X10*3/UL (ref 4.4–11.3)

## 2025-03-27 PROCEDURE — 36415 COLL VENOUS BLD VENIPUNCTURE: CPT | Performed by: FAMILY MEDICINE

## 2025-03-27 PROCEDURE — 1100000001 HC PRIVATE ROOM DAILY

## 2025-03-27 PROCEDURE — 97530 THERAPEUTIC ACTIVITIES: CPT | Mod: GP,CQ

## 2025-03-27 PROCEDURE — 97530 THERAPEUTIC ACTIVITIES: CPT | Mod: GO,CO

## 2025-03-27 PROCEDURE — 2500000001 HC RX 250 WO HCPCS SELF ADMINISTERED DRUGS (ALT 637 FOR MEDICARE OP): Performed by: ORTHOPAEDIC SURGERY

## 2025-03-27 PROCEDURE — 2500000004 HC RX 250 GENERAL PHARMACY W/ HCPCS (ALT 636 FOR OP/ED): Performed by: ORTHOPAEDIC SURGERY

## 2025-03-27 PROCEDURE — 85027 COMPLETE CBC AUTOMATED: CPT | Performed by: FAMILY MEDICINE

## 2025-03-27 PROCEDURE — 97116 GAIT TRAINING THERAPY: CPT | Mod: GP,CQ

## 2025-03-27 PROCEDURE — 99233 SBSQ HOSP IP/OBS HIGH 50: CPT | Performed by: FAMILY MEDICINE

## 2025-03-27 RX ADMIN — HEPARIN SODIUM 5000 UNITS: 5000 INJECTION, SOLUTION INTRAVENOUS; SUBCUTANEOUS at 14:31

## 2025-03-27 RX ADMIN — OXYCODONE HYDROCHLORIDE AND ACETAMINOPHEN 2 TABLET: 5; 325 TABLET ORAL at 09:37

## 2025-03-27 RX ADMIN — FLUTICASONE FUROATE AND VILANTEROL TRIFENATATE 1 PUFF: 100; 25 POWDER RESPIRATORY (INHALATION) at 06:36

## 2025-03-27 RX ADMIN — EMPAGLIFLOZIN 10 MG: 10 TABLET, FILM COATED ORAL at 09:37

## 2025-03-27 RX ADMIN — OXYCODONE HYDROCHLORIDE AND ACETAMINOPHEN 2 TABLET: 5; 325 TABLET ORAL at 22:19

## 2025-03-27 RX ADMIN — OXYCODONE HYDROCHLORIDE AND ACETAMINOPHEN 2 TABLET: 5; 325 TABLET ORAL at 01:29

## 2025-03-27 RX ADMIN — HEPARIN SODIUM 5000 UNITS: 5000 INJECTION, SOLUTION INTRAVENOUS; SUBCUTANEOUS at 06:36

## 2025-03-27 RX ADMIN — ASPIRIN 81 MG: 81 TABLET, COATED ORAL at 09:37

## 2025-03-27 RX ADMIN — METOPROLOL TARTRATE 12.5 MG: 25 TABLET, FILM COATED ORAL at 09:37

## 2025-03-27 RX ADMIN — HEPARIN SODIUM 5000 UNITS: 5000 INJECTION, SOLUTION INTRAVENOUS; SUBCUTANEOUS at 21:39

## 2025-03-27 RX ADMIN — TIOTROPIUM BROMIDE INHALATION SPRAY 2 PUFF: 3.12 SPRAY, METERED RESPIRATORY (INHALATION) at 06:36

## 2025-03-27 RX ADMIN — METOPROLOL TARTRATE 12.5 MG: 25 TABLET, FILM COATED ORAL at 21:39

## 2025-03-27 ASSESSMENT — COGNITIVE AND FUNCTIONAL STATUS - GENERAL
CLIMB 3 TO 5 STEPS WITH RAILING: TOTAL
DRESSING REGULAR UPPER BODY CLOTHING: A LOT
STANDING UP FROM CHAIR USING ARMS: A LOT
PERSONAL GROOMING: A LITTLE
WALKING IN HOSPITAL ROOM: A LOT
DAILY ACTIVITIY SCORE: 17
TOILETING: A LOT
DRESSING REGULAR UPPER BODY CLOTHING: A LITTLE
TURNING FROM BACK TO SIDE WHILE IN FLAT BAD: A LOT
PERSONAL GROOMING: A LITTLE
DRESSING REGULAR LOWER BODY CLOTHING: A LOT
MOVING TO AND FROM BED TO CHAIR: A LOT
MOVING TO AND FROM BED TO CHAIR: A LOT
DAILY ACTIVITIY SCORE: 14
PERSONAL GROOMING: A LITTLE
TOILETING: A LOT
DRESSING REGULAR LOWER BODY CLOTHING: A LOT
EATING MEALS: A LITTLE
MOBILITY SCORE: 11
MOVING FROM LYING ON BACK TO SITTING ON SIDE OF FLAT BED WITH BEDRAILS: A LOT
MOVING FROM LYING ON BACK TO SITTING ON SIDE OF FLAT BED WITH BEDRAILS: A LOT
MOBILITY SCORE: 11
WALKING IN HOSPITAL ROOM: A LOT
MOBILITY SCORE: 11
CLIMB 3 TO 5 STEPS WITH RAILING: TOTAL
HELP NEEDED FOR BATHING: A LITTLE
TURNING FROM BACK TO SIDE WHILE IN FLAT BAD: A LOT
STANDING UP FROM CHAIR USING ARMS: A LOT
MOVING FROM LYING ON BACK TO SITTING ON SIDE OF FLAT BED WITH BEDRAILS: A LOT
DRESSING REGULAR LOWER BODY CLOTHING: A LOT
STANDING UP FROM CHAIR USING ARMS: A LOT
TURNING FROM BACK TO SIDE WHILE IN FLAT BAD: A LOT
TOILETING: A LOT
WALKING IN HOSPITAL ROOM: A LOT
DRESSING REGULAR UPPER BODY CLOTHING: A LITTLE
MOVING TO AND FROM BED TO CHAIR: A LOT
HELP NEEDED FOR BATHING: A LITTLE
HELP NEEDED FOR BATHING: A LOT
DAILY ACTIVITIY SCORE: 17
CLIMB 3 TO 5 STEPS WITH RAILING: TOTAL

## 2025-03-27 ASSESSMENT — PAIN DESCRIPTION - LOCATION
LOCATION: HIP

## 2025-03-27 ASSESSMENT — PAIN DESCRIPTION - ORIENTATION
ORIENTATION: LEFT

## 2025-03-27 ASSESSMENT — PAIN SCALES - GENERAL
PAINLEVEL_OUTOF10: 2
PAINLEVEL_OUTOF10: 7
PAINLEVEL_OUTOF10: 5 - MODERATE PAIN
PAINLEVEL_OUTOF10: 6
PAINLEVEL_OUTOF10: 7
PAINLEVEL_OUTOF10: 6

## 2025-03-27 ASSESSMENT — PAIN - FUNCTIONAL ASSESSMENT
PAIN_FUNCTIONAL_ASSESSMENT: 0-10

## 2025-03-27 ASSESSMENT — PAIN DESCRIPTION - DESCRIPTORS: DESCRIPTORS: ACHING;SHOOTING

## 2025-03-27 NOTE — PROGRESS NOTES
"POST-OPERATIVE PROGRESS NOTE      ============================  IMPRESSION/PLAN:  ============================  75 y.o. female s/p left hip ORIF completed on 3/25/2025    PLAN:  -Weightbearing: Weight bearing as tolerated  -DVT Prophylaxis: Spine 81 mg daily for 6 weeks postoperatively  -Continue PT/OT Eval  -Disposition: Likely to SNF  -Discharge Instructions in EMR  -Follow-up: In 2 weeks with Dr. العراقي for staple removal and wound check        Elsa Lynco seen and examined at bedside. Doing well, no issues overnight. Pain controlled with current treatment plan.     Review of Systems:   Constitutional: See HPI for pain assessment, No significant weight loss, recent trauma. Denies fevers/chills  Cardiovascular: No chest pain, shortness of breath  Respiratory: No difficulty breathing, cough  Gastrointestinal: No nausea, vomiting, diarrhea, constipation  Musculoskeletal: Noted in HPI, no arthralgias   Integumentary: No rashes, easy bruising, redness   Neurological: no numbness or tingling in extremities, no gait disturbances     Last Recorded Vitals  Blood pressure 100/53, pulse 99, temperature 36.9 °C (98.5 °F), resp. rate 17, height 1.549 m (5' 1\"), weight (!) 39.5 kg (87 lb), SpO2 99%.      Patient Active Problem List   Diagnosis    Chronic obstructive pulmonary disease (Multi)    Essential (primary) hypertension    Anemia    Atherosclerotic heart disease of native coronary artery without angina pectoris    Hyperlipidemia    PAD (peripheral artery disease) (CMS-[facility])    Past myocardial infarction    Lung nodule    Displaced intertrochanteric fracture of left femur, initial encounter for closed fracture    Chronic combined systolic (congestive) and diastolic (congestive) heart failure       =================================  EXAM  =================================  Constitutional   General appearance: Alert and in no acute distress. Well developed, well nourished.    Eyes   External Eye, Conjunctiva and lids: " Normal external exam - extraocular movements intact (EOMI).   Ears, Nose, Mouth, and Throat   Hearing: Normal.   Neck   Neck: No neck mass was observed. Supple.   Pulmonary   Respiratory effort: No respiratory distress.   Cardiovascular   Examination of extremities: No peripheral edema.   Psychiatric   Judgment and insight: Intact.   Orientation to person, place, and time: Alert and oriented x 3.   Mood and affect: Normal.   Musculoskeletal:   Operative lower extremity in neutral alignment.  Hip dressing is clean, dry, intact.  Passive range of motion with mild discomfort, appropriate for postoperative timeframe.  Dermis is intact.  Neurovascular status is intact.  Dorsalis pedis pulses 2+, capillary refill <2 seconds.  Minimal swelling, no signs of compartment syndrome.  Negative Homans.         Pricilla George PA-C

## 2025-03-27 NOTE — CARE PLAN
Problem: Fall/Injury  Goal: Not fall by end of shift  Outcome: Progressing  Goal: Be free from injury by end of the shift  Outcome: Progressing  Goal: Verbalize understanding of personal risk factors for fall in the hospital  Outcome: Progressing  Goal: Verbalize understanding of risk factor reduction measures to prevent injury from fall in the home  Outcome: Progressing  Goal: Use assistive devices by end of the shift  Outcome: Progressing  Goal: Pace activities to prevent fatigue by end of the shift  Outcome: Progressing     Problem: Pain  Goal: Takes deep breaths with improved pain control throughout the shift  Outcome: Progressing  Goal: Turns in bed with improved pain control throughout the shift  Outcome: Progressing  Goal: Walks with improved pain control throughout the shift  Outcome: Progressing  Goal: Performs ADL's with improved pain control throughout shift  Outcome: Progressing  Goal: Participates in PT with improved pain control throughout the shift  Outcome: Progressing  Goal: Free from opioid side effects throughout the shift  Outcome: Progressing  Goal: Free from acute confusion related to pain meds throughout the shift  Outcome: Progressing     Problem: Pain - Adult  Goal: Verbalizes/displays adequate comfort level or baseline comfort level  Outcome: Progressing     Problem: Safety - Adult  Goal: Free from fall injury  Outcome: Progressing     Problem: Discharge Planning  Goal: Discharge to home or other facility with appropriate resources  Outcome: Progressing     Problem: Chronic Conditions and Co-morbidities  Goal: Patient's chronic conditions and co-morbidity symptoms are monitored and maintained or improved  Outcome: Progressing     Problem: Nutrition  Goal: Nutrient intake appropriate for maintaining nutritional needs  Outcome: Progressing     Problem: Skin  Goal: Decreased wound size/increased tissue granulation at next dressing change  Outcome: Progressing  Flowsheets (Taken 3/27/2025  0030)  Decreased wound size/increased tissue granulation at next dressing change:   Promote sleep for wound healing   Protective dressings over bony prominences  Goal: Participates in plan/prevention/treatment measures  Outcome: Progressing  Flowsheets (Taken 3/27/2025 0030)  Participates in plan/prevention/treatment measures:   Elevate heels   Discuss with provider PT/OT consult   Increase activity/out of bed for meals  Goal: Prevent/manage excess moisture  Outcome: Progressing  Flowsheets (Taken 3/27/2025 0030)  Prevent/manage excess moisture:   Monitor for/manage infection if present   Moisturize dry skin   Cleanse incontinence/protect with barrier cream  Goal: Prevent/minimize sheer/friction injuries  Outcome: Progressing  Flowsheets (Taken 3/27/2025 0030)  Prevent/minimize sheer/friction injuries:   Increase activity/out of bed for meals   Turn/reposition every 2 hours/use positioning/transfer devices  Goal: Promote/optimize nutrition  Outcome: Progressing  Flowsheets (Taken 3/27/2025 0030)  Promote/optimize nutrition:   Monitor/record intake including meals   Consume > 50% meals/supplements   Assist with feeding  Goal: Promote skin healing  Outcome: Progressing  Flowsheets (Taken 3/27/2025 0030)  Promote skin healing:   Protective dressings over bony prominences   Assess skin/pad under line(s)/device(s)   Turn/reposition every 2 hours/use positioning/transfer devices     Problem: Respiratory  Goal: Clear secretions with interventions this shift  Outcome: Progressing  Goal: Minimize anxiety/maximize coping throughout shift  Outcome: Progressing  Goal: Minimal/no exertional discomfort or dyspnea this shift  Outcome: Progressing  Goal: No signs of respiratory distress (eg. Use of accessory muscles. Peds grunting)  Outcome: Progressing  Goal: Patent airway maintained this shift  Outcome: Progressing

## 2025-03-27 NOTE — PROGRESS NOTES
TCC requested at bedside per RN. Pt now wanting SNF choices for possible placement. Patient was provided with a [employer] list of skilled SNF agencies that are in network with patient's insurance payor, service patient's preferred geographic region, and that displays CMS star ratings. Pt to review list with daughter. TCC following for choices.     1245: Pt and pt's daughter provided [place] for SNF choice. Referral sent. TCC following.

## 2025-03-27 NOTE — CARE PLAN
The patient's goals for the shift include get some rest    The clinical goals for the shift include [place] safety will be maintained throughout this shift      Problem: Fall/Injury  Goal: Not fall by end of shift  Outcome: Progressing  Goal: Be free from injury by end of the shift  Outcome: Progressing  Goal: Verbalize understanding of personal risk factors for fall in the hospital  Outcome: Progressing  Goal: Verbalize understanding of risk factor reduction measures to prevent injury from fall in the home  Outcome: Progressing  Goal: Use assistive devices by end of the shift  Outcome: Progressing  Goal: Pace activities to prevent fatigue by end of the shift  Outcome: Progressing     Problem: Pain  Goal: Takes deep breaths with improved pain control throughout the shift  Outcome: Progressing  Goal: Turns in bed with improved pain control throughout the shift  Outcome: Progressing  Goal: Walks with improved pain control throughout the shift  Outcome: Progressing  Goal: Performs ADL's with improved pain control throughout shift  Outcome: Progressing  Goal: Participates in PT with improved pain control throughout the shift  Outcome: Progressing  Goal: Free from opioid side effects throughout the shift  Outcome: Progressing  Goal: Free from acute confusion related to pain meds throughout the shift  Outcome: Progressing     Problem: Pain - Adult  Goal: Verbalizes/displays adequate comfort level or baseline comfort level  Outcome: Progressing     Problem: Safety - Adult  Goal: Free from fall injury  Outcome: Progressing     Problem: Discharge Planning  Goal: Discharge to home or other facility with appropriate resources  Outcome: Progressing     Problem: Chronic Conditions and Co-morbidities  Goal: Patient's chronic conditions and co-morbidity symptoms are monitored and maintained or improved  Outcome: Progressing     Problem: Nutrition  Goal: Nutrient intake appropriate for maintaining nutritional needs  Outcome:  Progressing     Problem: Skin  Goal: Decreased wound size/increased tissue granulation at next dressing change  Outcome: Progressing  Flowsheets (Taken 3/27/2025 1800)  Decreased wound size/increased tissue granulation at next dressing change:   Promote sleep for wound healing   Protective dressings over bony prominences  Goal: Participates in plan/prevention/treatment measures  Outcome: Progressing  Flowsheets (Taken 3/27/2025 1800)  Participates in plan/prevention/treatment measures:   Elevate heels   Increase activity/out of bed for meals  Goal: Prevent/manage excess moisture  Outcome: Progressing  Flowsheets (Taken 3/27/2025 1800)  Prevent/manage excess moisture:   Cleanse incontinence/protect with barrier cream   Monitor for/manage infection if present   Follow provider orders for dressing changes   Moisturize dry skin  Goal: Prevent/minimize sheer/friction injuries  Outcome: Progressing  Flowsheets (Taken 3/27/2025 1800)  Prevent/minimize sheer/friction injuries:   Complete micro-shifts as needed if patient unable. Adjust patient position to relieve pressure points, not a full turn   Increase activity/out of bed for meals   Use pull sheet   Utilize specialty bed per algorithm   Turn/reposition every 2 hours/use positioning/transfer devices  Goal: Promote/optimize nutrition  Outcome: Progressing  Flowsheets (Taken 3/27/2025 1800)  Promote/optimize nutrition:   Assist with feeding   Monitor/record intake including meals   Offer water/supplements/favorite foods   Consume > 50% meals/supplements  Goal: Promote skin healing  Outcome: Progressing  Flowsheets (Taken 3/27/2025 1800)  Promote skin healing:   Assess skin/pad under line(s)/device(s)   Protective dressings over bony prominences   Turn/reposition every 2 hours/use positioning/transfer devices   Rotate device position/do not position patient on device     Problem: Respiratory  Goal: Clear secretions with interventions this shift  Outcome: Progressing  Goal:  Minimize anxiety/maximize coping throughout shift  Outcome: Progressing  Goal: Minimal/no exertional discomfort or dyspnea this shift  Outcome: Progressing  Goal: No signs of respiratory distress (eg. Use of accessory muscles. Peds grunting)  Outcome: Progressing  Goal: Patent airway maintained this shift  Outcome: Progressing

## 2025-03-27 NOTE — PROGRESS NOTES
Elsa Hinson is a 75 y.o. female on day 3 of admission presenting with Displaced intertrochanteric fracture of left femur, initial encounter for closed fracture.      Subjective   75 y.o. female with PMHx of COPD on 4L home O2 24/7 and CAD s/p PCI of mid LCx who presented with left hip pain, which she has had since leaving her rehab facility close to a month ago. She fell just prior to leaving; no x-ray was done. She has had significant pain with ambulation since then. Today she went to see her PCP who ordered x-rays, which showed a left-sided hip fracture. Ortho surgery recommended surgery tomorrow.   ED labwork was benign. CXR was negative. VSS      3/25:                Today the patient is seen operative day postoperatively from her left hip fracture repair.  Seen in the presence of multiple family members.  She is awake alert and interactive appropriately complaining of only discomfort really in the sacral pressure injury area.  Orthopedic surgery describes WBAT and follow-up in 2 weeks.  At this point the patient appears to be refusing to consider SNF due to her recent experience where she fell before she left the SNF and was not evaluated.  It appears her daughters feel she likely would benefit from SNF however.  Otherwise denies interval new symptoms or complaints including chest pain palpitations pleuritic type pain unusual shortness of breath cough sputum nausea abdominal pain flank pain headache visual disturbances fever or chills.    3/26:                  Today the patient is POD #1 and seen once again in the presence of family members.  She appears much more awake alert and interactive today.  Voices no specific complaints and no acute events overnight.  However she continues to states she does not want to go to SNF.  AM-PAC 10.  Once again it appears patient's daughters feel she should go to SNF.  Awaiting transitional care coordinator interaction.  Orthopedic surgery describes WBAT, ASA 81 mg daily  for 6 weeks and follow-up with Dr. العراقي in 2 weeks. Otherwise denies interval new symptoms or complaints including chest pain palpitations pleuritic type pain unusual shortness of breath cough sputum nausea abdominal pain flank pain headache visual disturbances fever or chills.    3/27:                   Today patient is POD #2.  Seen in the presence of her daughter.  Continues awake alert and interactive appropriately.  She is experiencing more significant postoperative pain especially with PT.  Hemoglobin did trend down significantly with no evidence of active bleeding.  WBC trended down which was likely reactive.  After long discussion today with patient and daughter it appears the patient is now agreeable to SNF.  Transitional care working towards arrangements for this. Otherwise denies interval new symptoms or complaints including chest pain palpitations pleuritic type pain unusual shortness of breath cough sputum nausea abdominal pain flank pain headache visual disturbances fever or chills.         Objective     Last Recorded Vitals  BP 95/58 (BP Location: Right arm, Patient Position: Lying)   Pulse 78   Temp 35.9 °C (96.6 °F) (Temporal)   Resp 16   Wt (!) 39.5 kg (87 lb)   SpO2 100%   Intake/Output last 3 Shifts:    Intake/Output Summary (Last 24 hours) at 3/27/2025 1536  Last data filed at 3/27/2025 0552  Gross per 24 hour   Intake 318 ml   Output 650 ml   Net -332 ml       Admission Weight  Weight: (!) 39.5 kg (87 lb) (03/24/25 1637)    Daily Weight  03/24/25 : (!) 39.5 kg (87 lb)    Image Results  XR hip left with pelvis when performed 2 or 3 views  Narrative: Interpreted By:  Evaristo Byrnes,   STUDY:  XR HIP LEFT WITH PELVIS WHEN PERFORMED 2 OR 3 VIEWS; ;  3/24/2025  3:56 pm      INDICATION:  Signs/Symptoms:left hip pain s/p fall.      ,M25.552 Pain in left hip      COMPARISON:  None.      ACCESSION NUMBER(S):  [id number]      ORDERING CLINICIAN:  KEENAN ORDAZ      FINDINGS:  Left hip, three  views      There is a comminuted markedly angulated and moderately displaced the  left femoral intertrochanteric fracture. Subsequent studies  demonstrate fixation of these. Mild degenerative changes in the hips  bilaterally. There is no dislocation      Impression: Markedly angulated and moderately displaced left femoral  intertrochanteric fracture          MACRO:  None      Signed by: Evaristo Byrnes 3/25/2025 6:08 PM  Dictation workstation:   [id number]  FL less than 1 hour  These images are not reportable by radiology and will not be interpreted   by  Radiologists.  ECG 12 lead  Sinus rhythm  Borderline short AL interval  Borderline right axis deviation  ST elevation, consider inferior injury    See ED provider note for full interpretation and clinical correlation  Confirmed by Shey Osorio ([id number]) on 3/25/2025 10:49:51 AM      Physical Exam  Constitutional: Slender elderly  female, awake, alert, calm, oriented x4, no acute distress, cooperative   Eyes: EOMI, clear sclerae   ENMT: mucous membranes moist, no lesions seen   Head/Neck: Neck supple, no apparent injury, head atraumatic   Respiratory/Thorax: CTAB, good chest expansion, respirations even and unlabored, pt on 4L O2   Cardiovascular: Regular rate and rhythm, no murmurs/rubs/gallops, normal S1 and S 2   Gastrointestinal: Abdomen nondistended, soft, nontender, +BS, no bruits   Musculoskeletal: ROM intact except left lower extremity/hip, no joint swelling, normal  strength   Extremities: no cyanosis, contusions or clubbing, or edema   Neurological: no focal deficit, pt alert and oriented x4   Psychological: Appropriate affect and behavior, pleasant   Skin: Warm and dry, pressure injury sacrum, no rashes     Relevant Results               Assessment/Plan          This patient has a urinary catheter   Reason for the urinary catheter remaining today? perioperative use for selected surgical procedures          Assessment & Plan  Displaced  intertrochanteric fracture of left femur, initial encounter for closed fracture    Acute left-sided hip fracture 2/2 fall about a month PTA  Consult ortho surgery for planned intervention tomorrow   Continue pain and nausea regimen  Pt denies recent cardiac and respiratory symptoms, COPD is stable, EKG shows no evidence of myocardial ischemia (limited read 2/2 artifact), echo 2/14/25 revealed LVEF 60-65%, mild RVSP and no valvulopathy  Pt has 1 risk factor for this procedure and 0.9% risk of a major cardiac event per the Revised Cardiac Risk Index  3/25: Operative day for left intertrochanteric hip fracture repair.  No apparent perioperative complications and doing well at this point.  However patient presently is fairly adamant about refusing SNF.  3/26: POD #1.  Hemoglobin trended down slightly as expected.  WBC lovely felt likely reactive in nature.  Continue to monitor.  Awaiting patient and family decision for discharge disposition.  3/27: POD #2.  Hemoglobin continued to trend down with no evidence of active bleeding.  WBC improved likely reactive.  Today patient is agreeable to SNF.     COPD on 4L home O2 24/7  Continue home inhalers, pt is not hypoxic below baseline  CXR reveals no acute changes  3/27: Continues on home O2 level doing well.     Hx CAD s/p PCI of mid LCx   Continue Lipitor and metoprolol, will hold ASA preoperatively     DVT ppx: SubQ heparin              Olaf Noramn MD

## 2025-03-27 NOTE — PROGRESS NOTES
Physical Therapy    Physical Therapy Treatment    Patient Name: Elsa Hinson  MRN: [id number]  Department: [address]  Room: [facility]3324-A  Today's Date: 3/27/2025  Time Calculation  Start Time: 0835  Stop Time: 0915  Time Calculation (min): 40 min         Assessment/Plan   PT Assessment  PT Assessment Results: Decreased strength, Decreased endurance, Impaired balance  Rehab Prognosis: Good  Barriers to Discharge Home: Caregiver assistance, Physical needs  Evaluation/Treatment Tolerance: Patient limited by pain, Patient limited by fatigue  End of Session Communication: Bedside nurse  Assessment Comment: pt on 4L 02. pt's daughter present. cues on hand placement with transfers. pt stood from EOB and reported feeling dizzy and sat back down to subside. pt sat 10 minutes EOB. pt then stood and amb to chair with cues on proper foot sequencing. pt with NBOS stepping on the other foot when advancing. cued pt to correct. after a seated rest break in chair pt then amb forwards and stopped due to pain in hip. pt amb backwards to sit back down. notifed RN of pain. edu on SNF and safety, edu to pt and daughter on the amount of assist that is curently needed.  End of Session Patient Position: Up in chair, Alarm on     PT Plan  Treatment/Interventions: Bed mobility, Transfer training, Gait training, Stair training, Balance training, Strengthening, Endurance training, Therapeutic exercise, Therapeutic activity, Home exercise program  PT Plan: Ongoing PT  PT Frequency: 6 times per week  PT Discharge Recommendations: Moderate intensity level of continued care  PT - OK to Discharge: Yes (when medically cleared)      General Visit Information:   PT  Visit  PT Received On: 03/27/25       Subjective   Precautions:        Date/Time Vitals Session Patient Position Pulse Resp SpO2 BP MAP (mmHg)    03/27/25 0937 --  --  99  17  99 %  100/53  68                 Objective   Pain:  Pain Assessment  0-10 (Numeric) Pain Score: 7  Pain Type: Acute  pain, Surgical pain  Pain Location: Hip  Cognition:  Cognition  Overall Cognitive Status: Within Functional Limits  Orientation Level: Oriented X4  Coordination:          Treatments:       Bed Mobility 1  Bed Mobility 1: Supine to sitting  Level of Assistance 1: Minimum assistance (x2)    Ambulation/Gait Training  Ambulation/Gait Training Performed: Yes  Ambulation/Gait Training 1  Surface 1: Level tile  Device 1: Rolling walker  Assistance 1: Minimum assistance (x2)  Quality of Gait 1: Diminished heel strike, Narrow base of support, Decreased step length, Inconsistent stride length  Comments/Distance (ft) 1: 3, 3ft forwards and backwards  Transfers  Transfer: Yes  Transfer 1  Technique 1: Sit to stand, Stand to sit  Transfer Device 1: Walker  Transfer Level of Assistance 1: Minimum assistance (x1)  Trials/Comments 1: x2 EOB, 1x chair    Outcome Measures:  [facility] Basic Mobility  Turning from your back to your side while in a flat bed without using bedrails: A lot  Moving from lying on your back to sitting on the side of a flat bed without using bedrails: A lot  Moving to and from bed to chair (including a wheelchair): A lot  Standing up from a chair using your arms (e.g. wheelchair or bedside chair): A lot  To walk in hospital room: A lot  Climbing 3-5 steps with railing: Total  Basic Mobility - Total Score: 11    Education Documentation  Mobility Training, taught by Chantale Guzman PTA at 3/27/2025 10:50 AM.  Learner: Patient  Readiness: Acceptance  Method: Explanation  Response: Verbalizes Understanding    Education Comments  No comments found.        OP EDUCATION:       Encounter Problems       Encounter Problems (Active)       PT Problem       transfers (Progressing)       Start:  03/26/25    Expected End:  04/09/25       Patient will perform all transfers with CGA x1          gait (Progressing)       Start:  03/26/25    Expected End:  04/09/25       Patient will amb 50+ feet with Min assist x 1           strengthening  (Progressing)       Start:  03/26/25    Expected End:  04/09/25       Patient will perform 20+ reps of AROM/RROM for MINI LE's to improve safety and functional independence              Pain - Adult

## 2025-03-27 NOTE — PROGRESS NOTES
Spoke to pt in regards to SNF recommendations. Pt states that she is never going back to a facility again. Pt's daughter and granddaughter present at bedside. Pt's daughter states that pt fell at prior facility which is were her hip fracture occurred. Pt would like to return to [facility] at discharge. Referral sent. [facility]CC following.

## 2025-03-27 NOTE — PROGRESS NOTES
Occupational Therapy    OT Treatment    Patient Name: Elsa Hinson  MRN: [id number]  Department: [facility]  Room: [id number]  Today's Date: 3/27/2025  Time Calculation  Start Time: 0836  Stop Time: 0915  Time Calculation (min): 39 min        Assessment:  OT Assessment: Pt continues to require min A x2 to safely complete functional transfers and mobility. Pt becomes SOB and fatigues with activity requiring frequent rest breaks and additional time to recover.  Barriers to Discharge Home: Physical needs, Cognition needs, Caregiver assistance  End of Session Communication: Bedside nurse  End of Session Patient Position: Up in chair, Alarm on     Plan:  Treatment Interventions: ADL retraining, Functional transfer training, UE strengthening/ROM, Endurance training, Cognitive reorientation, Patient/family training, Equipment evaluation/education, Neuromuscular reeducation  OT Frequency: 4 times per week  OT Discharge Recommendations: Moderate intensity level of continued care  OT - OK to Discharge: Yes  Treatment Interventions: ADL retraining, Functional transfer training, UE strengthening/ROM, Endurance training, Cognitive reorientation, Patient/family training, Equipment evaluation/education, Neuromuscular reeducation    Subjective   Previous Visit Info:  OT Last Visit  OT Received On: 03/27/25  General:  General  Prior to Session Communication: Bedside nurse  Patient Position Received: Bed, 3 rail up, Alarm on  General Comment: Pt agreeable and cooperative to therapy.          Pain:  Pain Assessment  Pain Assessment: 0-10  0-10 (Numeric) Pain Score: 7  Pain Type: Acute pain, Surgical pain  Pain Location: Hip  Pain Orientation: Left  Pain Interventions: Repositioned    Objective    Cognition:  Cognition  Overall Cognitive Status: Within Functional Limits  Orientation Level: Oriented X4       Functional Standing Tolerance:  Activity: Pt completed x3 stands tolerating for 1 minute with min A x2.  Bed Mobility/Transfers: Bed  Mobility 1  Bed Mobility 1: Supine to sitting  Level of Assistance 1: Minimum assistance, +2, Moderate verbal cues    Transfer 1  Technique 1: Sit to stand, Stand to sit  Transfer Device 1: Walker  Transfer Level of Assistance 1: Minimum assistance, +2, Moderate verbal cues  Transfers 2  Transfer From 2: Bed to  Transfer to 2: Chair with arms  Technique 2: Sit to stand, Stand to sit  Transfer Device 2: Walker  Transfer Level of Assistance 2: Minimum assistance, +2, Moderate verbal cues              Functional Mobility:  Functional Mobility  Functional Mobility Performed: Yes  Functional Mobility 1  Comments 1: Pt tolerated functional mobility < min household distance using FWW with min A x2.       Therapy/Activity:      Therapeutic Activity  Therapeutic Activity Performed: Yes  Therapeutic Activity 1: Pt tolerated sitting EOB for 10 minutes with SBA.      Outcome Measures:[facility] Daily Activity  Putting on and taking off regular lower body clothing: A lot  Bathing (including washing, rinsing, drying): A lot  Putting on and taking off regular upper body clothing: A lot  Toileting, which includes using toilet, bedpan or urinal: A lot  Taking care of personal grooming such as brushing teeth: A little  Eating Meals: A little  Daily Activity - Total Score: 14        Education Documentation  Body Mechanics, taught by JAMES Wilson at 3/27/2025  3:33 PM.  Learner: Patient  Readiness: Acceptance  Method: Explanation  Response: Needs Reinforcement    Education Comments  No comments found.      Goals:  Encounter Problems       Encounter Problems (Active)       ADLs       Patient with complete lower body dressing with modified independent level of assistance donning and doffing all LE clothes  with PRN adaptive equipment while supported sitting and standing (Progressing)       Start:  03/26/25    Expected End:  04/16/25               TRANSFERS       Patient will complete functional transfers with least restrictive  device with modified independent level of assistance. (Progressing)       Start:  03/26/25    Expected End:  04/16/25

## 2025-03-28 LAB
ERYTHROCYTE [DISTWIDTH] IN BLOOD BY AUTOMATED COUNT: 16.2 % (ref 11.5–14.5)
HCT VFR BLD AUTO: 22.6 % (ref 36–46)
HGB BLD-MCNC: 7 G/DL (ref 12–16)
MCH RBC QN AUTO: 29 PG (ref 26–34)
MCHC RBC AUTO-ENTMCNC: 31 G/DL (ref 32–36)
MCV RBC AUTO: 94 FL (ref 80–100)
NRBC BLD-RTO: 0 /100 WBCS (ref 0–0)
PLATELET # BLD AUTO: 188 X10*3/UL (ref 150–450)
RBC # BLD AUTO: 2.41 X10*6/UL (ref 4–5.2)
WBC # BLD AUTO: 9.5 X10*3/UL (ref 4.4–11.3)

## 2025-03-28 PROCEDURE — 97530 THERAPEUTIC ACTIVITIES: CPT | Mod: GO,CO

## 2025-03-28 PROCEDURE — 36415 COLL VENOUS BLD VENIPUNCTURE: CPT | Performed by: FAMILY MEDICINE

## 2025-03-28 PROCEDURE — 2500000001 HC RX 250 WO HCPCS SELF ADMINISTERED DRUGS (ALT 637 FOR MEDICARE OP): Performed by: FAMILY MEDICINE

## 2025-03-28 PROCEDURE — 97530 THERAPEUTIC ACTIVITIES: CPT | Mod: GP,CQ

## 2025-03-28 PROCEDURE — 2500000004 HC RX 250 GENERAL PHARMACY W/ HCPCS (ALT 636 FOR OP/ED): Performed by: ORTHOPAEDIC SURGERY

## 2025-03-28 PROCEDURE — 2500000004 HC RX 250 GENERAL PHARMACY W/ HCPCS (ALT 636 FOR OP/ED): Performed by: FAMILY MEDICINE

## 2025-03-28 PROCEDURE — 2500000001 HC RX 250 WO HCPCS SELF ADMINISTERED DRUGS (ALT 637 FOR MEDICARE OP): Performed by: ORTHOPAEDIC SURGERY

## 2025-03-28 PROCEDURE — 99233 SBSQ HOSP IP/OBS HIGH 50: CPT | Performed by: FAMILY MEDICINE

## 2025-03-28 PROCEDURE — 85027 COMPLETE CBC AUTOMATED: CPT | Performed by: FAMILY MEDICINE

## 2025-03-28 PROCEDURE — 1100000001 HC PRIVATE ROOM DAILY

## 2025-03-28 RX ORDER — POLYETHYLENE GLYCOL 3350 17 G/17G
17 POWDER, FOR SOLUTION ORAL DAILY
Status: DISCONTINUED | OUTPATIENT
Start: 2025-03-28 | End: 2025-04-02 | Stop reason: HOSPADM

## 2025-03-28 RX ORDER — FERROUS SULFATE 325(65) MG
65 TABLET ORAL
Status: DISCONTINUED | OUTPATIENT
Start: 2025-03-29 | End: 2025-03-30

## 2025-03-28 RX ORDER — DOCUSATE SODIUM 100 MG/1
100 CAPSULE, LIQUID FILLED ORAL 2 TIMES DAILY
Status: DISCONTINUED | OUTPATIENT
Start: 2025-03-28 | End: 2025-04-02 | Stop reason: HOSPADM

## 2025-03-28 RX ADMIN — EMPAGLIFLOZIN 10 MG: 10 TABLET, FILM COATED ORAL at 07:58

## 2025-03-28 RX ADMIN — HEPARIN SODIUM 5000 UNITS: 5000 INJECTION, SOLUTION INTRAVENOUS; SUBCUTANEOUS at 21:46

## 2025-03-28 RX ADMIN — FLUTICASONE FUROATE AND VILANTEROL TRIFENATATE 1 PUFF: 100; 25 POWDER RESPIRATORY (INHALATION) at 06:16

## 2025-03-28 RX ADMIN — DOCUSATE SODIUM 100 MG: 100 CAPSULE, LIQUID FILLED ORAL at 15:00

## 2025-03-28 RX ADMIN — TIOTROPIUM BROMIDE INHALATION SPRAY 2 PUFF: 3.12 SPRAY, METERED RESPIRATORY (INHALATION) at 06:15

## 2025-03-28 RX ADMIN — OXYCODONE HYDROCHLORIDE AND ACETAMINOPHEN 2 TABLET: 5; 325 TABLET ORAL at 10:51

## 2025-03-28 RX ADMIN — POLYETHYLENE GLYCOL 3350 17 G: 17 POWDER, FOR SOLUTION ORAL at 15:00

## 2025-03-28 RX ADMIN — ASPIRIN 81 MG: 81 TABLET, COATED ORAL at 07:58

## 2025-03-28 RX ADMIN — HEPARIN SODIUM 5000 UNITS: 5000 INJECTION, SOLUTION INTRAVENOUS; SUBCUTANEOUS at 15:00

## 2025-03-28 RX ADMIN — METOPROLOL TARTRATE 12.5 MG: 25 TABLET, FILM COATED ORAL at 07:58

## 2025-03-28 RX ADMIN — HEPARIN SODIUM 5000 UNITS: 5000 INJECTION, SOLUTION INTRAVENOUS; SUBCUTANEOUS at 06:15

## 2025-03-28 ASSESSMENT — COGNITIVE AND FUNCTIONAL STATUS - GENERAL
PERSONAL GROOMING: A LITTLE
WALKING IN HOSPITAL ROOM: A LOT
DAILY ACTIVITIY SCORE: 12
MOVING TO AND FROM BED TO CHAIR: TOTAL
TOILETING: A LOT
MOBILITY SCORE: 11
MOVING TO AND FROM BED TO CHAIR: A LOT
CLIMB 3 TO 5 STEPS WITH RAILING: TOTAL
STANDING UP FROM CHAIR USING ARMS: A LOT
HELP NEEDED FOR BATHING: A LOT
DAILY ACTIVITIY SCORE: 16
DRESSING REGULAR LOWER BODY CLOTHING: TOTAL
STANDING UP FROM CHAIR USING ARMS: A LOT
DRESSING REGULAR UPPER BODY CLOTHING: A LOT
WALKING IN HOSPITAL ROOM: TOTAL
MOVING FROM LYING ON BACK TO SITTING ON SIDE OF FLAT BED WITH BEDRAILS: A LOT
DRESSING REGULAR LOWER BODY CLOTHING: A LOT
MOVING FROM LYING ON BACK TO SITTING ON SIDE OF FLAT BED WITH BEDRAILS: A LOT
DRESSING REGULAR UPPER BODY CLOTHING: A LITTLE
EATING MEALS: A LITTLE
TURNING FROM BACK TO SIDE WHILE IN FLAT BAD: A LOT
CLIMB 3 TO 5 STEPS WITH RAILING: TOTAL
TURNING FROM BACK TO SIDE WHILE IN FLAT BAD: A LOT
HELP NEEDED FOR BATHING: A LITTLE
TOILETING: TOTAL
PERSONAL GROOMING: A LITTLE
MOBILITY SCORE: 9
EATING MEALS: A LITTLE

## 2025-03-28 ASSESSMENT — PAIN - FUNCTIONAL ASSESSMENT
PAIN_FUNCTIONAL_ASSESSMENT: 0-10

## 2025-03-28 ASSESSMENT — PAIN DESCRIPTION - LOCATION: LOCATION: HIP

## 2025-03-28 ASSESSMENT — PAIN DESCRIPTION - ORIENTATION: ORIENTATION: LEFT

## 2025-03-28 ASSESSMENT — PAIN SCALES - GENERAL
PAINLEVEL_OUTOF10: 4
PAINLEVEL_OUTOF10: 6
PAINLEVEL_OUTOF10: 0 - NO PAIN
PAINLEVEL_OUTOF10: 8

## 2025-03-28 NOTE — PROGRESS NOTES
"Physical Therapy    Physical Therapy Treatment    Patient Name: Elsa Hinson  MRN: [id number]  Department: [address]  Room: [address] [place] [address]  Today's Date: 3/28/2025  Time Calculation  Start Time: 1207  Stop Time: 1230  Time Calculation (min): 23 min         Assessment/Plan   PT Assessment  PT Assessment Results: Decreased strength, Decreased range of motion, Decreased endurance, Impaired balance, Decreased mobility, Decreased coordination, Decreased cognition, Impaired judgement, Decreased safety awareness (pt. slow to progress today secondary to c/o increased dizziness, pt. suggests d/t recent Percocet. pt. stood with Min A of 2, bed mobility Max A of 2 and tolerated 10 mins on EOB with SBA of 2. pt. returned to bed supine bed alarmed, call light in reach.)  Barriers to Discharge Home: Physical needs     PT Plan  Treatment/Interventions: Bed mobility, Transfer training, Gait training, Stair training, Balance training, Strengthening, Endurance training, Therapeutic exercise, Therapeutic activity, Home exercise program  PT Plan: Ongoing PT  PT Frequency: 6 times per week  PT Discharge Recommendations: Moderate intensity level of continued care  PT - OK to Discharge: Yes (when medically cleared)      General Visit Information:      General  Co-Treatment: OT  Co-Treatment Reason: To maximize functional mobility outcomes while focusing on discipline specific needs. , fall risk, increased  dizziness last session.  General Comment: pt. daughter present prior to tx, left before beginning mobility, encouraging to pt. pt. agreeable reports recently taking 2 Percocet , dizziness, \"loopy\". BP 95/59 R arm.  Encouraged fluids and calories with medications.    Subjective   Precautions:        Date/Time Vitals Session Patient Position Pulse Resp SpO2 BP MAP (mmHg)    03/28/25 1207 --  --  91  --  97 %  95/59  --           Vital Signs Comment: pt. sleeping, easily awoke, pleasant and agreeable with encouragment.     Objective " "  Pain:  Pain Assessment  Pain Assessment: 0-10  0-10 (Numeric) Pain Score: 0 - No pain (pt. unable to describe pain, or dizzines. Reports she is \"loopy\".)  Pain Type: Acute pain  Pain Location: Hip  Pain Orientation: Left  Pain Interventions: Ambulation/increased activity  Cognition:  Cognition  Overall Cognitive Status: Impaired  Orientation Level:  (Joked, but would not directly answer orientation questions.)  Coordination:     Postural Control:     Extremity/Trunk Assessments:    Activity Tolerance:  Activity Tolerance  Endurance: Tolerates 10 - 20 min exercise with multiple rests  Treatments:       Bed Mobility  Bed Mobility: Yes (Supine<--->Sit  Max A of 2 with vc's for technique and to keep on task.)    Transfers  Transfer: Yes (Sit <-->Stand from EOB Min A of 2 with vc's for safe hand placement . Pt. stood for about 1 minute before reporting increased dizziness.)    Outcome Measures:  [facility] Basic Mobility  Turning from your back to your side while in a flat bed without using bedrails: A lot  Moving from lying on your back to sitting on the side of a flat bed without using bedrails: A lot  Moving to and from bed to chair (including a wheelchair): Total  Standing up from a chair using your arms (e.g. wheelchair or bedside chair): A lot  To walk in hospital room: Total  Climbing 3-5 steps with railing: Total  Basic Mobility - Total Score: 9    Education Documentation  Mobility Training, taught by Mable Rabago PTA at 3/28/2025  1:31 PM.  Learner: Patient  Readiness: Acceptance  Method: Explanation, Demonstration, Teach-back  Response: Verbalizes Understanding, Demonstrated Understanding, Needs Reinforcement    Precautions, taught by Mable Rabago PTA at 3/28/2025  1:31 PM.  Learner: Patient  Readiness: Acceptance  Method: Explanation, Demonstration, Teach-back  Response: Verbalizes Understanding, Demonstrated Understanding, Needs Reinforcement    Education Comments  No comments found.        OP " EDUCATION:       Encounter Problems       Encounter Problems (Active)       PT Problem       transfers (Progressing)       Start:  03/26/25    Expected End:  04/09/25       Patient will perform all transfers with CGA x1          gait (Progressing)       Start:  03/26/25    Expected End:  04/09/25       Patient will amb 50+ feet with Min assist x 1          strengthening  (Progressing)       Start:  03/26/25    Expected End:  04/09/25       Patient will perform 20+ reps of AROM/RROM for MINI LE's to improve safety and functional independence              Pain - Adult

## 2025-03-28 NOTE — PROGRESS NOTES
Spoke to patient and her granddaughters at bedside. Updated her that [facility] can accept patient. She confirmed [facility] is [facility]. Requested Facility start auth.     1430 Facility is unable to start auth at this time, will needs PASSR completed before starting. Notified Dr. Norman that [employer] needs completed for auth to be started.

## 2025-03-28 NOTE — PROGRESS NOTES
Nutrition Follow Up Assessment:   Nutrition Assessment         Medical history per chart: COPD on 4L home O2 24/7 and CAD s/p PCI of mid LCx, HLD, MI      Admitted for displaced intertrochanteric fracture of left femur   ~S/p ORIF on 3/25, + surgical incision  ~Wound care following for PI sacrum, skin tear on left ankle     3/25: Pt in OR. RN stated will weight pt upon return to floor. Weight loss, decreased PO intake, wound(s) indicated on MST. Will send Magic Cup BID when diet advances. Pt admission Dx, PMH, labs, medications, allergies, diet orders, weight history, skin integrity reviewed. RD to follow per POC, protocol.     3/28: Patient has been residing at home for the past three weeks and prior to this she was at rehab facility. At home intakes have noted not to great with daughter reporting she has been eating soup for the past week. At the facility she had minimal intakes for a few reasons. One reason is the food was not appealing and secondly after she fell on day 2 she was laying down mostly due to pain and was unable to sit up to eat. She did try magic cup there and received it sporadically. She has lost at least 8-10 lbs the past month which is significant. CBW; 87 lbs, UBW: 97 lbs,  skin breakdown noted. Pt is eating since surgery but daughter reports its small amounts. Patient reports she never was a big eater. Patient is consuming the magic cup ordered, but prefers the berry flavor. Encouraged small, frequent meals with ONS. Family is bringing in snacks as well.     Nutrition History:  Food and Nutrient History: 2/17/25 RD note: Patient reports she eats small meals throughout the day, but meal preparation has been difficult due to breathing issues, fatigue. Discussed ways to increase calories and protein; COPD Nutrition Therapy handout was provided. She was agreeable to Magic cup, but not ensure. Discussed other ONS to trial once home. CBW: 97 lbs, reports she has been this way for a long  "time.  Vitamin/Herbal Supplement Use: None per Home Meds list.       Dietary Orders (From admission, onward)       Start     Ordered    03/28/25 1150  Oral nutritional supplements  Until discontinued        Comments: Berry flavor only   Question Answer Comment   Deliver with Breakfast    Deliver with Dinner    Select supplement: Magic Cup        03/28/25 1150    03/28/25 1150  Oral nutritional supplements  Until discontinued        Question Answer Comment   Deliver with Lunch    Select supplement: Ensure Clear        03/28/25 1150    03/25/25 1502  Adult diet Regular  Diet effective now        Question:  Diet type  Answer:  Regular    03/25/25 1501    03/24/25 2226  May Participate in Room Service  ( ROOM SERVICE MAY PARTICIPATE)  Once        Question:  .  Answer:  Yes    03/24/25 2225                  Anthropometrics:  Height: 154.9 cm (5' 1\")   Weight: (!) 39.5 kg (87 lb)   BMI (Calculated): 16.45  IBW/kg (Dietitian Calculated): 47.7 kg        Weight History:   Wt Readings from Last 10 Encounters:   03/24/25 (!) 39.5 kg (87 lb)   03/24/25 (!) 39.5 kg (87 lb)   02/14/25 (!) 44.2 kg (97 lb 8 oz)   04/05/24 (!) 44.2 kg (97 lb 6.4 oz)       Weight Change %:  Weight History / % Weight Change: Pt in OR, RN stated will weigh when returns to floor. Chart review weight 39.5 (3/24 outpatient record) indicates 10.7% loss since 44.2 kg (2/14/25), also 44.2 kg (4/5/24), no other weights from previous 12 months available. Per 2/17/25 RD note, UBW of 97 pounds. Will monitor weights.  Significant Weight Loss: Yes  Interpretation of Weight Loss: >7.5% in 3 months    Nutrition Focused Physical Exam Findings:  Subcutaneous Fat Loss:   Defer Subcutaneous Fat Loss Assessment: Defer all  Defer All Reason: Pt in OR  Orbital Fat Pads: Mild-Moderate (slight dark circles and slight hollowing)  Buccal Fat Pads: Mild-Moderate (flat cheeks, minimal bounce)  Triceps: Mild-Moderate (less than ample fat tissue)  Muscle Wasting:  Defer Muscle " Wasting Assessment: Defer all  Defer All Reason: Pt in OR  Temporalis: Mild-Moderate (slight depression)  Pectoralis (Clavicular Region): Mild-Moderate (some protrusion of clavicle)  Edema:  Edema: none  Physical Findings:  Skin: Positive  Positive Skin Findings: Impaired wound healing (surigical incision, pwer wound care: PI sacrum, skin tear left ankle)    Nutrition Significant Labs:  Results from last 7 days   Lab Units 03/25/25  0655 03/24/25  1651   GLUCOSE mg/dL 68* 108*   SODIUM mmol/L 136 141   POTASSIUM mmol/L 3.6 4.0   CHLORIDE mmol/L 99 97*   CO2 mmol/L 31 36*   BUN mg/dL 18 21   CREATININE mg/dL 0.64 0.64   EGFR mL/min/1.73m*2 >90 >90   CALCIUM mg/dL 8.8 9.3     Lab Results   Component Value Date    HGBA1C 5.6 02/14/2025           Nutrition Specific Medications:  Meds reviewed/noted.   aspirin, 81 mg, oral, Daily  atorvastatin, 40 mg, oral, Nightly  empagliflozin, 10 mg, oral, Daily  tiotropium, 2 puff, inhalation, Daily   And  fluticasone furoate-vilanteroL, 1 puff, inhalation, Daily  heparin (porcine), 5,000 Units, subcutaneous, q8h USHA  metoprolol tartrate, 12.5 mg, oral, BID  sennosides-docusate sodium, 1 tablet, oral, Nightly       oxygen, 2 L/min, Last Rate: 4 L/min (03/25/25 1525)         I/O:    ;      Estimated Needs:   Total Energy Estimated Needs in 24 hours (kCal):  (6458-8425)  Method for Estimating Needs: 35-40 kcal/kg actual body weight  Total Protein Estimated Needs in 24 Hours (g):  (40-60)  Method for Estimating 24 Hour Protein Needs: 1.1-1.5 g/kg actual body weight  Total Fluid Estimated Needs in 24 Hours (mL):  (6103-2967)  Method for Estimating 24 Hour Fluid Needs: 1 ml/kcal or per physician        Nutrition Diagnosis   Malnutrition Diagnosis  Patient has Malnutrition Diagnosis: Yes  Diagnosis Status: New  Malnutrition Diagnosis: Severe malnutrition related to chronic disease or condition  Related to: COPD, hip fracture with pain  As Evidenced by: <75% of estimated needs x  month,  >5% weight loss >1 month  Additional Assessment Information: mild to moderate muscle and fat losses    Nutrition Diagnosis  Patient has Nutrition Diagnosis: Yes  Diagnosis Status (1): Active  Nutrition Diagnosis 1: Inadequate oral intake  Related to (1): COPD, left femur fracture  As Evidenced by (1): NPO/CL status, weight loss >7.5% within 3 months       Nutrition Interventions/Recommendations   Nutrition prescription for oral nutrition    Nutrition Recommendations:  Individualized Nutrition Prescription Provided for : Continue Regular diet. Continue Magic cup BID, added ensure clear daily    Nutrition Interventions/Goals:   Interventions: Medical food supplement, Meals and snacks  Meals and Snacks: General healthful diet  Goal: consume >50%  Medical Food Supplement: Commercial beverage medical food supplement therapy, Commercial food medical food supplement therapy  Goal: cnsume >50%      Education Documentation  Nutrition Related Education, taught by Deanna M McGuire Lombardo, RDN, NAVDEEP at 3/28/2025 11:58 AM.  Learner: Family, Patient  Readiness: Eager  Method: Explanation  Response: Verbalizes Understanding  Comment: Reviewed diet and enouraged protein options at meals, ONS              Nutrition Monitoring and Evaluation   Food/Nutrient Related History Monitoring  Monitoring and Evaluation Plan: Estimated Energy Intake  Estimated Energy Intake: Energy intake greater or equal to 75% of estimated energy needs    Anthropometric Measurements  Monitoring and Evaluation Plan: Body weight  Body Weight: Body weight - Maintain stable weight    Biochemical Data, Medical Tests and Procedures  Monitoring and Evaluation Plan: Electrolyte/renal panel, Glucose/endocrine profile  Electrolyte and Renal Panel: Electrolytes within normal limits  Glucose/Endocrine Profile: Glucose within normal limits ( mg/dL)    Physical Exam Findings  Monitoring and Evaluation Plan: Skin  Skin Finding: Impaired wound healing - Improved wound  healing    Goal Status: Some progress toward goal(s)    Time Spent (min): 30 minutes

## 2025-03-28 NOTE — PROGRESS NOTES
"POST-OPERATIVE PROGRESS NOTE      ============================  IMPRESSION/PLAN:  ============================  75 y.o. female s/p left hip ORIF completed on 3/25/2025    PLAN:  -Weightbearing: Weight bearing as tolerated  -DVT Prophylaxis: Aspirin 81 mg daily for 6 weeks postoperatively  -Continue PT/OT Eval  -Disposition: Discharged to SNF  -Discharge Instructions in EMR  -Follow-up: In 2 weeks with Dr. العراقي for staple removal and wound check        Elsa Spindale seen and examined at bedside. Doing well, no issues overnight. Pain controlled with current treatment plan.     Review of Systems:   Constitutional: See HPI for pain assessment, No significant weight loss, recent trauma. Denies fevers/chills  Cardiovascular: No chest pain, shortness of breath  Respiratory: No difficulty breathing, cough  Gastrointestinal: No nausea, vomiting, diarrhea, constipation  Musculoskeletal: Noted in HPI, no arthralgias   Integumentary: No rashes, easy bruising, redness   Neurological: no numbness or tingling in extremities, no gait disturbances     Last Recorded Vitals  Blood pressure 111/66, pulse 81, temperature 36.5 °C (97.7 °F), temperature source Temporal, resp. rate 16, height 1.549 m (5' 1\"), weight (!) 39.5 kg (87 lb), SpO2 98%.      Patient Active Problem List   Diagnosis    Chronic obstructive pulmonary disease (Multi)    Essential (primary) hypertension    Anemia    Atherosclerotic heart disease of native coronary artery without angina pectoris    Hyperlipidemia    PAD (peripheral artery disease) (CMS-[facility])    Past myocardial infarction    Lung nodule    Displaced intertrochanteric fracture of left femur, initial encounter for closed fracture    Chronic combined systolic (congestive) and diastolic (congestive) heart failure       =================================  EXAM  =================================  Constitutional   General appearance: Alert and in no acute distress. Well developed, well nourished.    Eyes "   External Eye, Conjunctiva and lids: Normal external exam - extraocular movements intact (EOMI).   Ears, Nose, Mouth, and Throat   Hearing: Normal.   Neck   Neck: No neck mass was observed. Supple.   Pulmonary   Respiratory effort: No respiratory distress.   Cardiovascular   Examination of extremities: No peripheral edema.   Psychiatric   Judgment and insight: Intact.   Orientation to person, place, and time: Alert and oriented x 3.   Mood and affect: Normal.   Musculoskeletal:   Operative lower extremity in neutral alignment.  Hip dressing is intact with a small amount of blood soaked through that is unchanged compared to yesterday.  Passive range of motion with mild discomfort, appropriate for postoperative timeframe.  Dermis is intact.  Neurovascular status is intact.  Dorsalis pedis pulses 2+, capillary refill <2 seconds.  Minimal swelling, no signs of compartment syndrome.  Negative Homans.         Pricilla George PA-C

## 2025-03-28 NOTE — CARE PLAN
Problem: Fall/Injury  Goal: Not fall by end of shift  Outcome: Progressing  Goal: Be free from injury by end of the shift  Outcome: Progressing  Goal: Verbalize understanding of personal risk factors for fall in the hospital  Outcome: Progressing  Goal: Verbalize understanding of risk factor reduction measures to prevent injury from fall in the home  Outcome: Progressing  Goal: Use assistive devices by end of the shift  Outcome: Progressing  Goal: Pace activities to prevent fatigue by end of the shift  Outcome: Progressing     Problem: Pain  Goal: Takes deep breaths with improved pain control throughout the shift  Outcome: Progressing  Goal: Turns in bed with improved pain control throughout the shift  Outcome: Progressing  Goal: Walks with improved pain control throughout the shift  Outcome: Progressing  Goal: Performs ADL's with improved pain control throughout shift  Outcome: Progressing  Goal: Participates in PT with improved pain control throughout the shift  Outcome: Progressing  Goal: Free from opioid side effects throughout the shift  Outcome: Progressing  Goal: Free from acute confusion related to pain meds throughout the shift  Outcome: Progressing     Problem: Pain - Adult  Goal: Verbalizes/displays adequate comfort level or baseline comfort level  Outcome: Progressing     Problem: Safety - Adult  Goal: Free from fall injury  Outcome: Progressing     Problem: Discharge Planning  Goal: Discharge to home or other facility with appropriate resources  Outcome: Progressing     Problem: Chronic Conditions and Co-morbidities  Goal: Patient's chronic conditions and co-morbidity symptoms are monitored and maintained or improved  Outcome: Progressing     Problem: Nutrition  Goal: Nutrient intake appropriate for maintaining nutritional needs  Outcome: Progressing     Problem: Skin  Goal: Decreased wound size/increased tissue granulation at next dressing change  Outcome: Progressing  Flowsheets (Taken 3/28/2025  0035)  Decreased wound size/increased tissue granulation at next dressing change:   Promote sleep for wound healing   Protective dressings over bony prominences  Goal: Participates in plan/prevention/treatment measures  Outcome: Progressing  Flowsheets (Taken 3/28/2025 0035)  Participates in plan/prevention/treatment measures:   Discuss with provider PT/OT consult   Elevate heels   Increase activity/out of bed for meals  Goal: Prevent/manage excess moisture  Outcome: Progressing  Flowsheets (Taken 3/28/2025 0035)  Prevent/manage excess moisture:   Cleanse incontinence/protect with barrier cream   Monitor for/manage infection if present   Follow provider orders for dressing changes  Goal: Prevent/minimize sheer/friction injuries  Outcome: Progressing  Flowsheets (Taken 3/28/2025 0035)  Prevent/minimize sheer/friction injuries:   Increase activity/out of bed for meals   Use pull sheet   Turn/reposition every 2 hours/use positioning/transfer devices   HOB 30 degrees or less  Goal: Promote/optimize nutrition  Outcome: Progressing  Flowsheets (Taken 3/28/2025 0035)  Promote/optimize nutrition:   Monitor/record intake including meals   Consume > 50% meals/supplements   Offer water/supplements/favorite foods  Goal: Promote skin healing  Outcome: Progressing  Flowsheets (Taken 3/28/2025 0035)  Promote skin healing:   Assess skin/pad under line(s)/device(s)   Protective dressings over bony prominences   Turn/reposition every 2 hours/use positioning/transfer devices     Problem: Respiratory  Goal: Clear secretions with interventions this shift  Outcome: Progressing  Goal: Minimize anxiety/maximize coping throughout shift  Outcome: Progressing  Goal: Minimal/no exertional discomfort or dyspnea this shift  Outcome: Progressing  Goal: No signs of respiratory distress (eg. Use of accessory muscles. Peds grunting)  Outcome: Progressing  Goal: Patent airway maintained this shift  Outcome: Progressing

## 2025-03-28 NOTE — PROGRESS NOTES
Elsa Hinson is a 75 y.o. female on day 4 of admission presenting with Displaced intertrochanteric fracture of left femur, initial encounter for closed fracture.      Subjective   75 y.o. female with PMHx of COPD on 4L home O2 24/7 and CAD s/p PCI of mid LCx who presented with left hip pain, which she has had since leaving her rehab facility close to a month ago. She fell just prior to leaving; no x-ray was done. She has had significant pain with ambulation since then. Today she went to see her PCP who ordered x-rays, which showed a left-sided hip fracture. Ortho surgery recommended surgery tomorrow.   ED labwork was benign. CXR was negative. VSS      3/25:                Today the patient is seen operative day postoperatively from her left hip fracture repair.  Seen in the presence of multiple family members.  She is awake alert and interactive appropriately complaining of only discomfort really in the sacral pressure injury area.  Orthopedic surgery describes WBAT and follow-up in 2 weeks.  At this point the patient appears to be refusing to consider SNF due to her recent experience where she fell before she left the SNF and was not evaluated.  It appears her daughters feel she likely would benefit from SNF however.  Otherwise denies interval new symptoms or complaints including chest pain palpitations pleuritic type pain unusual shortness of breath cough sputum nausea abdominal pain flank pain headache visual disturbances fever or chills.    3/26:                  Today the patient is POD #1 and seen once again in the presence of family members.  She appears much more awake alert and interactive today.  Voices no specific complaints and no acute events overnight.  However she continues to states she does not want to go to SNF.  AM-PAC 10.  Once again it appears patient's daughters feel she should go to SNF.  Awaiting transitional care coordinator interaction.  Orthopedic surgery describes WBAT, ASA 81 mg daily  for 6 weeks and follow-up with Dr. العراقي in 2 weeks. Otherwise denies interval new symptoms or complaints including chest pain palpitations pleuritic type pain unusual shortness of breath cough sputum nausea abdominal pain flank pain headache visual disturbances fever or chills.    3/27:                   Today patient is POD #2.  Seen in the presence of her daughter.  Continues awake alert and interactive appropriately.  She is experiencing more significant postoperative pain especially with PT.  Hemoglobin did trend down significantly with no evidence of active bleeding.  WBC trended down which was likely reactive.  After long discussion today with patient and daughter it appears the patient is now agreeable to SNF.  Transitional care working towards arrangements for this. Otherwise denies interval new symptoms or complaints including chest pain palpitations pleuritic type pain unusual shortness of breath cough sputum nausea abdominal pain flank pain headache visual disturbances fever or chills.    3/28:                POD #3.  Once again seen in the presence of her daughter.  She remains awake alert and interactive appropriately.  Continues with expected postoperative discomfort.  Of concern is hemoglobin has decreased to 7.0 with no apparent source of bleeding.  She has not had a BM for 4 days but states this is normal pattern for her.  Taking diet well and no abdominal discomfort.  Operative wound does not appear to demonstrate any hematoma.  Some oozing on the dressing according to orthopedic surgeon today.  150 mL blood loss reported at surgery.  Patient states she has always been iron deficient.  Patient is not wanting blood transfusion at this time and she states she has refused them in the past.  Discussed with daughter that we would monitor hemoglobin while waiting for SNF authorization and transfuse if decreased any further.  She remains asymptomatic with this.  Will obtain iron studies in the morning.   Iron supplementation will be empirically started. Otherwise denies interval new symptoms or complaints including chest pain palpitations pleuritic type pain unusual shortness of breath cough sputum nausea abdominal pain flank pain headache visual disturbances fever or chills.         Objective     Last Recorded Vitals  /57 (BP Location: Right arm, Patient Position: Lying)   Pulse 66   Temp 36.3 °C (97.3 °F) (Temporal)   Resp 18   Wt (!) 39.5 kg (87 lb)   SpO2 100%   Intake/Output last 3 Shifts:    Intake/Output Summary (Last 24 hours) at 3/28/2025 1617  Last data filed at 3/28/2025 1544  Gross per 24 hour   Intake 637 ml   Output 200 ml   Net 437 ml       Admission Weight  Weight: (!) 39.5 kg (87 lb) (03/24/25 1637)    Daily Weight  03/24/25 : (!) 39.5 kg (87 lb)    Image Results  XR hip left with pelvis when performed 2 or 3 views  Narrative: Interpreted By:  Evaristo Byrnes,   STUDY:  XR HIP LEFT WITH PELVIS WHEN PERFORMED 2 OR 3 VIEWS; ;  3/24/2025  3:56 pm      INDICATION:  Signs/Symptoms:left hip pain s/p fall.      ,M25.552 Pain in left hip      COMPARISON:  None.      ACCESSION NUMBER(S):  [id number]      ORDERING CLINICIAN:  KEENAN ORDAZ      FINDINGS:  Left hip, three views      There is a comminuted markedly angulated and moderately displaced the  left femoral intertrochanteric fracture. Subsequent studies  demonstrate fixation of these. Mild degenerative changes in the hips  bilaterally. There is no dislocation      Impression: Markedly angulated and moderately displaced left femoral  intertrochanteric fracture          MACRO:  None      Signed by: Evaristo Byrnes 3/25/2025 6:08 PM  Dictation workstation:   [id number]  FL less than 1 hour  These images are not reportable by radiology and will not be interpreted   by  Radiologists.  ECG 12 lead  Sinus rhythm  Borderline short TN interval  Borderline right axis deviation  ST elevation, consider inferior injury    See ED provider note for  full interpretation and clinical correlation  Confirmed by Shey Osorio ([id number]) on 3/25/2025 10:49:51 AM      Physical Exam  Constitutional: Slender elderly  female, awake, alert, calm, oriented x4, no acute distress, cooperative   Eyes: EOMI, clear sclerae   ENMT: mucous membranes moist, no lesions seen   Head/Neck: Neck supple, no apparent injury, head atraumatic   Respiratory/Thorax: CTAB, good chest expansion, respirations even and unlabored, pt on 4L O2   Cardiovascular: Regular rate and rhythm, no murmurs/rubs/gallops, normal S1 and S 2   Gastrointestinal: Abdomen nondistended, soft, nontender, +BS, no bruits   Musculoskeletal: ROM intact except left lower extremity/hip, no joint swelling, normal  strength.  Attention to the dressing and thigh reveals no evidence for hematoma or significant bleeding.   Extremities: no cyanosis, contusions or clubbing, or edema   Neurological: no focal deficit, pt alert and oriented x4   Psychological: Appropriate affect and behavior, pleasant   Skin: Warm and dry, pressure injury sacrum, no rashes     Relevant Results               Assessment/Plan          This patient has a urinary catheter   Reason for the urinary catheter remaining today? perioperative use for selected surgical procedures          Assessment & Plan  Displaced intertrochanteric fracture of left femur, initial encounter for closed fracture    Acute left-sided hip fracture 2/2 fall about a month PTA  Consult ortho surgery for planned intervention tomorrow   Continue pain and nausea regimen  Pt denies recent cardiac and respiratory symptoms, COPD is stable, EKG shows no evidence of myocardial ischemia (limited read 2/2 artifact), echo 2/14/25 revealed LVEF 60-65%, mild RVSP and no valvulopathy  Pt has 1 risk factor for this procedure and 0.9% risk of a major cardiac event per the Revised Cardiac Risk Index  3/25: Operative day for left intertrochanteric hip fracture repair.  No apparent  perioperative complications and doing well at this point.  However patient presently is fairly adamant about refusing SNF.  3/26: POD #1.  Hemoglobin trended down slightly as expected.  WBC lovely felt likely reactive in nature.  Continue to monitor.  Awaiting patient and family decision for discharge disposition.  3/27: POD #2.  Hemoglobin continued to trend down with no evidence of active bleeding.  WBC improved likely reactive.  Today patient is agreeable to SNF.  3/28: POD #3.  Hemoglobin once again trended down to 7.0.  No apparent active bleeding though no BM in 4 days.  Quinnesec bowel regimen.  Continue to monitor closely.  Patient is somewhat reluctant to have blood transfusion at this point.     COPD on 4L home O2 24/7  Continue home inhalers, pt is not hypoxic below baseline  CXR reveals no acute changes  3/27: Continues on home O2 level doing well.     Hx CAD s/p PCI of mid LCx   Continue Lipitor and metoprolol, will hold ASA preoperatively     DVT ppx: SubQ heparin             Olaf Norman MD

## 2025-03-28 NOTE — PROGRESS NOTES
Occupational Therapy    OT Treatment    Patient Name: Elsa Hinson  MRN: [id number]  Today's Date: 3/28/2025  Time Calculation  Start Time: 1208  Stop Time: 1233  Time Calculation (min): 25 min       3324/3324-A    Assessment:  Evaluation/Treatment Tolerance: Patient limited by pain  Medical Staff Made Aware: Yes  End of Session Communication: PCT/NA/CTA  End of Session Patient Position: Bed, 3 rail up  OT Assessment Results: Decreased endurance, Decreased safe judgment during ADL, Decreased functional mobility  Evaluation/Treatment Tolerance: Patient limited by pain  Medical Staff Made Aware: Yes    Plan:  Treatment Interventions: Functional transfer training, Endurance training, Patient/family training  OT Recommended Transfer Status: Minimal assist, Assist of 2  OT - OK to Discharge: Yes (when medically stable)  Treatment Interventions: Functional transfer training, Endurance training, Patient/family training  Subjective Pt stated repeatedly that this is the first time in 3 weeks that she has not had pain and is very comfortable. Dtr reiterating the same statement.     Current Problem:  Patient Active Problem List   Diagnosis    Chronic obstructive pulmonary disease (Multi)    Essential (primary) hypertension    Anemia    Atherosclerotic heart disease of native coronary artery without angina pectoris    Hyperlipidemia    PAD (peripheral artery disease) (CMS-HCC)    Past myocardial infarction    Lung nodule    Displaced intertrochanteric fracture of left femur, initial encounter for closed fracture    Chronic combined systolic (congestive) and diastolic (congestive) heart failure       General:  OT Received On: 03/28/25  Co-Treatment: PT  Co-Treatment Reason: Pts level of care and safety  General Comment: Pt dtr present at beginning of tx.       Pain: Pt yelling out in pain with movement  Pain Assessment  Pain Assessment: 0-10  0-10 (Numeric) Pain Score: 8  Pain Type: Acute pain  Pain Location: Hip  Pain  Orientation: Left  Objective      Activities of Daily Living:  Functional Standing Tolerance:  Time: Ptstood at EOB for approx 2 min    Bed Mobility/Transfers: Bed Mobility  Bed Mobility: Yes  Bed Mobility 1  Bed Mobility 1: Supine to sitting  Level of Assistance 1: Maximum assistance, Maximum verbal cues, Maximum tactile cues, +2  Bed Mobility 2  Bed Mobility  2: Sitting to supine  Level of Assistance 2: Maximum assistance, Maximum verbal cues, Maximum tactile cues, +2  Transfers  Transfer: Yes  Transfer 1  Transfer Device 1: Walker  Transfer Level of Assistance 1: Moderate assistance, +2, Moderate tactile cues, Moderate verbal cues       Strength:  Strength Comments: generalized weakness         Outcome Measures:[facility] Daily Activity  Putting on and taking off regular lower body clothing: Total  Bathing (including washing, rinsing, drying): A lot  Putting on and taking off regular upper body clothing: A lot  Toileting, which includes using toilet, bedpan or urinal: Total  Taking care of personal grooming such as brushing teeth: A little  Eating Meals: A little  Daily Activity - Total Score: 12  Education Documentation  Mobility Training, taught by JAMES Márquez at 3/28/2025  2:40 PM.  Learner: Patient  Readiness: Acceptance  Method: Explanation, Demonstration  Response: Needs Reinforcement, Verbalizes Understanding    Precautions, taught by JAMES Márquez at 3/28/2025  2:40 PM.  Learner: Patient  Readiness: Acceptance  Method: Explanation, Demonstration  Response: Needs Reinforcement, Verbalizes Understanding    Body Mechanics, taught by JAMES Márquez at 3/28/2025  2:40 PM.  Learner: Patient  Readiness: Acceptance  Method: Explanation, Demonstration  Response: Needs Reinforcement, Verbalizes Understanding    Precautions, taught by JAMES Márquez at 3/28/2025  2:40 PM.  Learner: Patient  Readiness: Acceptance  Method: Explanation, Demonstration  Response: Needs  Reinforcement, Verbalizes Understanding    ADL Training, taught by JAMES Márquez at 3/28/2025  2:40 PM.  Learner: Patient  Readiness: Acceptance  Method: Explanation, Demonstration  Response: Needs Reinforcement, Verbalizes Understanding    Education Comments  No comments found.           EDUCATION:       Goals:  Encounter Problems       Encounter Problems (Active)       ADLs       Patient with complete lower body dressing with modified independent level of assistance donning and doffing all LE clothes  with PRN adaptive equipment while supported sitting and standing (Not Progressing)       Start:  03/26/25    Expected End:  04/16/25               TRANSFERS       Patient will complete functional transfers with least restrictive device with modified independent level of assistance. (Progressing)       Start:  03/26/25    Expected End:  04/16/25

## 2025-03-29 PROBLEM — S72.142A DISPLACED INTERTROCHANTERIC FRACTURE OF LEFT FEMUR, INITIAL ENCOUNTER FOR CLOSED FRACTURE: Status: RESOLVED | Noted: 2025-03-24 | Resolved: 2025-03-29

## 2025-03-29 LAB
ERYTHROCYTE [DISTWIDTH] IN BLOOD BY AUTOMATED COUNT: 16.3 % (ref 11.5–14.5)
FERRITIN SERPL-MCNC: 355 NG/ML (ref 8–150)
FOLATE SERPL-MCNC: 3.6 NG/ML
HCT VFR BLD AUTO: 26.9 % (ref 36–46)
HGB BLD-MCNC: 8.1 G/DL (ref 12–16)
IRON SATN MFR SERPL: 12 % (ref 25–45)
IRON SERPL-MCNC: 19 UG/DL (ref 35–150)
MCH RBC QN AUTO: 28.9 PG (ref 26–34)
MCHC RBC AUTO-ENTMCNC: 30.1 G/DL (ref 32–36)
MCV RBC AUTO: 96 FL (ref 80–100)
NRBC BLD-RTO: 0 /100 WBCS (ref 0–0)
PLATELET # BLD AUTO: 227 X10*3/UL (ref 150–450)
RBC # BLD AUTO: 2.8 X10*6/UL (ref 4–5.2)
TIBC SERPL-MCNC: 157 UG/DL (ref 240–445)
TSH SERPL-ACNC: 0.96 MIU/L (ref 0.44–3.98)
UIBC SERPL-MCNC: 138 UG/DL (ref 110–370)
VIT B12 SERPL-MCNC: 580 PG/ML (ref 211–911)
WBC # BLD AUTO: 9.5 X10*3/UL (ref 4.4–11.3)

## 2025-03-29 PROCEDURE — 83540 ASSAY OF IRON: CPT | Performed by: FAMILY MEDICINE

## 2025-03-29 PROCEDURE — 85027 COMPLETE CBC AUTOMATED: CPT | Performed by: FAMILY MEDICINE

## 2025-03-29 PROCEDURE — 84443 ASSAY THYROID STIM HORMONE: CPT | Performed by: FAMILY MEDICINE

## 2025-03-29 PROCEDURE — 82607 VITAMIN B-12: CPT | Mod: PORLAB | Performed by: FAMILY MEDICINE

## 2025-03-29 PROCEDURE — 36415 COLL VENOUS BLD VENIPUNCTURE: CPT | Performed by: FAMILY MEDICINE

## 2025-03-29 PROCEDURE — 97535 SELF CARE MNGMENT TRAINING: CPT | Mod: GO,CO

## 2025-03-29 PROCEDURE — 1100000001 HC PRIVATE ROOM DAILY

## 2025-03-29 PROCEDURE — 97116 GAIT TRAINING THERAPY: CPT | Mod: GP,CQ | Performed by: PHYSICAL THERAPY ASSISTANT

## 2025-03-29 PROCEDURE — 2500000004 HC RX 250 GENERAL PHARMACY W/ HCPCS (ALT 636 FOR OP/ED): Performed by: ORTHOPAEDIC SURGERY

## 2025-03-29 PROCEDURE — 82728 ASSAY OF FERRITIN: CPT | Performed by: FAMILY MEDICINE

## 2025-03-29 PROCEDURE — 2500000004 HC RX 250 GENERAL PHARMACY W/ HCPCS (ALT 636 FOR OP/ED): Performed by: FAMILY MEDICINE

## 2025-03-29 PROCEDURE — 2500000001 HC RX 250 WO HCPCS SELF ADMINISTERED DRUGS (ALT 637 FOR MEDICARE OP): Performed by: ORTHOPAEDIC SURGERY

## 2025-03-29 PROCEDURE — 97530 THERAPEUTIC ACTIVITIES: CPT | Mod: GP,CQ | Performed by: PHYSICAL THERAPY ASSISTANT

## 2025-03-29 PROCEDURE — 97530 THERAPEUTIC ACTIVITIES: CPT | Mod: GO,CO

## 2025-03-29 PROCEDURE — 99233 SBSQ HOSP IP/OBS HIGH 50: CPT | Performed by: FAMILY MEDICINE

## 2025-03-29 PROCEDURE — 82746 ASSAY OF FOLIC ACID SERUM: CPT | Mod: PORLAB | Performed by: FAMILY MEDICINE

## 2025-03-29 PROCEDURE — 2500000001 HC RX 250 WO HCPCS SELF ADMINISTERED DRUGS (ALT 637 FOR MEDICARE OP): Performed by: FAMILY MEDICINE

## 2025-03-29 RX ORDER — AMOXICILLIN 250 MG
1 CAPSULE ORAL NIGHTLY
Start: 2025-03-29

## 2025-03-29 RX ORDER — FERROUS SULFATE 325(65) MG
65 TABLET ORAL
Start: 2025-03-30

## 2025-03-29 RX ORDER — DOCUSATE SODIUM 100 MG/1
100 CAPSULE, LIQUID FILLED ORAL 2 TIMES DAILY
Start: 2025-03-29

## 2025-03-29 RX ORDER — OXYCODONE AND ACETAMINOPHEN 5; 325 MG/1; MG/1
2 TABLET ORAL EVERY 6 HOURS PRN
Qty: 5 TABLET | Refills: 0 | Status: SHIPPED | OUTPATIENT
Start: 2025-03-29 | End: 2025-04-01

## 2025-03-29 RX ORDER — POLYETHYLENE GLYCOL 3350 17 G/17G
17 POWDER, FOR SOLUTION ORAL DAILY
Start: 2025-03-30

## 2025-03-29 RX ORDER — ACETAMINOPHEN 325 MG/1
650 TABLET ORAL EVERY 4 HOURS PRN
Start: 2025-03-29

## 2025-03-29 RX ORDER — FOLIC ACID 1 MG/1
1 TABLET ORAL DAILY
Status: DISCONTINUED | OUTPATIENT
Start: 2025-03-29 | End: 2025-04-02 | Stop reason: HOSPADM

## 2025-03-29 RX ADMIN — DOCUSATE SODIUM 100 MG: 100 CAPSULE, LIQUID FILLED ORAL at 09:23

## 2025-03-29 RX ADMIN — FERROUS SULFATE TAB 325 MG (65 MG ELEMENTAL FE) 325 MG: 325 (65 FE) TAB at 09:23

## 2025-03-29 RX ADMIN — POLYETHYLENE GLYCOL 3350 17 G: 17 POWDER, FOR SOLUTION ORAL at 09:24

## 2025-03-29 RX ADMIN — ASPIRIN 81 MG: 81 TABLET, COATED ORAL at 09:24

## 2025-03-29 RX ADMIN — METOPROLOL TARTRATE 12.5 MG: 25 TABLET, FILM COATED ORAL at 21:18

## 2025-03-29 RX ADMIN — EMPAGLIFLOZIN 10 MG: 10 TABLET, FILM COATED ORAL at 09:23

## 2025-03-29 RX ADMIN — METOPROLOL TARTRATE 12.5 MG: 25 TABLET, FILM COATED ORAL at 09:23

## 2025-03-29 RX ADMIN — OXYCODONE HYDROCHLORIDE AND ACETAMINOPHEN 2 TABLET: 5; 325 TABLET ORAL at 05:17

## 2025-03-29 RX ADMIN — HEPARIN SODIUM 5000 UNITS: 5000 INJECTION, SOLUTION INTRAVENOUS; SUBCUTANEOUS at 21:19

## 2025-03-29 RX ADMIN — DOCUSATE SODIUM 100 MG: 100 CAPSULE, LIQUID FILLED ORAL at 21:19

## 2025-03-29 RX ADMIN — FLUTICASONE FUROATE AND VILANTEROL TRIFENATATE 1 PUFF: 100; 25 POWDER RESPIRATORY (INHALATION) at 05:49

## 2025-03-29 RX ADMIN — TIOTROPIUM BROMIDE INHALATION SPRAY 2 PUFF: 3.12 SPRAY, METERED RESPIRATORY (INHALATION) at 05:49

## 2025-03-29 RX ADMIN — SENNOSIDES AND DOCUSATE SODIUM 1 TABLET: 50; 8.6 TABLET ORAL at 21:19

## 2025-03-29 RX ADMIN — FOLIC ACID 1 MG: 1 TABLET ORAL at 18:41

## 2025-03-29 RX ADMIN — HEPARIN SODIUM 5000 UNITS: 5000 INJECTION, SOLUTION INTRAVENOUS; SUBCUTANEOUS at 05:17

## 2025-03-29 RX ADMIN — HEPARIN SODIUM 5000 UNITS: 5000 INJECTION, SOLUTION INTRAVENOUS; SUBCUTANEOUS at 15:21

## 2025-03-29 ASSESSMENT — COGNITIVE AND FUNCTIONAL STATUS - GENERAL
STANDING UP FROM CHAIR USING ARMS: A LOT
DRESSING REGULAR LOWER BODY CLOTHING: A LOT
DRESSING REGULAR UPPER BODY CLOTHING: A LOT
STANDING UP FROM CHAIR USING ARMS: A LOT
DAILY ACTIVITIY SCORE: 14
DAILY ACTIVITIY SCORE: 15
WALKING IN HOSPITAL ROOM: A LOT
CLIMB 3 TO 5 STEPS WITH RAILING: A LOT
TOILETING: A LOT
MOBILITY SCORE: 14
TURNING FROM BACK TO SIDE WHILE IN FLAT BAD: A LOT
MOBILITY SCORE: 14
TOILETING: A LOT
STANDING UP FROM CHAIR USING ARMS: A LOT
MOVING FROM LYING ON BACK TO SITTING ON SIDE OF FLAT BED WITH BEDRAILS: A LITTLE
DRESSING REGULAR UPPER BODY CLOTHING: A LITTLE
DRESSING REGULAR UPPER BODY CLOTHING: A LOT
HELP NEEDED FOR BATHING: A LOT
MOVING TO AND FROM BED TO CHAIR: A LOT
PERSONAL GROOMING: A LOT
MOBILITY SCORE: 13
DAILY ACTIVITIY SCORE: 14
HELP NEEDED FOR BATHING: A LOT
TOILETING: A LOT
MOVING TO AND FROM BED TO CHAIR: A LOT
TURNING FROM BACK TO SIDE WHILE IN FLAT BAD: A LOT
PERSONAL GROOMING: A LITTLE
CLIMB 3 TO 5 STEPS WITH RAILING: A LOT
HELP NEEDED FOR BATHING: A LOT
DRESSING REGULAR LOWER BODY CLOTHING: A LOT
EATING MEALS: A LITTLE
WALKING IN HOSPITAL ROOM: A LOT
DRESSING REGULAR LOWER BODY CLOTHING: A LOT
CLIMB 3 TO 5 STEPS WITH RAILING: TOTAL
MOVING TO AND FROM BED TO CHAIR: A LOT
TURNING FROM BACK TO SIDE WHILE IN FLAT BAD: A LITTLE
WALKING IN HOSPITAL ROOM: A LOT
PERSONAL GROOMING: A LOT

## 2025-03-29 ASSESSMENT — ACTIVITIES OF DAILY LIVING (ADL): HOME_MANAGEMENT_TIME_ENTRY: 16

## 2025-03-29 ASSESSMENT — PAIN DESCRIPTION - LOCATION: LOCATION: HIP

## 2025-03-29 ASSESSMENT — PAIN - FUNCTIONAL ASSESSMENT
PAIN_FUNCTIONAL_ASSESSMENT: 0-10
PAIN_FUNCTIONAL_ASSESSMENT: 0-10

## 2025-03-29 ASSESSMENT — PAIN SCALES - GENERAL
PAINLEVEL_OUTOF10: 6
PAINLEVEL_OUTOF10: 6
PAINLEVEL_OUTOF10: 7
PAINLEVEL_OUTOF10: 4

## 2025-03-29 ASSESSMENT — PAIN DESCRIPTION - ORIENTATION: ORIENTATION: LEFT

## 2025-03-29 NOTE — CARE PLAN
Problem: Fall/Injury  Goal: Not fall by end of shift  Outcome: Progressing  Goal: Be free from injury by end of the shift  Outcome: Progressing  Goal: Verbalize understanding of personal risk factors for fall in the hospital  Outcome: Progressing  Goal: Verbalize understanding of risk factor reduction measures to prevent injury from fall in the home  Outcome: Progressing  Goal: Use assistive devices by end of the shift  Outcome: Progressing  Goal: Pace activities to prevent fatigue by end of the shift  Outcome: Progressing     Problem: Pain  Goal: Takes deep breaths with improved pain control throughout the shift  Outcome: Progressing  Goal: Turns in bed with improved pain control throughout the shift  Outcome: Progressing  Goal: Walks with improved pain control throughout the shift  Outcome: Progressing  Goal: Performs ADL's with improved pain control throughout shift  Outcome: Progressing  Goal: Participates in PT with improved pain control throughout the shift  Outcome: Progressing  Goal: Free from opioid side effects throughout the shift  Outcome: Progressing  Goal: Free from acute confusion related to pain meds throughout the shift  Outcome: Progressing     Problem: Pain - Adult  Goal: Verbalizes/displays adequate comfort level or baseline comfort level  Outcome: Progressing     Problem: Safety - Adult  Goal: Free from fall injury  Outcome: Progressing     Problem: Discharge Planning  Goal: Discharge to home or other facility with appropriate resources  Outcome: Progressing     Problem: Chronic Conditions and Co-morbidities  Goal: Patient's chronic conditions and co-morbidity symptoms are monitored and maintained or improved  Outcome: Progressing     Problem: Nutrition  Goal: Nutrient intake appropriate for maintaining nutritional needs  Outcome: Progressing     Problem: Skin  Goal: Decreased wound size/increased tissue granulation at next dressing change  Outcome: Progressing  Flowsheets (Taken 3/29/2025  1919)  Decreased wound size/increased tissue granulation at next dressing change:   Promote sleep for wound healing   Protective dressings over bony prominences  Goal: Participates in plan/prevention/treatment measures  Outcome: Progressing  Flowsheets (Taken 3/29/2025 1919)  Participates in plan/prevention/treatment measures:   Discuss with provider PT/OT consult   Elevate heels   Increase activity/out of bed for meals  Goal: Prevent/manage excess moisture  Outcome: Progressing  Flowsheets (Taken 3/29/2025 1919)  Prevent/manage excess moisture:   Cleanse incontinence/protect with barrier cream   Follow provider orders for dressing changes   Moisturize dry skin   Monitor for/manage infection if present  Goal: Prevent/minimize sheer/friction injuries  Outcome: Progressing  Flowsheets (Taken 3/29/2025 1919)  Prevent/minimize sheer/friction injuries:   Use pull sheet   Increase activity/out of bed for meals   Turn/reposition every 2 hours/use positioning/transfer devices   HOB 30 degrees or less  Goal: Promote/optimize nutrition  Outcome: Progressing  Flowsheets (Taken 3/29/2025 1919)  Promote/optimize nutrition:   Monitor/record intake including meals   Consume > 50% meals/supplements   Offer water/supplements/favorite foods  Goal: Promote skin healing  Outcome: Progressing  Flowsheets (Taken 3/29/2025 1919)  Promote skin healing:   Turn/reposition every 2 hours/use positioning/transfer devices   Protective dressings over bony prominences   Assess skin/pad under line(s)/device(s)     Problem: Respiratory  Goal: Clear secretions with interventions this shift  Outcome: Progressing  Goal: Minimize anxiety/maximize coping throughout shift  Outcome: Progressing  Goal: Minimal/no exertional discomfort or dyspnea this shift  Outcome: Progressing  Goal: No signs of respiratory distress (eg. Use of accessory muscles. Peds grunting)  Outcome: Progressing  Goal: Patent airway maintained this shift  Outcome: Progressing

## 2025-03-29 NOTE — CARE PLAN
The patient's goals for the shift include get some rest    The clinical goals for the shift include paatients pain will be <5/10 throughout this shift.      Problem: Fall/Injury  Goal: Not fall by end of shift  Outcome: Progressing  Goal: Be free from injury by end of the shift  Outcome: Progressing  Goal: Verbalize understanding of personal risk factors for fall in the hospital  Outcome: Progressing  Goal: Verbalize understanding of risk factor reduction measures to prevent injury from fall in the home  Outcome: Progressing  Goal: Use assistive devices by end of the shift  Outcome: Progressing  Goal: Pace activities to prevent fatigue by end of the shift  Outcome: Progressing     Problem: Pain  Goal: Takes deep breaths with improved pain control throughout the shift  Outcome: Progressing  Goal: Turns in bed with improved pain control throughout the shift  Outcome: Progressing  Goal: Walks with improved pain control throughout the shift  Outcome: Progressing  Goal: Performs ADL's with improved pain control throughout shift  Outcome: Progressing  Goal: Participates in PT with improved pain control throughout the shift  Outcome: Progressing  Goal: Free from opioid side effects throughout the shift  Outcome: Progressing  Goal: Free from acute confusion related to pain meds throughout the shift  Outcome: Progressing     Problem: Pain - Adult  Goal: Verbalizes/displays adequate comfort level or baseline comfort level  Outcome: Progressing     Problem: Safety - Adult  Goal: Free from fall injury  Outcome: Progressing     Problem: Discharge Planning  Goal: Discharge to home or other facility with appropriate resources  Outcome: Progressing     Problem: Chronic Conditions and Co-morbidities  Goal: Patient's chronic conditions and co-morbidity symptoms are monitored and maintained or improved  Outcome: Progressing     Problem: Nutrition  Goal: Nutrient intake appropriate for maintaining nutritional needs  Outcome:  Progressing     Problem: Skin  Goal: Decreased wound size/increased tissue granulation at next dressing change  Outcome: Progressing  Flowsheets (Taken 3/29/2025 0729)  Decreased wound size/increased tissue granulation at next dressing change:   Protective dressings over bony prominences   Promote sleep for wound healing  Goal: Participates in plan/prevention/treatment measures  Outcome: Progressing  Flowsheets (Taken 3/29/2025 0729)  Participates in plan/prevention/treatment measures:   Elevate heels   Increase activity/out of bed for meals  Goal: Prevent/manage excess moisture  Outcome: Progressing  Flowsheets (Taken 3/29/2025 0729)  Prevent/manage excess moisture:   Cleanse incontinence/protect with barrier cream   Moisturize dry skin   Use wicking fabric (obtain order)   Follow provider orders for dressing changes  Goal: Prevent/minimize sheer/friction injuries  Outcome: Progressing  Flowsheets (Taken 3/29/2025 0729)  Prevent/minimize sheer/friction injuries:   Increase activity/out of bed for meals   Use pull sheet   Turn/reposition every 2 hours/use positioning/transfer devices  Goal: Promote/optimize nutrition  Outcome: Progressing  Flowsheets (Taken 3/29/2025 0729)  Promote/optimize nutrition:   Consume > 50% meals/supplements   Offer water/supplements/favorite foods   Monitor/record intake including meals  Goal: Promote skin healing  Outcome: Progressing  Flowsheets (Taken 3/29/2025 0729)  Promote skin healing:   Assess skin/pad under line(s)/device(s)   Turn/reposition every 2 hours/use positioning/transfer devices   Protective dressings over bony prominences   Ensure correct size (line/device) and apply per  instructions   Rotate device position/do not position patient on device     Problem: Respiratory  Goal: Clear secretions with interventions this shift  Outcome: Progressing  Goal: Minimize anxiety/maximize coping throughout shift  Outcome: Progressing  Goal: Minimal/no exertional  discomfort or dyspnea this shift  Outcome: Progressing  Goal: No signs of respiratory distress (eg. Use of accessory muscles. Peds grunting)  Outcome: Progressing  Goal: Patent airway maintained this shift  Outcome: Progressing

## 2025-03-29 NOTE — PROGRESS NOTES
Physical Therapy    Physical Therapy Treatment    Patient Name: Elsa Hinson  MRN: [id number]  Department: [facility] E  Room: [facility]332-A  Today's Date: 3/29/2025  Time Calculation  Start Time: 0942  Stop Time: 1022  Time Calculation (min): 40 min         Assessment/Plan   PT Assessment  PT Assessment Results: Decreased strength, Impaired balance, Decreased endurance, Decreased range of motion  Rehab Prognosis: Good  Barriers to Discharge Home: Caregiver assistance, Physical needs  End of Session Communication: Bedside nurse  Assessment Comment:  (Pt on 3.5L 02. Pt with c/o dizziness during session. BP assessed seated and 83/45 HR 89. Nursing states Pt with low Hgb and refusing transfusion)  End of Session Patient Position: Up in chair, Alarm on     PT Plan  Treatment/Interventions: Bed mobility, Transfer training, Gait training, Stair training, Balance training, Strengthening, Endurance training, Therapeutic exercise, Therapeutic activity, Home exercise program  PT Plan: Ongoing PT  PT Frequency: 6 times per week  PT Discharge Recommendations: Moderate intensity level of continued care  PT - OK to Discharge: Yes (when medically cleared)      General Visit Information:   PT  Visit  PT Received On: 03/29/25  General  Co-Treatment: OT  Co-Treatment Reason:  (safety with mobility during PT specific needs.)  Patient Position Received: Bed, 3 rail up, Alarm on  General Comment:  (3324 ok to tx with visitor present at start.)    Subjective   Precautions:  Precautions  Precautions Comment:  (fall risk)     Date/Time Vitals Session Patient Position Pulse Resp SpO2 BP MAP (mmHg)    03/29/25 1115 --  --  76  --  --  110/64  79           Vital Signs Comment: pt. sleeping, easily awoke, pleasant and agreeable with encouragment.     Objective   Pain:  Pain Assessment  0-10 (Numeric) Pain Score: 7  Cognition:  Cognition  Orientation Level: Oriented X4  :     Treatments:  Bed Mobility 1  Bed Mobility 1: Supine to sitting  Level of  Assistance 1: Minimum assistance, Minimal verbal cues  Bed Mobility 2  Bed Mobility  2: Scooting  Level of Assistance 2: Minimum assistance    Ambulation/Gait Training 1  Surface 1: Level tile  Device 1: Rolling walker  Assistance 1: Minimum assistance, Minimal verbal cues (x2)  Quality of Gait 1: Narrow base of support, Diminished heel strike, Inconsistent stride length  Comments/Distance (ft) 1:  (4x2 Cues for AD approximation and sequencing)  Transfer 1  Transfer From 1: Sit to, Stand to  Transfer to 1: Sit, Stand  Transfer Device 1: Walker  Transfer Level of Assistance 1: Minimum assistance, Moderate verbal cues, +2  Trials/Comments 1:  (cues for hand placement.)  Transfers 2  Technique 2: Stand pivot  Transfer Device 2: Walker  Transfer Level of Assistance 2: Minimum assistance, Moderate verbal cues, +2  Trials/Comments 2:  (cues for sequencing and positioning)    Outcome Measures:  [facility] Basic Mobility  Turning from your back to your side while in a flat bed without using bedrails: A little  Moving from lying on your back to sitting on the side of a flat bed without using bedrails: A little  Moving to and from bed to chair (including a wheelchair): A lot  Standing up from a chair using your arms (e.g. wheelchair or bedside chair): A lot  To walk in hospital room: A lot  Climbing 3-5 steps with railing: Total  Basic Mobility - Total Score: 13    Education Documentation  Mobility Training, taught by Henna Alvarado PTA at 3/29/2025 11:27 AM.  Learner: Patient  Readiness: Acceptance  Method: Explanation  Response: Needs Reinforcement  Comment: safety with transfers    Precautions, taught by Henna Alvarado PTA at 3/29/2025 11:27 AM.  Learner: Patient  Readiness: Acceptance  Method: Explanation  Response: Needs Reinforcement  Comment: safety with transfers    Body Mechanics, taught by Henna Alvarado PTA at 3/29/2025 11:27 AM.  Learner: Patient  Readiness: Acceptance  Method: Explanation  Response: Needs  Reinforcement  Comment: safety with transfers    Precautions, taught by Henna Alvarado PTA at 3/29/2025 11:27 AM.  Learner: Patient  Readiness: Acceptance  Method: Explanation  Response: Needs Reinforcement  Comment: safety with transfers    ADL Training, taught by Henna Alvarado PTA at 3/29/2025 11:27 AM.  Learner: Patient  Readiness: Acceptance  Method: Explanation  Response: Needs Reinforcement  Comment: safety with transfers    Mobility Training, taught by Henna Alvarado PTA at 3/29/2025 11:26 AM.  Learner: Patient  Readiness: Acceptance  Method: Explanation  Response: Needs Reinforcement    Precautions, taught by Henna Alvarado PTA at 3/29/2025 11:26 AM.  Learner: Patient  Readiness: Acceptance  Method: Explanation  Response: Needs Reinforcement    Body Mechanics, taught by Henna Alvarado PTA at 3/29/2025 11:26 AM.  Learner: Patient  Readiness: Acceptance  Method: Explanation  Response: Needs Reinforcement    Precautions, taught by Henna Alvarado PTA at 3/29/2025 11:26 AM.  Learner: Patient  Readiness: Acceptance  Method: Explanation  Response: Needs Reinforcement    ADL Training, taught by Henna Alvarado PTA at 3/29/2025 11:26 AM.  Learner: Patient  Readiness: Acceptance  Method: Explanation  Response: Needs Reinforcement    Education Comments  No comments found.        OP EDUCATION:       Encounter Problems       Encounter Problems (Active)       PT Problem       transfers (Progressing)       Start:  03/26/25    Expected End:  04/09/25       Patient will perform all transfers with CGA x1          gait (Progressing)       Start:  03/26/25    Expected End:  04/09/25       Patient will amb 50+ feet with Min assist x 1          strengthening  (Not Progressing)       Start:  03/26/25    Expected End:  04/09/25       Patient will perform 20+ reps of AROM/RROM for MINI LE's to improve safety and functional independence              Pain - Adult

## 2025-03-29 NOTE — DOCUMENTATION CLARIFICATION NOTE
"    PATIENT:               SARAH LANGFORD  ACCT #:                  [id number]  MRN:                       [id number]  :                       1950  ADMIT DATE:       3/24/2025 4:28 PM  DISCH DATE:  RESPONDING PROVIDER #:        [id number]          PROVIDER RESPONSE TEXT:    Chronic Hypoxemic Respiratory Failure    CDI QUERY TEXT:    Clarification      Instruction:    Based on your assessment of the patient and the clinical information, please provide the requested documentation by clicking on the appropriate radio button and enter any additional information if prompted.    Question: Is there a diagnosis indicative of the clinical information    When answering this query, please exercise your independent professional judgment. The fact that a question is being asked, does not imply that any particular answer is desired or expected.    The patient's clinical indicators include:  Clinical Information: 3/26/25 IM: \" presenting with Displaced intertrochanteric fracture of left femur with repair.\" \"PMHx of COPD on 4L home O2 \"    Clinical Indicators:  3/24/25 CXR: \"IMPRESSION:  Hyperinflated lungs with emphysematous changes. No evidence of acute  cardiopulmonary process.\"  Per vs flow sheet- Using 4liters of oxygen.    Treatment: Oxygen use of 4liters. Breo Ellipta, Albuterol nebs.    Risk Factors:  COPD on 4liters of oxygen at home.  Surgery.  Options provided:  -- Chronic Hypoxemic Respiratory Failure  -- Chronic Hypercapnic Respiratory Failure  -- Chronic Hypoxemic and Hypercapnic Respiratory Failure  -- Other - I will add my own diagnosis  -- Refer to Clinical Documentation Reviewer    Query created by: Rosalinda Kamara on 3/26/2025 11:46 AM      Electronically signed by:  MAMIE LEBLANC MD 3/29/2025 6:10 PM          "

## 2025-03-29 NOTE — PROGRESS NOTES
Elsa Hinson is a 75 y.o. female on day 5 of admission presenting with Displaced intertrochanteric fracture of left femur, initial encounter for closed fracture.      Subjective   75 y.o. female with PMHx of COPD on 4L home O2 24/7 and CAD s/p PCI of mid LCx who presented with left hip pain, which she has had since leaving her rehab facility close to a month ago. She fell just prior to leaving; no x-ray was done. She has had significant pain with ambulation since then. Today she went to see her PCP who ordered x-rays, which showed a left-sided hip fracture. Ortho surgery recommended surgery tomorrow.   ED labwork was benign. CXR was negative. VSS      3/25:                Today the patient is seen operative day postoperatively from her left hip fracture repair.  Seen in the presence of multiple family members.  She is awake alert and interactive appropriately complaining of only discomfort really in the sacral pressure injury area.  Orthopedic surgery describes WBAT and follow-up in 2 weeks.  At this point the patient appears to be refusing to consider SNF due to her recent experience where she fell before she left the SNF and was not evaluated.  It appears her daughters feel she likely would benefit from SNF however.  Otherwise denies interval new symptoms or complaints including chest pain palpitations pleuritic type pain unusual shortness of breath cough sputum nausea abdominal pain flank pain headache visual disturbances fever or chills.    3/26:                  Today the patient is POD #1 and seen once again in the presence of family members.  She appears much more awake alert and interactive today.  Voices no specific complaints and no acute events overnight.  However she continues to states she does not want to go to SNF.  AM-PAC 10.  Once again it appears patient's daughters feel she should go to SNF.  Awaiting transitional care coordinator interaction.  Orthopedic surgery describes WBAT, ASA 81 mg daily  for 6 weeks and follow-up with Dr. العراقي in 2 weeks. Otherwise denies interval new symptoms or complaints including chest pain palpitations pleuritic type pain unusual shortness of breath cough sputum nausea abdominal pain flank pain headache visual disturbances fever or chills.    3/27:                   Today patient is POD #2.  Seen in the presence of her daughter.  Continues awake alert and interactive appropriately.  She is experiencing more significant postoperative pain especially with PT.  Hemoglobin did trend down significantly with no evidence of active bleeding.  WBC trended down which was likely reactive.  After long discussion today with patient and daughter it appears the patient is now agreeable to SNF.  Transitional care working towards arrangements for this. Otherwise denies interval new symptoms or complaints including chest pain palpitations pleuritic type pain unusual shortness of breath cough sputum nausea abdominal pain flank pain headache visual disturbances fever or chills.    3/28:                POD #3.  Once again seen in the presence of her daughter.  She remains awake alert and interactive appropriately.  Continues with expected postoperative discomfort.  Of concern is hemoglobin has decreased to 7.0 with no apparent source of bleeding.  She has not had a BM for 4 days but states this is normal pattern for her.  Taking diet well and no abdominal discomfort.  Operative wound does not appear to demonstrate any hematoma.  Some oozing on the dressing according to orthopedic surgeon today.  150 mL blood loss reported at surgery.  Patient states she has always been iron deficient.  Patient is not wanting blood transfusion at this time and she states she has refused them in the past.  Discussed with daughter that we would monitor hemoglobin while waiting for SNF authorization and transfuse if decreased any further.  She remains asymptomatic with this.  Will obtain iron studies in the morning.   Iron supplementation will be empirically started. Otherwise denies interval new symptoms or complaints including chest pain palpitations pleuritic type pain unusual shortness of breath cough sputum nausea abdominal pain flank pain headache visual disturbances fever or chills.    3/29:               Today the patient remains awake alert and interactive appropriately seen in the bedside chair.  Hemoglobin trended up to 8.1 today.  Still has not had BM however.  Discussed her low iron status and states she has been tried on iron pills in the past but did not tolerate them due to GI distress.  However this was a long time ago.  She is willing to try ferrous sulfate once a day with meal.  Discussed she will need to follow-up with PCP to see if she will require iron infusions.  Awaiting authorization for SNF. Otherwise denies interval new symptoms or complaints including chest pain palpitations pleuritic type pain unusual shortness of breath cough sputum nausea abdominal pain flank pain headache visual disturbances fever or chills.         Objective     Last Recorded Vitals  /68 (BP Location: Right arm, Patient Position: Lying)   Pulse 85   Temp 36.6 °C (97.9 °F) (Temporal)   Resp 17   Wt (!) 39.5 kg (87 lb)   SpO2 99%   Intake/Output last 3 Shifts:    Intake/Output Summary (Last 24 hours) at 3/29/2025 1538  Last data filed at 3/29/2025 1535  Gross per 24 hour   Intake 1288 ml   Output 500 ml   Net 788 ml       Admission Weight  Weight: (!) 39.5 kg (87 lb) (03/24/25 1637)    Daily Weight  03/24/25 : (!) 39.5 kg (87 lb)    Image Results  XR hip left with pelvis when performed 2 or 3 views  Narrative: Interpreted By:  Evaristo Byrnes,   STUDY:  XR HIP LEFT WITH PELVIS WHEN PERFORMED 2 OR 3 VIEWS; ;  3/24/2025  3:56 pm      INDICATION:  Signs/Symptoms:left hip pain s/p fall.      ,M25.552 Pain in left hip      COMPARISON:  None.      ACCESSION NUMBER(S):  [id number]      ORDERING CLINICIAN:  KEEANN ORDAZ       FINDINGS:  Left hip, three views      There is a comminuted markedly angulated and moderately displaced the  left femoral intertrochanteric fracture. Subsequent studies  demonstrate fixation of these. Mild degenerative changes in the hips  bilaterally. There is no dislocation      Impression: Markedly angulated and moderately displaced left femoral  intertrochanteric fracture          MACRO:  None      Signed by: Evaristo Byrnes 3/25/2025 6:08 PM  Dictation workstation:   [id number]  FL less than 1 hour  These images are not reportable by radiology and will not be interpreted   by  Radiologists.  ECG 12 lead  Sinus rhythm  Borderline short PA interval  Borderline right axis deviation  ST elevation, consider inferior injury    See ED provider note for full interpretation and clinical correlation  Confirmed by Shey Osorio ([id number]) on 3/25/2025 10:49:51 AM      Physical Exam  Constitutional: Slender elderly  female, awake, alert, calm, oriented x4, no acute distress, cooperative   Eyes: EOMI, clear sclerae   ENMT: mucous membranes moist, no lesions seen   Head/Neck: Neck supple, no apparent injury, head atraumatic   Respiratory/Thorax: CTAB, good chest expansion, respirations even and unlabored, pt on 4L O2   Cardiovascular: Regular rate and rhythm, no murmurs/rubs/gallops, normal S1 and S 2   Gastrointestinal: Abdomen nondistended, soft, nontender, +BS, no bruits   Musculoskeletal: ROM intact except left lower extremity/hip, no joint swelling, normal  strength.  Attention to the dressing and thigh reveals no evidence for hematoma or significant bleeding.   Extremities: no cyanosis, contusions or clubbing, or edema   Neurological: no focal deficit, pt alert and oriented x4   Psychological: Appropriate affect and behavior, pleasant   Skin: Warm and dry, pressure injury sacrum, no rashes     Relevant Results               Assessment/Plan          This patient has a urinary catheter   Reason for the  urinary catheter remaining today? perioperative use for selected surgical procedures          Assessment & Plan    Acute left-sided hip fracture 2/2 fall about a month PTA  Consult ortho surgery for planned intervention tomorrow   Continue pain and nausea regimen  Pt denies recent cardiac and respiratory symptoms, COPD is stable, EKG shows no evidence of myocardial ischemia (limited read 2/2 artifact), echo 2/14/25 revealed LVEF 60-65%, mild RVSP and no valvulopathy  Pt has 1 risk factor for this procedure and 0.9% risk of a major cardiac event per the Revised Cardiac Risk Index  3/25: Operative day for left intertrochanteric hip fracture repair.  No apparent perioperative complications and doing well at this point.  However patient presently is fairly adamant about refusing SNF.  3/26: POD #1.  Hemoglobin trended down slightly as expected.  WBC lovely felt likely reactive in nature.  Continue to monitor.  Awaiting patient and family decision for discharge disposition.  3/27: POD #2.  Hemoglobin continued to trend down with no evidence of active bleeding.  WBC improved likely reactive.  Today patient is agreeable to SNF.  3/28: POD #3.  Hemoglobin once again trended down to 7.0.  No apparent active bleeding though no BM in 4 days.  Hillman bowel regimen.  Continue to monitor closely.  Patient is somewhat reluctant to have blood transfusion at this point.  3/29: POD #4.  Hemoglobin trended up to 8.1 today.  Awaiting authorization for SNF.    Acute blood loss anemia  3/29: Hemoglobin reached a maxine of 7.0 yesterday with no obvious bleeding other than some minor oozing at the wound.  150 mL blood loss with surgery.  Iron studies low.  Was apparently intolerant at 1 point of oral iron but a long time ago and she is willing to try ferrous sulfate once a day with meals.  Continue to monitor.     COPD on 4L home O2 24/7  Continue home inhalers, pt is not hypoxic below baseline  CXR reveals no acute changes  3/27:  Continues on home O2 level doing well.     Hx CAD s/p PCI of mid LCx   Continue Lipitor and metoprolol, will hold ASA preoperatively     DVT ppx: SubQ heparin             Olaf Norman MD

## 2025-03-29 NOTE — CARE PLAN
The patient's goals for the shift include   Problem: Fall/Injury  Goal: Not fall by end of shift  Outcome: Progressing     Problem: Pain  Goal: Takes deep breaths with improved pain control throughout the shift  Outcome: Progressing       The clinical goals for the shift include

## 2025-03-29 NOTE — PROGRESS NOTES
"Occupational Therapy    OT Treatment    Patient Name: Elsa Hinson  MRN: [id number]  Department: [facility]  Room: [address] [place] [address]  Today's Date: 3/29/2025  Time Calculation  Start Time: 0943  Stop Time: 1022  Time Calculation (min): 39 min        Assessment: pt. Progressing demonstrated low bp and dizziness with mobility  Barriers to Discharge Home: Physical needs, Cognition needs, Caregiver assistance  End of Session Communication: Bedside nurse, PCT/NA/CTA  End of Session Patient Position: Up in chair, Alarm on     Plan:  Treatment Interventions: Functional transfer training, Endurance training, Patient/family training  OT Frequency: 4 times per week  OT Discharge Recommendations: Moderate intensity level of continued care  OT Recommended Transfer Status: Minimal assist, Assist of 2  OT - OK to Discharge: Yes (when medically stable)  Treatment Interventions: Functional transfer training, Endurance training, Patient/family training    Subjective \" The room is spinning\"  Previous Visit Info:     General:  General  Co-Treatment: PT  Co-Treatment Reason: Pts level of care and safety  Prior to Session Communication: Bedside nurse  Patient Position Received: Bed, 3 rail up, Alarm on  General Comment: .  Precautions:        Date/Time Vitals Session Patient Position Pulse Resp SpO2 BP MAP (mmHg)    03/29/25 1115 --  --  76  --  --  110/64  79                 Pain:  Pain Assessment  Pain Assessment: 0-10  0-10 (Numeric) Pain Score: 6    Objective    Cognition:  Cognition  Overall Cognitive Status: Within Functional Limits  Orientation Level: Oriented X4       Activities of Daily Living:      Grooming  Grooming Level of Assistance: Setup  Grooming Comments: brush hair EOB        LE Dressing  LE Dressing: Yes  LE Dressing Adaptive Equipment: Dressing stick, Sock aide  Sock Level of Assistance: Moderate assistance  LE Dressing Comments: R sock did not attempt left due to banadage         Bed Mobility/Transfers: Bed Mobility  Bed " Mobility: Yes  Bed Mobility 1  Bed Mobility 1: Supine to sitting  Level of Assistance 1: Minimum assistance  Bed Mobility Comments 1: cueing for hand placement    Transfers  Transfer: Yes  Transfer 1  Transfer From 1: Bed to  Transfer to 1: Chair with arms  Technique 1: Sit to stand, Stand to sit, Stand pivot  Transfer Device 1: Walker  Transfer Level of Assistance 1: Minimum assistance (another for safety)         Functional Mobility:  Functional Mobility  Functional Mobility Performed: Yes  Functional Mobility 1  Surface 1:  (room)  Device 1: Rolling walker  Assistance 1: Minimum assistance  Quality of Functional Mobility 1:  (small steps)  Comments 1: cueing for body mechanics and safety          Outcome Measures:[facility] Daily Activity  Putting on and taking off regular lower body clothing: A lot  Bathing (including washing, rinsing, drying): A lot  Putting on and taking off regular upper body clothing: A little  Toileting, which includes using toilet, bedpan or urinal: A lot  Taking care of personal grooming such as brushing teeth: A little  Eating Meals: A little  Daily Activity - Total Score: 15        Education Documentation  Body Mechanics, taught by JAMES Paniagua at 3/29/2025 11:16 AM.  Learner: Patient  Readiness: Acceptance  Method: Explanation  Response: Verbalizes Understanding, Needs Reinforcement    Precautions, taught by JAMES Paniagua at 3/29/2025 11:16 AM.  Learner: Patient  Readiness: Acceptance  Method: Explanation  Response: Verbalizes Understanding, Needs Reinforcement    ADL Training, taught by JAMES Paniagua at 3/29/2025 11:16 AM.  Learner: Patient  Readiness: Acceptance  Method: Explanation  Response: Verbalizes Understanding, Needs Reinforcement    Education Comments  No comments found.        OP EDUCATION:       Goals:  Encounter Problems       Encounter Problems (Active)       ADLs       Patient with complete lower body dressing with modified independent level  of assistance donning and doffing all LE clothes  with PRN adaptive equipment while supported sitting and standing (Progressing)       Start:  03/26/25    Expected End:  04/16/25               TRANSFERS       Patient will complete functional transfers with least restrictive device with modified independent level of assistance. (Progressing)       Start:  03/26/25    Expected End:  04/16/25

## 2025-03-29 NOTE — DOCUMENTATION CLARIFICATION NOTE
"    PATIENT:               SARAH LANGFORD  ACCT #:                  [id number]  MRN:                       [id number]  :                       1950  ADMIT DATE:       3/24/2025 4:28 PM  DISCH DATE:  RESPONDING PROVIDER #:        [id number]          PROVIDER RESPONSE TEXT:    Stage II Pressure Injury to Sacrum as documented by wound care on 3/25/25.    CDI QUERY TEXT:    Clarification        Instruction:    Based on your assessment of the patient and the clinical information, please provide the requested documentation by clicking on the appropriate radio button and enter any additional information if prompted.    Question: Please further clarify the pressure injury site, stage/depth    When answering this query, please exercise your independent professional judgment. The fact that a question is being asked, does not imply that any particular answer is desired or expected.    The patient's clinical indicators include:  Clinical Information: 3/26/25 PN: \"75 y.o. female s/p left hip ORIF completed on 3/25/25 \"      Clinical Indicators:  3/25/25 Wound care: \"Pressure Injury Sacrum Medial. Photo reviewed taken by bedside RN Cat benjamin, suspected cluster of stage 2 pressure injuries noted. Bedside RN states patient obtained skin tear while in surgery (was communicated in report with OR nurse). Large skin tear noted to left ankle with total flap loss.  Skin hygiene and dressing care preformed. \"    Treatment: Per wound care 3/25/25: \"Sacrum: Cleanse with NS. Apply mepilex border foam. Change every 3 days and PRN.\"    Risk Factors: BMI 16. Immobility.  Options provided:  -- Stage II Pressure Injury to Sacrum as documented by wound care on 3/25/25.  -- Other - I will add my own diagnosis  -- Refer to Clinical Documentation Reviewer    Query created by: Rosalinda Kamara on 3/27/2025 6:21 AM      Electronically signed by:  MAMIE LEBLANC MD 3/29/2025 6:10 PM          "

## 2025-03-30 VITALS
BODY MASS INDEX: 16.42 KG/M2 | TEMPERATURE: 97.9 F | RESPIRATION RATE: 17 BRPM | HEART RATE: 75 BPM | OXYGEN SATURATION: 100 % | WEIGHT: 87 LBS | SYSTOLIC BLOOD PRESSURE: 127 MMHG | HEIGHT: 61 IN | DIASTOLIC BLOOD PRESSURE: 71 MMHG

## 2025-03-30 LAB
ERYTHROCYTE [DISTWIDTH] IN BLOOD BY AUTOMATED COUNT: 16.8 % (ref 11.5–14.5)
HCT VFR BLD AUTO: 23.4 % (ref 36–46)
HGB BLD-MCNC: 7.1 G/DL (ref 12–16)
MCH RBC QN AUTO: 30.1 PG (ref 26–34)
MCHC RBC AUTO-ENTMCNC: 30.3 G/DL (ref 32–36)
MCV RBC AUTO: 99 FL (ref 80–100)
NRBC BLD-RTO: 0 /100 WBCS (ref 0–0)
PLATELET # BLD AUTO: 261 X10*3/UL (ref 150–450)
RBC # BLD AUTO: 2.36 X10*6/UL (ref 4–5.2)
WBC # BLD AUTO: 8.8 X10*3/UL (ref 4.4–11.3)

## 2025-03-30 PROCEDURE — 1100000001 HC PRIVATE ROOM DAILY

## 2025-03-30 PROCEDURE — 2500000001 HC RX 250 WO HCPCS SELF ADMINISTERED DRUGS (ALT 637 FOR MEDICARE OP): Performed by: ORTHOPAEDIC SURGERY

## 2025-03-30 PROCEDURE — 97116 GAIT TRAINING THERAPY: CPT | Mod: GP,CQ | Performed by: PHYSICAL THERAPY ASSISTANT

## 2025-03-30 PROCEDURE — 2500000001 HC RX 250 WO HCPCS SELF ADMINISTERED DRUGS (ALT 637 FOR MEDICARE OP): Performed by: FAMILY MEDICINE

## 2025-03-30 PROCEDURE — 97530 THERAPEUTIC ACTIVITIES: CPT | Mod: GP,CQ | Performed by: PHYSICAL THERAPY ASSISTANT

## 2025-03-30 PROCEDURE — 85027 COMPLETE CBC AUTOMATED: CPT | Performed by: FAMILY MEDICINE

## 2025-03-30 PROCEDURE — 99233 SBSQ HOSP IP/OBS HIGH 50: CPT | Performed by: FAMILY MEDICINE

## 2025-03-30 PROCEDURE — 36415 COLL VENOUS BLD VENIPUNCTURE: CPT | Performed by: FAMILY MEDICINE

## 2025-03-30 PROCEDURE — 2500000004 HC RX 250 GENERAL PHARMACY W/ HCPCS (ALT 636 FOR OP/ED): Performed by: ORTHOPAEDIC SURGERY

## 2025-03-30 PROCEDURE — 2500000004 HC RX 250 GENERAL PHARMACY W/ HCPCS (ALT 636 FOR OP/ED): Performed by: FAMILY MEDICINE

## 2025-03-30 RX ADMIN — OXYCODONE HYDROCHLORIDE AND ACETAMINOPHEN 2 TABLET: 5; 325 TABLET ORAL at 01:09

## 2025-03-30 RX ADMIN — HEPARIN SODIUM 5000 UNITS: 5000 INJECTION, SOLUTION INTRAVENOUS; SUBCUTANEOUS at 15:33

## 2025-03-30 RX ADMIN — FLUTICASONE FUROATE AND VILANTEROL TRIFENATATE 1 PUFF: 100; 25 POWDER RESPIRATORY (INHALATION) at 06:28

## 2025-03-30 RX ADMIN — METOPROLOL TARTRATE 12.5 MG: 25 TABLET, FILM COATED ORAL at 20:57

## 2025-03-30 RX ADMIN — OXYCODONE HYDROCHLORIDE AND ACETAMINOPHEN 2 TABLET: 5; 325 TABLET ORAL at 09:18

## 2025-03-30 RX ADMIN — DOCUSATE SODIUM 100 MG: 100 CAPSULE, LIQUID FILLED ORAL at 20:58

## 2025-03-30 RX ADMIN — DOCUSATE SODIUM 100 MG: 100 CAPSULE, LIQUID FILLED ORAL at 09:18

## 2025-03-30 RX ADMIN — FOLIC ACID 1 MG: 1 TABLET ORAL at 09:18

## 2025-03-30 RX ADMIN — ASPIRIN 81 MG: 81 TABLET, COATED ORAL at 09:18

## 2025-03-30 RX ADMIN — HEPARIN SODIUM 5000 UNITS: 5000 INJECTION, SOLUTION INTRAVENOUS; SUBCUTANEOUS at 21:09

## 2025-03-30 RX ADMIN — FERROUS SULFATE TAB 325 MG (65 MG ELEMENTAL FE) 325 MG: 325 (65 FE) TAB at 09:17

## 2025-03-30 RX ADMIN — TIOTROPIUM BROMIDE INHALATION SPRAY 2 PUFF: 3.12 SPRAY, METERED RESPIRATORY (INHALATION) at 06:28

## 2025-03-30 RX ADMIN — EMPAGLIFLOZIN 10 MG: 10 TABLET, FILM COATED ORAL at 09:17

## 2025-03-30 RX ADMIN — HEPARIN SODIUM 5000 UNITS: 5000 INJECTION, SOLUTION INTRAVENOUS; SUBCUTANEOUS at 06:28

## 2025-03-30 RX ADMIN — IRON SUCROSE 200 MG: 20 INJECTION, SOLUTION INTRAVENOUS at 12:06

## 2025-03-30 RX ADMIN — POLYETHYLENE GLYCOL 3350 17 G: 17 POWDER, FOR SOLUTION ORAL at 09:16

## 2025-03-30 RX ADMIN — METOPROLOL TARTRATE 12.5 MG: 25 TABLET, FILM COATED ORAL at 09:18

## 2025-03-30 ASSESSMENT — COGNITIVE AND FUNCTIONAL STATUS - GENERAL
MOBILITY SCORE: 15
DRESSING REGULAR LOWER BODY CLOTHING: A LOT
PERSONAL GROOMING: A LOT
TURNING FROM BACK TO SIDE WHILE IN FLAT BAD: A LITTLE
TOILETING: A LOT
TURNING FROM BACK TO SIDE WHILE IN FLAT BAD: A LITTLE
PERSONAL GROOMING: A LOT
DAILY ACTIVITIY SCORE: 14
STANDING UP FROM CHAIR USING ARMS: A LOT
STANDING UP FROM CHAIR USING ARMS: A LOT
MOBILITY SCORE: 15
WALKING IN HOSPITAL ROOM: A LOT
CLIMB 3 TO 5 STEPS WITH RAILING: A LOT
HELP NEEDED FOR BATHING: A LOT
DRESSING REGULAR UPPER BODY CLOTHING: A LOT
MOVING FROM LYING ON BACK TO SITTING ON SIDE OF FLAT BED WITH BEDRAILS: A LITTLE
MOBILITY SCORE: 15
MOVING TO AND FROM BED TO CHAIR: A LOT
STANDING UP FROM CHAIR USING ARMS: A LITTLE
TURNING FROM BACK TO SIDE WHILE IN FLAT BAD: A LITTLE
DAILY ACTIVITIY SCORE: 14
DRESSING REGULAR UPPER BODY CLOTHING: A LOT
HELP NEEDED FOR BATHING: A LOT
CLIMB 3 TO 5 STEPS WITH RAILING: A LOT
WALKING IN HOSPITAL ROOM: A LOT
MOVING TO AND FROM BED TO CHAIR: A LOT
MOVING TO AND FROM BED TO CHAIR: A LITTLE
TOILETING: A LOT
WALKING IN HOSPITAL ROOM: A LOT
DRESSING REGULAR LOWER BODY CLOTHING: A LOT
CLIMB 3 TO 5 STEPS WITH RAILING: TOTAL

## 2025-03-30 ASSESSMENT — PAIN - FUNCTIONAL ASSESSMENT
PAIN_FUNCTIONAL_ASSESSMENT: 0-10

## 2025-03-30 ASSESSMENT — PAIN SCALES - GENERAL
PAINLEVEL_OUTOF10: 0 - NO PAIN
PAINLEVEL_OUTOF10: 0 - NO PAIN
PAINLEVEL_OUTOF10: 8
PAINLEVEL_OUTOF10: 4
PAINLEVEL_OUTOF10: 8

## 2025-03-30 ASSESSMENT — PAIN DESCRIPTION - ORIENTATION: ORIENTATION: LEFT

## 2025-03-30 ASSESSMENT — PAIN DESCRIPTION - LOCATION: LOCATION: HIP

## 2025-03-30 NOTE — PROGRESS NOTES
Physical Therapy    Physical Therapy Treatment    Patient Name: Elsa Hinson  MRN: [id number]  Department: [facility] 3 E  Room: [address] [place] [address]A  Today's Date: 3/30/2025  Time Calculation  Start Time: 0947  Stop Time: 1025  Time Calculation (min): 38 min         Assessment/Plan   PT Assessment  PT Assessment Results: Decreased strength, Decreased range of motion, Decreased endurance, Impaired balance  Rehab Prognosis: Good  Barriers to Discharge Home: Caregiver assistance, Physical needs  End of Session Communication: Bedside nurse, PCT/NA/CTA  Assessment Comment:  (Pt with intermittent c/o dizziness. Rest break as needed to subside. Cues for breathing technique during session. increased time to complete tasks.)  End of Session Patient Position: Up in chair, Alarm on     PT Plan  Treatment/Interventions: Bed mobility, Transfer training, Gait training, Stair training, Balance training, Strengthening, Endurance training, Therapeutic exercise, Therapeutic activity, Home exercise program  PT Plan: Ongoing PT  PT Frequency: 6 times per week  PT Discharge Recommendations: Moderate intensity level of continued care  PT - OK to Discharge: Yes (when medically cleared)      General Visit Information:   PT  Visit  PT Received On: 03/30/25  Response to Previous Treatment: Patient with no complaints from previous session.  General  Prior to Session Communication: Bedside nurse  Patient Position Received: Bed, 3 rail up, Alarm on  General Comment:  (3324 ok to tx and Pt agreeable)    Subjective   Precautions:  Precautions  LE Weight Bearing Status: Weight Bearing as Tolerated  Precautions Comment:  (fall risk, 02)            Objective   Pain:  Pain Assessment  0-10 (Numeric) Pain Score: 0 - No pain  Cognition:  Cognition  Orientation Level: Oriented X4    Treatments:  Balance/Neuromuscular Re-Education  Balance/Neuromuscular Re-Education Activity 1:  (Static stand at FWW. F-balance 1-2min.)    Bed Mobility 1  Bed Mobility 1: Supine to  sitting  Level of Assistance 1: Contact guard  Bed Mobility Comments 1:  (increased time and effort.)    Ambulation/Gait Training 1  Surface 1: Level tile  Device 1: Rolling walker  Gait Support Devices: Gait belt  Assistance 1: Minimum assistance, Minimal verbal cues  Quality of Gait 1: Narrow base of support, Diminished heel strike, Inconsistent stride length  Comments/Distance (ft) 1:  (4x1, 10x1 Cues for positioning of AD for safety. PTA managing supplemental 02line)  Transfer 1  Transfer From 1: Sit to, Stand to  Transfer to 1: Sit, Stand  Transfer Device 1: Walker  Transfer Level of Assistance 1: Contact guard, Minimal verbal cues  Transfers 2  Technique 2: Stand pivot  Transfer Device 2: Walker, Gait belt  Transfer Level of Assistance 2: Contact guard, Minimal verbal cues  Trials/Comments 2:  (cues for positioning)    Outcome Measures:  [facility] Basic Mobility  Turning from your back to your side while in a flat bed without using bedrails: A little  Moving from lying on your back to sitting on the side of a flat bed without using bedrails: A little  Moving to and from bed to chair (including a wheelchair): A little  Standing up from a chair using your arms (e.g. wheelchair or bedside chair): A little  To walk in hospital room: A lot  Climbing 3-5 steps with railing: Total  Basic Mobility - Total Score: 15    Education Documentation  Mobility Training, taught by Henna Alvarado PTA at 3/30/2025 11:31 AM.  Learner: Patient  Readiness: Acceptance  Method: Explanation  Response: Needs Reinforcement  Comment: breathing technique and importance of proper nutrition    Precautions, taught by Henna Alvarado PTA at 3/30/2025 11:31 AM.  Learner: Patient  Readiness: Acceptance  Method: Explanation  Response: Needs Reinforcement  Comment: breathing technique and importance of proper nutrition    Education Comments  No comments found.        OP EDUCATION:       Encounter Problems       Encounter Problems (Active)       PT Problem        transfers (Progressing)       Start:  03/26/25    Expected End:  04/09/25       Patient will perform all transfers with CGA x1          gait (Progressing)       Start:  03/26/25    Expected End:  04/09/25       Patient will amb 50+ feet with Min assist x 1          strengthening  (Not Progressing)       Start:  03/26/25    Expected End:  04/09/25       Patient will perform 20+ reps of AROM/RROM for MINI LE's to improve safety and functional independence              Pain - Adult

## 2025-03-30 NOTE — PROGRESS NOTES
Elsa Hinson is a 75 y.o. female on day 6 of admission presenting with Displaced intertrochanteric fracture of left femur, initial encounter for closed fracture.      Subjective   75 y.o. female with PMHx of COPD on 4L home O2 24/7 and CAD s/p PCI of mid LCx who presented with left hip pain, which she has had since leaving her rehab facility close to a month ago. She fell just prior to leaving; no x-ray was done. She has had significant pain with ambulation since then. Today she went to see her PCP who ordered x-rays, which showed a left-sided hip fracture. Ortho surgery recommended surgery tomorrow.   ED labwork was benign. CXR was negative. VSS      3/25:                Today the patient is seen operative day postoperatively from her left hip fracture repair.  Seen in the presence of multiple family members.  She is awake alert and interactive appropriately complaining of only discomfort really in the sacral pressure injury area.  Orthopedic surgery describes WBAT and follow-up in 2 weeks.  At this point the patient appears to be refusing to consider SNF due to her recent experience where she fell before she left the SNF and was not evaluated.  It appears her daughters feel she likely would benefit from SNF however.  Otherwise denies interval new symptoms or complaints including chest pain palpitations pleuritic type pain unusual shortness of breath cough sputum nausea abdominal pain flank pain headache visual disturbances fever or chills.    3/26:                  Today the patient is POD #1 and seen once again in the presence of family members.  She appears much more awake alert and interactive today.  Voices no specific complaints and no acute events overnight.  However she continues to states she does not want to go to SNF.  AM-PAC 10.  Once again it appears patient's daughters feel she should go to SNF.  Awaiting transitional care coordinator interaction.  Orthopedic surgery describes WBAT, ASA 81 mg daily  for 6 weeks and follow-up with Dr. العراقي in 2 weeks. Otherwise denies interval new symptoms or complaints including chest pain palpitations pleuritic type pain unusual shortness of breath cough sputum nausea abdominal pain flank pain headache visual disturbances fever or chills.    3/27:                   Today patient is POD #2.  Seen in the presence of her daughter.  Continues awake alert and interactive appropriately.  She is experiencing more significant postoperative pain especially with PT.  Hemoglobin did trend down significantly with no evidence of active bleeding.  WBC trended down which was likely reactive.  After long discussion today with patient and daughter it appears the patient is now agreeable to SNF.  Transitional care working towards arrangements for this. Otherwise denies interval new symptoms or complaints including chest pain palpitations pleuritic type pain unusual shortness of breath cough sputum nausea abdominal pain flank pain headache visual disturbances fever or chills.    3/28:                POD #3.  Once again seen in the presence of her daughter.  She remains awake alert and interactive appropriately.  Continues with expected postoperative discomfort.  Of concern is hemoglobin has decreased to 7.0 with no apparent source of bleeding.  She has not had a BM for 4 days but states this is normal pattern for her.  Taking diet well and no abdominal discomfort.  Operative wound does not appear to demonstrate any hematoma.  Some oozing on the dressing according to orthopedic surgeon today.  150 mL blood loss reported at surgery.  Patient states she has always been iron deficient.  Patient is not wanting blood transfusion at this time and she states she has refused them in the past.  Discussed with daughter that we would monitor hemoglobin while waiting for SNF authorization and transfuse if decreased any further.  She remains asymptomatic with this.  Will obtain iron studies in the morning.   Iron supplementation will be empirically started. Otherwise denies interval new symptoms or complaints including chest pain palpitations pleuritic type pain unusual shortness of breath cough sputum nausea abdominal pain flank pain headache visual disturbances fever or chills.    3/29:               Today the patient remains awake alert and interactive appropriately seen in the bedside chair.  Hemoglobin trended up to 8.1 today.  Still has not had BM however.  Discussed her low iron status and states she has been tried on iron pills in the past but did not tolerate them due to GI distress.  However this was a long time ago.  She is willing to try ferrous sulfate once a day with meal.  Discussed she will need to follow-up with PCP to see if she will require iron infusions.  Awaiting authorization for SNF. Otherwise denies interval new symptoms or complaints including chest pain palpitations pleuritic type pain unusual shortness of breath cough sputum nausea abdominal pain flank pain headache visual disturbances fever or chills.    3/30:              Today patient remains awake alert and interactive appropriately and seen in the presence of her daughter.  Voices no specific complaints and no acute events overnight.  States she is continuing to work well with PT.  She does describe some abdominal discomfort symptoms with ferrous sulfate.  We will give her IV iron infusion today.  Started on folic acid for folate deficiency.  Awaiting authorization for SNF. Otherwise denies interval new symptoms or complaints including chest pain palpitations pleuritic type pain unusual shortness of breath cough sputum nausea abdominal pain flank pain headache visual disturbances fever or chills.         Objective     Last Recorded Vitals  /77 (BP Location: Right arm, Patient Position: Lying)   Pulse 86   Temp 36.6 °C (97.8 °F) (Temporal)   Resp 17   Wt (!) 39.5 kg (87 lb)   SpO2 100%   Intake/Output last 3  Shifts:    Intake/Output Summary (Last 24 hours) at 3/30/2025 1317  Last data filed at 3/30/2025 0906  Gross per 24 hour   Intake 240 ml   Output 200 ml   Net 40 ml       Admission Weight  Weight: (!) 39.5 kg (87 lb) (03/24/25 1637)    Daily Weight  03/24/25 : (!) 39.5 kg (87 lb)    Image Results  XR hip left with pelvis when performed 2 or 3 views  Narrative: Interpreted By:  Evaristo Byrnes,   STUDY:  XR HIP LEFT WITH PELVIS WHEN PERFORMED 2 OR 3 VIEWS; ;  3/24/2025  3:56 pm      INDICATION:  Signs/Symptoms:left hip pain s/p fall.      ,M25.552 Pain in left hip      COMPARISON:  None.      ACCESSION NUMBER(S):  [id number]      ORDERING CLINICIAN:  KEENAN ORDAZ      FINDINGS:  Left hip, three views      There is a comminuted markedly angulated and moderately displaced the  left femoral intertrochanteric fracture. Subsequent studies  demonstrate fixation of these. Mild degenerative changes in the hips  bilaterally. There is no dislocation      Impression: Markedly angulated and moderately displaced left femoral  intertrochanteric fracture          MACRO:  None      Signed by: Evaristo Byrnes 3/25/2025 6:08 PM  Dictation workstation:   [id number]  FL less than 1 hour  These images are not reportable by radiology and will not be interpreted   by  Radiologists.  ECG 12 lead  Sinus rhythm  Borderline short NE interval  Borderline right axis deviation  ST elevation, consider inferior injury    See ED provider note for full interpretation and clinical correlation  Confirmed by Shey Osorio ([id number]) on 3/25/2025 10:49:51 AM      Physical Exam  Constitutional: Slender elderly  female, awake, alert, calm, oriented x4, no acute distress, cooperative   Eyes: EOMI, clear sclerae   ENMT: mucous membranes moist, no lesions seen   Head/Neck: Neck supple, no apparent injury, head atraumatic   Respiratory/Thorax: CTAB, good chest expansion, respirations even and unlabored, pt on 4L O2   Cardiovascular: Regular  rate and rhythm, no murmurs/rubs/gallops, normal S1 and S 2   Gastrointestinal: Abdomen nondistended, soft, nontender, +BS, no bruits   Musculoskeletal: ROM intact except left lower extremity/hip, no joint swelling, normal  strength.  Attention to the dressing and thigh reveals no evidence for hematoma or significant bleeding.   Extremities: no cyanosis, contusions or clubbing, or edema   Neurological: no focal deficit, pt alert and oriented x4   Psychological: Appropriate affect and behavior, pleasant   Skin: Warm and dry, pressure injury sacrum, no rashes     Relevant Results               Assessment/Plan          This patient has a urinary catheter   Reason for the urinary catheter remaining today? perioperative use for selected surgical procedures          Assessment & Plan    Acute left-sided hip fracture 2/2 fall about a month PTA  Consult ortho surgery for planned intervention tomorrow   Continue pain and nausea regimen  Pt denies recent cardiac and respiratory symptoms, COPD is stable, EKG shows no evidence of myocardial ischemia (limited read 2/2 artifact), echo 2/14/25 revealed LVEF 60-65%, mild RVSP and no valvulopathy  Pt has 1 risk factor for this procedure and 0.9% risk of a major cardiac event per the Revised Cardiac Risk Index  3/25: Operative day for left intertrochanteric hip fracture repair.  No apparent perioperative complications and doing well at this point.  However patient presently is fairly adamant about refusing SNF.  3/26: POD #1.  Hemoglobin trended down slightly as expected.  WBC lovely felt likely reactive in nature.  Continue to monitor.  Awaiting patient and family decision for discharge disposition.  3/27: POD #2.  Hemoglobin continued to trend down with no evidence of active bleeding.  WBC improved likely reactive.  Today patient is agreeable to SNF.  3/28: POD #3.  Hemoglobin once again trended down to 7.0.  No apparent active bleeding though no BM in 4 days.  [place] bowel  regimen.  Continue to monitor closely.  Patient is somewhat reluctant to have blood transfusion at this point.  3/29: POD #4.  Hemoglobin trended up to 8.1 today.  Awaiting authorization for SNF.  3/30: POD #5.  Awaiting authorization for SNF.    Acute blood loss anemia  3/29: Hemoglobin reached a maxine of 7.0 yesterday with no obvious bleeding other than some minor oozing at the wound.  150 mL blood loss with surgery.  Iron studies low.  Was apparently intolerant at 1 point of oral iron but a long time ago and she is willing to try ferrous sulfate once a day with meals.   3/30: Hemoglobin appeared to trend down to 7.1 today.  Still no significant evidence for bleeding.  Folic acid started for folate deficiency and as she appeared to have some mild adverse reaction to oral iron we will give her IV iron infusion today.  Continue to monitor.     COPD on 4L home O2 24/7  Continue home inhalers, pt is not hypoxic below baseline  CXR reveals no acute changes  3/27: Continues on home O2 level doing well.     Hx CAD s/p PCI of mid LCx   Continue Lipitor and metoprolol, will hold ASA preoperatively     DVT ppx: SubQ heparin             Olaf Norman MD

## 2025-03-30 NOTE — CARE PLAN
The patient's goals for the shift include get some rest    The clinical goals for the shift include patient will tolerate ambulation throughout this shift.      Problem: Fall/Injury  Goal: Not fall by end of shift  Outcome: Progressing  Goal: Be free from injury by end of the shift  Outcome: Progressing  Goal: Verbalize understanding of personal risk factors for fall in the hospital  Outcome: Progressing  Goal: Verbalize understanding of risk factor reduction measures to prevent injury from fall in the home  Outcome: Progressing  Goal: Use assistive devices by end of the shift  Outcome: Progressing  Goal: Pace activities to prevent fatigue by end of the shift  Outcome: Progressing     Problem: Pain  Goal: Takes deep breaths with improved pain control throughout the shift  Outcome: Progressing  Goal: Turns in bed with improved pain control throughout the shift  Outcome: Progressing  Goal: Walks with improved pain control throughout the shift  Outcome: Progressing  Goal: Performs ADL's with improved pain control throughout shift  Outcome: Progressing  Goal: Participates in PT with improved pain control throughout the shift  Outcome: Progressing  Goal: Free from opioid side effects throughout the shift  Outcome: Progressing  Goal: Free from acute confusion related to pain meds throughout the shift  Outcome: Progressing     Problem: Pain - Adult  Goal: Verbalizes/displays adequate comfort level or baseline comfort level  Outcome: Progressing     Problem: Safety - Adult  Goal: Free from fall injury  Outcome: Progressing     Problem: Discharge Planning  Goal: Discharge to home or other facility with appropriate resources  Outcome: Progressing     Problem: Chronic Conditions and Co-morbidities  Goal: Patient's chronic conditions and co-morbidity symptoms are monitored and maintained or improved  Outcome: Progressing     Problem: Nutrition  Goal: Nutrient intake appropriate for maintaining nutritional needs  Outcome:  Progressing     Problem: Skin  Goal: Decreased wound size/increased tissue granulation at next dressing change  Outcome: Progressing  Flowsheets (Taken 3/30/2025 1059)  Decreased wound size/increased tissue granulation at next dressing change:   Protective dressings over bony prominences   Utilize specialty bed per algorithm   Promote sleep for wound healing  Goal: Participates in plan/prevention/treatment measures  Outcome: Progressing  Flowsheets (Taken 3/30/2025 1059)  Participates in plan/prevention/treatment measures:   Elevate heels   Increase activity/out of bed for meals  Goal: Prevent/manage excess moisture  Outcome: Progressing  Flowsheets (Taken 3/30/2025 1059)  Prevent/manage excess moisture:   Cleanse incontinence/protect with barrier cream   Moisturize dry skin   Monitor for/manage infection if present  Goal: Prevent/minimize sheer/friction injuries  Outcome: Progressing  Flowsheets (Taken 3/30/2025 1059)  Prevent/minimize sheer/friction injuries:   Turn/reposition every 2 hours/use positioning/transfer devices   Increase activity/out of bed for meals   Use pull sheet  Goal: Promote/optimize nutrition  Outcome: Progressing  Flowsheets (Taken 3/30/2025 1059)  Promote/optimize nutrition:   Assist with feeding   Monitor/record intake including meals   Offer water/supplements/favorite foods  Goal: Promote skin healing  Outcome: Progressing  Flowsheets (Taken 3/30/2025 1059)  Promote skin healing:   Assess skin/pad under line(s)/device(s)   Ensure correct size (line/device) and apply per  instructions   Protective dressings over bony prominences   Rotate device position/do not position patient on device   Turn/reposition every 2 hours/use positioning/transfer devices     Problem: Respiratory  Goal: Clear secretions with interventions this shift  Outcome: Progressing  Goal: Minimize anxiety/maximize coping throughout shift  Outcome: Progressing  Goal: Minimal/no exertional discomfort or dyspnea  this shift  Outcome: Progressing  Goal: No signs of respiratory distress (eg. Use of accessory muscles. Peds grunting)  Outcome: Progressing  Goal: Patent airway maintained this shift  Outcome: Progressing

## 2025-03-31 LAB
ANION GAP SERPL CALC-SCNC: 11 MMOL/L (ref 10–20)
BUN SERPL-MCNC: 7 MG/DL (ref 6–23)
CALCIUM SERPL-MCNC: 8.6 MG/DL (ref 8.6–10.3)
CHLORIDE SERPL-SCNC: 99 MMOL/L (ref 98–107)
CO2 SERPL-SCNC: 36 MMOL/L (ref 21–32)
CREAT SERPL-MCNC: 0.39 MG/DL (ref 0.5–1.05)
EGFRCR SERPLBLD CKD-EPI 2021: >90 ML/MIN/1.73M*2
ERYTHROCYTE [DISTWIDTH] IN BLOOD BY AUTOMATED COUNT: 16.9 % (ref 11.5–14.5)
GLUCOSE SERPL-MCNC: 98 MG/DL (ref 74–99)
HCT VFR BLD AUTO: 26.3 % (ref 36–46)
HGB BLD-MCNC: 7.9 G/DL (ref 12–16)
MCH RBC QN AUTO: 29 PG (ref 26–34)
MCHC RBC AUTO-ENTMCNC: 30 G/DL (ref 32–36)
MCV RBC AUTO: 97 FL (ref 80–100)
NRBC BLD-RTO: 0 /100 WBCS (ref 0–0)
PLATELET # BLD AUTO: 320 X10*3/UL (ref 150–450)
POTASSIUM SERPL-SCNC: 3.8 MMOL/L (ref 3.5–5.3)
RBC # BLD AUTO: 2.72 X10*6/UL (ref 4–5.2)
SODIUM SERPL-SCNC: 142 MMOL/L (ref 136–145)
WBC # BLD AUTO: 9.5 X10*3/UL (ref 4.4–11.3)

## 2025-03-31 PROCEDURE — 2500000001 HC RX 250 WO HCPCS SELF ADMINISTERED DRUGS (ALT 637 FOR MEDICARE OP): Performed by: FAMILY MEDICINE

## 2025-03-31 PROCEDURE — 99232 SBSQ HOSP IP/OBS MODERATE 35: CPT | Performed by: HOSPITALIST

## 2025-03-31 PROCEDURE — 2500000004 HC RX 250 GENERAL PHARMACY W/ HCPCS (ALT 636 FOR OP/ED): Performed by: HOSPITALIST

## 2025-03-31 PROCEDURE — 1100000001 HC PRIVATE ROOM DAILY

## 2025-03-31 PROCEDURE — 97530 THERAPEUTIC ACTIVITIES: CPT | Mod: GP,CQ

## 2025-03-31 PROCEDURE — 2500000001 HC RX 250 WO HCPCS SELF ADMINISTERED DRUGS (ALT 637 FOR MEDICARE OP): Performed by: HOSPITALIST

## 2025-03-31 PROCEDURE — 85027 COMPLETE CBC AUTOMATED: CPT | Performed by: FAMILY MEDICINE

## 2025-03-31 PROCEDURE — 2500000001 HC RX 250 WO HCPCS SELF ADMINISTERED DRUGS (ALT 637 FOR MEDICARE OP): Performed by: ORTHOPAEDIC SURGERY

## 2025-03-31 PROCEDURE — 97530 THERAPEUTIC ACTIVITIES: CPT | Mod: GO,CO

## 2025-03-31 PROCEDURE — 2500000004 HC RX 250 GENERAL PHARMACY W/ HCPCS (ALT 636 FOR OP/ED): Performed by: FAMILY MEDICINE

## 2025-03-31 PROCEDURE — 80048 BASIC METABOLIC PNL TOTAL CA: CPT | Performed by: FAMILY MEDICINE

## 2025-03-31 PROCEDURE — 97535 SELF CARE MNGMENT TRAINING: CPT | Mod: GO,CO

## 2025-03-31 PROCEDURE — 2500000004 HC RX 250 GENERAL PHARMACY W/ HCPCS (ALT 636 FOR OP/ED): Performed by: ORTHOPAEDIC SURGERY

## 2025-03-31 PROCEDURE — 97116 GAIT TRAINING THERAPY: CPT | Mod: GP,CQ

## 2025-03-31 PROCEDURE — 36415 COLL VENOUS BLD VENIPUNCTURE: CPT | Performed by: FAMILY MEDICINE

## 2025-03-31 RX ORDER — ALBUTEROL SULFATE 90 UG/1
2 INHALANT RESPIRATORY (INHALATION) EVERY 4 HOURS PRN
Status: DISCONTINUED | OUTPATIENT
Start: 2025-03-31 | End: 2025-04-02 | Stop reason: HOSPADM

## 2025-03-31 RX ORDER — FLUTICASONE FUROATE AND VILANTEROL 100; 25 UG/1; UG/1
1 POWDER RESPIRATORY (INHALATION)
Status: DISCONTINUED | OUTPATIENT
Start: 2025-04-01 | End: 2025-04-02 | Stop reason: HOSPADM

## 2025-03-31 RX ADMIN — SENNOSIDES AND DOCUSATE SODIUM 1 TABLET: 50; 8.6 TABLET ORAL at 20:10

## 2025-03-31 RX ADMIN — METOPROLOL TARTRATE 12.5 MG: 25 TABLET, FILM COATED ORAL at 08:06

## 2025-03-31 RX ADMIN — METOPROLOL TARTRATE 12.5 MG: 25 TABLET, FILM COATED ORAL at 20:09

## 2025-03-31 RX ADMIN — FOLIC ACID 1 MG: 1 TABLET ORAL at 08:06

## 2025-03-31 RX ADMIN — EMPAGLIFLOZIN 10 MG: 10 TABLET, FILM COATED ORAL at 08:06

## 2025-03-31 RX ADMIN — ACETAMINOPHEN 650 MG: 325 TABLET ORAL at 20:09

## 2025-03-31 RX ADMIN — POLYETHYLENE GLYCOL 3350 17 G: 17 POWDER, FOR SOLUTION ORAL at 08:06

## 2025-03-31 RX ADMIN — HEPARIN SODIUM 5000 UNITS: 5000 INJECTION, SOLUTION INTRAVENOUS; SUBCUTANEOUS at 06:10

## 2025-03-31 RX ADMIN — HEPARIN SODIUM 5000 UNITS: 5000 INJECTION, SOLUTION INTRAVENOUS; SUBCUTANEOUS at 14:21

## 2025-03-31 RX ADMIN — DOCUSATE SODIUM 100 MG: 100 CAPSULE, LIQUID FILLED ORAL at 08:06

## 2025-03-31 RX ADMIN — TIOTROPIUM BROMIDE INHALATION SPRAY 2 PUFF: 3.12 SPRAY, METERED RESPIRATORY (INHALATION) at 06:10

## 2025-03-31 RX ADMIN — HEPARIN SODIUM 5000 UNITS: 5000 INJECTION, SOLUTION INTRAVENOUS; SUBCUTANEOUS at 22:59

## 2025-03-31 RX ADMIN — FLUTICASONE FUROATE AND VILANTEROL TRIFENATATE 1 PUFF: 100; 25 POWDER RESPIRATORY (INHALATION) at 06:17

## 2025-03-31 RX ADMIN — ASPIRIN 81 MG: 81 TABLET, COATED ORAL at 08:06

## 2025-03-31 ASSESSMENT — COGNITIVE AND FUNCTIONAL STATUS - GENERAL
DRESSING REGULAR LOWER BODY CLOTHING: A LOT
MOVING TO AND FROM BED TO CHAIR: A LOT
CLIMB 3 TO 5 STEPS WITH RAILING: TOTAL
MOVING TO AND FROM BED TO CHAIR: A LOT
DRESSING REGULAR UPPER BODY CLOTHING: A LITTLE
WALKING IN HOSPITAL ROOM: A LOT
MOBILITY SCORE: 14
MOVING FROM LYING ON BACK TO SITTING ON SIDE OF FLAT BED WITH BEDRAILS: A LITTLE
MOBILITY SCORE: 15
HELP NEEDED FOR BATHING: A LITTLE
TURNING FROM BACK TO SIDE WHILE IN FLAT BAD: A LITTLE
WALKING IN HOSPITAL ROOM: A LOT
MOVING TO AND FROM BED TO CHAIR: A LITTLE
TOILETING: A LITTLE
DAILY ACTIVITIY SCORE: 16
HELP NEEDED FOR BATHING: A LOT
PERSONAL GROOMING: A LITTLE
DRESSING REGULAR LOWER BODY CLOTHING: A LOT
DAILY ACTIVITIY SCORE: 17
WALKING IN HOSPITAL ROOM: A LOT
EATING MEALS: A LITTLE
CLIMB 3 TO 5 STEPS WITH RAILING: A LOT
MOBILITY SCORE: 14
DRESSING REGULAR UPPER BODY CLOTHING: A LITTLE
PERSONAL GROOMING: A LITTLE
MOVING FROM LYING ON BACK TO SITTING ON SIDE OF FLAT BED WITH BEDRAILS: A LITTLE
PERSONAL GROOMING: A LITTLE
TOILETING: A LOT
STANDING UP FROM CHAIR USING ARMS: A LITTLE
TURNING FROM BACK TO SIDE WHILE IN FLAT BAD: A LITTLE
CLIMB 3 TO 5 STEPS WITH RAILING: A LOT
DAILY ACTIVITIY SCORE: 17
STANDING UP FROM CHAIR USING ARMS: A LOT
TOILETING: A LOT
DRESSING REGULAR UPPER BODY CLOTHING: A LITTLE
MOVING FROM LYING ON BACK TO SITTING ON SIDE OF FLAT BED WITH BEDRAILS: A LITTLE
DRESSING REGULAR LOWER BODY CLOTHING: A LOT
STANDING UP FROM CHAIR USING ARMS: A LOT
TURNING FROM BACK TO SIDE WHILE IN FLAT BAD: A LITTLE
HELP NEEDED FOR BATHING: A LITTLE

## 2025-03-31 ASSESSMENT — PAIN DESCRIPTION - ORIENTATION: ORIENTATION: LEFT

## 2025-03-31 ASSESSMENT — PAIN - FUNCTIONAL ASSESSMENT
PAIN_FUNCTIONAL_ASSESSMENT: 0-10

## 2025-03-31 ASSESSMENT — PAIN SCALES - GENERAL
PAINLEVEL_OUTOF10: 0 - NO PAIN
PAINLEVEL_OUTOF10: 0 - NO PAIN
PAINLEVEL_OUTOF10: 4

## 2025-03-31 ASSESSMENT — PAIN DESCRIPTION - LOCATION: LOCATION: HIP

## 2025-03-31 ASSESSMENT — ACTIVITIES OF DAILY LIVING (ADL): HOME_MANAGEMENT_TIME_ENTRY: 14

## 2025-03-31 NOTE — PROGRESS NOTES
Occupational Therapy    OT Treatment    Patient Name: Elsa Hinson  MRN: [id number]  Department: [facility] 3 E  Room: [address] [place] [address]  Today's Date: 3/31/2025  Time Calculation  Start Time: 0952  Stop Time: 1020  Time Calculation (min): 28 min        Assessment:  Barriers to Discharge Home: Physical needs, Cognition needs, Caregiver assistance  End of Session Communication: Bedside nurse, PCT/NA/CTA  End of Session Patient Position: Up in chair, Alarm on     Plan:  Treatment Interventions: Functional transfer training, Endurance training, Patient/family training      Subjective   Previous Visit Info:  OT Last Visit  OT Received On: 03/31/25  General:  General  Prior to Session Communication: Bedside nurse  Patient Position Received: Bed, 3 rail up, Alarm on  General Comment: pt. doing well transfers MIN A , UB bathing MIN A, LE dresing MOD A. Pt. reports she is feeling much better today.  Precautions:               Pain:  Pain Assessment  Pain Assessment: 0-10  0-10 (Numeric) Pain Score: 0 - No pain    Objective    Cognition:  Cognition  Orientation Level: Oriented X4  Coordination:     Activities of Daily Living:      Grooming  Grooming Level of Assistance: Setup  Grooming Where Assessed: Chair    UE Bathing  UE Bathing Level of Assistance: Minimum assistance  UE Bathing Where Assessed:  (chair)         UE Dressing  UE Dressing Level of Assistance: Minimum assistance  UE Dressing Where Assessed: Chair    LE Dressing  LE Dressing: Yes  Adult Briefs Level of Assistance: Moderate assistance  LE Dressing Where Assessed: Bedside commode  LE Dressing Comments: assist with donning brief over feet    Toileting  Toileting Level of Assistance: Minimum assistance  Where Assessed: Bedside commode      Outcome Measures:[facility] Daily Activity  Putting on and taking off regular lower body clothing: A lot  Bathing (including washing, rinsing, drying): A lot  Putting on and taking off regular upper body clothing: A little  Toileting, which  includes using toilet, bedpan or urinal: A little  Taking care of personal grooming such as brushing teeth: A little  Eating Meals: A little  Daily Activity - Total Score: 16        Education Documentation  Body Mechanics, taught by JAMES Paniagua at 3/31/2025 11:15 AM.  Learner: Patient  Readiness: Acceptance  Method: Explanation  Response: Verbalizes Understanding, Needs Reinforcement    Precautions, taught by JAMES Paniagua at 3/31/2025 11:15 AM.  Learner: Patient  Readiness: Acceptance  Method: Explanation  Response: Verbalizes Understanding, Needs Reinforcement    ADL Training, taught by JAMES Paniagua at 3/31/2025 11:15 AM.  Learner: Patient  Readiness: Acceptance  Method: Explanation  Response: Verbalizes Understanding, Needs Reinforcement    Education Comments  No comments found.        OP EDUCATION:       Goals:  Encounter Problems       Encounter Problems (Active)       ADLs       Patient with complete lower body dressing with modified independent level of assistance donning and doffing all LE clothes  with PRN adaptive equipment while supported sitting and standing (Progressing)       Start:  03/26/25    Expected End:  04/16/25               TRANSFERS       Patient will complete functional transfers with least restrictive device with modified independent level of assistance. (Progressing)       Start:  03/26/25    Expected End:  04/16/25

## 2025-03-31 NOTE — CARE PLAN
Problem: Fall/Injury  Goal: Not fall by end of shift  Outcome: Progressing  Goal: Be free from injury by end of the shift  Outcome: Progressing  Goal: Verbalize understanding of personal risk factors for fall in the hospital  Outcome: Progressing  Goal: Verbalize understanding of risk factor reduction measures to prevent injury from fall in the home  Outcome: Progressing  Goal: Use assistive devices by end of the shift  Outcome: Progressing  Goal: Pace activities to prevent fatigue by end of the shift  Outcome: Progressing     Problem: Pain  Goal: Takes deep breaths with improved pain control throughout the shift  Outcome: Progressing  Goal: Turns in bed with improved pain control throughout the shift  Outcome: Progressing  Goal: Walks with improved pain control throughout the shift  Outcome: Progressing  Goal: Performs ADL's with improved pain control throughout shift  Outcome: Progressing  Goal: Participates in PT with improved pain control throughout the shift  Outcome: Progressing  Goal: Free from opioid side effects throughout the shift  Outcome: Progressing  Goal: Free from acute confusion related to pain meds throughout the shift  Outcome: Progressing     Problem: Pain - Adult  Goal: Verbalizes/displays adequate comfort level or baseline comfort level  Outcome: Progressing  Flowsheets (Taken 3/30/2025 2118)  Verbalizes/displays adequate comfort level or baseline comfort level:   Encourage patient to monitor pain and request assistance   Assess pain using appropriate pain scale   Administer analgesics based on type and severity of pain and evaluate response   Implement non-pharmacological measures as appropriate and evaluate response     Problem: Safety - Adult  Goal: Free from fall injury  Outcome: Progressing     Problem: Discharge Planning  Goal: Discharge to home or other facility with appropriate resources  Outcome: Progressing     Problem: Chronic Conditions and Co-morbidities  Goal: Patient's chronic  conditions and co-morbidity symptoms are monitored and maintained or improved  Outcome: Progressing     Problem: Nutrition  Goal: Nutrient intake appropriate for maintaining nutritional needs  Outcome: Progressing     Problem: Skin  Goal: Decreased wound size/increased tissue granulation at next dressing change  Outcome: Progressing  Flowsheets (Taken 3/30/2025 2118)  Decreased wound size/increased tissue granulation at next dressing change:   Promote sleep for wound healing   Protective dressings over bony prominences  Goal: Participates in plan/prevention/treatment measures  Outcome: Progressing  Flowsheets (Taken 3/30/2025 2118)  Participates in plan/prevention/treatment measures:   Discuss with provider PT/OT consult   Increase activity/out of bed for meals  Goal: Prevent/manage excess moisture  Outcome: Progressing  Flowsheets (Taken 3/30/2025 2118)  Prevent/manage excess moisture:   Cleanse incontinence/protect with barrier cream   Monitor for/manage infection if present   Follow provider orders for dressing changes  Goal: Prevent/minimize sheer/friction injuries  Outcome: Progressing  Flowsheets (Taken 3/30/2025 2118)  Prevent/minimize sheer/friction injuries:   Complete micro-shifts as needed if patient unable. Adjust patient position to relieve pressure points, not a full turn   Increase activity/out of bed for meals   HOB 30 degrees or less   Turn/reposition every 2 hours/use positioning/transfer devices   Use pull sheet  Goal: Promote/optimize nutrition  Outcome: Progressing  Flowsheets (Taken 3/30/2025 2118)  Promote/optimize nutrition:   Monitor/record intake including meals   Consume > 50% meals/supplements  Goal: Promote skin healing  Outcome: Progressing  Flowsheets (Taken 3/30/2025 2118)  Promote skin healing:   Assess skin/pad under line(s)/device(s)   Protective dressings over bony prominences   Turn/reposition every 2 hours/use positioning/transfer devices     Problem: Respiratory  Goal: Clear  secretions with interventions this shift  Outcome: Progressing  Goal: Minimize anxiety/maximize coping throughout shift  Outcome: Progressing  Goal: Minimal/no exertional discomfort or dyspnea this shift  Outcome: Progressing  Goal: No signs of respiratory distress (eg. Use of accessory muscles. Peds grunting)  Outcome: Progressing  Goal: Patent airway maintained this shift  Outcome: Progressing   The patient's goals for the shift include get some rest    The clinical goals for the shift include patient will tolerate ambulation throughout this shift.

## 2025-03-31 NOTE — CARE PLAN
The patient's goals for the shift include get to the chair for meals    The clinical goals for the shift include pt remain free from falls and injuries, vitals remain stable, control pain, participate in pressure redistribution, work with PT/OT, get up to the chair for meals      Problem: Fall/Injury  Goal: Not fall by end of shift  Outcome: Progressing  Goal: Be free from injury by end of the shift  Outcome: Progressing  Goal: Verbalize understanding of personal risk factors for fall in the hospital  Outcome: Progressing  Goal: Verbalize understanding of risk factor reduction measures to prevent injury from fall in the home  Outcome: Progressing  Goal: Use assistive devices by end of the shift  Outcome: Progressing  Goal: Pace activities to prevent fatigue by end of the shift  Outcome: Progressing     Problem: Pain  Goal: Takes deep breaths with improved pain control throughout the shift  Outcome: Progressing  Goal: Turns in bed with improved pain control throughout the shift  Outcome: Progressing  Goal: Walks with improved pain control throughout the shift  Outcome: Progressing  Goal: Performs ADL's with improved pain control throughout shift  Outcome: Progressing  Goal: Participates in PT with improved pain control throughout the shift  Outcome: Progressing  Goal: Free from opioid side effects throughout the shift  Outcome: Progressing  Goal: Free from acute confusion related to pain meds throughout the shift  Outcome: Progressing     Problem: Pain - Adult  Goal: Verbalizes/displays adequate comfort level or baseline comfort level  Outcome: Progressing     Problem: Safety - Adult  Goal: Free from fall injury  Outcome: Progressing     Problem: Discharge Planning  Goal: Discharge to home or other facility with appropriate resources  Outcome: Progressing     Problem: Chronic Conditions and Co-morbidities  Goal: Patient's chronic conditions and co-morbidity symptoms are monitored and maintained or improved  Outcome:  Progressing     Problem: Nutrition  Goal: Nutrient intake appropriate for maintaining nutritional needs  Outcome: Progressing     Problem: Skin  Goal: Decreased wound size/increased tissue granulation at next dressing change  Outcome: Progressing  Flowsheets (Taken 3/31/2025 0745)  Decreased wound size/increased tissue granulation at next dressing change:   Promote sleep for wound healing   Protective dressings over bony prominences  Goal: Participates in plan/prevention/treatment measures  Outcome: Progressing  Flowsheets (Taken 3/31/2025 0745)  Participates in plan/prevention/treatment measures:   Discuss with provider PT/OT consult   Elevate heels  Goal: Prevent/manage excess moisture  Outcome: Progressing  Flowsheets (Taken 3/31/2025 0745)  Prevent/manage excess moisture:   Moisturize dry skin   Follow provider orders for dressing changes   Monitor for/manage infection if present  Goal: Prevent/minimize sheer/friction injuries  Outcome: Progressing  Flowsheets (Taken 3/31/2025 0745)  Prevent/minimize sheer/friction injuries:   Increase activity/out of bed for meals   Turn/reposition every 2 hours/use positioning/transfer devices   HOB 30 degrees or less   Use pull sheet  Goal: Promote/optimize nutrition  Outcome: Progressing  Flowsheets (Taken 3/31/2025 0745)  Promote/optimize nutrition:   Consume > 50% meals/supplements   Monitor/record intake including meals   Offer water/supplements/favorite foods  Goal: Promote skin healing  Outcome: Progressing  Flowsheets (Taken 3/31/2025 0745)  Promote skin healing:   Assess skin/pad under line(s)/device(s)   Ensure correct size (line/device) and apply per  instructions   Protective dressings over bony prominences   Rotate device position/do not position patient on device   Turn/reposition every 2 hours/use positioning/transfer devices     Problem: Respiratory  Goal: Clear secretions with interventions this shift  Outcome: Progressing  Goal: Minimize  anxiety/maximize coping throughout shift  Outcome: Progressing  Goal: Minimal/no exertional discomfort or dyspnea this shift  Outcome: Progressing  Goal: No signs of respiratory distress (eg. Use of accessory muscles. Peds grunting)  Outcome: Progressing  Goal: Patent airway maintained this shift  Outcome: Progressing

## 2025-03-31 NOTE — PROGRESS NOTES
Physical Therapy    Physical Therapy Treatment    Patient Name: Elsa Hinson  MRN: [id number]  Department: [facility]  Room: [address] [place] [address]-A  Today's Date: 3/31/2025  Time Calculation  Start Time: 1048  Stop Time: 1131  Time Calculation (min): 43 min         Assessment/Plan   PT Assessment  PT Assessment Results: Decreased strength, Decreased endurance  Rehab Prognosis: Good  Barriers to Discharge Home: Caregiver assistance, Physical needs  End of Session Communication: Bedside nurse  Assessment Comment: pt up in chair upon arrival. pt reports pain in hip with movement. pt amb forwards and paused for about 30 seconds working on breathing.pt took a seated rest break in chair. pt stood up again and for about 30 seconds and sat back down. pt reports hip pain with standing and limiting her motion. RN notified. edu on mobility and safety.  End of Session Patient Position: Up in chair, Alarm on     PT Plan  Treatment/Interventions: Bed mobility, Transfer training, Gait training, Stair training, Balance training, Strengthening, Endurance training, Therapeutic exercise, Therapeutic activity, Home exercise program  PT Plan: Ongoing PT  PT Frequency: 6 times per week  PT Discharge Recommendations: Moderate intensity level of continued care  PT - OK to Discharge: Yes (when medically cleared)      General Visit Information:   PT  Visit  PT Received On: 03/31/25       Subjective   Precautions:               Objective   Pain:  Pain Assessment  0-10 (Numeric) Pain Score:  (pain in hip not rated)  Cognition:  Cognition  Orientation Level: Oriented X4  Coordination:          Treatments:            Ambulation/Gait Training 1  Surface 1: Level tile  Device 1: Rolling walker  Assistance 1: Minimum assistance  Quality of Gait 1: Narrow base of support, Diminished heel strike, Inconsistent stride length, Decreased step length  Comments/Distance (ft) 1: 4ft forwards and backwards  Transfer 1  Technique 1: Sit to stand, Stand to sit  Transfer  Device 1: Walker  Transfer Level of Assistance 1: Minimum assistance  Trials/Comments 1: x2    Outcome Measures:  [facility] Basic Mobility  Turning from your back to your side while in a flat bed without using bedrails: A little  Moving from lying on your back to sitting on the side of a flat bed without using bedrails: A little  Moving to and from bed to chair (including a wheelchair): A little  Standing up from a chair using your arms (e.g. wheelchair or bedside chair): A little  To walk in hospital room: A lot  Climbing 3-5 steps with railing: Total  Basic Mobility - Total Score: 15    Education Documentation  Mobility Training, taught by Chantale Guzman PTA at 3/31/2025 12:22 PM.  Learner: Patient  Readiness: Acceptance  Method: Explanation  Response: Verbalizes Understanding    Education Comments  No comments found.        OP EDUCATION:       Encounter Problems       Encounter Problems (Active)       PT Problem       transfers (Progressing)       Start:  03/26/25    Expected End:  04/09/25       Patient will perform all transfers with CGA x1          gait (Progressing)       Start:  03/26/25    Expected End:  04/09/25       Patient will amb 50+ feet with Min assist x 1          strengthening  (Progressing)       Start:  03/26/25    Expected End:  04/09/25       Patient will perform 20+ reps of AROM/RROM for MINI LE's to improve safety and functional independence              Pain - Adult

## 2025-03-31 NOTE — PROGRESS NOTES
Nutrition Follow Up Assessment:   Nutrition Assessment         Medical history per chart: COPD on 4L home O2 24/7 and CAD s/p PCI of mid LCx, HLD, MI      Admitted for displaced intertrochanteric fracture of left femur   ~S/p ORIF on 3/25, + surgical incision  ~Wound care following for PI sacrum, skin tear on left ankle     3/25: Pt in OR. RN stated will weight pt upon return to floor. Weight loss, decreased PO intake, wound(s) indicated on MST. Will send Magic Cup BID when diet advances. Pt admission Dx, PMH, labs, medications, allergies, diet orders, weight history, skin integrity reviewed. RD to follow per POC, protocol.      3/28: Patient has been residing at home for the past three weeks and prior to this she was at rehab facility. At home intakes have noted not to great with daughter reporting she has been eating soup for the past week. At the facility she had minimal intakes for a few reasons. One reason is the food was not appealing and secondly after she fell on day 2 she was laying down mostly due to pain and was unable to sit up to eat. She did try magic cup there and received it sporadically. She has lost at least 8-10 lbs the past month which is significant. CBW; 87 lbs, UBW: 97 lbs,  skin breakdown noted. Pt is eating since surgery but daughter reports its small amounts. Patient reports she never was a big eater. Patient is consuming the magic cup ordered, but prefers the berry flavor. Encouraged small, frequent meals with ONS. Family is bringing in snacks as well.     3/31: Patient seen OOB. Documented intakes are <50% of meals. She states she does not have much of an appetite, but family is bringing in snacks. She is taking the magic cup for the most part 2x/day, but does not like the ensure clear. Denies n/v/abd pain.     Nutrition History:  Food and Nutrient History: 2/17/25 RD note: Patient reports she eats small meals throughout the day, but meal preparation has been difficult due to breathing  "issues, fatigue. Discussed ways to increase calories and protein; COPD Nutrition Therapy handout was provided. She was agreeable to Magic cup, but not ensure. Discussed other ONS to trial once home. CBW: 97 lbs, reports she has been this way for a long time.  Vitamin/Herbal Supplement Use: None per Home Meds list.       Average meal Intake during admission: <50%   Dietary Orders (From admission, onward)       Start     Ordered    03/28/25 1150  Oral nutritional supplements  Until discontinued        Comments: Berry flavor only   Question Answer Comment   Deliver with Breakfast    Deliver with Dinner    Select supplement: Magic Cup        03/28/25 1150    03/25/25 1502  Adult diet Regular  Diet effective now        Question:  Diet type  Answer:  Regular    03/25/25 1501    03/24/25 2226  May Participate in Room Service  ( ROOM SERVICE MAY PARTICIPATE)  Once        Question:  .  Answer:  Yes    03/24/25 2225                    Anthropometrics:  Height: 154.9 cm (5' 1\")   Weight: (!) 39.5 kg (87 lb)   BMI (Calculated): 16.45  IBW/kg (Dietitian Calculated): 47.7 kg        Weight History:   Wt Readings from Last 10 Encounters:   03/24/25 (!) 39.5 kg (87 lb)   03/24/25 (!) 39.5 kg (87 lb)   02/14/25 (!) 44.2 kg (97 lb 8 oz)   04/05/24 (!) 44.2 kg (97 lb 6.4 oz)       Weight Change %:  Weight History / % Weight Change: Pt in OR, RN stated will weigh when returns to floor. Chart review weight 39.5 (3/24 outpatient record) indicates 10.7% loss since 44.2 kg (2/14/25), also 44.2 kg (4/5/24), no other weights from previous 12 months available. Per 2/17/25 RD note, UBW of 97 pounds. Will monitor weights.  Significant Weight Loss: Yes  Interpretation of Weight Loss: >7.5% in 3 months    Nutrition Focused Physical Exam Findings:  Subcutaneous Fat Loss:   Defer Subcutaneous Fat Loss Assessment: Defer all  Defer All Reason: Pt in OR  Orbital Fat Pads: Mild-Moderate (slight dark circles and slight hollowing)  Buccal Fat Pads: " Mild-Moderate (flat cheeks, minimal bounce)  Triceps: Mild-Moderate (less than ample fat tissue)  Muscle Wasting:  Defer Muscle Wasting Assessment: Defer all  Defer All Reason: Pt in OR  Temporalis: Mild-Moderate (slight depression)  Pectoralis (Clavicular Region): Mild-Moderate (some protrusion of clavicle)  Edema:  Edema: none  Physical Findings:  Skin: Positive  Positive Skin Findings: Impaired wound healing (surigical incision, pwer wound care: PI sacrum, skin tear left ankle)    Nutrition Significant Labs:  Results from last 7 days   Lab Units 03/31/25  0630 03/25/25  0655 03/24/25  1651   GLUCOSE mg/dL 98 68* 108*   SODIUM mmol/L 142 136 141   POTASSIUM mmol/L 3.8 3.6 4.0   CHLORIDE mmol/L 99 99 97*   CO2 mmol/L 36* 31 36*   BUN mg/dL 7 18 21   CREATININE mg/dL 0.39* 0.64 0.64   EGFR mL/min/1.73m*2 >90 >90 >90   CALCIUM mg/dL 8.6 8.8 9.3     Lab Results   Component Value Date    HGBA1C 5.6 02/14/2025           Nutrition Specific Medications:  Meds reviewed/noted.   aspirin, 81 mg, oral, Daily  atorvastatin, 40 mg, oral, Nightly  docusate sodium, 100 mg, oral, BID  empagliflozin, 10 mg, oral, Daily  tiotropium, 2 puff, inhalation, Daily   And  fluticasone furoate-vilanteroL, 1 puff, inhalation, Daily  folic acid, 1 mg, oral, Daily  heparin (porcine), 5,000 Units, subcutaneous, q8h USHA  metoprolol tartrate, 12.5 mg, oral, BID  polyethylene glycol, 17 g, oral, Daily  sennosides-docusate sodium, 1 tablet, oral, Nightly       oxygen, 2 L/min, Last Rate: 4 L/min (03/25/25 1525)         I/O:    ;      Estimated Needs:   Total Energy Estimated Needs in 24 hours (kCal):  (9174-7375)  Method for Estimating Needs: 35-40 kcal/kg actual body weight  Total Protein Estimated Needs in 24 Hours (g):  (40-60)  Method for Estimating 24 Hour Protein Needs: 1.1-1.5 g/kg actual body weight  Total Fluid Estimated Needs in 24 Hours (mL):  (1061-2818)  Method for Estimating 24 Hour Fluid Needs: 1 ml/kcal or per physician         Nutrition Diagnosis   Malnutrition Diagnosis  Patient has Malnutrition Diagnosis: Yes  Diagnosis Status: Active  Malnutrition Diagnosis: Severe malnutrition related to chronic disease or condition  Related to: COPD, hip fracture with pain  As Evidenced by: <75% of estimated needs x  month, >5% weight loss >1 month  Additional Assessment Information: mild to moderate muscle and fat losses    Nutrition Diagnosis  Patient has Nutrition Diagnosis: Yes  Diagnosis Status (1): Active  Nutrition Diagnosis 1: Inadequate oral intake  Related to (1): COPD, left femur fracture  As Evidenced by (1): NPO/CL status, weight loss >7.5% within 3 months       Nutrition Interventions/Recommendations   Nutrition prescription for oral nutrition    Nutrition Recommendations:  Individualized Nutrition Prescription Provided for : Continue Regular diet. Continue Magic cup BID, discontinue ensure clear daily. Check zinc status. Add a daily multivitamin with mineral    Nutrition Interventions/Goals:   Interventions: Medical food supplement, Meals and snacks  Meals and Snacks: General healthful diet  Medical Food Supplement: Commercial food medical food supplement therapy  Goal: cnsume >50%      Education Documentation  Nutrition Related Education, taught by Deanna M McGuire Lombardo, RDN, [employer] at 3/28/2025 11:58 AM.  Learner: Family, Patient  Readiness: Eager  Method: Explanation  Response: Verbalizes Understanding  Comment: Reviewed diet and enouraged protein options at meals, ONS              Nutrition Monitoring and Evaluation   Food/Nutrient Related History Monitoring  Monitoring and Evaluation Plan: Estimated Energy Intake  Estimated Energy Intake: Energy intake greater or equal to 75% of estimated energy needs    Anthropometric Measurements  Monitoring and Evaluation Plan: Body weight  Body Weight: Body weight - Maintain stable weight    Biochemical Data, Medical Tests and Procedures  Monitoring and Evaluation Plan: Electrolyte/renal  panel, Glucose/endocrine profile  Electrolyte and Renal Panel: Electrolytes within normal limits  Glucose/Endocrine Profile: Glucose within normal limits ( mg/dL)    Physical Exam Findings  Monitoring and Evaluation Plan: Skin  Skin Finding: Impaired wound healing - Improved wound healing    Goal Status: Some progress toward goal(s)    Time Spent (min): 30 minutes

## 2025-03-31 NOTE — PROGRESS NOTES
Met with patient at bedside, updated that insurance auth was not able to be started over the weekend. Mark has confirmed that they started auth this morning. Patient with active discharge orders in place. She is aware that auth can take up to 72 business hours to complete. TCC following.

## 2025-03-31 NOTE — PROGRESS NOTES
Elsa Hinson  [id number]   Service: Internal Medicine / Hospitalist Date of service:  3/31/2025                          Full Code              Elsa Hinson is a 75 y.o. female presenting with Displaced intertrochanteric fracture of left femur, initial encounter for closed fracture.       Subjective      75 y.o. female with PMHx of COPD on 4L home O2 24/7 and CAD s/p PCI of mid LCx who presented with left hip pain, which she has had since leaving her rehab facility close to a month ago. She fell just prior to leaving; no x-ray was done. She has had significant pain with ambulation since then. Today she went to see her PCP who ordered x-rays, which showed a left-sided hip fracture. Ortho surgery recommended surgery tomorrow.   ED labwork was benign. CXR was negative. VSS        3/25:                Today the patient is seen operative day postoperatively from her left hip fracture repair.  Seen in the presence of multiple family members.  She is awake alert and interactive appropriately complaining of only discomfort really in the sacral pressure injury area.  Orthopedic surgery describes WBAT and follow-up in 2 weeks.  At this point the patient appears to be refusing to consider SNF due to her recent experience where she fell before she left the SNF and was not evaluated.  It appears her daughters feel she likely would benefit from SNF however.  Otherwise denies interval new symptoms or complaints including chest pain palpitations pleuritic type pain unusual shortness of breath cough sputum nausea abdominal pain flank pain headache visual disturbances fever or chills.     3/26:                  Today the patient is POD #1 and seen once again in the presence of family members.  She appears much more awake alert and interactive today.  Voices no specific complaints and no acute events overnight.  However she continues to states she does not want to go to SNF.  AM-PAC 10.  Once again it appears patient's daughters feel  she should go to SNF.  Awaiting transitional care coordinator interaction.  Orthopedic surgery describes WBAT, ASA 81 mg daily for 6 weeks and follow-up with Dr. العراقي in 2 weeks. Otherwise denies interval new symptoms or complaints including chest pain palpitations pleuritic type pain unusual shortness of breath cough sputum nausea abdominal pain flank pain headache visual disturbances fever or chills.     3/27:                   Today patient is POD #2.  Seen in the presence of her daughter.  Continues awake alert and interactive appropriately.  She is experiencing more significant postoperative pain especially with PT.  Hemoglobin did trend down significantly with no evidence of active bleeding.  WBC trended down which was likely reactive.  After long discussion today with patient and daughter it appears the patient is now agreeable to SNF.  Transitional care working towards arrangements for this. Otherwise denies interval new symptoms or complaints including chest pain palpitations pleuritic type pain unusual shortness of breath cough sputum nausea abdominal pain flank pain headache visual disturbances fever or chills.     3/28:                POD #3.  Once again seen in the presence of her daughter.  She remains awake alert and interactive appropriately.  Continues with expected postoperative discomfort.  Of concern is hemoglobin has decreased to 7.0 with no apparent source of bleeding.  She has not had a BM for 4 days but states this is normal pattern for her.  Taking diet well and no abdominal discomfort.  Operative wound does not appear to demonstrate any hematoma.  Some oozing on the dressing according to orthopedic surgeon today.  150 mL blood loss reported at surgery.  Patient states she has always been iron deficient.  Patient is not wanting blood transfusion at this time and she states she has refused them in the past.  Discussed with daughter that we would monitor hemoglobin while waiting for SNF  authorization and transfuse if decreased any further.  She remains asymptomatic with this.  Will obtain iron studies in the morning.  Iron supplementation will be empirically started. Otherwise denies interval new symptoms or complaints including chest pain palpitations pleuritic type pain unusual shortness of breath cough sputum nausea abdominal pain flank pain headache visual disturbances fever or chills.     3/29:               Today the patient remains awake alert and interactive appropriately seen in the bedside chair.  Hemoglobin trended up to 8.1 today.  Still has not had BM however.  Discussed her low iron status and states she has been tried on iron pills in the past but did not tolerate them due to GI distress.  However this was a long time ago.  She is willing to try ferrous sulfate once a day with meal.  Discussed she will need to follow-up with PCP to see if she will require iron infusions.  Awaiting authorization for SNF. Otherwise denies interval new symptoms or complaints including chest pain palpitations pleuritic type pain unusual shortness of breath cough sputum nausea abdominal pain flank pain headache visual disturbances fever or chills.     3/30:              Today patient remains awake alert and interactive appropriately and seen in the presence of her daughter.  Voices no specific complaints and no acute events overnight.  States she is continuing to work well with PT.  She does describe some abdominal discomfort symptoms with ferrous sulfate.  We will give her IV iron infusion today.  Started on folic acid for folate deficiency.  Awaiting authorization for SNF. Otherwise denies interval new symptoms or complaints including chest pain palpitations pleuritic type pain unusual shortness of breath cough sputum nausea abdominal pain flank pain headache visual disturbances fever or chills.      3/31...........................Patient reported feeling fair today still endorsed postoperative pain but  improved.  No reported :chest pains, nausea, vomiting, fevers, chills, dyspnea or palpitations.        Patient seen and examined, lab data and diagnostics reviewed.  No reported: acute symptomatology at the time of evaluation including but not limited to :chest pain, dyspnea, constipation, abdominal pain, dysuria , nausea, vomiting ,headache, new focal weakness, diaphoresis,fever, chills, syncope,presyncope or palpitations.       Review of Systems:   Review of system otherwise negative if not aforementioned above in subjective.    Objective        Physical Exam     Constitutional:       Appearance: Patient appeared in no acute cardiopulmonary distress.     Comments: Patient alert and oriented to : person, place ,time and situation.  HEENT:      Head: Normocephalic and atraumatic.Trachea midline      Nose:No observed congestion or rhinorrhea.     Mouth/Throat: Mucous membranes Moist, Trachea appeared  midline.  Eyes:      Extraocular Movements: Extraocular movements intact.      Pupils: Pupils are equal, round, and reactive to light.      Comments: No scleral icterus or conjunctival injection appreciated.   Cardiovascular:      Rate and Rhythm: Normal rate and regular rhythm. No clicks rubs or gallops, normal S1 and S2.No peripheral stigmata of endocarditis appreciated.     Pulmonary:      Lungs appeared clear to auscultation, no adventitious sound appreciated.  Abdominal:      General: Abdomen soft, nontender, active bowel sounds, no involuntary guarding or rebound tenderness appreciated.     Comments: None   Musculoskeletal:       Left ankle dressing in place, no drainage appreciated  No pitting edema or cyanosis appreciated.       Lymphadenopathy:      No appreciable palpable lymphadenopathy  Skin:     General: Skin is warm.      Coloration:  No jaundice     Findings: No abnormal appearing skin rashes or lesions that appeared acute noted on unclothed area of the skin..   Neurological:      General: No focal  sensory or motor deficits appreciated, no meningeal signs or dysmetria noted.      Cranial Nerves: Cranial nerves II to XII appearing grossly intact.     Genitals:  Deferred  Psychiatric:         The patient appears to be displaying normal mood and affect at the time of evaluation.    Labs:     Lab Results   Component Value Date    GLUCOSE 98 03/31/2025    CALCIUM 8.6 03/31/2025     03/31/2025    K 3.8 03/31/2025    CO2 36 (H) 03/31/2025    CL 99 03/31/2025    BUN 7 03/31/2025    CREATININE 0.39 (L) 03/31/2025      Lab Results   Component Value Date    WBC 9.5 03/31/2025    HGB 7.9 (L) 03/31/2025    HCT 26.3 (L) 03/31/2025    MCV 97 03/31/2025     03/31/2025      [unfilled]   [unfilled]   No results found for the last 90 days.              X-rays/ Images    [unfilled]   Radiology Results (last 21 days)    Procedure Component Value Units Date/Time   FL less than 1 hour [[id number]] Resulted: 03/25/25 1304   Order Status: Completed Updated: 03/25/25 1304   Narrative:     These images are not reportable by radiology and will not be interpreted  by  Radiologists.   XR chest 1 view [[id number]] Collected: 03/24/25 1745   Order Status: Completed Updated: 03/24/25 1745   Narrative:     Interpreted By:  Evaristo Byrnes,  STUDY:  XR CHEST 1 VIEW;  3/24/2025 5:26 pm      INDICATION:  Signs/Symptoms:pre op clearance.          COMPARISON:  None.      ACCESSION NUMBER(S):  [id number]      ORDERING CLINICIAN:  YANNA MCCLOUD      FINDINGS:                  CARDIOMEDIASTINAL SILHOUETTE:  Cardiomediastinal silhouette is normal in size and configuration.      LUNGS:  The lungs are hyperinflated. There is biapical pleural thickening. No  consolidation or effusion seen. There is no edema      ABDOMEN:  No remarkable upper abdominal findings.      BONES:  No acute osseous changes.       Impression:     Hyperinflated lungs with emphysematous changes. No evidence of acute  cardiopulmonary process.           MACRO:  None      Signed by: Evaristo Byrnes 3/24/2025 5:44 PM  Dictation workstation:   [id number]   XR hip left with pelvis when performed 2 or 3 views [[id number]] Collected: 03/25/25 1809   Order Status: Completed Updated: 03/25/25 1809   Narrative:     Interpreted By:  Evaristo Byrnes,  STUDY:  XR HIP LEFT WITH PELVIS WHEN PERFORMED 2 OR 3 VIEWS; ;  3/24/2025  3:56 pm      INDICATION:  Signs/Symptoms:left hip pain s/p fall.      ,M25.552 Pain in left hip      COMPARISON:  None.      ACCESSION NUMBER(S):  [id number]      ORDERING CLINICIAN:  KEENAN ORDAZ      FINDINGS:  Left hip, three views      There is a comminuted markedly angulated and moderately displaced the  left femoral intertrochanteric fracture. Subsequent studies  demonstrate fixation of these. Mild degenerative changes in the hips  bilaterally. There is no dislocation       Impression:     Markedly angulated and moderately displaced left femoral  intertrochanteric fracture          MACRO:  None      Signed by: Evaristo Byrnes 3/25/2025 6:08 PM  Dictation workstation:   [id number]             Medical Problems       Problem List       Chronic obstructive pulmonary disease (Multi)    Essential (primary) hypertension    Anemia    Atherosclerotic heart disease of native coronary artery without angina pectoris    Hyperlipidemia    PAD (peripheral artery disease) (CMS-[facility])    Past myocardial infarction    Overview Signed 2/13/2025  9:28 PM by Marino Reveles PA-C     Comment on above: LM: mild disease.* LAD: mild disease up to 20% in the mid vessel; D1 and D2 are largewith mild disease.* LCX: dominant, mid 30% diffuse disease and mid 100% thromboticlesion;* RCA: non-dominant with mid 40% stenosis.* Elevated LVEDP.* Successful PCI of mid LCX with 2.5x23mm Promus DILIA postdilated to2.5mm.;         Lung nodule    Chronic combined systolic (congestive) and diastolic (congestive) heart failure               Above medical problems may be reflective of  historical medical problems that may have resolved and may not related to acute clinical condition/medical problems.    Clinical impression/plan:    Acute left-sided hip fracture 2/2 fall about a month PTA  Consult ortho surgery for planned intervention tomorrow   Continue pain and nausea regimen  Pt denies recent cardiac and respiratory symptoms, COPD is stable, EKG shows no evidence of myocardial ischemia (limited read 2/2 artifact), echo 2/14/25 revealed LVEF 60-65%, mild RVSP and no valvulopathy  Pt has 1 risk factor for this procedure and 0.9% risk of a major cardiac event per the Revised Cardiac Risk Index  3/25: Operative day for left intertrochanteric hip fracture repair.  No apparent perioperative complications and doing well at this point.  However patient presently is fairly adamant about refusing SNF.  3/26: POD #1.  Hemoglobin trended down slightly as expected.  WBC lovely felt likely reactive in nature.  Continue to monitor.  Awaiting patient and family decision for discharge disposition.  3/27: POD #2.  Hemoglobin continued to trend down with no evidence of active bleeding.  WBC improved likely reactive.  Today patient is agreeable to SNF.  3/28: POD #3.  Hemoglobin once again trended down to 7.0.  No apparent active bleeding though no BM in 4 days.  [place] bowel regimen.  Continue to monitor closely.  Patient is somewhat reluctant to have blood transfusion at this point.  3/29: POD #4.  Hemoglobin trended up to 8.1 today.  Awaiting authorization for SNF.  3/30: POD #5.  Awaiting authorization for SNF.     Acute blood loss anemia  3/29: Hemoglobin reached a maxine of 7.0 yesterday with no obvious bleeding other than some minor oozing at the wound.  150 mL blood loss with surgery.  Iron studies low.  Was apparently intolerant at 1 point of oral iron but a long time ago and she is willing to try ferrous sulfate once a day with meals.   3/30: Hemoglobin appeared to trend down to 7.1 today.  Still no  significant evidence for bleeding.  Folic acid started for folate deficiency and as she appeared to have some mild adverse reaction to oral iron we will give her IV iron infusion today.  Continue to monitor.     COPD on 4L home O2 24/7  Continue home inhalers, pt is not hypoxic below baseline  CXR reveals no acute changes  3/27: Continues on home O2 level doing well.     Hx CAD s/p PCI of mid LCx   Continue Lipitor and metoprolol, will hold ASA preoperatively     DVT ppx: SubQ heparin       Disposition/additional care plan/interventions: 3/31/2025    Awaiting authorization for skilled nursing facility.    Continue weightbearing as tolerated    Continue analgesics therapy    Continue wound care for sacrum and left ankle.    Discharge orders already in place    Upward trending of H&H today hemoglobin 7.9, prior 7.1 yesterday.    CAD history without acute complaints.    COPD without acute exacerbation continue supportive care.Continue to monitor respiratory disposition closely    Continue postoperative care as recommended by orthopedic service    The patient was informed of differential diagnosis , work up , plan of care and possible sequelae of clinical disposition.Patient in agreement with plan of care. Further recommendations forthcoming in accordance with patient's clinical disposition and response to care.    Discharge planning:Discharge barrier awaiting authorization For discharge.  Patient appeared hemodynamically stable and clinically fit for discharge.               Dictation performed with assistance of voice recognition device therefore transcription errors are possible.

## 2025-04-01 LAB
ERYTHROCYTE [DISTWIDTH] IN BLOOD BY AUTOMATED COUNT: 17.3 % (ref 11.5–14.5)
HCT VFR BLD AUTO: 24.6 % (ref 36–46)
HGB BLD-MCNC: 7.5 G/DL (ref 12–16)
MCH RBC QN AUTO: 29.1 PG (ref 26–34)
MCHC RBC AUTO-ENTMCNC: 30.5 G/DL (ref 32–36)
MCV RBC AUTO: 95 FL (ref 80–100)
NRBC BLD-RTO: 0 /100 WBCS (ref 0–0)
PLATELET # BLD AUTO: 345 X10*3/UL (ref 150–450)
RBC # BLD AUTO: 2.58 X10*6/UL (ref 4–5.2)
WBC # BLD AUTO: 8.2 X10*3/UL (ref 4.4–11.3)

## 2025-04-01 PROCEDURE — 1100000001 HC PRIVATE ROOM DAILY

## 2025-04-01 PROCEDURE — 99232 SBSQ HOSP IP/OBS MODERATE 35: CPT | Performed by: HOSPITALIST

## 2025-04-01 PROCEDURE — 36415 COLL VENOUS BLD VENIPUNCTURE: CPT | Performed by: HOSPITALIST

## 2025-04-01 PROCEDURE — 85027 COMPLETE CBC AUTOMATED: CPT | Performed by: HOSPITALIST

## 2025-04-01 PROCEDURE — 2500000004 HC RX 250 GENERAL PHARMACY W/ HCPCS (ALT 636 FOR OP/ED): Performed by: HOSPITALIST

## 2025-04-01 PROCEDURE — 2500000004 HC RX 250 GENERAL PHARMACY W/ HCPCS (ALT 636 FOR OP/ED): Performed by: FAMILY MEDICINE

## 2025-04-01 PROCEDURE — 2500000001 HC RX 250 WO HCPCS SELF ADMINISTERED DRUGS (ALT 637 FOR MEDICARE OP): Performed by: HOSPITALIST

## 2025-04-01 PROCEDURE — 2500000001 HC RX 250 WO HCPCS SELF ADMINISTERED DRUGS (ALT 637 FOR MEDICARE OP): Performed by: ORTHOPAEDIC SURGERY

## 2025-04-01 PROCEDURE — 2500000001 HC RX 250 WO HCPCS SELF ADMINISTERED DRUGS (ALT 637 FOR MEDICARE OP): Performed by: FAMILY MEDICINE

## 2025-04-01 RX ADMIN — FOLIC ACID 1 MG: 1 TABLET ORAL at 08:03

## 2025-04-01 RX ADMIN — HEPARIN SODIUM 5000 UNITS: 5000 INJECTION, SOLUTION INTRAVENOUS; SUBCUTANEOUS at 21:47

## 2025-04-01 RX ADMIN — METOPROLOL TARTRATE 12.5 MG: 25 TABLET, FILM COATED ORAL at 21:47

## 2025-04-01 RX ADMIN — POLYETHYLENE GLYCOL 3350 17 G: 17 POWDER, FOR SOLUTION ORAL at 08:03

## 2025-04-01 RX ADMIN — HEPARIN SODIUM 5000 UNITS: 5000 INJECTION, SOLUTION INTRAVENOUS; SUBCUTANEOUS at 06:25

## 2025-04-01 RX ADMIN — DOCUSATE SODIUM 100 MG: 100 CAPSULE, LIQUID FILLED ORAL at 08:03

## 2025-04-01 RX ADMIN — METOPROLOL TARTRATE 12.5 MG: 25 TABLET, FILM COATED ORAL at 08:03

## 2025-04-01 RX ADMIN — FLUTICASONE FUROATE AND VILANTEROL TRIFENATATE 1 PUFF: 100; 25 POWDER RESPIRATORY (INHALATION) at 06:24

## 2025-04-01 RX ADMIN — HEPARIN SODIUM 5000 UNITS: 5000 INJECTION, SOLUTION INTRAVENOUS; SUBCUTANEOUS at 14:36

## 2025-04-01 RX ADMIN — ASPIRIN 81 MG: 81 TABLET, COATED ORAL at 08:03

## 2025-04-01 RX ADMIN — TIOTROPIUM BROMIDE INHALATION SPRAY 2 PUFF: 3.12 SPRAY, METERED RESPIRATORY (INHALATION) at 06:23

## 2025-04-01 RX ADMIN — EMPAGLIFLOZIN 10 MG: 10 TABLET, FILM COATED ORAL at 08:03

## 2025-04-01 ASSESSMENT — COGNITIVE AND FUNCTIONAL STATUS - GENERAL
TOILETING: A LITTLE
WALKING IN HOSPITAL ROOM: A LOT
HELP NEEDED FOR BATHING: A LITTLE
DRESSING REGULAR LOWER BODY CLOTHING: A LOT
DAILY ACTIVITIY SCORE: 18
CLIMB 3 TO 5 STEPS WITH RAILING: A LOT
MOVING FROM LYING ON BACK TO SITTING ON SIDE OF FLAT BED WITH BEDRAILS: A LITTLE
DRESSING REGULAR UPPER BODY CLOTHING: A LITTLE
TOILETING: A LOT
CLIMB 3 TO 5 STEPS WITH RAILING: A LOT
STANDING UP FROM CHAIR USING ARMS: A LOT
MOBILITY SCORE: 14
STANDING UP FROM CHAIR USING ARMS: A LOT
WALKING IN HOSPITAL ROOM: A LOT
MOVING TO AND FROM BED TO CHAIR: A LOT
DRESSING REGULAR LOWER BODY CLOTHING: A LOT
PERSONAL GROOMING: A LITTLE
MOBILITY SCORE: 14
DAILY ACTIVITIY SCORE: 17
TURNING FROM BACK TO SIDE WHILE IN FLAT BAD: A LITTLE
HELP NEEDED FOR BATHING: A LITTLE
TURNING FROM BACK TO SIDE WHILE IN FLAT BAD: A LITTLE
MOVING FROM LYING ON BACK TO SITTING ON SIDE OF FLAT BED WITH BEDRAILS: A LITTLE
MOVING TO AND FROM BED TO CHAIR: A LOT
DRESSING REGULAR UPPER BODY CLOTHING: A LITTLE
PERSONAL GROOMING: A LITTLE

## 2025-04-01 ASSESSMENT — PAIN - FUNCTIONAL ASSESSMENT
PAIN_FUNCTIONAL_ASSESSMENT: 0-10
PAIN_FUNCTIONAL_ASSESSMENT: 0-10

## 2025-04-01 ASSESSMENT — PAIN SCALES - GENERAL
PAINLEVEL_OUTOF10: 0 - NO PAIN
PAINLEVEL_OUTOF10: 0 - NO PAIN

## 2025-04-01 NOTE — PROGRESS NOTES
Elsa Hinson  [id number]   Service: Internal Medicine / Hospitalist Date of service:  4/1/2025                                  Full Code                  Elsa Hinson is a 75 y.o. female presenting with Displaced intertrochanteric fracture of left femur, initial encounter for closed fracture.         Subjective        75 y.o. female with PMHx of COPD on 4L home O2 24/7 and CAD s/p PCI of mid LCx who presented with left hip pain, which she has had since leaving her rehab facility close to a month ago. She fell just prior to leaving; no x-ray was done. She has had significant pain with ambulation since then. Today she went to see her PCP who ordered x-rays, which showed a left-sided hip fracture. Ortho surgery recommended surgery tomorrow.   ED labwork was benign. CXR was negative. VSS        3/25:                Today the patient is seen operative day postoperatively from her left hip fracture repair.  Seen in the presence of multiple family members.  She is awake alert and interactive appropriately complaining of only discomfort really in the sacral pressure injury area.  Orthopedic surgery describes WBAT and follow-up in 2 weeks.  At this point the patient appears to be refusing to consider SNF due to her recent experience where she fell before she left the SNF and was not evaluated.  It appears her daughters feel she likely would benefit from SNF however.  Otherwise denies interval new symptoms or complaints including chest pain palpitations pleuritic type pain unusual shortness of breath cough sputum nausea abdominal pain flank pain headache visual disturbances fever or chills.     3/26:                  Today the patient is POD #1 and seen once again in the presence of family members.  She appears much more awake alert and interactive today.  Voices no specific complaints and no acute events overnight.  However she continues to states she does not want to go to SNF.  AM-PAC 10.  Once again it appears patient's  daughters feel she should go to SNF.  Awaiting transitional care coordinator interaction.  Orthopedic surgery describes WBAT, ASA 81 mg daily for 6 weeks and follow-up with Dr. العراقي in 2 weeks. Otherwise denies interval new symptoms or complaints including chest pain palpitations pleuritic type pain unusual shortness of breath cough sputum nausea abdominal pain flank pain headache visual disturbances fever or chills.     3/27:                   Today patient is POD #2.  Seen in the presence of her daughter.  Continues awake alert and interactive appropriately.  She is experiencing more significant postoperative pain especially with PT.  Hemoglobin did trend down significantly with no evidence of active bleeding.  WBC trended down which was likely reactive.  After long discussion today with patient and daughter it appears the patient is now agreeable to SNF.  Transitional care working towards arrangements for this. Otherwise denies interval new symptoms or complaints including chest pain palpitations pleuritic type pain unusual shortness of breath cough sputum nausea abdominal pain flank pain headache visual disturbances fever or chills.     3/28:                POD #3.  Once again seen in the presence of her daughter.  She remains awake alert and interactive appropriately.  Continues with expected postoperative discomfort.  Of concern is hemoglobin has decreased to 7.0 with no apparent source of bleeding.  She has not had a BM for 4 days but states this is normal pattern for her.  Taking diet well and no abdominal discomfort.  Operative wound does not appear to demonstrate any hematoma.  Some oozing on the dressing according to orthopedic surgeon today.  150 mL blood loss reported at surgery.  Patient states she has always been iron deficient.  Patient is not wanting blood transfusion at this time and she states she has refused them in the past.  Discussed with daughter that we would monitor hemoglobin while  waiting for SNF authorization and transfuse if decreased any further.  She remains asymptomatic with this.  Will obtain iron studies in the morning.  Iron supplementation will be empirically started. Otherwise denies interval new symptoms or complaints including chest pain palpitations pleuritic type pain unusual shortness of breath cough sputum nausea abdominal pain flank pain headache visual disturbances fever or chills.     3/29:               Today the patient remains awake alert and interactive appropriately seen in the bedside chair.  Hemoglobin trended up to 8.1 today.  Still has not had BM however.  Discussed her low iron status and states she has been tried on iron pills in the past but did not tolerate them due to GI distress.  However this was a long time ago.  She is willing to try ferrous sulfate once a day with meal.  Discussed she will need to follow-up with PCP to see if she will require iron infusions.  Awaiting authorization for SNF. Otherwise denies interval new symptoms or complaints including chest pain palpitations pleuritic type pain unusual shortness of breath cough sputum nausea abdominal pain flank pain headache visual disturbances fever or chills.     3/30:              Today patient remains awake alert and interactive appropriately and seen in the presence of her daughter.  Voices no specific complaints and no acute events overnight.  States she is continuing to work well with PT.  She does describe some abdominal discomfort symptoms with ferrous sulfate.  We will give her IV iron infusion today.  Started on folic acid for folate deficiency.  Awaiting authorization for SNF. Otherwise denies interval new symptoms or complaints including chest pain palpitations pleuritic type pain unusual shortness of breath cough sputum nausea abdominal pain flank pain headache visual disturbances fever or chills.        3/31...........................Patient reported feeling fair today still endorsed  postoperative pain but improved.  No reported :chest pains, nausea, vomiting, fevers, chills, dyspnea or palpitations.     4/1...............Late note entry patient seen and evaluated earlier todayno reported: Headaches, chest pains, nausea, vomiting, palpitations, syncope or presyncope.  The patient did endorsed postoperative related pain but controlled.        Patient seen and examined, lab data and diagnostics reviewed.  No reported: acute symptomatology at the time of evaluation including but not limited to :chest pain, dyspnea, constipation, abdominal pain, dysuria , nausea, vomiting ,headache, new focal weakness, diaphoresis,fever, chills, syncope,presyncope or palpitations.         Review of Systems:   Review of system otherwise negative if not aforementioned above in subjective.     Objective           Physical Exam      Constitutional:       Appearance: Patient appeared in no acute cardiopulmonary distress.     Comments: Patient alert and oriented to : person, place ,time and situation.  HEENT:      Head: Normocephalic and atraumatic.Trachea midline      Nose:No observed congestion or rhinorrhea.     Mouth/Throat: Mucous membranes Moist, Trachea appeared  midline.  Eyes:      Extraocular Movements: Extraocular movements intact.      Pupils: Pupils are equal, round, and reactive to light.      Comments: No scleral icterus or conjunctival injection appreciated.   Cardiovascular:      Rate and Rhythm: Normal rate and regular rhythm. No clicks rubs or gallops, normal S1 and S2.No peripheral stigmata of endocarditis appreciated.     Pulmonary:      Lungs appeared clear to auscultation, no adventitious sound appreciated.  Abdominal:      General: Abdomen soft, nontender, active bowel sounds, no involuntary guarding or rebound tenderness appreciated.     Comments: None   Musculoskeletal:       Left ankle dressing in place, no drainage appreciated  No pitting edema or cyanosis appreciated.       Lymphadenopathy:       No appreciable palpable lymphadenopathy  Skin:     General: Skin is warm.      Coloration:  No jaundice     Findings: No abnormal appearing skin rashes or lesions that appeared acute noted on unclothed area of the skin..   Neurological:      General: No focal sensory or motor deficits appreciated, no meningeal signs or dysmetria noted.      Cranial Nerves: Cranial nerves II to XII appearing grossly intact.     Genitals:  Deferred  Psychiatric:         The patient appears to be displaying normal mood and affect at the time of evaluation.     Labs:           Lab Results   Component Value Date     GLUCOSE 98 03/31/2025     CALCIUM 8.6 03/31/2025      03/31/2025     K 3.8 03/31/2025     CO2 36 (H) 03/31/2025     CL 99 03/31/2025     BUN 7 03/31/2025     CREATININE 0.39 (L) 03/31/2025            Lab Results   Component Value Date     WBC 9.5 03/31/2025     HGB 7.9 (L) 03/31/2025     HCT 26.3 (L) 03/31/2025     MCV 97 03/31/2025      03/31/2025      [unfilled]   [unfilled]   No results found for the last 90 days.                  X-rays/ Images     [unfilled]   Radiology Results (last 21 days)     Procedure Component Value Units Date/Time   FL less than 1 hour [[id number]] Resulted: 03/25/25 1304   Order Status: Completed Updated: 03/25/25 1304   Narrative:     These images are not reportable by radiology and will not be interpreted  by  Radiologists.   XR chest 1 view [[id number]] Collected: 03/24/25 1745   Order Status: Completed Updated: 03/24/25 1745   Narrative:     Interpreted By:  Evaristo Byrnes,  STUDY:  XR CHEST 1 VIEW;  3/24/2025 5:26 pm      INDICATION:  Signs/Symptoms:pre op clearance.          COMPARISON:  None.      ACCESSION NUMBER(S):  [id number]      ORDERING CLINICIAN:  YANNA MCCLOUD      FINDINGS:                  CARDIOMEDIASTINAL SILHOUETTE:  Cardiomediastinal silhouette is normal in size and configuration.      LUNGS:  The lungs are hyperinflated. There is biapical  pleural thickening. No  consolidation or effusion seen. There is no edema      ABDOMEN:  No remarkable upper abdominal findings.      BONES:  No acute osseous changes.       Impression:     Hyperinflated lungs with emphysematous changes. No evidence of acute  cardiopulmonary process.          MACRO:  None      Signed by: Evaristo Byrnes 3/24/2025 5:44 PM  Dictation workstation:   [id number]   XR hip left with pelvis when performed 2 or 3 views [[id number]] Collected: 03/25/25 1809   Order Status: Completed Updated: 03/25/25 1809   Narrative:     Interpreted By:  Evaristo Byrnes,  STUDY:  XR HIP LEFT WITH PELVIS WHEN PERFORMED 2 OR 3 VIEWS; ;  3/24/2025  3:56 pm      INDICATION:  Signs/Symptoms:left hip pain s/p fall.      ,M25.552 Pain in left hip      COMPARISON:  None.      ACCESSION NUMBER(S):  [id number]      ORDERING CLINICIAN:  KEENAN ORDAZ      FINDINGS:  Left hip, three views      There is a comminuted markedly angulated and moderately displaced the  left femoral intertrochanteric fracture. Subsequent studies  demonstrate fixation of these. Mild degenerative changes in the hips  bilaterally. There is no dislocation       Impression:     Markedly angulated and moderately displaced left femoral  intertrochanteric fracture          MACRO:  None      Signed by: Evaristo Byrnes 3/25/2025 6:08 PM  Dictation workstation:   [id number]                  Medical Problems         Problem List         Chronic obstructive pulmonary disease (Multi)     Essential (primary) hypertension     Anemia     Atherosclerotic heart disease of native coronary artery without angina pectoris     Hyperlipidemia     PAD (peripheral artery disease) (CMS-[facility])     Past myocardial infarction     Overview Signed 2/13/2025  9:28 PM by Marino Reveles PA-C       Comment on above: LM: mild disease.* LAD: mild disease up to 20% in the mid vessel; D1 and D2 are largewith mild disease.* LCX: dominant, mid 30% diffuse disease and mid 100%  thromboticlesion;* RCA: non-dominant with mid 40% stenosis.* Elevated LVEDP.* Successful PCI of mid LCX with 2.5x23mm Promus DILIA postdilated to2.5mm.;           Lung nodule     Chronic combined systolic (congestive) and diastolic (congestive) heart failure                  Above medical problems may be reflective of historical medical problems that may have resolved and may not related to acute clinical condition/medical problems.     Clinical impression/plan:     Acute left-sided hip fracture 2/2 fall about a month PTA  Consult ortho surgery for planned intervention tomorrow   Continue pain and nausea regimen  Pt denies recent cardiac and respiratory symptoms, COPD is stable, EKG shows no evidence of myocardial ischemia (limited read 2/2 artifact), echo 2/14/25 revealed LVEF 60-65%, mild RVSP and no valvulopathy  Pt has 1 risk factor for this procedure and 0.9% risk of a major cardiac event per the Revised Cardiac Risk Index  3/25: Operative day for left intertrochanteric hip fracture repair.  No apparent perioperative complications and doing well at this point.  However patient presently is fairly adamant about refusing SNF.  3/26: POD #1.  Hemoglobin trended down slightly as expected.  WBC lovely felt likely reactive in nature.  Continue to monitor.  Awaiting patient and family decision for discharge disposition.  3/27: POD #2.  Hemoglobin continued to trend down with no evidence of active bleeding.  WBC improved likely reactive.  Today patient is agreeable to SNF.  3/28: POD #3.  Hemoglobin once again trended down to 7.0.  No apparent active bleeding though no BM in 4 days.  [place] bowel regimen.  Continue to monitor closely.  Patient is somewhat reluctant to have blood transfusion at this point.  3/29: POD #4.  Hemoglobin trended up to 8.1 today.  Awaiting authorization for SNF.  3/30: POD #5.  Awaiting authorization for SNF.     Acute blood loss anemia  3/29: Hemoglobin reached a maxine of 7.0 yesterday with  no obvious bleeding other than some minor oozing at the wound.  150 mL blood loss with surgery.  Iron studies low.  Was apparently intolerant at 1 point of oral iron but a long time ago and she is willing to try ferrous sulfate once a day with meals.   3/30: Hemoglobin appeared to trend down to 7.1 today.  Still no significant evidence for bleeding.  Folic acid started for folate deficiency and as she appeared to have some mild adverse reaction to oral iron we will give her IV iron infusion today.  Continue to monitor.     COPD on 4L home O2 24/7  Continue home inhalers, pt is not hypoxic below baseline  CXR reveals no acute changes  3/27: Continues on home O2 level doing well.     Hx CAD s/p PCI of mid LCx   Continue Lipitor and metoprolol, will hold ASA preoperatively     DVT ppx: SubQ heparin        Disposition/additional care plan/interventions: 3/31/2025     Awaiting authorization for skilled nursing facility.     Continue weightbearing as tolerated     Continue analgesics therapy     Continue wound care for sacrum and left ankle.     Discharge orders already in place     Upward trending of H&H today hemoglobin 7.9, prior 7.1 yesterday.     CAD history without acute complaints.     COPD without acute exacerbation continue supportive care.Continue to monitor respiratory disposition closely     Continue postoperative care as recommended by orthopedic service      Disposition/additional care plan/interventions: 4/1/2025    Continue supportive care    Recent anemia with upward trending of hemoglobin, check CBC and a.m. continue to monitor for any further blood loss.    Check BMP tomorrow monitor renal function    Discharge barrier still awaiting authorization for skilled nursing facility.     The patient was informed of differential diagnosis , work up , plan of care and possible sequelae of clinical disposition.Patient in agreement with plan of care. Further recommendations forthcoming in accordance with patient's  clinical disposition and response to care.     Discharge planning:Discharge barrier awaiting authorization For discharge.  Patient appeared hemodynamically stable and clinically fit for discharge.                    Dictation performed with assistance of voice recognition device therefore transcription errors are possible.

## 2025-04-01 NOTE — PROGRESS NOTES
Occupational Therapy                 Therapy Communication Note    Patient Name: Elsa Hinson  MRN: [id number]  Department: [facility] [address]  Room: [address] [place] [address]  Today's Date: 4/1/2025     Discipline: Occupational Therapy    OT Missed Visit: Yes     Missed Visit Reason: Missed Visit Reason: Patient refused (pt. reported she was too tired today.)    Missed Time: Attempt    Comment:

## 2025-04-01 NOTE — CARE PLAN
Problem: Fall/Injury  Goal: Not fall by end of shift  Outcome: Progressing  Goal: Be free from injury by end of the shift  Outcome: Progressing  Goal: Verbalize understanding of personal risk factors for fall in the hospital  Outcome: Progressing  Goal: Verbalize understanding of risk factor reduction measures to prevent injury from fall in the home  Outcome: Progressing  Goal: Use assistive devices by end of the shift  Outcome: Progressing  Goal: Pace activities to prevent fatigue by end of the shift  Outcome: Progressing     Problem: Pain  Goal: Takes deep breaths with improved pain control throughout the shift  Outcome: Progressing  Goal: Turns in bed with improved pain control throughout the shift  Outcome: Progressing  Goal: Walks with improved pain control throughout the shift  Outcome: Progressing  Goal: Performs ADL's with improved pain control throughout shift  Outcome: Progressing  Goal: Participates in PT with improved pain control throughout the shift  Outcome: Progressing  Goal: Free from opioid side effects throughout the shift  Outcome: Progressing  Goal: Free from acute confusion related to pain meds throughout the shift  Outcome: Progressing     Problem: Pain - Adult  Goal: Verbalizes/displays adequate comfort level or baseline comfort level  Outcome: Progressing  Flowsheets (Taken 3/31/2025 2046)  Verbalizes/displays adequate comfort level or baseline comfort level:   Encourage patient to monitor pain and request assistance   Assess pain using appropriate pain scale   Administer analgesics based on type and severity of pain and evaluate response   Implement non-pharmacological measures as appropriate and evaluate response     Problem: Safety - Adult  Goal: Free from fall injury  Outcome: Progressing     Problem: Discharge Planning  Goal: Discharge to home or other facility with appropriate resources  Outcome: Progressing     Problem: Chronic Conditions and Co-morbidities  Goal: Patient's chronic  conditions and co-morbidity symptoms are monitored and maintained or improved  Outcome: Progressing     Problem: Nutrition  Goal: Nutrient intake appropriate for maintaining nutritional needs  Outcome: Progressing     Problem: Skin  Goal: Decreased wound size/increased tissue granulation at next dressing change  Outcome: Progressing  Flowsheets (Taken 3/31/2025 2046)  Decreased wound size/increased tissue granulation at next dressing change:   Promote sleep for wound healing   Protective dressings over bony prominences  Goal: Participates in plan/prevention/treatment measures  Outcome: Progressing  Flowsheets (Taken 3/31/2025 2046)  Participates in plan/prevention/treatment measures:   Discuss with provider PT/OT consult   Elevate heels   Increase activity/out of bed for meals  Goal: Prevent/manage excess moisture  Outcome: Progressing  Flowsheets (Taken 3/31/2025 2046)  Prevent/manage excess moisture:   Monitor for/manage infection if present   Follow provider orders for dressing changes  Goal: Prevent/minimize sheer/friction injuries  Outcome: Progressing  Flowsheets (Taken 3/31/2025 2046)  Prevent/minimize sheer/friction injuries:   Increase activity/out of bed for meals   Use pull sheet   HOB 30 degrees or less   Turn/reposition every 2 hours/use positioning/transfer devices  Goal: Promote/optimize nutrition  Outcome: Progressing  Flowsheets (Taken 3/31/2025 2046)  Promote/optimize nutrition:   Assist with feeding   Monitor/record intake including meals   Consume > 50% meals/supplements  Goal: Promote skin healing  Outcome: Progressing  Flowsheets (Taken 3/31/2025 2046)  Promote skin healing:   Assess skin/pad under line(s)/device(s)   Protective dressings over bony prominences   Turn/reposition every 2 hours/use positioning/transfer devices     Problem: Respiratory  Goal: Clear secretions with interventions this shift  Outcome: Progressing  Goal: Minimize anxiety/maximize coping throughout shift  Outcome:  Progressing  Goal: Minimal/no exertional discomfort or dyspnea this shift  Outcome: Progressing  Goal: No signs of respiratory distress (eg. Use of accessory muscles. Peds grunting)  Outcome: Progressing  Goal: Patent airway maintained this shift  Outcome: Progressing   The patient's goals for the shift include get some rest    The clinical goals for the shift include pt remain free from falls and injuries, vitals remain stable, control pain, participate in pressure redistribution, work with PT/OT, get up to the chair for meals

## 2025-04-01 NOTE — PROGRESS NOTES
Physical Therapy                 Therapy Communication Note    Patient Name: Elsa Hinson  MRN: [id number]  Department: [facility] [address]  Room: [address] [place] [address]  Today's Date: 4/1/2025     Discipline: Physical Therapy    PT Missed Visit: Yes     Missed Visit Reason: Missed Visit Reason: Patient refused    Missed Time: Attempt    Comment: pt declined, despite attempted persuasion, due to fatigue

## 2025-04-01 NOTE — CARE PLAN
The patient's goals for the shift include get to the chair for meals    The clinical goals for the shift include pt remain free from falls and injuries, vitals remain stable, control pain, participate in pressure redistribution, work with PT/ OT, get to the chair      Problem: Fall/Injury  Goal: Not fall by end of shift  Outcome: Progressing  Goal: Be free from injury by end of the shift  Outcome: Progressing  Goal: Verbalize understanding of personal risk factors for fall in the hospital  Outcome: Progressing  Goal: Verbalize understanding of risk factor reduction measures to prevent injury from fall in the home  Outcome: Progressing  Goal: Use assistive devices by end of the shift  Outcome: Progressing  Goal: Pace activities to prevent fatigue by end of the shift  Outcome: Progressing     Problem: Pain  Goal: Takes deep breaths with improved pain control throughout the shift  Outcome: Progressing  Goal: Turns in bed with improved pain control throughout the shift  Outcome: Progressing  Goal: Walks with improved pain control throughout the shift  Outcome: Progressing  Goal: Performs ADL's with improved pain control throughout shift  Outcome: Progressing  Goal: Participates in PT with improved pain control throughout the shift  Outcome: Progressing  Goal: Free from opioid side effects throughout the shift  Outcome: Progressing  Goal: Free from acute confusion related to pain meds throughout the shift  Outcome: Progressing     Problem: Pain - Adult  Goal: Verbalizes/displays adequate comfort level or baseline comfort level  Outcome: Progressing     Problem: Safety - Adult  Goal: Free from fall injury  Outcome: Progressing     Problem: Discharge Planning  Goal: Discharge to home or other facility with appropriate resources  Outcome: Progressing     Problem: Chronic Conditions and Co-morbidities  Goal: Patient's chronic conditions and co-morbidity symptoms are monitored and maintained or improved  Outcome:  Progressing     Problem: Nutrition  Goal: Nutrient intake appropriate for maintaining nutritional needs  Outcome: Progressing     Problem: Skin  Goal: Decreased wound size/increased tissue granulation at next dressing change  Outcome: Progressing  Flowsheets (Taken 4/1/2025 0737)  Decreased wound size/increased tissue granulation at next dressing change:   Promote sleep for wound healing   Protective dressings over bony prominences  Goal: Participates in plan/prevention/treatment measures  Outcome: Progressing  Flowsheets (Taken 4/1/2025 0737)  Participates in plan/prevention/treatment measures:   Discuss with provider PT/OT consult   Elevate heels  Goal: Prevent/manage excess moisture  Outcome: Progressing  Flowsheets (Taken 4/1/2025 0737)  Prevent/manage excess moisture:   Follow provider orders for dressing changes   Moisturize dry skin   Monitor for/manage infection if present  Goal: Prevent/minimize sheer/friction injuries  Outcome: Progressing  Flowsheets (Taken 4/1/2025 0737)  Prevent/minimize sheer/friction injuries:   HOB 30 degrees or less   Increase activity/out of bed for meals   Turn/reposition every 2 hours/use positioning/transfer devices   Use pull sheet  Goal: Promote/optimize nutrition  Outcome: Progressing  Flowsheets (Taken 4/1/2025 0737)  Promote/optimize nutrition:   Consume > 50% meals/supplements   Monitor/record intake including meals   Offer water/supplements/favorite foods  Goal: Promote skin healing  Outcome: Progressing  Flowsheets (Taken 4/1/2025 0737)  Promote skin healing:   Assess skin/pad under line(s)/device(s)   Ensure correct size (line/device) and apply per  instructions   Protective dressings over bony prominences   Turn/reposition every 2 hours/use positioning/transfer devices   Rotate device position/do not position patient on device     Problem: Respiratory  Goal: Clear secretions with interventions this shift  Outcome: Progressing  Goal: Minimize  anxiety/maximize coping throughout shift  Outcome: Progressing  Goal: Minimal/no exertional discomfort or dyspnea this shift  Outcome: Progressing  Goal: No signs of respiratory distress (eg. Use of accessory muscles. Peds grunting)  Outcome: Progressing  Goal: Patent airway maintained this shift  Outcome: Progressing

## 2025-04-02 ENCOUNTER — APPOINTMENT (OUTPATIENT)
Dept: ORTHOPEDIC SURGERY | Facility: CLINIC | Age: 75
End: 2025-04-02
Payer: COMMERCIAL

## 2025-04-02 VITALS
BODY MASS INDEX: 16.42 KG/M2 | WEIGHT: 87 LBS | HEART RATE: 99 BPM | RESPIRATION RATE: 18 BRPM | SYSTOLIC BLOOD PRESSURE: 110 MMHG | OXYGEN SATURATION: 94 % | TEMPERATURE: 99.4 F | DIASTOLIC BLOOD PRESSURE: 70 MMHG | HEIGHT: 61 IN

## 2025-04-02 LAB
ANION GAP SERPL CALC-SCNC: 12 MMOL/L (ref 10–20)
BUN SERPL-MCNC: 7 MG/DL (ref 6–23)
CALCIUM SERPL-MCNC: 8.3 MG/DL (ref 8.6–10.3)
CHLORIDE SERPL-SCNC: 99 MMOL/L (ref 98–107)
CO2 SERPL-SCNC: 36 MMOL/L (ref 21–32)
CREAT SERPL-MCNC: 0.49 MG/DL (ref 0.5–1.05)
EGFRCR SERPLBLD CKD-EPI 2021: >90 ML/MIN/1.73M*2
ERYTHROCYTE [DISTWIDTH] IN BLOOD BY AUTOMATED COUNT: 17.7 % (ref 11.5–14.5)
GLUCOSE SERPL-MCNC: 100 MG/DL (ref 74–99)
HCT VFR BLD AUTO: 24.9 % (ref 36–46)
HGB BLD-MCNC: 7.5 G/DL (ref 12–16)
MCH RBC QN AUTO: 29 PG (ref 26–34)
MCHC RBC AUTO-ENTMCNC: 30.1 G/DL (ref 32–36)
MCV RBC AUTO: 96 FL (ref 80–100)
NRBC BLD-RTO: 0 /100 WBCS (ref 0–0)
PLATELET # BLD AUTO: 329 X10*3/UL (ref 150–450)
POTASSIUM SERPL-SCNC: 3.1 MMOL/L (ref 3.5–5.3)
RBC # BLD AUTO: 2.59 X10*6/UL (ref 4–5.2)
SODIUM SERPL-SCNC: 144 MMOL/L (ref 136–145)
WBC # BLD AUTO: 8.4 X10*3/UL (ref 4.4–11.3)

## 2025-04-02 PROCEDURE — 2500000004 HC RX 250 GENERAL PHARMACY W/ HCPCS (ALT 636 FOR OP/ED): Performed by: FAMILY MEDICINE

## 2025-04-02 PROCEDURE — 2500000002 HC RX 250 W HCPCS SELF ADMINISTERED DRUGS (ALT 637 FOR MEDICARE OP, ALT 636 FOR OP/ED): Performed by: HOSPITALIST

## 2025-04-02 PROCEDURE — 97530 THERAPEUTIC ACTIVITIES: CPT | Mod: GO,CO

## 2025-04-02 PROCEDURE — 97116 GAIT TRAINING THERAPY: CPT | Mod: GP,CQ

## 2025-04-02 PROCEDURE — 2500000001 HC RX 250 WO HCPCS SELF ADMINISTERED DRUGS (ALT 637 FOR MEDICARE OP): Performed by: FAMILY MEDICINE

## 2025-04-02 PROCEDURE — 2500000001 HC RX 250 WO HCPCS SELF ADMINISTERED DRUGS (ALT 637 FOR MEDICARE OP): Performed by: HOSPITALIST

## 2025-04-02 PROCEDURE — 99239 HOSP IP/OBS DSCHRG MGMT >30: CPT | Performed by: HOSPITALIST

## 2025-04-02 PROCEDURE — 85027 COMPLETE CBC AUTOMATED: CPT | Performed by: HOSPITALIST

## 2025-04-02 PROCEDURE — 2500000001 HC RX 250 WO HCPCS SELF ADMINISTERED DRUGS (ALT 637 FOR MEDICARE OP): Performed by: ORTHOPAEDIC SURGERY

## 2025-04-02 PROCEDURE — 36415 COLL VENOUS BLD VENIPUNCTURE: CPT | Performed by: HOSPITALIST

## 2025-04-02 PROCEDURE — 80048 BASIC METABOLIC PNL TOTAL CA: CPT | Performed by: HOSPITALIST

## 2025-04-02 PROCEDURE — 2500000004 HC RX 250 GENERAL PHARMACY W/ HCPCS (ALT 636 FOR OP/ED): Performed by: HOSPITALIST

## 2025-04-02 PROCEDURE — 97110 THERAPEUTIC EXERCISES: CPT | Mod: GP,CQ

## 2025-04-02 PROCEDURE — 97535 SELF CARE MNGMENT TRAINING: CPT | Mod: GO,CO

## 2025-04-02 RX ORDER — NALOXONE HYDROCHLORIDE 4 MG/.1ML
4 SPRAY NASAL AS NEEDED
Qty: 2 EACH | Refills: 11 | Status: SHIPPED | OUTPATIENT
Start: 2025-04-02

## 2025-04-02 RX ORDER — POTASSIUM CHLORIDE 20 MEQ/1
40 TABLET, EXTENDED RELEASE ORAL ONCE
Status: COMPLETED | OUTPATIENT
Start: 2025-04-02 | End: 2025-04-02

## 2025-04-02 RX ADMIN — DOCUSATE SODIUM 100 MG: 100 CAPSULE, LIQUID FILLED ORAL at 08:04

## 2025-04-02 RX ADMIN — METOPROLOL TARTRATE 12.5 MG: 25 TABLET, FILM COATED ORAL at 08:04

## 2025-04-02 RX ADMIN — EMPAGLIFLOZIN 10 MG: 10 TABLET, FILM COATED ORAL at 08:04

## 2025-04-02 RX ADMIN — ASPIRIN 81 MG: 81 TABLET, COATED ORAL at 08:04

## 2025-04-02 RX ADMIN — OXYCODONE HYDROCHLORIDE AND ACETAMINOPHEN 2 TABLET: 5; 325 TABLET ORAL at 10:51

## 2025-04-02 RX ADMIN — FOLIC ACID 1 MG: 1 TABLET ORAL at 08:04

## 2025-04-02 RX ADMIN — FLUTICASONE FUROATE AND VILANTEROL TRIFENATATE 1 PUFF: 100; 25 POWDER RESPIRATORY (INHALATION) at 07:04

## 2025-04-02 RX ADMIN — TIOTROPIUM BROMIDE INHALATION SPRAY 2 PUFF: 3.12 SPRAY, METERED RESPIRATORY (INHALATION) at 07:04

## 2025-04-02 RX ADMIN — POTASSIUM CHLORIDE 40 MEQ: 1500 TABLET, EXTENDED RELEASE ORAL at 10:51

## 2025-04-02 RX ADMIN — ACETAMINOPHEN 650 MG: 325 TABLET ORAL at 01:18

## 2025-04-02 RX ADMIN — HEPARIN SODIUM 5000 UNITS: 5000 INJECTION, SOLUTION INTRAVENOUS; SUBCUTANEOUS at 07:09

## 2025-04-02 ASSESSMENT — COGNITIVE AND FUNCTIONAL STATUS - GENERAL
DRESSING REGULAR LOWER BODY CLOTHING: A LOT
MOVING TO AND FROM BED TO CHAIR: A LOT
DRESSING REGULAR UPPER BODY CLOTHING: A LITTLE
MOBILITY SCORE: 14
DRESSING REGULAR LOWER BODY CLOTHING: A LOT
DAILY ACTIVITIY SCORE: 17
WALKING IN HOSPITAL ROOM: A LOT
TURNING FROM BACK TO SIDE WHILE IN FLAT BAD: A LITTLE
STANDING UP FROM CHAIR USING ARMS: A LITTLE
STANDING UP FROM CHAIR USING ARMS: A LOT
TOILETING: A LOT
PERSONAL GROOMING: A LITTLE
MOVING FROM LYING ON BACK TO SITTING ON SIDE OF FLAT BED WITH BEDRAILS: A LITTLE
MOBILITY SCORE: 15
CLIMB 3 TO 5 STEPS WITH RAILING: TOTAL
TURNING FROM BACK TO SIDE WHILE IN FLAT BAD: A LITTLE
TOILETING: A LITTLE
DRESSING REGULAR UPPER BODY CLOTHING: A LITTLE
DAILY ACTIVITIY SCORE: 16
EATING MEALS: A LITTLE
HELP NEEDED FOR BATHING: A LOT
MOVING TO AND FROM BED TO CHAIR: A LITTLE
MOVING FROM LYING ON BACK TO SITTING ON SIDE OF FLAT BED WITH BEDRAILS: A LITTLE
WALKING IN HOSPITAL ROOM: A LOT
CLIMB 3 TO 5 STEPS WITH RAILING: A LOT
PERSONAL GROOMING: A LITTLE
HELP NEEDED FOR BATHING: A LITTLE

## 2025-04-02 ASSESSMENT — PAIN SCALES - GENERAL
PAINLEVEL_OUTOF10: 6
PAINLEVEL_OUTOF10: 5 - MODERATE PAIN
PAINLEVEL_OUTOF10: 6

## 2025-04-02 ASSESSMENT — PAIN - FUNCTIONAL ASSESSMENT
PAIN_FUNCTIONAL_ASSESSMENT: 0-10

## 2025-04-02 ASSESSMENT — ACTIVITIES OF DAILY LIVING (ADL): HOME_MANAGEMENT_TIME_ENTRY: 10

## 2025-04-02 ASSESSMENT — PAIN DESCRIPTION - DESCRIPTORS
DESCRIPTORS: ACHING;DISCOMFORT
DESCRIPTORS: ACHING;DISCOMFORT

## 2025-04-02 ASSESSMENT — PAIN DESCRIPTION - LOCATION
LOCATION: HIP
LOCATION: HIP

## 2025-04-02 ASSESSMENT — PAIN DESCRIPTION - ORIENTATION
ORIENTATION: LEFT
ORIENTATION: LEFT

## 2025-04-02 NOTE — PROGRESS NOTES
Elsa Hinson  [id number]   Service: Internal Medicine / Hospitalist Date of service:  4/2/2025                                  Full Code                  Elsa Hinson is a 75 y.o. female presenting with Displaced intertrochanteric fracture of left femur, initial encounter for closed fracture.         Subjective        75 y.o. female with PMHx of COPD on 4L home O2 24/7 and CAD s/p PCI of mid LCx who presented with left hip pain, which she has had since leaving her rehab facility close to a month ago. She fell just prior to leaving; no x-ray was done. She has had significant pain with ambulation since then. Today she went to see her PCP who ordered x-rays, which showed a left-sided hip fracture. Ortho surgery recommended surgery tomorrow.   ED labwork was benign. CXR was negative. VSS        3/25:                Today the patient is seen operative day postoperatively from her left hip fracture repair.  Seen in the presence of multiple family members.  She is awake alert and interactive appropriately complaining of only discomfort really in the sacral pressure injury area.  Orthopedic surgery describes WBAT and follow-up in 2 weeks.  At this point the patient appears to be refusing to consider SNF due to her recent experience where she fell before she left the SNF and was not evaluated.  It appears her daughters feel she likely would benefit from SNF however.  Otherwise denies interval new symptoms or complaints including chest pain palpitations pleuritic type pain unusual shortness of breath cough sputum nausea abdominal pain flank pain headache visual disturbances fever or chills.     3/26:                  Today the patient is POD #1 and seen once again in the presence of family members.  She appears much more awake alert and interactive today.  Voices no specific complaints and no acute events overnight.  However she continues to states she does not want to go to SNF.  AM-PAC 10.  Once again it appears  patient's daughters feel she should go to SNF.  Awaiting transitional care coordinator interaction.  Orthopedic surgery describes WBAT, ASA 81 mg daily for 6 weeks and follow-up with Dr. العراقي in 2 weeks. Otherwise denies interval new symptoms or complaints including chest pain palpitations pleuritic type pain unusual shortness of breath cough sputum nausea abdominal pain flank pain headache visual disturbances fever or chills.     3/27:                   Today patient is POD #2.  Seen in the presence of her daughter.  Continues awake alert and interactive appropriately.  She is experiencing more significant postoperative pain especially with PT.  Hemoglobin did trend down significantly with no evidence of active bleeding.  WBC trended down which was likely reactive.  After long discussion today with patient and daughter it appears the patient is now agreeable to SNF.  Transitional care working towards arrangements for this. Otherwise denies interval new symptoms or complaints including chest pain palpitations pleuritic type pain unusual shortness of breath cough sputum nausea abdominal pain flank pain headache visual disturbances fever or chills.     3/28:                POD #3.  Once again seen in the presence of her daughter.  She remains awake alert and interactive appropriately.  Continues with expected postoperative discomfort.  Of concern is hemoglobin has decreased to 7.0 with no apparent source of bleeding.  She has not had a BM for 4 days but states this is normal pattern for her.  Taking diet well and no abdominal discomfort.  Operative wound does not appear to demonstrate any hematoma.  Some oozing on the dressing according to orthopedic surgeon today.  150 mL blood loss reported at surgery.  Patient states she has always been iron deficient.  Patient is not wanting blood transfusion at this time and she states she has refused them in the past.  Discussed with daughter that we would monitor hemoglobin  while waiting for SNF authorization and transfuse if decreased any further.  She remains asymptomatic with this.  Will obtain iron studies in the morning.  Iron supplementation will be empirically started. Otherwise denies interval new symptoms or complaints including chest pain palpitations pleuritic type pain unusual shortness of breath cough sputum nausea abdominal pain flank pain headache visual disturbances fever or chills.     3/29:               Today the patient remains awake alert and interactive appropriately seen in the bedside chair.  Hemoglobin trended up to 8.1 today.  Still has not had BM however.  Discussed her low iron status and states she has been tried on iron pills in the past but did not tolerate them due to GI distress.  However this was a long time ago.  She is willing to try ferrous sulfate once a day with meal.  Discussed she will need to follow-up with PCP to see if she will require iron infusions.  Awaiting authorization for SNF. Otherwise denies interval new symptoms or complaints including chest pain palpitations pleuritic type pain unusual shortness of breath cough sputum nausea abdominal pain flank pain headache visual disturbances fever or chills.     3/30:              Today patient remains awake alert and interactive appropriately and seen in the presence of her daughter.  Voices no specific complaints and no acute events overnight.  States she is continuing to work well with PT.  She does describe some abdominal discomfort symptoms with ferrous sulfate.  We will give her IV iron infusion today.  Started on folic acid for folate deficiency.  Awaiting authorization for SNF. Otherwise denies interval new symptoms or complaints including chest pain palpitations pleuritic type pain unusual shortness of breath cough sputum nausea abdominal pain flank pain headache visual disturbances fever or chills.        3/31...........................Patient reported feeling fair today still  endorsed postoperative pain but improved.  No reported :chest pains, nausea, vomiting, fevers, chills, dyspnea or palpitations.     4/1...............Late note entry patient seen and evaluated earlier todayno reported: Headaches, chest pains, nausea, vomiting, palpitations, syncope or presyncope.  The patient did endorsed postoperative related pain but controlled.    4/2..................No new complaints voiced.  No reported: epistaxis, melena, hematochezia, hemoptysis, fevers, chills, nausea, vomiting, chest pains or palpitations.                Review of Systems:   Review of system otherwise negative if not aforementioned above in subjective.     Objective           Physical Exam      Constitutional:       Appearance: Patient appeared in no acute cardiopulmonary distress.     Comments: Patient alert and oriented to : person, place ,time and situation.  HEENT:      Head: Normocephalic and atraumatic.Trachea midline      Nose:No observed congestion or rhinorrhea.     Mouth/Throat: Mucous membranes Moist, Trachea appeared  midline.  Eyes:      Extraocular Movements: Extraocular movements intact.      Pupils: Pupils are equal, round, and reactive to light.      Comments: No scleral icterus or conjunctival injection appreciated.   Cardiovascular:      Rate and Rhythm: Normal rate and regular rhythm. No clicks rubs or gallops, normal S1 and S2.No peripheral stigmata of endocarditis appreciated.     Pulmonary:      Lungs appeared clear to auscultation, no adventitious sound appreciated.  Abdominal:      General: Abdomen soft, nontender, active bowel sounds, no involuntary guarding or rebound tenderness appreciated.     Comments: None   Musculoskeletal:       Left ankle dressing in place, no drainage appreciated  No pitting edema or cyanosis appreciated.       Lymphadenopathy:      No appreciable palpable lymphadenopathy  Skin:     General: Skin is warm.      Coloration:  No jaundice     Findings: No abnormal appearing  skin rashes or lesions that appeared acute noted on unclothed area of the skin..   Neurological:      General: No focal sensory or motor deficits appreciated, no meningeal signs or dysmetria noted.      Cranial Nerves: Cranial nerves II to XII appearing grossly intact.     Genitals:  Deferred  Psychiatric:         The patient appears to be displaying normal mood and affect at the time of evaluation.     Labs:               Lab Results   Component Value Date     GLUCOSE 98 03/31/2025     CALCIUM 8.6 03/31/2025      03/31/2025     K 3.8 03/31/2025     CO2 36 (H) 03/31/2025     CL 99 03/31/2025     BUN 7 03/31/2025     CREATININE 0.39 (L) 03/31/2025           Lab Results   Component Value Date    GLUCOSE 100 (H) 04/02/2025    CALCIUM 8.3 (L) 04/02/2025     04/02/2025    K 3.1 (L) 04/02/2025    CO2 36 (H) 04/02/2025    CL 99 04/02/2025    BUN 7 04/02/2025    CREATININE 0.49 (L) 04/02/2025           Lab Results   Component Value Date     WBC 9.5 03/31/2025     HGB 7.9 (L) 03/31/2025     HCT 26.3 (L) 03/31/2025     MCV 97 03/31/2025      03/31/2025      Lab Results   Component Value Date    WBC 8.4 04/02/2025    HGB 7.5 (L) 04/02/2025    HCT 24.9 (L) 04/02/2025    MCV 96 04/02/2025     04/02/2025      [unfilled]   [unfilled]   No results found for the last 90 days.                  X-rays/ Images     [unfilled]   Radiology Results (last 21 days)     Procedure Component Value Units Date/Time   FL less than 1 hour [[id number]] Resulted: 03/25/25 1304   Order Status: Completed Updated: 03/25/25 1304   Narrative:     These images are not reportable by radiology and will not be interpreted  by  Radiologists.   XR chest 1 view [[id number]] Collected: 03/24/25 1745   Order Status: Completed Updated: 03/24/25 1745   Narrative:     Interpreted By:  Evaristo Byrnes,  STUDY:  XR CHEST 1 VIEW;  3/24/2025 5:26 pm      INDICATION:  Signs/Symptoms:pre op clearance.          COMPARISON:  None.       ACCESSION NUMBER(S):  [id number]      ORDERING CLINICIAN:  YANNA MCCLOUD      FINDINGS:                  CARDIOMEDIASTINAL SILHOUETTE:  Cardiomediastinal silhouette is normal in size and configuration.      LUNGS:  The lungs are hyperinflated. There is biapical pleural thickening. No  consolidation or effusion seen. There is no edema      ABDOMEN:  No remarkable upper abdominal findings.      BONES:  No acute osseous changes.       Impression:     Hyperinflated lungs with emphysematous changes. No evidence of acute  cardiopulmonary process.          MACRO:  None      Signed by: Evaristo Byrnes 3/24/2025 5:44 PM  Dictation workstation:   [id number]   XR hip left with pelvis when performed 2 or 3 views [[id number]] Collected: 03/25/25 1809   Order Status: Completed Updated: 03/25/25 1809   Narrative:     Interpreted By:  Evaristo Byrnes,  STUDY:  XR HIP LEFT WITH PELVIS WHEN PERFORMED 2 OR 3 VIEWS; ;  3/24/2025  3:56 pm      INDICATION:  Signs/Symptoms:left hip pain s/p fall.      ,M25.552 Pain in left hip      COMPARISON:  None.      ACCESSION NUMBER(S):  [id number]      ORDERING CLINICIAN:  KEENAN ORDAZ      FINDINGS:  Left hip, three views      There is a comminuted markedly angulated and moderately displaced the  left femoral intertrochanteric fracture. Subsequent studies  demonstrate fixation of these. Mild degenerative changes in the hips  bilaterally. There is no dislocation       Impression:     Markedly angulated and moderately displaced left femoral  intertrochanteric fracture          MACRO:  None      Signed by: Evaristo Byrnes 3/25/2025 6:08 PM  Dictation workstation:   [id number]                  Medical Problems         Problem List         Chronic obstructive pulmonary disease (Multi)     Essential (primary) hypertension     Anemia     Atherosclerotic heart disease of native coronary artery without angina pectoris     Hyperlipidemia     PAD (peripheral artery disease) (CMS-[facility])     Past  myocardial infarction     Overview Signed 2/13/2025  9:28 PM by Marino Reveles PA-C       Comment on above: LM: mild disease.* LAD: mild disease up to 20% in the mid vessel; D1 and D2 are largewith mild disease.* LCX: dominant, mid 30% diffuse disease and mid 100% thromboticlesion;* RCA: non-dominant with mid 40% stenosis.* Elevated LVEDP.* Successful PCI of mid LCX with 2.5x23mm Promus DILIA postdilated to2.5mm.;           Lung nodule     Chronic combined systolic (congestive) and diastolic (congestive) heart failure                  Above medical problems may be reflective of historical medical problems that may have resolved and may not related to acute clinical condition/medical problems.     Clinical impression/plan:     Acute left-sided hip fracture 2/2 fall about a month PTA  Consult ortho surgery for planned intervention tomorrow   Continue pain and nausea regimen  Pt denies recent cardiac and respiratory symptoms, COPD is stable, EKG shows no evidence of myocardial ischemia (limited read 2/2 artifact), echo 2/14/25 revealed LVEF 60-65%, mild RVSP and no valvulopathy  Pt has 1 risk factor for this procedure and 0.9% risk of a major cardiac event per the Revised Cardiac Risk Index  3/25: Operative day for left intertrochanteric hip fracture repair.  No apparent perioperative complications and doing well at this point.  However patient presently is fairly adamant about refusing SNF.  3/26: POD #1.  Hemoglobin trended down slightly as expected.  WBC lovely felt likely reactive in nature.  Continue to monitor.  Awaiting patient and family decision for discharge disposition.  3/27: POD #2.  Hemoglobin continued to trend down with no evidence of active bleeding.  WBC improved likely reactive.  Today patient is agreeable to SNF.  3/28: POD #3.  Hemoglobin once again trended down to 7.0.  No apparent active bleeding though no BM in 4 days.  Warren bowel regimen.  Continue to monitor closely.  Patient is somewhat  reluctant to have blood transfusion at this point.  3/29: POD #4.  Hemoglobin trended up to 8.1 today.  Awaiting authorization for SNF.  3/30: POD #5.  Awaiting authorization for SNF.     Acute blood loss anemia  3/29: Hemoglobin reached a maxine of 7.0 yesterday with no obvious bleeding other than some minor oozing at the wound.  150 mL blood loss with surgery.  Iron studies low.  Was apparently intolerant at 1 point of oral iron but a long time ago and she is willing to try ferrous sulfate once a day with meals.   3/30: Hemoglobin appeared to trend down to 7.1 today.  Still no significant evidence for bleeding.  Folic acid started for folate deficiency and as she appeared to have some mild adverse reaction to oral iron we will give her IV iron infusion today.  Continue to monitor.     COPD on 4L home O2 24/7  Continue home inhalers, pt is not hypoxic below baseline  CXR reveals no acute changes  3/27: Continues on home O2 level doing well.     Hx CAD s/p PCI of mid LCx   Continue Lipitor and metoprolol, will hold ASA preoperatively     DVT ppx: SubQ heparin     Hypokalemia.   - Replete potassium     Disposition/additional care plan/interventions: 3/31/2025     Awaiting authorization for skilled nursing facility.     Continue weightbearing as tolerated     Continue analgesics therapy     Continue wound care for sacrum and left ankle.     Discharge orders already in place     Upward trending of H&H today hemoglobin 7.9, prior 7.1 yesterday.     CAD history without acute complaints.     COPD without acute exacerbation continue supportive care.Continue to monitor respiratory disposition closely     Continue postoperative care as recommended by orthopedic service        Disposition/additional care plan/interventions: 4/1/2025     Continue supportive care     Recent anemia with upward trending of hemoglobin, check CBC and a.m. continue to monitor for any further blood loss.     Check BMP tomorrow monitor renal function      Discharge barrier still awaiting authorization for skilled nursing facility.     The patient was informed of differential diagnosis , work up , plan of care and possible sequelae of clinical disposition.Patient in agreement with plan of care. Further recommendations forthcoming in accordance with patient's clinical disposition and response to care.     Discharge [id number]        Authorization obtained.  Patient appeared hemodynamic stable and clinically fit for discharge.    A.m. labs reviewed, hypokalemia....................... potassium will be repleted before discharge.  Patient should seek medical attention immediately if condition should worsen, new symptoms develop , no further improvement or recurrence of presenting symptomatology, patient warned.      Patient appeared hemodynamically stable and clinically fit for discharge.        Dictation performed with assistance of voice recognition device therefore transcription errors are possible.

## 2025-04-02 NOTE — PROGRESS NOTES
Physical Therapy    Physical Therapy Treatment    Patient Name: Elsa Hinson  MRN: [id number]  Department: [address]  Room: [address] [place] [address]  Today's Date: 4/2/2025  Time Calculation  Start Time: 0855  Stop Time: 0921  Time Calculation (min): 26 min    Assessment/Plan   PT Assessment  Barriers to Discharge Home: Caregiver assistance, Physical needs  End of Session Communication: Bedside nurse, PCT/NA/CTA  Assessment Comment: pt pleasant and cooperative but required cues for safety awareness and safe navigationj of walker. pt exhibited a single LOB with amb requiring Mod to correct  End of Session Patient Position: Up in chair, Alarm on     PT Plan  Treatment/Interventions: Bed mobility, Transfer training, Gait training, Stair training, Balance training, Strengthening, Endurance training, Therapeutic exercise, Therapeutic activity, Home exercise program  PT Plan: Ongoing PT  PT Frequency: 6 times per week  PT Discharge Recommendations: Moderate intensity level of continued care  PT - OK to Discharge: Yes (when medically cleared)    General Visit Information:   PT  Visit  PT Received On: 04/02/25  General  PT Missed Visit: Yes  Missed Visit Reason: Patient refused  Prior to Session Communication: Bedside nurse  Patient Position Received: Bed, 3 rail up, Alarm on  General Comment: pt alert and agreeable    Subjective   Precautions:  Precautions  LE Weight Bearing Status: Weight Bearing as Tolerated  Medical Precautions: Fall precautions     Date/Time Vitals Session Patient Position Pulse Resp SpO2 BP MAP (mmHg)    04/02/25 0923 --  --  99  18  94 %  110/70  83           Objective   Pain:  Pain Assessment  Pain Assessment: 0-10  0-10 (Numeric) Pain Score:  (no rating)  Pain Location: Hip  Pain Orientation: Left  Pain Interventions: Repositioned, Distraction  Cognition:  Cognition  Orientation Level: Oriented X4    Activity Tolerance:  Activity Tolerance  Endurance: Decreased tolerance for upright  activites  Treatments:  Therapeutic Exercise  Therapeutic Exercise Performed: Yes  Therapeutic Exercise Activity 1: pt completed seated LE exercises: heel raises x 10 reps, hip flexion x 10 reps, LAQ x 10 reps, hip abduction x 10 reps, glut sets x 10 reps    Therapeutic Activity  Therapeutic Activity Performed: Yes  Therapeutic Activity 1: pt sat EOB for 5 min with CGA    Bed Mobility  Bed Mobility: Yes  Bed Mobility 1  Bed Mobility 1: Supine to sitting  Level of Assistance 1: Minimum assistance, Moderate verbal cues    Ambulation/Gait Training  Ambulation/Gait Training Performed: Yes  Ambulation/Gait Training 1  Surface 1: Level tile  Device 1: Rolling walker  Assistance 1: Minimum assistance, Moderate verbal cues, Minimal tactile cues, Loss of balance (Comment)  Quality of Gait 1: Narrow base of support, Diminished heel strike, Inconsistent stride length, Decreased step length  Comments/Distance (ft) 1: 12 feet x 2  Transfers  Transfer: Yes  Transfer 1  Technique 1: Sit to stand, Stand to sit  Transfer Device 1: Walker  Transfer Level of Assistance 1: Minimum assistance, Moderate verbal cues  Trials/Comments 1: cues for hand placement  Transfers 2  Transfer to 2: Toilet  Technique 2: Sit to stand, Stand to sit  Transfer Device 2:  (grab bars)  Transfer Level of Assistance 2: Minimum assistance, Moderate verbal cues  Trials/Comments 2: cues for hand placement    Outcome Measures:  [facility] Basic Mobility  Turning from your back to your side while in a flat bed without using bedrails: A little  Moving from lying on your back to sitting on the side of a flat bed without using bedrails: A little  Moving to and from bed to chair (including a wheelchair): A little  Standing up from a chair using your arms (e.g. wheelchair or bedside chair): A little  To walk in hospital room: A lot  Climbing 3-5 steps with railing: Total  Basic Mobility - Total Score: 15    Education Documentation  Mobility Training, taught by Cande LENTZ  JAVON Verma at 4/2/2025  9:29 AM.  Learner: Patient  Readiness: Acceptance  Method: Explanation  Response: Needs Reinforcement    Precautions, taught by Cande Verma PTA at 4/2/2025  9:29 AM.  Learner: Patient  Readiness: Acceptance  Method: Explanation  Response: Needs Reinforcement    Education Comments  No comments found.        OP EDUCATION:       Encounter Problems       Encounter Problems (Active)       PT Problem       transfers (Progressing)       Start:  03/26/25    Expected End:  04/09/25       Patient will perform all transfers with CGA x1          gait (Progressing)       Start:  03/26/25    Expected End:  04/09/25       Patient will amb 50+ feet with Min assist x 1          strengthening  (Progressing)       Start:  03/26/25    Expected End:  04/09/25       Patient will perform 20+ reps of AROM/RROM for MINI LE's to improve safety and functional independence              Pain - Adult

## 2025-04-02 NOTE — CARE PLAN
The patient's goals for the shift include work with PT    The clinical goals for the shift include pt remain free from falls and injuries, vitals remain stable, control pain, participate in pressure redistribution, work with PT/OT, get up to the chair      Problem: Fall/Injury  Goal: Not fall by end of shift  Outcome: Progressing  Goal: Be free from injury by end of the shift  Outcome: Progressing  Goal: Verbalize understanding of personal risk factors for fall in the hospital  Outcome: Progressing  Goal: Verbalize understanding of risk factor reduction measures to prevent injury from fall in the home  Outcome: Progressing  Goal: Use assistive devices by end of the shift  Outcome: Progressing  Goal: Pace activities to prevent fatigue by end of the shift  Outcome: Progressing     Problem: Pain  Goal: Takes deep breaths with improved pain control throughout the shift  Outcome: Progressing  Goal: Turns in bed with improved pain control throughout the shift  Outcome: Progressing  Goal: Walks with improved pain control throughout the shift  Outcome: Progressing  Goal: Performs ADL's with improved pain control throughout shift  Outcome: Progressing  Goal: Participates in PT with improved pain control throughout the shift  Outcome: Progressing  Goal: Free from opioid side effects throughout the shift  Outcome: Progressing  Goal: Free from acute confusion related to pain meds throughout the shift  Outcome: Progressing     Problem: Pain - Adult  Goal: Verbalizes/displays adequate comfort level or baseline comfort level  Outcome: Progressing     Problem: Safety - Adult  Goal: Free from fall injury  Outcome: Progressing     Problem: Discharge Planning  Goal: Discharge to home or other facility with appropriate resources  Outcome: Progressing     Problem: Chronic Conditions and Co-morbidities  Goal: Patient's chronic conditions and co-morbidity symptoms are monitored and maintained or improved  Outcome: Progressing     Problem:  Nutrition  Goal: Nutrient intake appropriate for maintaining nutritional needs  Outcome: Progressing     Problem: Skin  Goal: Decreased wound size/increased tissue granulation at next dressing change  Outcome: Progressing  Flowsheets (Taken 4/2/2025 0725)  Decreased wound size/increased tissue granulation at next dressing change:   Promote sleep for wound healing   Protective dressings over bony prominences  Goal: Participates in plan/prevention/treatment measures  Outcome: Progressing  Flowsheets (Taken 4/2/2025 0725)  Participates in plan/prevention/treatment measures:   Discuss with provider PT/OT consult   Elevate heels  Goal: Prevent/manage excess moisture  Outcome: Progressing  Flowsheets (Taken 4/2/2025 0725)  Prevent/manage excess moisture:   Follow provider orders for dressing changes   Moisturize dry skin   Monitor for/manage infection if present  Goal: Prevent/minimize sheer/friction injuries  Outcome: Progressing  Flowsheets (Taken 4/2/2025 0725)  Prevent/minimize sheer/friction injuries:   HOB 30 degrees or less   Increase activity/out of bed for meals   Use pull sheet   Turn/reposition every 2 hours/use positioning/transfer devices  Goal: Promote/optimize nutrition  Outcome: Progressing  Flowsheets (Taken 4/2/2025 0725)  Promote/optimize nutrition:   Consume > 50% meals/supplements   Monitor/record intake including meals   Offer water/supplements/favorite foods  Goal: Promote skin healing  Outcome: Progressing  Flowsheets (Taken 4/2/2025 0725)  Promote skin healing:   Protective dressings over bony prominences   Assess skin/pad under line(s)/device(s)   Ensure correct size (line/device) and apply per  instructions   Turn/reposition every 2 hours/use positioning/transfer devices   Rotate device position/do not position patient on device     Problem: Respiratory  Goal: Clear secretions with interventions this shift  Outcome: Progressing  Goal: Minimize anxiety/maximize coping throughout  shift  Outcome: Progressing  Goal: Minimal/no exertional discomfort or dyspnea this shift  Outcome: Progressing  Goal: No signs of respiratory distress (eg. Use of accessory muscles. Peds grunting)  Outcome: Progressing  Goal: Patent airway maintained this shift  Outcome: Progressing

## 2025-04-02 NOTE — CARE PLAN
Problem: Fall/Injury  Goal: Not fall by end of shift  Outcome: Progressing  Goal: Be free from injury by end of the shift  Outcome: Progressing  Goal: Verbalize understanding of personal risk factors for fall in the hospital  Outcome: Progressing  Goal: Verbalize understanding of risk factor reduction measures to prevent injury from fall in the home  Outcome: Progressing  Goal: Use assistive devices by end of the shift  Outcome: Progressing  Goal: Pace activities to prevent fatigue by end of the shift  Outcome: Progressing     Problem: Pain  Goal: Takes deep breaths with improved pain control throughout the shift  Outcome: Progressing  Goal: Turns in bed with improved pain control throughout the shift  Outcome: Progressing  Goal: Walks with improved pain control throughout the shift  Outcome: Progressing  Goal: Performs ADL's with improved pain control throughout shift  Outcome: Progressing  Goal: Participates in PT with improved pain control throughout the shift  Outcome: Progressing  Goal: Free from opioid side effects throughout the shift  Outcome: Progressing  Goal: Free from acute confusion related to pain meds throughout the shift  Outcome: Progressing     Problem: Pain - Adult  Goal: Verbalizes/displays adequate comfort level or baseline comfort level  Outcome: Progressing  Flowsheets (Taken 4/1/2025 7914)  Verbalizes/displays adequate comfort level or baseline comfort level:   Encourage patient to monitor pain and request assistance   Assess pain using appropriate pain scale   Administer analgesics based on type and severity of pain and evaluate response   Implement non-pharmacological measures as appropriate and evaluate response     Problem: Safety - Adult  Goal: Free from fall injury  Outcome: Progressing     Problem: Discharge Planning  Goal: Discharge to home or other facility with appropriate resources  Outcome: Progressing     Problem: Chronic Conditions and Co-morbidities  Goal: Patient's chronic  conditions and co-morbidity symptoms are monitored and maintained or improved  Outcome: Progressing     Problem: Nutrition  Goal: Nutrient intake appropriate for maintaining nutritional needs  Outcome: Progressing     Problem: Skin  Goal: Decreased wound size/increased tissue granulation at next dressing change  Outcome: Progressing  Flowsheets (Taken 4/1/2025 2315)  Decreased wound size/increased tissue granulation at next dressing change:   Promote sleep for wound healing   Protective dressings over bony prominences  Goal: Participates in plan/prevention/treatment measures  Outcome: Progressing  Flowsheets (Taken 4/1/2025 2315)  Participates in plan/prevention/treatment measures:   Discuss with provider PT/OT consult   Elevate heels   Increase activity/out of bed for meals  Goal: Prevent/manage excess moisture  Outcome: Progressing  Flowsheets (Taken 4/1/2025 2315)  Prevent/manage excess moisture:   Cleanse incontinence/protect with barrier cream   Monitor for/manage infection if present   Follow provider orders for dressing changes  Goal: Prevent/minimize sheer/friction injuries  Outcome: Progressing  Flowsheets (Taken 4/1/2025 2315)  Prevent/minimize sheer/friction injuries:   Increase activity/out of bed for meals   Use pull sheet   HOB 30 degrees or less   Turn/reposition every 2 hours/use positioning/transfer devices  Goal: Promote/optimize nutrition  Outcome: Progressing  Flowsheets (Taken 4/1/2025 2315)  Promote/optimize nutrition:   Monitor/record intake including meals   Consume > 50% meals/supplements  Goal: Promote skin healing  Outcome: Progressing  Flowsheets (Taken 4/1/2025 2315)  Promote skin healing:   Assess skin/pad under line(s)/device(s)   Protective dressings over bony prominences   Turn/reposition every 2 hours/use positioning/transfer devices     Problem: Respiratory  Goal: Clear secretions with interventions this shift  Outcome: Progressing  Goal: Minimize anxiety/maximize coping throughout  shift  Outcome: Progressing  Goal: Minimal/no exertional discomfort or dyspnea this shift  Outcome: Progressing  Goal: No signs of respiratory distress (eg. Use of accessory muscles. Peds grunting)  Outcome: Progressing  Goal: Patent airway maintained this shift  Outcome: Progressing   The patient's goals for the shift include get some rest    The clinical goals for the shift include pt remain free from falls and injuries, vitals remain stable, control pain, participate in pressure redistribution, work with PT/ OT, get to the chair

## 2025-04-02 NOTE — PROGRESS NOTES
Occupational Therapy    OT Treatment    Patient Name: Elsa Hinson  MRN: [id number]  Department: [facility]  Room: [address] [place] [address]  Today's Date: 4/2/2025  Time Calculation  Start Time: 0856  Stop Time: 0921  Time Calculation (min): 25 min        Assessment: Progressing with functional transfers now able to walk to bathroom. Noted patient short of breath but does well implementing purse lip breathing during functional activity.  Barriers to Discharge Home: Physical needs, Cognition needs, Caregiver assistance  End of Session Communication: PCT/NA/CTA  End of Session Patient Position: Up in chair, Alarm on     Plan:  Treatment Interventions: Functional transfer training, Endurance training, Patient/family training  OT Frequency: 4 times per week  OT Discharge Recommendations: Moderate intensity level of continued care  OT Recommended Transfer Status: Minimal assist, Assist of 2  OT - OK to Discharge: Yes (when medically stable)  Treatment Interventions: Functional transfer training, Endurance training, Patient/family training    Subjective   Previous Visit Info:  OT Last Visit  OT Received On: 04/02/25  General:  General  OT Missed Visit: Yes  Missed Visit Reason: Patient refused (pt. reported she was too tired today.)  Prior to Session Communication: Bedside nurse  Patient Position Received: Bed, 3 rail up, Alarm on  General Comment: pt. reporting stiffness and pain in L hip and LE. She was able to walk into bathrooming with MIN A. MIN A for bed mobility.  Precautions:        Date/Time Vitals Session Patient Position Pulse Resp SpO2 BP MAP (mmHg)    04/02/25 0923 --  --  99  18  94 %  110/70  83                 Pain:  Pain Assessment  Pain Assessment: 0-10  0-10 (Numeric) Pain Score:  (L hip and leg pain, Notified RN she is making rounds to patient  )    Objective    Cognition:  Cognition  Overall Cognitive Status: Within Functional Limits  Orientation Level: Oriented X4    Activities of Daily Living:       Grooming  Grooming Level of Assistance: Setup  Grooming Comments: wash face and brush hair    UE Bathing  UE Bathing Level of Assistance: Moderate assistance  UE Bathing Where Assessed:  (sitting on chair)  UE Bathing Comments: chest, arms pt. able to perform with set up need Assist with back         UE Dressing  UE Dressing Level of Assistance: Minimum assistance  UE Dressing Comments: gown    LE Dressing  LE Dressing:  (pt. aware of A.E. reports she will use when going home)  Adult Briefs Level of Assistance: Moderate assistance  LE Dressing Where Assessed: Toilet  LE Dressing Comments: pt. needed assist getting over LLE. able to brin R LE up into brief    Toileting  Toileting Level of Assistance: Minimum assistance  Where Assessed: Toilet  Toileting Comments: pt. wiping and managing brief need MIN A for balance    Bed Mobility/Transfers: Bed Mobility  Bed Mobility: Yes  Bed Mobility 1  Bed Mobility 1: Supine to sitting  Level of Assistance 1: Minimum assistance  Bed Mobility Comments 1: cues for hand rail and postioning of LUE to assist with scooting    Transfers  Transfer: Yes  Transfer 1  Transfer From 1: Bed to  Transfer to 1: Toilet  Technique 1: Sit to stand, Stand to sit  Transfer Device 1: Walker  Transfer Level of Assistance 1: Minimum assistance  Trials/Comments 1: need asist with managing o2 lines and positioning fww in narrow spaces . cueing to extend LLE when sitting for comfort  Transfers 2  Transfer From 2: Toilet to  Transfer to 2: Chair with arms  Technique 2: Sit to stand, Stand to sit  Transfer Device 2: Walker  Transfer Level of Assistance 2: Minimum assistance  Trials/Comments 2: cues for hand placement             Outcome Measures:[facility] Daily Activity  Putting on and taking off regular lower body clothing: A lot  Bathing (including washing, rinsing, drying): A lot  Putting on and taking off regular upper body clothing: A little  Toileting, which includes using toilet, bedpan or urinal: A  little  Taking care of personal grooming such as brushing teeth: A little  Eating Meals: A little  Daily Activity - Total Score: 16        Education Documentation  Body Mechanics, taught by JAMES Paniagua at 4/2/2025  9:23 AM.  Learner: Patient  Readiness: Acceptance  Method: Explanation  Response: Verbalizes Understanding, Needs Reinforcement    Precautions, taught by JAMES Paniagua at 4/2/2025  9:23 AM.  Learner: Patient  Readiness: Acceptance  Method: Explanation  Response: Verbalizes Understanding, Needs Reinforcement    ADL Training, taught by JAMES Paniagua at 4/2/2025  9:23 AM.  Learner: Patient  Readiness: Acceptance  Method: Explanation  Response: Verbalizes Understanding, Needs Reinforcement    Education Comments  No comments found.        OP EDUCATION:       Goals:  Encounter Problems       Encounter Problems (Active)       ADLs       Patient with complete lower body dressing with modified independent level of assistance donning and doffing all LE clothes  with PRN adaptive equipment while supported sitting and standing (Progressing)       Start:  03/26/25    Expected End:  04/16/25               TRANSFERS       Patient will complete functional transfers with least restrictive device with modified independent level of assistance. (Progressing)       Start:  03/26/25    Expected End:  04/16/25

## 2025-04-02 NOTE — NURSING NOTE
Patient discharging to SNF per order. [place], AVS and MAR printed out and compiled for discharge packet. Patient is not discharging with any scripts. All belongings packed up and collected for discharge. IV removed x1 with tip of catheter intact per policy. No tele to be removed or returned. All questions and concerns addressed at this time. Report called to Nadiya at facility. Patient discharging off unit to [place] via [facility]. Daughter Neris called and voicemail left to update about patient discharge.

## 2025-04-02 NOTE — DISCHARGE SUMMARY
Discharge Summary    Elsa Hinson    [id number]     3/24/2025  4:28 PM , admit date    4/2/2025, discharge date      .      Discharge Diagnosis:          1.  Left hip fracture status post intramedullary nailing of the left intertrochanteric femur    2.  Acute blood loss anemia    3.  Coronary artery disease    4.  COPD with O2 dependency    5 Hypokalemia        Problem List Items Addressed This Visit             ICD-10-CM       Cardiac and Vasculature    Chronic combined systolic (congestive) and diastolic (congestive) heart failure I50.42    Relevant Medications    metoprolol tartrate (Lopressor) tablet 12.5 mg    Other Relevant Orders    Monitor intake and output       Hematology and Neoplasia    Anemia D64.9    Relevant Medications    ferrous sulfate 325 (65 Fe) mg tablet       Musculoskeletal and Injuries    * (Principal) RESOLVED: Displaced intertrochanteric fracture of left femur, initial encounter for closed fracture - Primary S72.142A    Relevant Medications    acetaminophen (Tylenol) 325 mg tablet    docusate sodium (Colace) 100 mg capsule    polyethylene glycol (Glycolax, Miralax) 17 gram packet    sennosides-docusate sodium (Fior-Colace) 8.6-50 mg tablet    Other Relevant Orders    Case Request Operating Room: CLOSED INSERTION, INTRAMEDULLARY ZHANG, FRACTURE, FEMUR (Completed)       Pulmonary and Pneumonias    Chronic obstructive pulmonary disease (Multi) J44.9    Relevant Medications    oxygen (O2) gas therapy          Hospital Course/Progress note on the date of  discharge.    Elsa Hinson  [id number]   Service: Internal Medicine / Hospitalist Date of service:  4/2/2025                                  Full Code                  Elsa Hinson is a 75 y.o. female presenting with Displaced intertrochanteric fracture of left femur, initial encounter for closed fracture.         Subjective        75 y.o. female with PMHx of COPD on 4L home O2 24/7 and CAD s/p PCI of mid LCx who presented with left hip pain,  which she has had since leaving her rehab facility close to a month ago. She fell just prior to leaving; no x-ray was done. She has had significant pain with ambulation since then. Today she went to see her PCP who ordered x-rays, which showed a left-sided hip fracture. Ortho surgery recommended surgery tomorrow.   ED labwork was benign. CXR was negative. VSS        3/25:                Today the patient is seen operative day postoperatively from her left hip fracture repair.  Seen in the presence of multiple family members.  She is awake alert and interactive appropriately complaining of only discomfort really in the sacral pressure injury area.  Orthopedic surgery describes WBAT and follow-up in 2 weeks.  At this point the patient appears to be refusing to consider SNF due to her recent experience where she fell before she left the SNF and was not evaluated.  It appears her daughters feel she likely would benefit from SNF however.  Otherwise denies interval new symptoms or complaints including chest pain palpitations pleuritic type pain unusual shortness of breath cough sputum nausea abdominal pain flank pain headache visual disturbances fever or chills.     3/26:                  Today the patient is POD #1 and seen once again in the presence of family members.  She appears much more awake alert and interactive today.  Voices no specific complaints and no acute events overnight.  However she continues to states she does not want to go to SNF.  AM-PAC 10.  Once again it appears patient's daughters feel she should go to SNF.  Awaiting transitional care coordinator interaction.  Orthopedic surgery describes WBAT, ASA 81 mg daily for 6 weeks and follow-up with Dr. العراقي in 2 weeks. Otherwise denies interval new symptoms or complaints including chest pain palpitations pleuritic type pain unusual shortness of breath cough sputum nausea abdominal pain flank pain headache visual disturbances fever or chills.     3/27:                    Today patient is POD #2.  Seen in the presence of her daughter.  Continues awake alert and interactive appropriately.  She is experiencing more significant postoperative pain especially with PT.  Hemoglobin did trend down significantly with no evidence of active bleeding.  WBC trended down which was likely reactive.  After long discussion today with patient and daughter it appears the patient is now agreeable to SNF.  Transitional care working towards arrangements for this. Otherwise denies interval new symptoms or complaints including chest pain palpitations pleuritic type pain unusual shortness of breath cough sputum nausea abdominal pain flank pain headache visual disturbances fever or chills.     3/28:                POD #3.  Once again seen in the presence of her daughter.  She remains awake alert and interactive appropriately.  Continues with expected postoperative discomfort.  Of concern is hemoglobin has decreased to 7.0 with no apparent source of bleeding.  She has not had a BM for 4 days but states this is normal pattern for her.  Taking diet well and no abdominal discomfort.  Operative wound does not appear to demonstrate any hematoma.  Some oozing on the dressing according to orthopedic surgeon today.  150 mL blood loss reported at surgery.  Patient states she has always been iron deficient.  Patient is not wanting blood transfusion at this time and she states she has refused them in the past.  Discussed with daughter that we would monitor hemoglobin while waiting for SNF authorization and transfuse if decreased any further.  She remains asymptomatic with this.  Will obtain iron studies in the morning.  Iron supplementation will be empirically started. Otherwise denies interval new symptoms or complaints including chest pain palpitations pleuritic type pain unusual shortness of breath cough sputum nausea abdominal pain flank pain headache visual disturbances fever or chills.     3/29:                Today the patient remains awake alert and interactive appropriately seen in the bedside chair.  Hemoglobin trended up to 8.1 today.  Still has not had BM however.  Discussed her low iron status and states she has been tried on iron pills in the past but did not tolerate them due to GI distress.  However this was a long time ago.  She is willing to try ferrous sulfate once a day with meal.  Discussed she will need to follow-up with PCP to see if she will require iron infusions.  Awaiting authorization for SNF. Otherwise denies interval new symptoms or complaints including chest pain palpitations pleuritic type pain unusual shortness of breath cough sputum nausea abdominal pain flank pain headache visual disturbances fever or chills.     3/30:              Today patient remains awake alert and interactive appropriately and seen in the presence of her daughter.  Voices no specific complaints and no acute events overnight.  States she is continuing to work well with PT.  She does describe some abdominal discomfort symptoms with ferrous sulfate.  We will give her IV iron infusion today.  Started on folic acid for folate deficiency.  Awaiting authorization for SNF. Otherwise denies interval new symptoms or complaints including chest pain palpitations pleuritic type pain unusual shortness of breath cough sputum nausea abdominal pain flank pain headache visual disturbances fever or chills.        3/31...........................Patient reported feeling fair today still endorsed postoperative pain but improved.  No reported :chest pains, nausea, vomiting, fevers, chills, dyspnea or palpitations.     4/1...............Late note entry patient seen and evaluated earlier todayno reported: Headaches, chest pains, nausea, vomiting, palpitations, syncope or presyncope.  The patient did endorsed postoperative related pain but controlled.     4/2..................No new complaints voiced.  No reported: epistaxis, melena,  hematochezia, hemoptysis, fevers, chills, nausea, vomiting, chest pains or palpitations.                 Review of Systems:   Review of system otherwise negative if not aforementioned above in subjective.     Objective           Physical Exam      Constitutional:       Appearance: Patient appeared in no acute cardiopulmonary distress.     Comments: Patient alert and oriented to : person, place ,time and situation.  HEENT:      Head: Normocephalic and atraumatic.Trachea midline      Nose:No observed congestion or rhinorrhea.     Mouth/Throat: Mucous membranes Moist, Trachea appeared  midline.  Eyes:      Extraocular Movements: Extraocular movements intact.      Pupils: Pupils are equal, round, and reactive to light.      Comments: No scleral icterus or conjunctival injection appreciated.   Cardiovascular:      Rate and Rhythm: Normal rate and regular rhythm. No clicks rubs or gallops, normal S1 and S2.No peripheral stigmata of endocarditis appreciated.     Pulmonary:      Lungs appeared clear to auscultation, no adventitious sound appreciated.  Abdominal:      General: Abdomen soft, nontender, active bowel sounds, no involuntary guarding or rebound tenderness appreciated.     Comments: None   Musculoskeletal:       Left ankle dressing in place, no drainage appreciated  No pitting edema or cyanosis appreciated.       Lymphadenopathy:      No appreciable palpable lymphadenopathy  Skin:     General: Skin is warm.      Coloration:  No jaundice     Findings: No abnormal appearing skin rashes or lesions that appeared acute noted on unclothed area of the skin..   Neurological:      General: No focal sensory or motor deficits appreciated, no meningeal signs or dysmetria noted.      Cranial Nerves: Cranial nerves II to XII appearing grossly intact.     Genitals:  Deferred  Psychiatric:         The patient appears to be displaying normal mood and affect at the time of evaluation.     Labs:               Lab Results   Component  Value Date     GLUCOSE 98 03/31/2025     CALCIUM 8.6 03/31/2025      03/31/2025     K 3.8 03/31/2025     CO2 36 (H) 03/31/2025     CL 99 03/31/2025     BUN 7 03/31/2025     CREATININE 0.39 (L) 03/31/2025                 Lab Results   Component Value Date     GLUCOSE 100 (H) 04/02/2025     CALCIUM 8.3 (L) 04/02/2025      04/02/2025     K 3.1 (L) 04/02/2025     CO2 36 (H) 04/02/2025     CL 99 04/02/2025     BUN 7 04/02/2025     CREATININE 0.49 (L) 04/02/2025           Lab Results   Component Value Date     WBC 9.5 03/31/2025     HGB 7.9 (L) 03/31/2025     HCT 26.3 (L) 03/31/2025     MCV 97 03/31/2025      03/31/2025            Lab Results   Component Value Date     WBC 8.4 04/02/2025     HGB 7.5 (L) 04/02/2025     HCT 24.9 (L) 04/02/2025     MCV 96 04/02/2025      04/02/2025      [unfilled]   [unfilled]   No results found for the last 90 days.                  X-rays/ Images     [unfilled]   Radiology Results (last 21 days)     Procedure Component Value Units Date/Time   FL less than 1 hour [[id number]] Resulted: 03/25/25 1304   Order Status: Completed Updated: 03/25/25 1304   Narrative:     These images are not reportable by radiology and will not be interpreted  by  Radiologists.   XR chest 1 view [[id number]] Collected: 03/24/25 1745   Order Status: Completed Updated: 03/24/25 1745   Narrative:     Interpreted By:  Evaristo Byrnes,  STUDY:  XR CHEST 1 VIEW;  3/24/2025 5:26 pm      INDICATION:  Signs/Symptoms:pre op clearance.          COMPARISON:  None.      ACCESSION NUMBER(S):  [id number]      ORDERING CLINICIAN:  YANNA MCCLOUD      FINDINGS:                  CARDIOMEDIASTINAL SILHOUETTE:  Cardiomediastinal silhouette is normal in size and configuration.      LUNGS:  The lungs are hyperinflated. There is biapical pleural thickening. No  consolidation or effusion seen. There is no edema      ABDOMEN:  No remarkable upper abdominal findings.      BONES:  No acute osseous  changes.       Impression:     Hyperinflated lungs with emphysematous changes. No evidence of acute  cardiopulmonary process.          MACRO:  None      Signed by: Evaristo Byrnes 3/24/2025 5:44 PM  Dictation workstation:   [id number]   XR hip left with pelvis when performed 2 or 3 views [[id number]] Collected: 03/25/25 1809   Order Status: Completed Updated: 03/25/25 1809   Narrative:     Interpreted By:  Evaristo Byrnes,  STUDY:  XR HIP LEFT WITH PELVIS WHEN PERFORMED 2 OR 3 VIEWS; ;  3/24/2025  3:56 pm      INDICATION:  Signs/Symptoms:left hip pain s/p fall.      ,M25.552 Pain in left hip      COMPARISON:  None.      ACCESSION NUMBER(S):  [id number]      ORDERING CLINICIAN:  KEENAN ORDAZ      FINDINGS:  Left hip, three views      There is a comminuted markedly angulated and moderately displaced the  left femoral intertrochanteric fracture. Subsequent studies  demonstrate fixation of these. Mild degenerative changes in the hips  bilaterally. There is no dislocation       Impression:     Markedly angulated and moderately displaced left femoral  intertrochanteric fracture          MACRO:  None      Signed by: Evaristo Byrnes 3/25/2025 6:08 PM  Dictation workstation:   [id number]                  Medical Problems         Problem List         Chronic obstructive pulmonary disease (Multi)     Essential (primary) hypertension     Anemia     Atherosclerotic heart disease of native coronary artery without angina pectoris     Hyperlipidemia     PAD (peripheral artery disease) (CMS-[facility])     Past myocardial infarction     Overview Signed 2/13/2025  9:28 PM by Marino Reveles PA-C       Comment on above: LM: mild disease.* LAD: mild disease up to 20% in the mid vessel; D1 and D2 are largewith mild disease.* LCX: dominant, mid 30% diffuse disease and mid 100% thromboticlesion;* RCA: non-dominant with mid 40% stenosis.* Elevated LVEDP.* Successful PCI of mid LCX with 2.5x23mm Promus DILIA postdilated to2.5mm.;           Lung  nodule     Chronic combined systolic (congestive) and diastolic (congestive) heart failure                  Above medical problems may be reflective of historical medical problems that may have resolved and may not related to acute clinical condition/medical problems.     Clinical impression/plan:     Acute left-sided hip fracture 2/2 fall about a month PTA  Consult ortho surgery for planned intervention tomorrow   Continue pain and nausea regimen  Pt denies recent cardiac and respiratory symptoms, COPD is stable, EKG shows no evidence of myocardial ischemia (limited read 2/2 artifact), echo 2/14/25 revealed LVEF 60-65%, mild RVSP and no valvulopathy  Pt has 1 risk factor for this procedure and 0.9% risk of a major cardiac event per the Revised Cardiac Risk Index  3/25: Operative day for left intertrochanteric hip fracture repair.  No apparent perioperative complications and doing well at this point.  However patient presently is fairly adamant about refusing SNF.  3/26: POD #1.  Hemoglobin trended down slightly as expected.  WBC lovely felt likely reactive in nature.  Continue to monitor.  Awaiting patient and family decision for discharge disposition.  3/27: POD #2.  Hemoglobin continued to trend down with no evidence of active bleeding.  WBC improved likely reactive.  Today patient is agreeable to SNF.  3/28: POD #3.  Hemoglobin once again trended down to 7.0.  No apparent active bleeding though no BM in 4 days.  [place] bowel regimen.  Continue to monitor closely.  Patient is somewhat reluctant to have blood transfusion at this point.  3/29: POD #4.  Hemoglobin trended up to 8.1 today.  Awaiting authorization for SNF.  3/30: POD #5.  Awaiting authorization for SNF.     Acute blood loss anemia  3/29: Hemoglobin reached a maxine of 7.0 yesterday with no obvious bleeding other than some minor oozing at the wound.  150 mL blood loss with surgery.  Iron studies low.  Was apparently intolerant at 1 point of oral iron  but a long time ago and she is willing to try ferrous sulfate once a day with meals.   3/30: Hemoglobin appeared to trend down to 7.1 today.  Still no significant evidence for bleeding.  Folic acid started for folate deficiency and as she appeared to have some mild adverse reaction to oral iron we will give her IV iron infusion today.  Continue to monitor.     COPD on 4L home O2   Continue home inhalers, pt is not hypoxic below baseline  CXR reveals no acute changes  3/27: Continues on home O2 level doing well.     Hx CAD s/p PCI of mid LCx   Continue Lipitor and metoprolol, will hold ASA preoperatively     DVT ppx: SubQ heparin      Hypokalemia.   - Replete potassium     Disposition/additional care plan/interventions: 3/31/2025     Awaiting authorization for skilled nursing facility.     Continue weightbearing as tolerated     Continue analgesics therapy     Continue wound care for sacrum and left ankle.     Discharge orders already in place     Upward trending of H&H today hemoglobin 7.9, prior 7.1 yesterday.     CAD history without acute complaints.     COPD without acute exacerbation continue supportive care.Continue to monitor respiratory disposition closely     Continue postoperative care as recommended by orthopedic service        Disposition/additional care plan/interventions: 2025     Continue supportive care     Recent anemia with upward trending of hemoglobin, check CBC and a.m. continue to monitor for any further blood loss.     Check BMP tomorrow monitor renal function     Discharge barrier still awaiting authorization for skilled nursing facility.     The patient was informed of differential diagnosis , work up , plan of care and possible sequelae of clinical disposition.Patient in agreement with plan of care. Further recommendations forthcoming in accordance with patient's clinical disposition and response to care.     Discharge [id number]         Authorization obtained.  Patient  appeared hemodynamic stable and clinically fit for discharge.     A.m. labs reviewed, hypokalemia....................... potassium will be repleted before discharge.  Patient should seek medical attention immediately if condition should worsen, new symptoms develop , no further improvement or recurrence of presenting symptomatology, patient warned.      Patient appeared hemodynamically stable and clinically fit for discharge.        Dictation performed with assistance of voice recognition device therefore transcription errors are possible.  Consultants    Orthopedics           Surgeries/Procedures:       Intramedullary nailing of left intertrochanteric femur              Your medication list        START taking these medications        Instructions Last Dose Given Next Dose Due   acetaminophen 325 mg tablet  Commonly known as: Tylenol      Take 2 tablets (650 mg) by mouth every 4 hours if needed for mild pain (1 - 3).       albuterol 90 mcg/actuation inhaler  Commonly known as: ProAir HFA      Inhale 2 puffs every 4 hours if needed for wheezing or shortness of breath.       atorvastatin 40 mg tablet  Commonly known as: Lipitor      Take 1 tablet (40 mg) by mouth once daily.       docusate sodium 100 mg capsule  Commonly known as: Colace      Take 1 capsule (100 mg) by mouth 2 times a day.       ferrous sulfate 325 (65 Fe) mg tablet      Take 1 tablet (325 mg) by mouth once daily with breakfast.       oxygen gas therapy  Commonly known as: O2      Inhale 2 L/min continuously.       polyethylene glycol 17 gram packet  Commonly known as: Glycolax, Miralax      Take 17 g by mouth once daily.       sennosides-docusate sodium 8.6-50 mg tablet  Commonly known as: Fior-Colace      Take 1 tablet by mouth once daily at bedtime.              CHANGE how you take these medications        Instructions Last Dose Given Next Dose Due   metoprolol tartrate 25 mg tablet  Commonly known as: Lopressor  What changed:   how much to  take  when to take this      Take 1 tablet (25 mg) by mouth once daily.              CONTINUE taking these medications        Instructions Last Dose Given Next Dose Due   aspirin 81 mg EC tablet  Commonly known as: Aftab Low Dose Aspirin      Take 1 tablet (81 mg) by mouth once daily.       empagliflozin 10 mg tablet  Commonly known as: Jardiance      Take 1 tablet (10 mg) by mouth once daily.       meloxicam 15 mg tablet  Commonly known as: Mobic      Take 1 tablet (15 mg) by mouth once daily.       Trelegy Ellipta 100-62.5-25 mcg blister with device  Generic drug: fluticasone-umeclidin-vilanter      Inhale 1 puff once daily.              ASK your doctor about these medications        Instructions Last Dose Given Next Dose Due   oxyCODONE-acetaminophen 5-325 mg tablet  Commonly known as: Percocet  Ask about: Should I take this medication?      Take 2 tablets by mouth every 6 hours if needed for moderate pain (4 - 6) or severe pain (7 - 10) for up to 3 days.                 Where to Get Your Medications        These medications were sent to [facility] [id number]  [address], [facility] [address]      Phone: [phone]   empagliflozin 10 mg tablet  metoprolol tartrate 25 mg tablet  Trelegy Ellipta 100-62.5-25 mcg blister with device       You can get these medications from any pharmacy    Bring a paper prescription for each of these medications  oxyCODONE-acetaminophen 5-325 mg tablet       Information about where to get these medications is not yet available    Ask your nurse or doctor about these medications  acetaminophen 325 mg tablet  aspirin 81 mg EC tablet  docusate sodium 100 mg capsule  ferrous sulfate 325 (65 Fe) mg tablet  oxygen gas therapy  polyethylene glycol 17 gram packet  sennosides-docusate sodium 8.6-50 mg tablet            Disposition/additional Hospital course/events:    75-year-old  female with notable history of COPD, O2 dependent presented with left hip pain.  She was  found to have acute left-sided hip fracture, she was seen in consultation by orthopedic service subsequently underwent intramedullary nailing of the left into congenic femur.  She was monitored inpatient postoperative encounter show clinical improvement.  Today electrolyte imbalance was noted with a potassium of 3.1, supplementation given before discharge.  Overall patient hospital course appeared fairly uneventful patient responded well supportive interventions implemented.  Blood loss anemia appreciated postoperatively with H&H holding well.  She appeared clinically stable and fit for discharge to skilled nursing facility today.  Patient in agreement with discharge.  Outpatient follow-up with primary care physician and orthopedic service as directed.  Patient should seek medical attention immediately if condition should worsen, new symptoms develop , no further improvement or recurrence of presenting symptomatology, patient warned.    Follow-up:    Follow -up with family physician within one week.       Discharge Time :31 mins      Patient should seek medical attention immediately if condition should worsen , presenting symptoms/conditions do not resolve or new symptoms should developed,patient warned.     Dictation performed with assistance of voice recognition device therefore transcription errors are possible.

## 2025-04-03 NOTE — DOCUMENTATION CLARIFICATION NOTE
"    PATIENT:               SARAH LANGFORD  ACCT #:                  [id number]  MRN:                       [id number]  :                       1950  ADMIT DATE:       3/24/2025 4:28 PM  DISCH DATE:        2025 11:49 AM  RESPONDING PROVIDER #:        [id number]          PROVIDER RESPONSE TEXT:    I agree with dietician diagnosis of severe malnutrition documented on 3/28/25 by Nutrition    CDI QUERY TEXT:    Clarification        Instruction:    Based on your assessment of the patient and the clinical information, please provide the requested documentation by clicking on the appropriate radio button and enter any additional information if prompted.    Question: Please further clarify this patient nutritional status as    When answering this query, please exercise your independent professional judgment. The fact that a question is being asked, does not imply that any particular answer is desired or expected.    The patient's clinical indicators include:  Clinical Information: 3/31/25 IM: \"Admit with Acute left sided hip fracture s/p repair.\"    Clinical Indicators:  3/28/25 Nutrition: \"Malnutrition Diagnosis: Severe malnutrition related to chronic disease or condition  Related to: COPD, hip fracture with pain  As Evidenced by: <75% of estimated needs x  month, >5% weight loss >1 month  Additional Assessment Information: mild to moderate muscle and fat losses. As Evidenced by (1): NPO/CL status, weight loss >7.5% within 3 months      Treatment: 3/28/25 Nutrition: \" Regular diet, Continue Magic cup BID, added ensure clear daily. \" Monitor I's and O's and Weight.    Risk Factors: COPD, Poor po intake, anemia.  Options provided:  -- I agree with dietician diagnosis of severe malnutrition documented on 3/28/25 by Nutrition  -- Other - I will add my own diagnosis  -- Refer to Clinical Documentation Reviewer    Query created by: Rosalinda Kamara on 2025 9:04 AM      Electronically signed by:  HIEU DE LA ROSA DO 2025 " 10:00 PM

## 2025-04-04 ENCOUNTER — TELEPHONE (OUTPATIENT)
Dept: PRIMARY CARE | Facility: CLINIC | Age: 75
End: 2025-04-04
Payer: COMMERCIAL

## 2025-04-04 NOTE — TELEPHONE ENCOUNTER
VISITING NURSE STAN  [phone] IS CALLING IN WANTING TO LET YOU KNOW THAT THE PATIENT HAD GONE TO THE HOSPITAL SINCE  LAST WEEK 03/24/2025 SHE HAS A DISPLACED HIP FRACTURE  SHE IS AT [facility]

## 2025-04-09 DIAGNOSIS — M25.552 LEFT HIP PAIN: ICD-10-CM

## 2025-04-11 ENCOUNTER — OFFICE VISIT (OUTPATIENT)
Dept: ORTHOPEDIC SURGERY | Facility: HOSPITAL | Age: 75
End: 2025-04-11
Payer: COMMERCIAL

## 2025-04-11 ENCOUNTER — HOSPITAL ENCOUNTER (OUTPATIENT)
Dept: RADIOLOGY | Facility: HOSPITAL | Age: 75
Discharge: HOME | End: 2025-04-11
Payer: COMMERCIAL

## 2025-04-11 VITALS — BODY MASS INDEX: 16.42 KG/M2 | WEIGHT: 87 LBS | HEIGHT: 61 IN

## 2025-04-11 DIAGNOSIS — S72.142D INTERTROCHANTERIC FRACTURE OF LEFT HIP, CLOSED, WITH ROUTINE HEALING, SUBSEQUENT ENCOUNTER: Primary | ICD-10-CM

## 2025-04-11 DIAGNOSIS — M25.552 LEFT HIP PAIN: ICD-10-CM

## 2025-04-11 PROCEDURE — 99211 OFF/OP EST MAY X REQ PHY/QHP: CPT

## 2025-04-11 PROCEDURE — 73502 X-RAY EXAM HIP UNI 2-3 VIEWS: CPT | Mod: LT

## 2025-04-11 ASSESSMENT — PAIN SCALES - GENERAL: PAINLEVEL_OUTOF10: 2

## 2025-04-11 ASSESSMENT — PAIN - FUNCTIONAL ASSESSMENT: PAIN_FUNCTIONAL_ASSESSMENT: 0-10

## 2025-04-11 NOTE — PROGRESS NOTES
S/P left hip ORIF completed on 3/25/2024 by Dr. Malcom Cantu removed today 04/11/2025  Patient is at a [facility] rehab   Doing PT once/twice daily - 5 days a week   Xrays done today

## 2025-04-11 NOTE — PROGRESS NOTES
POST-OPERATIVE FOLLOW UP      ============================  IMPRESSION/PLAN:  ============================  75 y.o. female s/p left hip ORIF completed on 3/25/2024 by Dr. العراقي    PLAN:  -Staples removed today and Steri-Strips applied  -Weightbearing: Weight bearing as tolerated  -DVT Prophylaxis: Aspirin 81 mg daily for 6 weeks postoperatively  -Radiology Studies: X-rays of the left hip were taken and reviewed today  -Therapies: Continue physical therapy at the rehab facility.  I be happy to provide the patient with a referral for home health physical/occupational therapy if she is discharged from rehab prior to her next visit.  -Transition off assistive device as seen fit by patient and therapy  -Follow-up: in one month for updated x rays and reevaluation        Elsa Hinson presents today for follow up of the above operation. Pain controlled with current treatment plan. Patient has begun physical therapy. They are currently doing therapy at the rehab facility she resides at. They are currently ambulating with Walker.     Review of Systems:   Constitutional: See HPI for pain assessment, No significant weight loss, recent trauma. Denies fevers/chills  Cardiovascular: No chest pain, shortness of breath  Respiratory: No difficulty breathing, cough  Gastrointestinal: No nausea, vomiting, diarrhea, constipation  Musculoskeletal: Noted in HPI, no arthralgias   Integumentary: No rashes, easy bruising, redness   Neurological: no numbness or tingling in extremities, no gait disturbances     Patient Active Problem List   Diagnosis    Chronic obstructive pulmonary disease (Multi)    Essential (primary) hypertension    Anemia    Atherosclerotic heart disease of native coronary artery without angina pectoris    Hyperlipidemia    PAD (peripheral artery disease) (CMS-[facility])    Past myocardial infarction    Lung nodule    Chronic combined systolic (congestive) and diastolic (congestive) heart failure        =================================  EXAM  =================================  Constitutional   General appearance: Alert and in no acute distress. Well developed, well nourished.    Eyes   External Eye, Conjunctiva and lids: Normal external exam - extraocular movements intact (EOMI).   Ears, Nose, Mouth, and Throat   Hearing: Normal.   Neck   Neck: No neck mass was observed. Supple.   Pulmonary   Respiratory effort: No respiratory distress.   Cardiovascular   Examination of extremities: No peripheral edema.   Psychiatric   Judgment and insight: Intact.   Orientation to person, place, and time: Alert and oriented x 3.   Mood and affect: Normal.   Musculoskeletal:   Operative lower extremity in neutral alignment.  Hip incision is intact with no signs of wound dehiscence.  Active range of motion without discomfort.  Dermis is intact.  Neurovascular status is intact.  Dorsalis pedis pulses 2+, capillary refill <2 seconds.  Minimal swelling, no signs of compartment syndrome.  Negative Homans.       IMAGING:  Multiple views of the affected left hip demonstrate: Postsurgical changes of the left hip ORIF.  Hardware in good alignment with no perihardware fractures or lucencies indicating hardware loosening or failure.   X-rays were personally reviewed and interpreted by me.  Radiology reports were reviewed by me as well, if readily available at the time.    Pricilla George PA-C  Physician Assistant  Orthopedic Surgery  [facility]

## 2025-05-03 ENCOUNTER — HOSPITAL ENCOUNTER (INPATIENT)
Facility: HOSPITAL | Age: 75
End: 2025-05-03
Attending: EMERGENCY MEDICINE | Admitting: INTERNAL MEDICINE
Payer: COMMERCIAL

## 2025-05-03 ENCOUNTER — APPOINTMENT (OUTPATIENT)
Dept: CARDIOLOGY | Facility: HOSPITAL | Age: 75
End: 2025-05-03
Payer: COMMERCIAL

## 2025-05-03 ENCOUNTER — APPOINTMENT (OUTPATIENT)
Dept: RADIOLOGY | Facility: HOSPITAL | Age: 75
End: 2025-05-03
Payer: COMMERCIAL

## 2025-05-03 DIAGNOSIS — R06.89 HYPERCAPNIA: Primary | ICD-10-CM

## 2025-05-03 DIAGNOSIS — J44.1 COPD EXACERBATION (MULTI): ICD-10-CM

## 2025-05-03 PROBLEM — I50.21 ACUTE SYSTOLIC (CONGESTIVE) HEART FAILURE: Status: ACTIVE | Noted: 2025-02-19

## 2025-05-03 PROBLEM — D50.9 IRON DEFICIENCY ANEMIA, UNSPECIFIED: Status: ACTIVE | Noted: 2025-05-03

## 2025-05-03 PROBLEM — R41.841 COGNITIVE COMMUNICATION DEFICIT: Status: ACTIVE | Noted: 2025-02-20

## 2025-05-03 PROBLEM — I48.0 PAROXYSMAL ATRIAL FIBRILLATION (MULTI): Status: ACTIVE | Noted: 2025-02-19

## 2025-05-03 PROBLEM — E44.0 MODERATE PROTEIN-CALORIE MALNUTRITION (MULTI): Status: ACTIVE | Noted: 2025-02-19

## 2025-05-03 LAB
ALBUMIN SERPL BCP-MCNC: 3.6 G/DL (ref 3.4–5)
ALP SERPL-CCNC: 98 U/L (ref 33–136)
ALT SERPL W P-5'-P-CCNC: 10 U/L (ref 7–45)
ANION GAP SERPL CALC-SCNC: 13 MMOL/L (ref 10–20)
AST SERPL W P-5'-P-CCNC: 22 U/L (ref 9–39)
BASE EXCESS BLDV CALC-SCNC: 25.3 MMOL/L (ref -2–3)
BASOPHILS # BLD AUTO: 0.06 X10*3/UL (ref 0–0.1)
BASOPHILS NFR BLD AUTO: 0.5 %
BILIRUB SERPL-MCNC: 0.6 MG/DL (ref 0–1.2)
BODY TEMPERATURE: 37 DEGREES CELSIUS
BUN SERPL-MCNC: 19 MG/DL (ref 6–23)
CALCIUM SERPL-MCNC: 9.7 MG/DL (ref 8.6–10.3)
CHLORIDE SERPL-SCNC: 90 MMOL/L (ref 98–107)
CO2 SERPL-SCNC: 45 MMOL/L (ref 21–32)
CREAT SERPL-MCNC: 0.47 MG/DL (ref 0.5–1.05)
EGFRCR SERPLBLD CKD-EPI 2021: >90 ML/MIN/1.73M*2
EOSINOPHIL # BLD AUTO: 0.06 X10*3/UL (ref 0–0.4)
EOSINOPHIL NFR BLD AUTO: 0.5 %
ERYTHROCYTE [DISTWIDTH] IN BLOOD BY AUTOMATED COUNT: 15.9 % (ref 11.5–14.5)
FLUAV RNA RESP QL NAA+PROBE: NOT DETECTED
FLUBV RNA RESP QL NAA+PROBE: NOT DETECTED
GLUCOSE SERPL-MCNC: 126 MG/DL (ref 74–99)
HCO3 BLDV-SCNC: 55 MMOL/L (ref 22–26)
HCT VFR BLD AUTO: 39.4 % (ref 36–46)
HGB BLD-MCNC: 11.7 G/DL (ref 12–16)
IMM GRANULOCYTES # BLD AUTO: 0.05 X10*3/UL (ref 0–0.5)
IMM GRANULOCYTES NFR BLD AUTO: 0.4 % (ref 0–0.9)
INHALED O2 CONCENTRATION: 32 %
LYMPHOCYTES # BLD AUTO: 1.19 X10*3/UL (ref 0.8–3)
LYMPHOCYTES NFR BLD AUTO: 8.9 %
MCH RBC QN AUTO: 30.1 PG (ref 26–34)
MCHC RBC AUTO-ENTMCNC: 29.7 G/DL (ref 32–36)
MCV RBC AUTO: 101 FL (ref 80–100)
MONOCYTES # BLD AUTO: 0.68 X10*3/UL (ref 0.05–0.8)
MONOCYTES NFR BLD AUTO: 5.1 %
NEUTROPHILS # BLD AUTO: 11.29 X10*3/UL (ref 1.6–5.5)
NEUTROPHILS NFR BLD AUTO: 84.6 %
NRBC BLD-RTO: 0 /100 WBCS (ref 0–0)
OXYHGB MFR BLDV: 16.1 % (ref 45–75)
PCO2 BLDV: 81 MM HG (ref 41–51)
PH BLDV: 7.44 PH (ref 7.33–7.43)
PLATELET # BLD AUTO: 367 X10*3/UL (ref 150–450)
PO2 BLDV: 18 MM HG (ref 35–45)
POTASSIUM SERPL-SCNC: 3.4 MMOL/L (ref 3.5–5.3)
PROT SERPL-MCNC: 6.5 G/DL (ref 6.4–8.2)
RBC # BLD AUTO: 3.89 X10*6/UL (ref 4–5.2)
SAO2 % BLDV: 16 % (ref 45–75)
SARS-COV-2 RNA RESP QL NAA+PROBE: NOT DETECTED
SODIUM SERPL-SCNC: 145 MMOL/L (ref 136–145)
WBC # BLD AUTO: 13.3 X10*3/UL (ref 4.4–11.3)

## 2025-05-03 PROCEDURE — 36415 COLL VENOUS BLD VENIPUNCTURE: CPT | Performed by: EMERGENCY MEDICINE

## 2025-05-03 PROCEDURE — 99285 EMERGENCY DEPT VISIT HI MDM: CPT | Performed by: EMERGENCY MEDICINE

## 2025-05-03 PROCEDURE — 2500000005 HC RX 250 GENERAL PHARMACY W/O HCPCS

## 2025-05-03 PROCEDURE — 71045 X-RAY EXAM CHEST 1 VIEW: CPT | Performed by: RADIOLOGY

## 2025-05-03 PROCEDURE — 2060000001 HC INTERMEDIATE ICU ROOM DAILY

## 2025-05-03 PROCEDURE — 85025 COMPLETE CBC W/AUTO DIFF WBC: CPT | Performed by: EMERGENCY MEDICINE

## 2025-05-03 PROCEDURE — 87636 SARSCOV2 & INF A&B AMP PRB: CPT | Performed by: EMERGENCY MEDICINE

## 2025-05-03 PROCEDURE — 93005 ELECTROCARDIOGRAM TRACING: CPT

## 2025-05-03 PROCEDURE — 94664 DEMO&/EVAL PT USE INHALER: CPT

## 2025-05-03 PROCEDURE — 94640 AIRWAY INHALATION TREATMENT: CPT

## 2025-05-03 PROCEDURE — 80053 COMPREHEN METABOLIC PANEL: CPT | Performed by: EMERGENCY MEDICINE

## 2025-05-03 PROCEDURE — 96375 TX/PRO/DX INJ NEW DRUG ADDON: CPT

## 2025-05-03 PROCEDURE — 99223 1ST HOSP IP/OBS HIGH 75: CPT

## 2025-05-03 PROCEDURE — 96374 THER/PROPH/DIAG INJ IV PUSH: CPT

## 2025-05-03 PROCEDURE — 82805 BLOOD GASES W/O2 SATURATION: CPT | Performed by: EMERGENCY MEDICINE

## 2025-05-03 PROCEDURE — 2500000004 HC RX 250 GENERAL PHARMACY W/ HCPCS (ALT 636 FOR OP/ED)

## 2025-05-03 PROCEDURE — 71045 X-RAY EXAM CHEST 1 VIEW: CPT

## 2025-05-03 PROCEDURE — 2500000001 HC RX 250 WO HCPCS SELF ADMINISTERED DRUGS (ALT 637 FOR MEDICARE OP)

## 2025-05-03 PROCEDURE — 2500000002 HC RX 250 W HCPCS SELF ADMINISTERED DRUGS (ALT 637 FOR MEDICARE OP, ALT 636 FOR OP/ED)

## 2025-05-03 PROCEDURE — 2500000004 HC RX 250 GENERAL PHARMACY W/ HCPCS (ALT 636 FOR OP/ED): Mod: JZ | Performed by: EMERGENCY MEDICINE

## 2025-05-03 RX ORDER — HYDROXYZINE HYDROCHLORIDE 25 MG/1
25 TABLET, FILM COATED ORAL EVERY 8 HOURS PRN
Status: ACTIVE | OUTPATIENT
Start: 2025-05-03

## 2025-05-03 RX ORDER — METOPROLOL TARTRATE 25 MG/1
25 TABLET, FILM COATED ORAL DAILY
Status: DISPENSED | OUTPATIENT
Start: 2025-05-03

## 2025-05-03 RX ORDER — TALC
3 POWDER (GRAM) TOPICAL NIGHTLY PRN
Status: ACTIVE | OUTPATIENT
Start: 2025-05-03

## 2025-05-03 RX ORDER — ACETAMINOPHEN 325 MG/1
650 TABLET ORAL EVERY 4 HOURS PRN
Status: ACTIVE | OUTPATIENT
Start: 2025-05-03

## 2025-05-03 RX ORDER — CEFTRIAXONE 1 G/50ML
1 INJECTION, SOLUTION INTRAVENOUS ONCE
Status: COMPLETED | OUTPATIENT
Start: 2025-05-03 | End: 2025-05-03

## 2025-05-03 RX ORDER — POLYETHYLENE GLYCOL 3350 17 G/17G
17 POWDER, FOR SOLUTION ORAL DAILY PRN
Status: ACTIVE | OUTPATIENT
Start: 2025-05-03

## 2025-05-03 RX ORDER — PANTOPRAZOLE SODIUM 40 MG/1
40 TABLET, DELAYED RELEASE ORAL
Status: DISPENSED | OUTPATIENT
Start: 2025-05-04

## 2025-05-03 RX ORDER — CEFTRIAXONE 1 G/50ML
1 INJECTION, SOLUTION INTRAVENOUS EVERY 24 HOURS
Status: DISPENSED | OUTPATIENT
Start: 2025-05-04

## 2025-05-03 RX ORDER — GUAIFENESIN 600 MG/1
600 TABLET, EXTENDED RELEASE ORAL EVERY 12 HOURS PRN
Status: ACTIVE | OUTPATIENT
Start: 2025-05-03

## 2025-05-03 RX ORDER — HEPARIN SODIUM 5000 [USP'U]/ML
5000 INJECTION, SOLUTION INTRAVENOUS; SUBCUTANEOUS EVERY 8 HOURS SCHEDULED
Status: DISPENSED | OUTPATIENT
Start: 2025-05-03

## 2025-05-03 RX ORDER — ATORVASTATIN CALCIUM 40 MG/1
40 TABLET, FILM COATED ORAL NIGHTLY
Status: DISPENSED | OUTPATIENT
Start: 2025-05-03

## 2025-05-03 RX ORDER — IPRATROPIUM BROMIDE AND ALBUTEROL SULFATE 2.5; .5 MG/3ML; MG/3ML
3 SOLUTION RESPIRATORY (INHALATION)
Status: DISCONTINUED | OUTPATIENT
Start: 2025-05-03 | End: 2025-05-03

## 2025-05-03 RX ORDER — PANTOPRAZOLE SODIUM 40 MG/10ML
40 INJECTION, POWDER, LYOPHILIZED, FOR SOLUTION INTRAVENOUS
Status: ACTIVE | OUTPATIENT
Start: 2025-05-04

## 2025-05-03 RX ORDER — FERROUS SULFATE 325(65) MG
65 TABLET ORAL
Status: DISPENSED | OUTPATIENT
Start: 2025-05-04

## 2025-05-03 RX ORDER — FLUTICASONE FUROATE AND VILANTEROL 100; 25 UG/1; UG/1
1 POWDER RESPIRATORY (INHALATION)
Status: DISPENSED | OUTPATIENT
Start: 2025-05-03

## 2025-05-03 RX ORDER — ALBUTEROL SULFATE 90 UG/1
2 INHALANT RESPIRATORY (INHALATION) EVERY 4 HOURS PRN
Status: DISPENSED | OUTPATIENT
Start: 2025-05-03

## 2025-05-03 RX ORDER — ONDANSETRON HYDROCHLORIDE 2 MG/ML
4 INJECTION, SOLUTION INTRAVENOUS EVERY 6 HOURS PRN
Status: ACTIVE | OUTPATIENT
Start: 2025-05-03

## 2025-05-03 RX ORDER — ASPIRIN 81 MG/1
81 TABLET ORAL DAILY
Status: DISPENSED | OUTPATIENT
Start: 2025-05-03

## 2025-05-03 RX ORDER — IPRATROPIUM BROMIDE AND ALBUTEROL SULFATE 2.5; .5 MG/3ML; MG/3ML
3 SOLUTION RESPIRATORY (INHALATION)
Status: ACTIVE | OUTPATIENT
Start: 2025-05-03

## 2025-05-03 RX ADMIN — Medication 4 L/MIN: at 21:09

## 2025-05-03 RX ADMIN — Medication 4 L/MIN: at 15:33

## 2025-05-03 RX ADMIN — IPRATROPIUM BROMIDE AND ALBUTEROL SULFATE 3 ML: 2.5; .5 SOLUTION RESPIRATORY (INHALATION) at 19:55

## 2025-05-03 RX ADMIN — HEPARIN SODIUM 5000 UNITS: 5000 INJECTION, SOLUTION INTRAVENOUS; SUBCUTANEOUS at 21:08

## 2025-05-03 RX ADMIN — HEPARIN SODIUM 5000 UNITS: 5000 INJECTION, SOLUTION INTRAVENOUS; SUBCUTANEOUS at 15:40

## 2025-05-03 RX ADMIN — ATORVASTATIN CALCIUM 40 MG: 40 TABLET, FILM COATED ORAL at 21:08

## 2025-05-03 RX ADMIN — METHYLPREDNISOLONE SODIUM SUCCINATE 40 MG: 40 INJECTION, POWDER, FOR SOLUTION INTRAMUSCULAR; INTRAVENOUS at 15:40

## 2025-05-03 RX ADMIN — CEFTRIAXONE 1 G: 1 INJECTION, SOLUTION INTRAVENOUS at 14:29

## 2025-05-03 RX ADMIN — METHYLPREDNISOLONE SODIUM SUCCINATE 40 MG: 40 INJECTION, POWDER, FOR SOLUTION INTRAMUSCULAR; INTRAVENOUS at 21:08

## 2025-05-03 RX ADMIN — AZITHROMYCIN MONOHYDRATE 500 MG: 500 INJECTION, POWDER, LYOPHILIZED, FOR SOLUTION INTRAVENOUS at 14:29

## 2025-05-03 SDOH — ECONOMIC STABILITY: INCOME INSECURITY: IN THE PAST 12 MONTHS HAS THE ELECTRIC, GAS, OIL, OR WATER COMPANY THREATENED TO SHUT OFF SERVICES IN YOUR HOME?: NO

## 2025-05-03 SDOH — SOCIAL STABILITY: SOCIAL INSECURITY: WITHIN THE LAST YEAR, HAVE YOU BEEN HUMILIATED OR EMOTIONALLY ABUSED IN OTHER WAYS BY YOUR PARTNER OR EX-PARTNER?: NO

## 2025-05-03 SDOH — SOCIAL STABILITY: SOCIAL INSECURITY: WITHIN THE LAST YEAR, HAVE YOU BEEN AFRAID OF YOUR PARTNER OR EX-PARTNER?: NO

## 2025-05-03 SDOH — ECONOMIC STABILITY: FOOD INSECURITY: WITHIN THE PAST 12 MONTHS, YOU WORRIED THAT YOUR FOOD WOULD RUN OUT BEFORE YOU GOT THE MONEY TO BUY MORE.: NEVER TRUE

## 2025-05-03 SDOH — ECONOMIC STABILITY: FOOD INSECURITY: WITHIN THE PAST 12 MONTHS, THE FOOD YOU BOUGHT JUST DIDN'T LAST AND YOU DIDN'T HAVE MONEY TO GET MORE.: NEVER TRUE

## 2025-05-03 SDOH — SOCIAL STABILITY: SOCIAL INSECURITY: HAVE YOU HAD THOUGHTS OF HARMING ANYONE ELSE?: NO

## 2025-05-03 SDOH — SOCIAL STABILITY: SOCIAL INSECURITY: WERE YOU ABLE TO COMPLETE ALL THE BEHAVIORAL HEALTH SCREENINGS?: YES

## 2025-05-03 ASSESSMENT — ENCOUNTER SYMPTOMS
WEAKNESS: 0
NAUSEA: 0
CHEST TIGHTNESS: 0
VOMITING: 0
CHILLS: 0
SHORTNESS OF BREATH: 1
CONFUSION: 1
DIARRHEA: 0
FEVER: 0
FATIGUE: 0
ACTIVITY CHANGE: 1
WHEEZING: 0
TREMORS: 0
ABDOMINAL PAIN: 0
PALPITATIONS: 0
COUGH: 0
FLANK PAIN: 0
BACK PAIN: 0
JOINT SWELLING: 0
WOUND: 0
HEADACHES: 0
APPETITE CHANGE: 1
HEMATURIA: 0
CONSTIPATION: 0

## 2025-05-03 ASSESSMENT — COGNITIVE AND FUNCTIONAL STATUS - GENERAL
MOBILITY SCORE: 20
PATIENT BASELINE BEDBOUND: NO
MOVING TO AND FROM BED TO CHAIR: A LITTLE
MOVING TO AND FROM BED TO CHAIR: A LITTLE
DRESSING REGULAR UPPER BODY CLOTHING: A LITTLE
STANDING UP FROM CHAIR USING ARMS: A LITTLE
DRESSING REGULAR UPPER BODY CLOTHING: A LITTLE
DRESSING REGULAR LOWER BODY CLOTHING: A LITTLE
WALKING IN HOSPITAL ROOM: A LITTLE
TURNING FROM BACK TO SIDE WHILE IN FLAT BAD: A LITTLE
CLIMB 3 TO 5 STEPS WITH RAILING: A LOT
DRESSING REGULAR UPPER BODY CLOTHING: A LITTLE
CLIMB 3 TO 5 STEPS WITH RAILING: A LOT
HELP NEEDED FOR BATHING: A LITTLE
MOBILITY SCORE: 17
MOBILITY SCORE: 17
DRESSING REGULAR LOWER BODY CLOTHING: A LITTLE
TOILETING: A LITTLE
HELP NEEDED FOR BATHING: A LITTLE
MOVING TO AND FROM BED TO CHAIR: A LITTLE
DAILY ACTIVITIY SCORE: 20
TURNING FROM BACK TO SIDE WHILE IN FLAT BAD: A LITTLE
STANDING UP FROM CHAIR USING ARMS: A LITTLE
WALKING IN HOSPITAL ROOM: A LOT
TOILETING: A LITTLE
DAILY ACTIVITIY SCORE: 20
TOILETING: A LITTLE
DRESSING REGULAR LOWER BODY CLOTHING: A LITTLE
CLIMB 3 TO 5 STEPS WITH RAILING: A LITTLE
WALKING IN HOSPITAL ROOM: A LOT
DAILY ACTIVITIY SCORE: 20
STANDING UP FROM CHAIR USING ARMS: A LITTLE
HELP NEEDED FOR BATHING: A LITTLE

## 2025-05-03 ASSESSMENT — LIFESTYLE VARIABLES
AUDIT-C TOTAL SCORE: 0
AUDIT-C TOTAL SCORE: 0
SUBSTANCE_ABUSE_PAST_12_MONTHS: NO
HOW MANY STANDARD DRINKS CONTAINING ALCOHOL DO YOU HAVE ON A TYPICAL DAY: PATIENT DOES NOT DRINK
HOW OFTEN DO YOU HAVE A DRINK CONTAINING ALCOHOL: NEVER
HOW OFTEN DO YOU HAVE 6 OR MORE DRINKS ON ONE OCCASION: NEVER
PRESCIPTION_ABUSE_PAST_12_MONTHS: NO
SKIP TO QUESTIONS 9-10: 1

## 2025-05-03 ASSESSMENT — ACTIVITIES OF DAILY LIVING (ADL)
WALKS IN HOME: NEEDS ASSISTANCE
LACK_OF_TRANSPORTATION: NO
FEEDING YOURSELF: INDEPENDENT
LACK_OF_TRANSPORTATION: NO
TOILETING: NEEDS ASSISTANCE
ASSISTIVE_DEVICE: DENTURES LOWER;DENTURES UPPER;EYEGLASSES
DRESSING YOURSELF: NEEDS ASSISTANCE
ADEQUATE_TO_COMPLETE_ADL: YES
PATIENT'S MEMORY ADEQUATE TO SAFELY COMPLETE DAILY ACTIVITIES?: YES
GROOMING: INDEPENDENT
HEARING - LEFT EAR: FUNCTIONAL
BATHING: NEEDS ASSISTANCE
JUDGMENT_ADEQUATE_SAFELY_COMPLETE_DAILY_ACTIVITIES: YES
HEARING - RIGHT EAR: FUNCTIONAL

## 2025-05-03 ASSESSMENT — PAIN - FUNCTIONAL ASSESSMENT
PAIN_FUNCTIONAL_ASSESSMENT: 0-10

## 2025-05-03 ASSESSMENT — PAIN SCALES - GENERAL
PAINLEVEL_OUTOF10: 0 - NO PAIN
PAINLEVEL_OUTOF10: 0 - NO PAIN
PAINLEVEL_OUTOF10: 3
PAINLEVEL_OUTOF10: 0 - NO PAIN

## 2025-05-03 ASSESSMENT — PATIENT HEALTH QUESTIONNAIRE - PHQ9
1. LITTLE INTEREST OR PLEASURE IN DOING THINGS: NOT AT ALL
2. FEELING DOWN, DEPRESSED OR HOPELESS: NOT AT ALL
SUM OF ALL RESPONSES TO PHQ9 QUESTIONS 1 & 2: 0

## 2025-05-03 ASSESSMENT — PAIN DESCRIPTION - PAIN TYPE: TYPE: CHRONIC PAIN

## 2025-05-03 ASSESSMENT — PAIN DESCRIPTION - LOCATION: LOCATION: LEG

## 2025-05-03 ASSESSMENT — PAIN DESCRIPTION - DESCRIPTORS: DESCRIPTORS: DISCOMFORT

## 2025-05-03 NOTE — ED PROVIDER NOTES
HPI   Chief Complaint   Patient presents with    Shortness of Breath       75-year-old female with a history of O2 dependent COPD, on 4 L chronically presents with shortness of breath and elevated CO2 from a rehab facility.  She had a recent left hip fracture with intramedullary nail repair.  She was transferred back to the facility.  For the past several days she has been increasingly short of breath, weak, not eating or drinking much.  She had outpatient labs which revealed an elevated CO2 so she was sent to the emergency department.  She has been increasingly weak and occasionally confused according to her family.              Patient History   Medical History[1]  Surgical History[2]  Family History[3]  Social History[4]    Physical Exam   ED Triage Vitals [05/03/25 1220]   Temperature Heart Rate Respirations BP   36.8 °C (98.2 °F) (!) 103 (!) 22 151/78      Pulse Ox Temp src Heart Rate Source Patient Position   96 % -- -- --      BP Location FiO2 (%)     -- --       Physical Exam  Constitutional:       Appearance: She is ill-appearing.   HENT:      Head: Normocephalic and atraumatic.      Mouth/Throat:      Comments: Dry MM    Eyes:      Pupils: Pupils are equal, round, and reactive to light.   Neck:      Thyroid: No thyromegaly.   Cardiovascular:      Rate and Rhythm: Normal rate and regular rhythm.      Heart sounds: No murmur heard.  Pulmonary:      Effort: Accessory muscle usage and respiratory distress present.      Breath sounds: Wheezing present. No decreased breath sounds or rales.   Abdominal:      General: Bowel sounds are normal.      Palpations: Abdomen is soft.   Musculoskeletal:      Cervical back: Normal range of motion and neck supple.      Right lower leg: No edema.      Left lower leg: No edema.   Skin:     General: Skin is warm and dry.      Coloration: Skin is not pale.   Neurological:      General: No focal deficit present.      Mental Status: She is alert.   Psychiatric:         Mood and  Affect: Mood normal.           ED Course & MDM   ED Course as of 25 1429   Sat May 03, 2025   1417 Glucose is 126.  Bicarb 45.  CO2 was 81 on VBG.  White count 13,300.  Chest x-ray reveals extensive chronic lung disease consistent with her advanced COPD.  Questionable small infiltrate.  I covered were with IV antibiotics.  Given her generalized weakness and elevated CO2, I do think hospitalization is reasonable. [CD]   1428 I spoke with the hospitalist team and she will be admitted to [facility]. [CD]      ED Course User Index  [CD] Clarence Rod MD         Diagnoses as of 25 142   Hypercapnia   COPD exacerbation (Multi)                 No data recorded                                 Medical Decision Making      Procedure  Procedures       [1]   Past Medical History:  Diagnosis Date    CAD (coronary artery disease)     COPD (chronic obstructive pulmonary disease) (Multi)     Hyperlipidemia, unspecified     Old myocardial infarction    [2]   Past Surgical History:  Procedure Laterality Date     SECTION, CLASSIC      CORONARY ANGIOPLASTY WITH STENT PLACEMENT      TONSILLECTOMY     [3] No family history on file.  [4]   Social History  Tobacco Use    Smoking status: Former     Types: Cigarettes    Smokeless tobacco: Never   Substance Use Topics    Alcohol use: Never    Drug use: Never        Clarence Rod MD  25 1424

## 2025-05-03 NOTE — H&P
[facility] - GENERAL MEDICINE HISTORY AND PHYSICAL    HISTORY OF PRESENT ILLNESS     History Obtained From (Primary Source): Patient  Collateral History (Secondary Sources): D/w ED    History Of Present Illness (HPI):  Elsa Hinson is a 75 y.o. female with PMHx s/f  CAD s/p PCI of mid LCx, history of STEMI, HTN, HLD, COPD (4L NC) presenting with shortness of breath and elevated CO2 from a rehab facility.  She had a recent left hip fracture with ORIF completed on 3/25/2025.  Her shortness of breath is progressively worsening for past couple of years and has been the worst past few days.  She uses Trelegy daily and has rescue inhaler albuterol.  Her family reports that she has decreased appetite/poor oral intake and has increased generalized weakness and confusion.  Her outpatient labs was notable for elevated CO2 and was sent to ED for further evaluation.  Her family states she has been refusing to wear BiPAP at her facility. Has been compliant with her medications. She has a pulmonology appointment on May 12th. Denies recent ill contacts.  Denies f/c/n/v/d, lightheadedness, chest pain, abdominal pain, changes in urinary symptoms or bowel symptoms, leg swelling.    ED Course:   Vitals on presentation: T 36.8 °C (98.2 °F)  HR (!) 103  /78  RR (!) 22  O2 96 % Supplemental oxygen  Labs:   CBC with WBC 13.3, Hgb 11.7, Plts 367.   CMP with glucose 126, Na 145, K 3.4, BUN 19, sCr 0.47, alk phos 98, ALT 10, AST 22, bilirubin 0.6. Magnesium 2.37.   . Trop 9.   Flu, COVID-negative.  VBG-pH 7.44, pCO2 81, pO2 18, HCO3 55.0  EKG: Sinus rhythm at 87 bpm, no acute ST elevation or depression.  , QTc 448.  Imaging - agree with radiology interpretation(s):   CXR-extensive chronic lung changes.  Unchanged 8 mm nodule in left midlung.  New irregular density in left lung base measuring 1.7 cm suggesting focal infiltrate but neoplasm cannot be excluded.  Interventions: Rocephin, Zithromax,  admission for further management    12-point ROS reviewed and found to be negative aside from aforementioned positives in HPI and/or noted in dedicated ROS section below.     Decision made to admit the patient to the hospitalist service after evaluation of the patient, review of the above, and discussion with ED provider.     LABS AND IMAGING     I have personally reviewed the following labs from 05/03/25: CBC, CMP, Mag, Troponin, VBG, and Viral / Respiratory Panel  I have personally reviewed the following imaging studies from 05/03/25: CXR, with my personal interpretations as documented in ED course above.   I have personally reviewed any obtained EKGs on 05/03/25, with my interpretation as listed above in the ED summary course.   I have personally reviewed the patient's vitals on presentation to the ED and any/all changes through to time of admission (on 05/03/25).     ED Course (From ED Provider):  ED Course as of 05/03/25 1456   Sat May 03, 2025   1417 Glucose is 126.  Bicarb 45.  CO2 was 81 on VBG.  White count 13,300.  Chest x-ray reveals extensive chronic lung disease consistent with her advanced COPD.  Questionable small infiltrate.  I covered were with IV antibiotics.  Given her generalized weakness and elevated CO2, I do think hospitalization is reasonable. [CD]   1428 I spoke with the hospitalist team and she will be admitted to [facility]. [CD]      ED Course User Index  [CD] Clarence Rod MD         Diagnoses as of 05/03/25 1456   Hypercapnia   COPD exacerbation (Multi)     Relevant Results  Results for orders placed or performed during the hospital encounter of 05/03/25 (from the past 24 hours)   CBC and Auto Differential   Result Value Ref Range    WBC 13.3 (H) 4.4 - 11.3 x10*3/uL    nRBC 0.0 0.0 - 0.0 /100 WBCs    RBC 3.89 (L) 4.00 - 5.20 x10*6/uL    Hemoglobin 11.7 (L) 12.0 - 16.0 g/dL    Hematocrit 39.4 36.0 - 46.0 %     (H) 80 - 100 fL    MCH 30.1 26.0 - 34.0 pg    MCHC 29.7 (L) 32.0 -  36.0 g/dL    RDW 15.9 (H) 11.5 - 14.5 %    Platelets 367 150 - 450 x10*3/uL    Neutrophils % 84.6 40.0 - 80.0 %    Immature Granulocytes %, Automated 0.4 0.0 - 0.9 %    Lymphocytes % 8.9 13.0 - 44.0 %    Monocytes % 5.1 2.0 - 10.0 %    Eosinophils % 0.5 0.0 - 6.0 %    Basophils % 0.5 0.0 - 2.0 %    Neutrophils Absolute 11.29 (H) 1.60 - 5.50 x10*3/uL    Immature Granulocytes Absolute, Automated 0.05 0.00 - 0.50 x10*3/uL    Lymphocytes Absolute 1.19 0.80 - 3.00 x10*3/uL    Monocytes Absolute 0.68 0.05 - 0.80 x10*3/uL    Eosinophils Absolute 0.06 0.00 - 0.40 x10*3/uL    Basophils Absolute 0.06 0.00 - 0.10 x10*3/uL   Comprehensive Metabolic Panel   Result Value Ref Range    Glucose 126 (H) 74 - 99 mg/dL    Sodium 145 136 - 145 mmol/L    Potassium 3.4 (L) 3.5 - 5.3 mmol/L    Chloride 90 (L) 98 - 107 mmol/L    Bicarbonate 45 (HH) 21 - 32 mmol/L    Anion Gap 13 10 - 20 mmol/L    Urea Nitrogen 19 6 - 23 mg/dL    Creatinine 0.47 (L) 0.50 - 1.05 mg/dL    eGFR >90 >60 mL/min/1.73m*2    Calcium 9.7 8.6 - 10.3 mg/dL    Albumin 3.6 3.4 - 5.0 g/dL    Alkaline Phosphatase 98 33 - 136 U/L    Total Protein 6.5 6.4 - 8.2 g/dL    AST 22 9 - 39 U/L    Bilirubin, Total 0.6 0.0 - 1.2 mg/dL    ALT 10 7 - 45 U/L   BLOOD GAS VENOUS   Result Value Ref Range    POCT pH, Venous 7.44 (H) 7.33 - 7.43 pH    POCT pCO2, Venous 81 (HH) 41 - 51 mm Hg    POCT pO2, Venous 18 (L) 35 - 45 mm Hg    POCT SO2, Venous 16 (L) 45 - 75 %    POCT Oxy Hemoglobin, Venous 16.1 (L) 45.0 - 75.0 %    POCT Base Excess, Venous 25.3 (H) -2.0 - 3.0 mmol/L    POCT HCO3 Calculated, Venous 55.0 (H) 22.0 - 26.0 mmol/L    Patient Temperature 37.0 degrees Celsius    FiO2 32 %   Sars-CoV-2 PCR   Result Value Ref Range    Coronavirus 2019, PCR Not Detected Not Detected   Influenza A, and B PCR   Result Value Ref Range    Flu A Result Not Detected Not Detected    Flu B Result Not Detected Not Detected      Imaging  XR chest 1 view  Result Date: 5/3/2025  Extensive chronic lung  changes.   Redemonstration of 8 mm nodule in the left midlung.   New irregular density in the left lung base measuring 1.7 cm. This may be related to a focal infiltrate though neoplasm can not be excluded and further evaluation with CT of the chest is suggested.       MACRO: None   Signed by: Beronica Tadeo 5/3/2025 1:28 PM Dictation workstation:   [id number]      Cardiology, Vascular, and Other Imaging  No other imaging results found for the past 2 days       PAST HISTORIES AND ALLERGIES     Past Medical History  She has a past medical history of CAD (coronary artery disease), COPD (chronic obstructive pulmonary disease) (Multi), Hyperlipidemia, unspecified, and Old myocardial infarction.    Surgical History  She has a past surgical history that includes  section, classic; Tonsillectomy; and Coronary angioplasty with stent.     Social History  She reports that she has quit smoking. Her smoking use included cigarettes. She has never used smokeless tobacco. She reports that she does not drink alcohol and does not use drugs.    Family History  Family History[1]    Allergies  Sulfa (sulfonamide antibiotics)    MEDICATIONS     Scheduled Medications:  Scheduled Medications[2]  Continuous Medications:  Continuous Medications[3]  PRN Medications:  PRN Medications[4]     REVIEW OF SYSTEMS     Review of Systems   Constitutional:  Positive for activity change and appetite change. Negative for chills, fatigue and fever.   Respiratory:  Positive for shortness of breath. Negative for cough, chest tightness and wheezing.    Cardiovascular:  Negative for chest pain, palpitations and leg swelling.   Gastrointestinal:  Negative for abdominal pain, constipation, diarrhea, nausea and vomiting.   Genitourinary:  Negative for flank pain and hematuria.   Musculoskeletal:  Negative for back pain and joint swelling.   Skin:  Negative for rash and wound.   Neurological:  Negative for tremors, syncope, weakness and headaches.         Generalized weakness   Psychiatric/Behavioral:  Positive for confusion.        OBJECTIVE     Last Recorded Vitals  /55   Pulse 94   Temp 36.8 °C (98.2 °F)   Resp 16   Wt (!) 39.5 kg (87 lb)   SpO2 97%      Physical Exam:  Vital signs and nursing notes reviewed.   Constitutional: Pleasant and cooperative.  Cachectic elderly lady.  Laying in bed in no acute distress. Conversant.   Skin: Warm and dry; multiple bruises on bilateral shins.  Eyes: EOMI. Anicteric sclera.   ENT: Mucous membranes moist; no obvious injury or deformity appreciated.   Head and Neck: Normocephalic, atraumatic. ROM preserved. Trachea midline. No appreciable JVD.   Respiratory: Nonlabored on 4L NC. Lungs sound diminished but clear to auscultation bilaterally without obvious adventitious sounds. Chest rise is equal.  Cardiovascular: RRR. No gross murmur, gallop, or rub. Extremities are warm and well-perfused with good capillary refill (< 3 seconds). No chest wall tenderness.   GI: Abdomen soft, nontender, nondistended. No obvious organomegaly appreciated. Bowel sounds are present.  : No CVA tenderness.   MSK: No gross abnormalities appreciated. No limitations to AROM/PROM appreciated.   Extremities: No cyanosis, edema, or clubbing evident. Neurovascularly intact.   Neuro: A&Ox3. CN 2-12 grossly intact. Able to respond to questions appropriately and clearly. No acute focal neurologic deficits appreciated.  Psych: Appropriate mood and behavior.    ASSESSMENT AND PLAN   Assessment/Plan     75 y.o. female with PMHx s/f CAD s/p PCI of mid LCx, history of STEMI, HTN, HLD, COPD (4L NC) presenting with shortness of breath and elevated CO2 from a rehab facility.      COPD with Acute Exacerbation   Acute on chronic hypoxic hypercapnic respiratory failure  -pt agreeable to start on BiPAP with a dose of atarax, currently on 4L NC (at baseline)  -Empirically continued on abx Rocephin, Zithromax  -Continued on IV steroids   -DuoNebs, home inhalers    -Pulmonary hygiene   -RT consultation appreciated     Pneumonia of left lower lobe  -Continue antibiotic coverage with IV Rocephin, Zithromax  -Urine antigens, sputum culture  -DuoNebs, home inhalers    CAD s/p PCI of mid Lcx  -denies anginal chest discomfort  -Continue home aspirin     HTN  -Stable and controlled  -Continue home antihypertensives     HLD  -continue home lipitor     Pulmonary nodules in left lung  -follow-up with non contrast chest CT at 3-6 months  -follow-up with pulmonology outpatient as scheduled    Diet: Regular  DVT Prophylaxis: SCDs, SQH  Code Status: Full Code   Case Discussed With: ED provider  Additional Sources Reviewed: ED note day of admission; Internal Medicine / Hospitalist Service    Anticipated Length of Stay (LOS): Patient will require two-plus midnight stay for further evaluation and management of the above.      Marino Reveles PA-C    Dragon dictation software was used to dictate this note and thus there may be minor errors in translation/transcription including garbled speech or misspellings. Please contact for clarification if needed.       [1] No family history on file.  [2] aspirin, 81 mg, oral, Daily  atorvastatin, 40 mg, oral, Daily  azithromycin, 500 mg, intravenous, Once  azithromycin, 500 mg, intravenous, q24h  cefTRIAXone, 1 g, intravenous, Once  cefTRIAXone, 1 g, intravenous, q24h  empagliflozin, 10 mg, oral, Daily  [START ON 5/4/2025] ferrous sulfate, 65 mg of iron, oral, Daily with breakfast  tiotropium, 2 puff, inhalation, Daily   And  fluticasone furoate-vilanteroL, 1 puff, inhalation, Daily  heparin (porcine), 5,000 Units, subcutaneous, q8h USHA  ipratropium-albuteroL, 3 mL, nebulization, q6h  methylPREDNISolone sodium succinate (PF), 40 mg, intravenous, q8h USHA  metoprolol tartrate, 25 mg, oral, Daily  oxygen, , inhalation, Continuous - Inhalation  [START ON 5/4/2025] pantoprazole, 40 mg, oral, Daily before breakfast   Or  [START ON 5/4/2025] pantoprazole, 40 mg,  intravenous, Daily before breakfast    [3]    [4] PRN medications: acetaminophen, albuterol, guaiFENesin, hydrOXYzine HCL, melatonin, ondansetron, polyethylene glycol

## 2025-05-03 NOTE — CARE PLAN
The patient's goals for the shift include      The clinical goals for the shift include tolerate cpap      Problem: Pain - Adult  Goal: Verbalizes/displays adequate comfort level or baseline comfort level  Outcome: Progressing     Problem: Safety - Adult  Goal: Free from fall injury  Outcome: Progressing     Problem: Discharge Planning  Goal: Discharge to home or other facility with appropriate resources  Outcome: Progressing     Problem: Chronic Conditions and Co-morbidities  Goal: Patient's chronic conditions and co-morbidity symptoms are monitored and maintained or improved  Outcome: Progressing     Problem: Nutrition  Goal: Nutrient intake appropriate for maintaining nutritional needs  Outcome: Progressing     Problem: Skin  Goal: Decreased wound size/increased tissue granulation at next dressing change  Outcome: Progressing  Goal: Participates in plan/prevention/treatment measures  Outcome: Progressing  Goal: Prevent/manage excess moisture  Outcome: Progressing  Goal: Prevent/minimize sheer/friction injuries  Outcome: Progressing  Goal: Promote/optimize nutrition  Outcome: Progressing  Goal: Promote skin healing  Outcome: Progressing

## 2025-05-04 VITALS
HEART RATE: 80 BPM | OXYGEN SATURATION: 100 % | WEIGHT: 87 LBS | BODY MASS INDEX: 16.42 KG/M2 | SYSTOLIC BLOOD PRESSURE: 116 MMHG | RESPIRATION RATE: 19 BRPM | TEMPERATURE: 96.8 F | HEIGHT: 61 IN | DIASTOLIC BLOOD PRESSURE: 67 MMHG

## 2025-05-04 LAB
ALBUMIN SERPL BCP-MCNC: 2.8 G/DL (ref 3.4–5)
ALP SERPL-CCNC: 73 U/L (ref 33–136)
ALT SERPL W P-5'-P-CCNC: 7 U/L (ref 7–45)
ANION GAP SERPL CALC-SCNC: ABNORMAL MMOL/L
ANION GAP SERPL CALC-SCNC: ABNORMAL MMOL/L
AST SERPL W P-5'-P-CCNC: 15 U/L (ref 9–39)
BILIRUB SERPL-MCNC: 0.3 MG/DL (ref 0–1.2)
BUN SERPL-MCNC: 14 MG/DL (ref 6–23)
BUN SERPL-MCNC: 15 MG/DL (ref 6–23)
CALCIUM SERPL-MCNC: 9 MG/DL (ref 8.6–10.3)
CALCIUM SERPL-MCNC: 9.6 MG/DL (ref 8.6–10.3)
CHLORIDE SERPL-SCNC: 90 MMOL/L (ref 98–107)
CHLORIDE SERPL-SCNC: 93 MMOL/L (ref 98–107)
CO2 SERPL-SCNC: >45 MMOL/L (ref 21–32)
CO2 SERPL-SCNC: >45 MMOL/L (ref 21–32)
CREAT SERPL-MCNC: 0.45 MG/DL (ref 0.5–1.05)
CREAT SERPL-MCNC: 0.56 MG/DL (ref 0.5–1.05)
EGFRCR SERPLBLD CKD-EPI 2021: >90 ML/MIN/1.73M*2
EGFRCR SERPLBLD CKD-EPI 2021: >90 ML/MIN/1.73M*2
ERYTHROCYTE [DISTWIDTH] IN BLOOD BY AUTOMATED COUNT: 16 % (ref 11.5–14.5)
GLUCOSE SERPL-MCNC: 162 MG/DL (ref 74–99)
GLUCOSE SERPL-MCNC: 179 MG/DL (ref 74–99)
HCT VFR BLD AUTO: 31.1 % (ref 36–46)
HGB BLD-MCNC: 9.5 G/DL (ref 12–16)
MAGNESIUM SERPL-MCNC: 1.99 MG/DL (ref 1.6–2.4)
MCH RBC QN AUTO: 30.9 PG (ref 26–34)
MCHC RBC AUTO-ENTMCNC: 30.5 G/DL (ref 32–36)
MCV RBC AUTO: 101 FL (ref 80–100)
NRBC BLD-RTO: 0 /100 WBCS (ref 0–0)
PLATELET # BLD AUTO: 316 X10*3/UL (ref 150–450)
POTASSIUM SERPL-SCNC: 3 MMOL/L (ref 3.5–5.3)
POTASSIUM SERPL-SCNC: 3.3 MMOL/L (ref 3.5–5.3)
PROT SERPL-MCNC: 5.4 G/DL (ref 6.4–8.2)
RBC # BLD AUTO: 3.07 X10*6/UL (ref 4–5.2)
SODIUM SERPL-SCNC: 144 MMOL/L (ref 136–145)
SODIUM SERPL-SCNC: 148 MMOL/L (ref 136–145)
WBC # BLD AUTO: 6.8 X10*3/UL (ref 4.4–11.3)

## 2025-05-04 PROCEDURE — 2500000002 HC RX 250 W HCPCS SELF ADMINISTERED DRUGS (ALT 637 FOR MEDICARE OP, ALT 636 FOR OP/ED): Performed by: HOSPITALIST

## 2025-05-04 PROCEDURE — 2500000004 HC RX 250 GENERAL PHARMACY W/ HCPCS (ALT 636 FOR OP/ED)

## 2025-05-04 PROCEDURE — 94640 AIRWAY INHALATION TREATMENT: CPT

## 2025-05-04 PROCEDURE — 36415 COLL VENOUS BLD VENIPUNCTURE: CPT

## 2025-05-04 PROCEDURE — 80048 BASIC METABOLIC PNL TOTAL CA: CPT | Mod: CCI | Performed by: HOSPITALIST

## 2025-05-04 PROCEDURE — 2500000002 HC RX 250 W HCPCS SELF ADMINISTERED DRUGS (ALT 637 FOR MEDICARE OP, ALT 636 FOR OP/ED)

## 2025-05-04 PROCEDURE — 97161 PT EVAL LOW COMPLEX 20 MIN: CPT | Mod: GP | Performed by: PHYSICAL THERAPIST

## 2025-05-04 PROCEDURE — 2500000004 HC RX 250 GENERAL PHARMACY W/ HCPCS (ALT 636 FOR OP/ED): Performed by: HOSPITALIST

## 2025-05-04 PROCEDURE — 36415 COLL VENOUS BLD VENIPUNCTURE: CPT | Performed by: HOSPITALIST

## 2025-05-04 PROCEDURE — 2500000001 HC RX 250 WO HCPCS SELF ADMINISTERED DRUGS (ALT 637 FOR MEDICARE OP)

## 2025-05-04 PROCEDURE — 94660 CPAP INITIATION&MGMT: CPT

## 2025-05-04 PROCEDURE — 80053 COMPREHEN METABOLIC PANEL: CPT

## 2025-05-04 PROCEDURE — 85027 COMPLETE CBC AUTOMATED: CPT

## 2025-05-04 PROCEDURE — 2500000005 HC RX 250 GENERAL PHARMACY W/O HCPCS

## 2025-05-04 PROCEDURE — 99233 SBSQ HOSP IP/OBS HIGH 50: CPT | Performed by: HOSPITALIST

## 2025-05-04 PROCEDURE — 83735 ASSAY OF MAGNESIUM: CPT | Performed by: HOSPITALIST

## 2025-05-04 PROCEDURE — 2060000001 HC INTERMEDIATE ICU ROOM DAILY

## 2025-05-04 RX ORDER — LANOLIN ALCOHOL/MO/W.PET/CERES
400 CREAM (GRAM) TOPICAL 2 TIMES DAILY
Status: DISPENSED | OUTPATIENT
Start: 2025-05-04 | End: 2025-05-05

## 2025-05-04 RX ORDER — POTASSIUM CHLORIDE 20 MEQ/1
40 TABLET, EXTENDED RELEASE ORAL ONCE
Status: COMPLETED | OUTPATIENT
Start: 2025-05-04 | End: 2025-05-04

## 2025-05-04 RX ADMIN — IPRATROPIUM BROMIDE AND ALBUTEROL SULFATE 3 ML: 2.5; .5 SOLUTION RESPIRATORY (INHALATION) at 07:32

## 2025-05-04 RX ADMIN — IPRATROPIUM BROMIDE AND ALBUTEROL SULFATE 3 ML: 2.5; .5 SOLUTION RESPIRATORY (INHALATION) at 11:26

## 2025-05-04 RX ADMIN — METHYLPREDNISOLONE SODIUM SUCCINATE 40 MG: 40 INJECTION, POWDER, FOR SOLUTION INTRAMUSCULAR; INTRAVENOUS at 17:07

## 2025-05-04 RX ADMIN — TIOTROPIUM BROMIDE INHALATION SPRAY 2 PUFF: 3.12 SPRAY, METERED RESPIRATORY (INHALATION) at 06:52

## 2025-05-04 RX ADMIN — CEFTRIAXONE 1 G: 1 INJECTION, SOLUTION INTRAVENOUS at 17:07

## 2025-05-04 RX ADMIN — FLUTICASONE FUROATE AND VILANTEROL TRIFENATATE 1 PUFF: 100; 25 POWDER RESPIRATORY (INHALATION) at 06:55

## 2025-05-04 RX ADMIN — ASPIRIN 81 MG: 81 TABLET, COATED ORAL at 10:07

## 2025-05-04 RX ADMIN — Medication 400 MG: at 20:00

## 2025-05-04 RX ADMIN — Medication 4 L/MIN: at 11:26

## 2025-05-04 RX ADMIN — METHYLPREDNISOLONE SODIUM SUCCINATE 40 MG: 40 INJECTION, POWDER, FOR SOLUTION INTRAMUSCULAR; INTRAVENOUS at 20:00

## 2025-05-04 RX ADMIN — IPRATROPIUM BROMIDE AND ALBUTEROL SULFATE 3 ML: 2.5; .5 SOLUTION RESPIRATORY (INHALATION) at 19:43

## 2025-05-04 RX ADMIN — PANTOPRAZOLE SODIUM 40 MG: 40 TABLET, DELAYED RELEASE ORAL at 06:51

## 2025-05-04 RX ADMIN — HEPARIN SODIUM 5000 UNITS: 5000 INJECTION, SOLUTION INTRAVENOUS; SUBCUTANEOUS at 05:53

## 2025-05-04 RX ADMIN — HEPARIN SODIUM 5000 UNITS: 5000 INJECTION, SOLUTION INTRAVENOUS; SUBCUTANEOUS at 23:24

## 2025-05-04 RX ADMIN — Medication 4 L/MIN: at 07:32

## 2025-05-04 RX ADMIN — METHYLPREDNISOLONE SODIUM SUCCINATE 40 MG: 40 INJECTION, POWDER, FOR SOLUTION INTRAMUSCULAR; INTRAVENOUS at 06:00

## 2025-05-04 RX ADMIN — Medication 4 L/MIN: at 19:50

## 2025-05-04 RX ADMIN — AZITHROMYCIN MONOHYDRATE 500 MG: 500 INJECTION, POWDER, LYOPHILIZED, FOR SOLUTION INTRAVENOUS at 18:26

## 2025-05-04 RX ADMIN — FERROUS SULFATE TAB 325 MG (65 MG ELEMENTAL FE) 325 MG: 325 (65 FE) TAB at 10:07

## 2025-05-04 RX ADMIN — Medication 4 L/MIN: at 10:11

## 2025-05-04 RX ADMIN — HEPARIN SODIUM 5000 UNITS: 5000 INJECTION, SOLUTION INTRAVENOUS; SUBCUTANEOUS at 17:07

## 2025-05-04 RX ADMIN — EMPAGLIFLOZIN 10 MG: 10 TABLET, FILM COATED ORAL at 10:07

## 2025-05-04 RX ADMIN — METOPROLOL TARTRATE 25 MG: 25 TABLET, FILM COATED ORAL at 10:07

## 2025-05-04 RX ADMIN — POTASSIUM CHLORIDE 40 MEQ: 1500 TABLET, EXTENDED RELEASE ORAL at 10:07

## 2025-05-04 ASSESSMENT — PAIN SCALES - GENERAL
PAINLEVEL_OUTOF10: 0 - NO PAIN

## 2025-05-04 ASSESSMENT — COGNITIVE AND FUNCTIONAL STATUS - GENERAL
MOBILITY SCORE: 17
STANDING UP FROM CHAIR USING ARMS: A LITTLE
TURNING FROM BACK TO SIDE WHILE IN FLAT BAD: A LITTLE
CLIMB 3 TO 5 STEPS WITH RAILING: TOTAL
WALKING IN HOSPITAL ROOM: A LITTLE
MOVING TO AND FROM BED TO CHAIR: A LITTLE

## 2025-05-04 ASSESSMENT — PAIN - FUNCTIONAL ASSESSMENT
PAIN_FUNCTIONAL_ASSESSMENT: UNABLE TO SELF-REPORT
PAIN_FUNCTIONAL_ASSESSMENT: 0-10
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
## 2025-05-04 NOTE — PROGRESS NOTES
Social work consult placed for Risk Screen, met with pt and/or family to assess needs and provide support, introduced self and my role as  with care transition team. Inquired about areas of SW support/SDOH, pt denied any SW concerns at this time. Stated she has been at the [facility] in [place] since her last admission, SW informed pt Care Transitions will f/u for discharge planning. No other needs identified at this time, SW signing off,  pt and care team aware of SW availability while inpt.     Tong Serna, MSW, LSW ([id number])   Care Transitions

## 2025-05-04 NOTE — PROGRESS NOTES
"Physical Therapy    Physical Therapy Evaluation    Patient Name: Elsa Hinson  MRN: [id number]  Today's Date: 5/4/2025   Time Calculation  Start Time: 1648  Stop Time: 1703  Time Calculation (min): 15 min  2009/2009-A    Assessment/Plan   PT Assessment  PT Assessment Results: Decreased strength, Decreased range of motion, Decreased endurance, Decreased mobility, Pain  Rehab Prognosis: Good  Barriers to Discharge Home: Caregiver assistance, Physical needs  Caregiver Assistance: Patient lives alone and/or does not have reliable caregiver assistance  Physical Needs: Ambulating household distances limited by function/safety, 24hr mobility assistance needed, High falls risk due to function or environment  Evaluation/Treatment Tolerance: Patient limited by fatigue, Patient limited by pain  End of Session Communication: Bedside nurse  Assessment Comment: pt demos ongoing difficulty amb s/p L hip fx repair/ORIF 3/25/25  End of Session Patient Position: Bed, 3 rail up, Alarm off, caregiver present (alarm would not set d/t pt dangling \"wt too low\")  IP OR SWING BED PT PLAN  Inpatient or Swing Bed: Inpatient  PT Plan  Treatment/Interventions: Transfer training, Gait training, Neuromuscular re-education, Therapeutic exercise, Therapeutic activity  PT Plan: Ongoing PT  PT Frequency: 4 times per week  PT - OK to Discharge: Yes    Subjective     Current Problem:  1. Hypercapnia        2. COPD exacerbation (Multi)          Problem List[1]    General Visit Information:  General  Reason for Referral: increased SOB, increased CO2 from [facility]: hypercapnia  Referred By: VIOLETA PEDERSEN. PT FOR IMPAIRED MOBILITY, GAIT TRAINING  Past Medical History Relevant to Rehab: CAD s/p PCI of mid LCx, history of STEMI, HTN, HLD, COPD (4L NC)  Family/Caregiver Present: Yes  Caregiver Feedback: dtr and male family observed session  Prior to Session Communication: Bedside nurse  Patient Position Received: Bed, 3 rail up, Alarm off, caregiver " present  General Comment: pt alert, needed some encouragement for OOB mobility (c/o chronic wound L foot, lines, supper tray)    Home Living:  Home Living  Type of Home: House  Lives With: Alone  Home Adaptive Equipment: Walker rolling or standard  Home Layout: One level  Home Access: Stairs to enter without rails  Entrance Stairs-Number of Steps: 1    Prior Level of Function:  Prior Function Per Pt/Caregiver Report  Level of [place]: Needs assistance with ADLs, Needs assistance with homemaking  Receives Help From: Other (Comment) (staff at rehab/facility)  Ambulatory Assistance: Needs assistance  Transfers: Assistive device  Gait: Assistive device, Minimal (rollator)  Prior Function Comments: pt reports ongoing difficulty walking s/p L hip fx repair 3/25: states she amb with ROLLATOR and FOLLOWED BY STAFF WITH W/C and O2    Precautions:  Precautions  Medical Precautions: Fall precautions, Other (comment) (chronic wound L foot with bandage)  Precautions Comment: HOME O2 4L     Objective     Pain:  Pain Assessment  Pain Assessment: 0-10  0-10 (Numeric) Pain Score: 0 - No pain    Cognition:  Cognition  Orientation Level: Oriented X4  Insight: Moderate  Processing Speed: Delayed    General Assessments:  General Observation  General Observation: slow positional changes/extra time needed d/t anxiety (easily overwhelmed, DOES NOT LIKE TO BE RUSHED), transfer OOB, amb in room as able, return to dangle and requested to dangle awhile/requested to allow dtr to assist RTB and supper tray (dtr expressed agreeance)   Activity Tolerance  Endurance: Decreased tolerance for upright activites  Activity Tolerance Comments: rapid fatigue     Dynamic Sitting Balance  Dynamic Sitting-Comments: good  Dynamic Standing Balance  Dynamic Standing-Comments: fair- heavy walker support needed    Functional Assessments:     Bed Mobility 1  Bed Mobility 1: Supine to sitting  Level of Assistance 1: Set up, Minimum assistance  Bed Mobility  Comments 1: needed assist to operate bed controls, assist to move LLE off bed  Transfer 1  Transfer From 1: Bed to  Technique 1: Sit to stand, Stand to sit  Transfer Device 1: Walker  Transfer Level of Assistance 1: Minimum assistance  Trials/Comments 1: needed extra time/patience before pt felt ready d/t ANXIETY  Ambulation/Gait Training 1  Device 1: Rolling walker  Assistance 1: Minimum assistance  Quality of Gait 1: Antalgic, Decreased step length (left)  Comments/Distance (ft) 1: 8  Stairs  Stairs: No       Extremity/Trunk Assessments:        RLE   RLE : Exceptions to WFL  AROM RLE (degrees)  RLE AROM Comment: WFL  Strength RLE  RLE Overall Strength: Greater than or equal to 3/5 as evidenced by functional mobility  LLE   LLE : Exceptions to WFL  AROM LLE (degrees)  LLE AROM Comment: guarding, farzana <50%  Strength LLE  LLE Overall Strength: Unable to assess, Due to pain (grossly 3-/5 needed assist to lift off bed)    Outcome Measures:     [facility] Basic Mobility  Turning from your back to your side while in a flat bed without using bedrails: None  Moving from lying on your back to sitting on the side of a flat bed without using bedrails: A little  Moving to and from bed to chair (including a wheelchair): A little  Standing up from a chair using your arms (e.g. wheelchair or bedside chair): A little  To walk in hospital room: A little  Climbing 3-5 steps with railing: Total  Basic Mobility - Total Score: 17       Goals:  Encounter Problems       Encounter Problems (Active)       Mobility       STG - Patient will ambulate household distance using walker at all times, no more than SB assist x1 needed       Start:  05/04/25    Expected End:  05/18/25               PT Transfers       STG - Patient will demonstrate SAFE transfer techniques using walker at all times, no more than SB assist x1 needed       Start:  05/04/25    Expected End:  05/18/25               Pain - Adult          Strengthening        Pt will perform 10+  reps AAROM/AROM/RROM BLE to improve functional strength needed for improved mobility.        Start:  05/04/25    Expected End:  05/18/25                 Education Documentation  Mobility Training, taught by Odilia Joseph, PT at 5/4/2025  5:48 PM.  Learner: Family, Patient  Readiness: Acceptance  Method: Explanation, Demonstration  Response: Needs Reinforcement    Education Comments  No comments found.              [1]   Patient Active Problem List  Diagnosis    Chronic obstructive pulmonary disease (Multi)    Essential (primary) hypertension    Anemia    Atherosclerotic heart disease of native coronary artery without angina pectoris    Hyperlipidemia    PAD (peripheral artery disease) (CMS-[facility])    Past myocardial infarction    Lung nodule    Chronic combined systolic (congestive) and diastolic (congestive) heart failure    Acute systolic (congestive) heart failure    Cognitive communication deficit    Iron deficiency anemia, unspecified    Moderate protein-calorie malnutrition (Multi)    Paroxysmal atrial fibrillation (Multi)    Hypercapnia

## 2025-05-05 LAB
ANION GAP SERPL CALC-SCNC: 10 MMOL/L (ref 10–20)
APPEARANCE UR: CLEAR
BASE EXCESS BLDV CALC-SCNC: 25 MMOL/L (ref -2–3)
BILIRUB UR STRIP.AUTO-MCNC: NEGATIVE MG/DL
BODY TEMPERATURE: 37 DEGREES CELSIUS
BUN SERPL-MCNC: 13 MG/DL (ref 6–23)
CALCIUM SERPL-MCNC: 9.6 MG/DL (ref 8.6–10.3)
CHLORIDE SERPL-SCNC: 95 MMOL/L (ref 98–107)
CO2 SERPL-SCNC: 44 MMOL/L (ref 21–32)
COLOR UR: YELLOW
CREAT SERPL-MCNC: 0.45 MG/DL (ref 0.5–1.05)
EGFRCR SERPLBLD CKD-EPI 2021: >90 ML/MIN/1.73M*2
ERYTHROCYTE [DISTWIDTH] IN BLOOD BY AUTOMATED COUNT: 16.3 % (ref 11.5–14.5)
GLUCOSE SERPL-MCNC: 166 MG/DL (ref 74–99)
GLUCOSE UR STRIP.AUTO-MCNC: ABNORMAL MG/DL
HCO3 BLDV-SCNC: 52.7 MMOL/L (ref 22–26)
HCT VFR BLD AUTO: 31.4 % (ref 36–46)
HGB BLD-MCNC: 9.2 G/DL (ref 12–16)
INHALED O2 CONCENTRATION: 36 %
KETONES UR STRIP.AUTO-MCNC: NEGATIVE MG/DL
LEUKOCYTE ESTERASE UR QL STRIP.AUTO: NEGATIVE
MCH RBC QN AUTO: 30.5 PG (ref 26–34)
MCHC RBC AUTO-ENTMCNC: 29.3 G/DL (ref 32–36)
MCV RBC AUTO: 104 FL (ref 80–100)
NITRITE UR QL STRIP.AUTO: NEGATIVE
NRBC BLD-RTO: 0 /100 WBCS (ref 0–0)
OXYHGB MFR BLDV: 81.4 % (ref 45–75)
PCO2 BLDV: 66 MM HG (ref 41–51)
PH BLDV: 7.51 PH (ref 7.33–7.43)
PH UR STRIP.AUTO: 6 [PH]
PLATELET # BLD AUTO: 291 X10*3/UL (ref 150–450)
PO2 BLDV: 52 MM HG (ref 35–45)
POTASSIUM SERPL-SCNC: 3.5 MMOL/L (ref 3.5–5.3)
PROT UR STRIP.AUTO-MCNC: NEGATIVE MG/DL
RBC # BLD AUTO: 3.02 X10*6/UL (ref 4–5.2)
RBC # UR STRIP.AUTO: NEGATIVE MG/DL
SAO2 % BLDV: 83 % (ref 45–75)
SODIUM SERPL-SCNC: 145 MMOL/L (ref 136–145)
SP GR UR STRIP.AUTO: 1.01
UROBILINOGEN UR STRIP.AUTO-MCNC: ABNORMAL MG/DL
WBC # BLD AUTO: 16.9 X10*3/UL (ref 4.4–11.3)

## 2025-05-05 PROCEDURE — 97165 OT EVAL LOW COMPLEX 30 MIN: CPT | Mod: GO | Performed by: OCCUPATIONAL THERAPIST

## 2025-05-05 PROCEDURE — 2500000004 HC RX 250 GENERAL PHARMACY W/ HCPCS (ALT 636 FOR OP/ED): Performed by: INTERNAL MEDICINE

## 2025-05-05 PROCEDURE — 81003 URINALYSIS AUTO W/O SCOPE: CPT | Performed by: EMERGENCY MEDICINE

## 2025-05-05 PROCEDURE — 99233 SBSQ HOSP IP/OBS HIGH 50: CPT | Performed by: INTERNAL MEDICINE

## 2025-05-05 PROCEDURE — 2500000004 HC RX 250 GENERAL PHARMACY W/ HCPCS (ALT 636 FOR OP/ED): Mod: JZ

## 2025-05-05 PROCEDURE — 87086 URINE CULTURE/COLONY COUNT: CPT | Mod: PORLAB | Performed by: EMERGENCY MEDICINE

## 2025-05-05 PROCEDURE — 82805 BLOOD GASES W/O2 SATURATION: CPT | Performed by: HOSPITALIST

## 2025-05-05 PROCEDURE — 2500000002 HC RX 250 W HCPCS SELF ADMINISTERED DRUGS (ALT 637 FOR MEDICARE OP, ALT 636 FOR OP/ED): Performed by: INTERNAL MEDICINE

## 2025-05-05 PROCEDURE — 80048 BASIC METABOLIC PNL TOTAL CA: CPT | Performed by: HOSPITALIST

## 2025-05-05 PROCEDURE — 2500000001 HC RX 250 WO HCPCS SELF ADMINISTERED DRUGS (ALT 637 FOR MEDICARE OP)

## 2025-05-05 PROCEDURE — 2500000002 HC RX 250 W HCPCS SELF ADMINISTERED DRUGS (ALT 637 FOR MEDICARE OP, ALT 636 FOR OP/ED)

## 2025-05-05 PROCEDURE — 87899 AGENT NOS ASSAY W/OPTIC: CPT | Mod: PORLAB

## 2025-05-05 PROCEDURE — 97116 GAIT TRAINING THERAPY: CPT | Mod: CQ,GP

## 2025-05-05 PROCEDURE — 85027 COMPLETE CBC AUTOMATED: CPT | Performed by: HOSPITALIST

## 2025-05-05 PROCEDURE — 2500000005 HC RX 250 GENERAL PHARMACY W/O HCPCS

## 2025-05-05 PROCEDURE — 2500000004 HC RX 250 GENERAL PHARMACY W/ HCPCS (ALT 636 FOR OP/ED): Performed by: HOSPITALIST

## 2025-05-05 PROCEDURE — 1200000002 HC GENERAL ROOM WITH TELEMETRY DAILY

## 2025-05-05 PROCEDURE — 36415 COLL VENOUS BLD VENIPUNCTURE: CPT | Performed by: HOSPITALIST

## 2025-05-05 PROCEDURE — 94667 MNPJ CHEST WALL 1ST: CPT

## 2025-05-05 PROCEDURE — 87449 NOS EACH ORGANISM AG IA: CPT | Mod: PORLAB

## 2025-05-05 PROCEDURE — 94640 AIRWAY INHALATION TREATMENT: CPT

## 2025-05-05 RX ORDER — PREDNISONE 20 MG/1
40 TABLET ORAL DAILY
Status: DISCONTINUED | OUTPATIENT
Start: 2025-05-05 | End: 2025-05-08 | Stop reason: HOSPADM

## 2025-05-05 RX ORDER — AMINO ACIDS/PROTEIN HYDROLYS 15G-100/30
30 LIQUID (ML) ORAL 2 TIMES DAILY
COMMUNITY

## 2025-05-05 RX ORDER — METOPROLOL SUCCINATE 25 MG/1
25 TABLET, EXTENDED RELEASE ORAL DAILY
COMMUNITY

## 2025-05-05 RX ORDER — BISMUTH SUBSALICYLATE 262 MG
1 TABLET,CHEWABLE ORAL DAILY
COMMUNITY

## 2025-05-05 RX ORDER — HYDROXYZINE HYDROCHLORIDE 25 MG/1
25 TABLET, FILM COATED ORAL EVERY 8 HOURS PRN
COMMUNITY

## 2025-05-05 RX ORDER — MIRTAZAPINE 7.5 MG/1
7.5 TABLET, FILM COATED ORAL NIGHTLY
COMMUNITY

## 2025-05-05 RX ORDER — CHOLECALCIFEROL (VITAMIN D3) 1250 MCG
1.25 TABLET ORAL
COMMUNITY

## 2025-05-05 RX ORDER — ACETAMINOPHEN 325 MG/1
650 TABLET ORAL 2 TIMES DAILY
COMMUNITY

## 2025-05-05 RX ORDER — POTASSIUM CHLORIDE 20 MEQ/1
40 TABLET, EXTENDED RELEASE ORAL ONCE
Status: COMPLETED | OUTPATIENT
Start: 2025-05-05 | End: 2025-05-05

## 2025-05-05 RX ORDER — CHOLECALCIFEROL (VITAMIN D3) 1250 MCG
1.25 TABLET ORAL
Status: DISCONTINUED | OUTPATIENT
Start: 2025-05-11 | End: 2025-05-08 | Stop reason: HOSPADM

## 2025-05-05 RX ORDER — METOPROLOL SUCCINATE 25 MG/1
25 TABLET, EXTENDED RELEASE ORAL DAILY
Status: DISCONTINUED | OUTPATIENT
Start: 2025-05-06 | End: 2025-05-08 | Stop reason: HOSPADM

## 2025-05-05 RX ORDER — ALBUTEROL SULFATE 0.83 MG/ML
2.5 SOLUTION RESPIRATORY (INHALATION) 4 TIMES DAILY
COMMUNITY

## 2025-05-05 RX ORDER — MIRTAZAPINE 7.5 MG/1
7.5 TABLET, FILM COATED ORAL NIGHTLY
Status: DISCONTINUED | OUTPATIENT
Start: 2025-05-05 | End: 2025-05-08 | Stop reason: HOSPADM

## 2025-05-05 RX ADMIN — PANTOPRAZOLE SODIUM 40 MG: 40 TABLET, DELAYED RELEASE ORAL at 05:58

## 2025-05-05 RX ADMIN — TIOTROPIUM BROMIDE INHALATION SPRAY 2 PUFF: 3.12 SPRAY, METERED RESPIRATORY (INHALATION) at 05:58

## 2025-05-05 RX ADMIN — IPRATROPIUM BROMIDE AND ALBUTEROL SULFATE 3 ML: 2.5; .5 SOLUTION RESPIRATORY (INHALATION) at 20:50

## 2025-05-05 RX ADMIN — Medication 1 L/MIN: at 07:57

## 2025-05-05 RX ADMIN — AZITHROMYCIN DIHYDRATE 500 MG: 500 INJECTION, POWDER, LYOPHILIZED, FOR SOLUTION INTRAVENOUS at 15:16

## 2025-05-05 RX ADMIN — Medication 4 L/MIN: at 21:37

## 2025-05-05 RX ADMIN — IPRATROPIUM BROMIDE AND ALBUTEROL SULFATE 3 ML: 2.5; .5 SOLUTION RESPIRATORY (INHALATION) at 07:43

## 2025-05-05 RX ADMIN — PREDNISONE 40 MG: 20 TABLET ORAL at 09:58

## 2025-05-05 RX ADMIN — HEPARIN SODIUM 5000 UNITS: 5000 INJECTION INTRAVENOUS; SUBCUTANEOUS at 08:14

## 2025-05-05 RX ADMIN — ATORVASTATIN CALCIUM 40 MG: 40 TABLET, FILM COATED ORAL at 22:19

## 2025-05-05 RX ADMIN — METOPROLOL TARTRATE 25 MG: 25 TABLET, FILM COATED ORAL at 08:09

## 2025-05-05 RX ADMIN — Medication 3 L/MIN: at 11:26

## 2025-05-05 RX ADMIN — EMPAGLIFLOZIN 10 MG: 10 TABLET, FILM COATED ORAL at 08:09

## 2025-05-05 RX ADMIN — METHYLPREDNISOLONE SODIUM SUCCINATE 40 MG: 40 INJECTION, POWDER, FOR SOLUTION INTRAMUSCULAR; INTRAVENOUS at 05:58

## 2025-05-05 RX ADMIN — FLUTICASONE FUROATE AND VILANTEROL TRIFENATATE 1 PUFF: 100; 25 POWDER RESPIRATORY (INHALATION) at 05:59

## 2025-05-05 RX ADMIN — POTASSIUM CHLORIDE 40 MEQ: 1500 TABLET, EXTENDED RELEASE ORAL at 08:09

## 2025-05-05 RX ADMIN — ASPIRIN 81 MG: 81 TABLET, COATED ORAL at 08:09

## 2025-05-05 RX ADMIN — HEPARIN SODIUM 5000 UNITS: 5000 INJECTION INTRAVENOUS; SUBCUTANEOUS at 22:19

## 2025-05-05 RX ADMIN — CEFTRIAXONE 1 G: 1 INJECTION, SOLUTION INTRAVENOUS at 15:16

## 2025-05-05 RX ADMIN — IPRATROPIUM BROMIDE AND ALBUTEROL SULFATE 3 ML: 2.5; .5 SOLUTION RESPIRATORY (INHALATION) at 11:26

## 2025-05-05 RX ADMIN — Medication 400 MG: at 08:09

## 2025-05-05 RX ADMIN — HEPARIN SODIUM 5000 UNITS: 5000 INJECTION INTRAVENOUS; SUBCUTANEOUS at 15:16

## 2025-05-05 ASSESSMENT — COGNITIVE AND FUNCTIONAL STATUS - GENERAL
CLIMB 3 TO 5 STEPS WITH RAILING: TOTAL
MOBILITY SCORE: 14
MOBILITY SCORE: 14
TOILETING: A LOT
TURNING FROM BACK TO SIDE WHILE IN FLAT BAD: A LITTLE
HELP NEEDED FOR BATHING: A LOT
MOVING FROM LYING ON BACK TO SITTING ON SIDE OF FLAT BED WITH BEDRAILS: A LITTLE
WALKING IN HOSPITAL ROOM: A LOT
DAILY ACTIVITIY SCORE: 19
MOBILITY SCORE: 15
WALKING IN HOSPITAL ROOM: A LOT
TOILETING: A LITTLE
TOILETING: A LITTLE
PERSONAL GROOMING: A LITTLE
DRESSING REGULAR LOWER BODY CLOTHING: A LOT
TURNING FROM BACK TO SIDE WHILE IN FLAT BAD: A LITTLE
MOVING TO AND FROM BED TO CHAIR: A LOT
CLIMB 3 TO 5 STEPS WITH RAILING: TOTAL
DRESSING REGULAR LOWER BODY CLOTHING: A LITTLE
DAILY ACTIVITIY SCORE: 18
CLIMB 3 TO 5 STEPS WITH RAILING: TOTAL
WALKING IN HOSPITAL ROOM: A LOT
DRESSING REGULAR LOWER BODY CLOTHING: A LOT
PERSONAL GROOMING: A LITTLE
DRESSING REGULAR UPPER BODY CLOTHING: A LITTLE
HELP NEEDED FOR BATHING: A LITTLE
STANDING UP FROM CHAIR USING ARMS: A LOT
STANDING UP FROM CHAIR USING ARMS: A LITTLE
DAILY ACTIVITIY SCORE: 18
DRESSING REGULAR UPPER BODY CLOTHING: A LITTLE
HELP NEEDED FOR BATHING: A LITTLE
TURNING FROM BACK TO SIDE WHILE IN FLAT BAD: A LITTLE
STANDING UP FROM CHAIR USING ARMS: A LOT
MOVING TO AND FROM BED TO CHAIR: A LITTLE
MOVING TO AND FROM BED TO CHAIR: A LOT

## 2025-05-05 ASSESSMENT — PAIN - FUNCTIONAL ASSESSMENT
PAIN_FUNCTIONAL_ASSESSMENT: 0-10
PAIN_FUNCTIONAL_ASSESSMENT: UNABLE TO SELF-REPORT

## 2025-05-05 ASSESSMENT — ACTIVITIES OF DAILY LIVING (ADL)
LACK_OF_TRANSPORTATION: NO
ADL_ASSISTANCE: INDEPENDENT

## 2025-05-05 ASSESSMENT — PAIN SCALES - GENERAL
PAINLEVEL_OUTOF10: 0 - NO PAIN

## 2025-05-05 NOTE — NURSING NOTE
Pt c/o new IV in [facility]. Area is bruised. IV removed. Pt also has new room assigned on [address], room 3327   Family at bedside and pt aware.,

## 2025-05-05 NOTE — PROGRESS NOTES
Elas Hinson is a 75 y.o. female on day 2 of admission presenting with Hypercapnia.      Subjective   Patient feeling better but still coughing.  On 3 L nasal cannula.  Talked about considering rehab       Objective     Last Recorded Vitals  /59 (BP Location: Right arm, Patient Position: Lying)   Pulse 95   Temp 36.6 °C (97.8 °F) (Temporal)   Resp 16   Wt (!) 39.5 kg (87 lb 1.3 oz)   SpO2 92%   Intake/Output last 3 Shifts:    Intake/Output Summary (Last 24 hours) at 5/5/2025 1115  Last data filed at 5/5/2025 0800  Gross per 24 hour   Intake 540 ml   Output --   Net 540 ml       Admission Weight  Weight: (!) 39.5 kg (87 lb) (05/03/25 1220)    Daily Weight  05/05/25 : (!) 39.5 kg (87 lb 1.3 oz)      Physical Exam:  General: Thin, frail female on 3 L nasal cannula  HEENT: PERRLA, head intact and normocephalic  Neck: Normal to inspection  Lungs: Clear to auscultation, work of breathing within normal limit  Cardiac: Regular rate and rhythm  Abdomen: Soft nontender, positive bowel sounds  : Exam deferred  Skin: Intact  Hematology: No petechia or excessive ecchymosis  Musculoskeletal: Without significant trauma.  Muscle wasting noted  Neurological: Alert awake oriented, no focal deficit, cranial nerves grossly intact  Psych: No suicidal ideation or homicidal ideation    Relevant Results  Scheduled medications  Scheduled Medications[1]  Continuous medications  Continuous Medications[2]  PRN medications  PRN Medications[3]   Labs  Results from last 7 days   Lab Units 05/05/25 0434 05/04/25 0552 05/03/25  1245   WBC AUTO x10*3/uL 16.9* 6.8 13.3*   HEMOGLOBIN g/dL 9.2* 9.5* 11.7*   HEMATOCRIT % 31.4* 31.1* 39.4   PLATELETS AUTO x10*3/uL 291 316 367     Results from last 7 days   Lab Units 05/05/25  0434 05/04/25 2008 05/04/25  0552 05/03/25  1245   SODIUM mmol/L 145 144 148* 145   POTASSIUM mmol/L 3.5 3.3* 3.0* 3.4*   CHLORIDE mmol/L 95* 93* 90* 90*   CO2 mmol/L 44* >45* >45* 45*   BUN mg/dL 13 14 15 19    CREATININE mg/dL 0.45* 0.56 0.45* 0.47*   CALCIUM mg/dL 9.6 9.6 9.0 9.7   PROTEIN TOTAL g/dL  --   --  5.4* 6.5   BILIRUBIN TOTAL mg/dL  --   --  0.3 0.6   ALK PHOS U/L  --   --  73 98   ALT U/L  --   --  7 10   AST U/L  --   --  15 22   GLUCOSE mg/dL 166* 179* 162* 126*       Imaging  XR chest 1 view  Result Date: 5/3/2025  Extensive chronic lung changes.   Redemonstration of 8 mm nodule in the left midlung.   New irregular density in the left lung base measuring 1.7 cm. This may be related to a focal infiltrate though neoplasm can not be excluded and further evaluation with CT of the chest is suggested.       MACRO: None   Signed by: Beronica Tadeo 5/3/2025 1:28 PM Dictation workstation:   [id number]      Cardiology, Vascular, and Other Imaging  ECG 12 Lead  Result Date: 5/5/2025  Sinus rhythm Short SD interval Consider left ventricular hypertrophy                      Assessment/Plan   Elsa Hinson is a 75 y.o. female with past medical history of CAD status post PCI, history of STEMI, hypertension, hyperlipidemia, COPD presented for increased CO2 from rehab facility  Assessment & Plan  Hypercapnia    Assessment:   Acute on chronic COPD exacerbation  Acute on chronic hypoxemic and hypercapnic respiratory failure  Left lower lobe pneumonia  CAD status post PCI  Multiple pulmonary nodule  Hypokalemia  Hypertension  Chronically underweight  Hyperlipidemia  Generalized anxiety    Plan:  Having significant anxiety as a component that is bothering her  Continue with Atarax  Continue Remeron  Switch to oral prednisone  Continue with ceftriaxone azithromycin  Supplemental O2 and wean as tolerated  Continue scheduled breathing treatment  Incentive spirometry, flutter valve  Continue chronic medication  Protein supplement  Patient is quite underweight with overall poor outlook  Discharge planning underway  Noninvasive ventilation as needed       Plan discussed with patient at bedside    High level of MDM based on above  issue and discussing plan    This note is created using voice recognition software. All efforts are made to minimize errors, if there are errors there due to transcription.    Enoch Mehta  Hospitalist           [1] aspirin, 81 mg, oral, Daily  atorvastatin, 40 mg, oral, Nightly  azithromycin, 500 mg, intravenous, q24h  cefTRIAXone, 1 g, intravenous, q24h  empagliflozin, 10 mg, oral, Daily  ferrous sulfate, 65 mg of iron, oral, Daily with breakfast  tiotropium, 2 puff, inhalation, Daily   And  fluticasone furoate-vilanteroL, 1 puff, inhalation, Daily  heparin (porcine), 5,000 Units, subcutaneous, q8h USHA  ipratropium-albuteroL, 3 mL, nebulization, TID  metoprolol tartrate, 25 mg, oral, Daily  oxygen, , inhalation, Continuous - Inhalation  pantoprazole, 40 mg, oral, Daily before breakfast   Or  pantoprazole, 40 mg, intravenous, Daily before breakfast  predniSONE, 40 mg, oral, Daily    [2]    [3] PRN medications: acetaminophen, albuterol, guaiFENesin, hydrOXYzine HCL, melatonin, ondansetron, polyethylene glycol

## 2025-05-05 NOTE — PROGRESS NOTES
Physical Therapy  Physical Therapy Treatment    Patient Name: Elsa Hinson  MRN: [id number]  Department: [address]  Room: 2009/2009-A  Today's Date: 5/5/2025  Time Calculation  Start Time: 1231  Stop Time: 1256  Time Calculation (min): 25 min    PT Plan  Treatment/Interventions: Transfer training, Gait training, Neuromuscular re-education, Therapeutic exercise, Therapeutic activity  PT Plan: Ongoing PT  PT Frequency: 4 times per week  PT - OK to Discharge: Yes    General Visit Information:   PT  Visit  PT Received On: 05/05/25  Response to Previous Treatment: Patient with no complaints from previous session.    Reason for Referral:   increased SOB, increased CO2 from [facility]: hypercapnia    Precautions:  Falls    O2   recent L hip fx, s/p ORIF 3/25/25    Pain:  No pain    Cognition:  Oriented X4    Activity Tolerance:  Activity Tolerance  Endurance: Tolerates 10 - 20 min exercise with multiple rests    Treatments:  Bed Mobility  Supine to sitting: Minimum assistance  Scooting: Minimum assistance    Transfers  Sit to stand: Minimum assistance  Stand to sit: Minimum assistance  Stand pivot: Minimum assistance    Ambulation/Gait Training  10 feet x1 rep with FWW, Sebastián     Pt fatigues quickly, moderate SOB with minimal exertion        Pt sitting in bedside chair following tx. Alarm on and call light in reach.      Outcome Measures:  [facility] Basic Mobility  Turning from your back to your side while in a flat bed without using bedrails: A little  Moving from lying on your back to sitting on the side of a flat bed without using bedrails: A little  Moving to and from bed to chair (including a wheelchair): A little  Standing up from a chair using your arms (e.g. wheelchair or bedside chair): A little  To walk in hospital room: A lot  Climbing 3-5 steps with railing: Total  Basic Mobility - Total Score: 15    Education Documentation  Mobility Training, taught by Gabe Stanley PTA at 5/5/2025  1:15 PM.  Learner:  Patient  Readiness: Acceptance  Method: Demonstration, Explanation  Response: Needs Reinforcement    Education Comments  No comments found.        OP EDUCATION:       Encounter Problems       Encounter Problems (Active)       Mobility       STG - Patient will ambulate household distance using walker at all times, no more than SB assist x1 needed (Progressing)       Start:  05/04/25    Expected End:  05/18/25               PT Transfers       STG - Patient will demonstrate SAFE transfer techniques using walker at all times, no more than SB assist x1 needed (Progressing)       Start:  05/04/25    Expected End:  05/18/25               Pain - Adult          Strengthening        Pt will perform 10+ reps AAROM/AROM/RROM BLE to improve functional strength needed for improved mobility.  (Progressing)       Start:  05/04/25    Expected End:  05/18/25

## 2025-05-05 NOTE — NURSING NOTE
Pt to be transferred to medical floor, [facility], room 3327. Report called to receiving RN.

## 2025-05-05 NOTE — PROGRESS NOTES
Physical Therapy                 Therapy Communication Note    Patient Name: Elsa Hinson  MRN: [id number]  Department: [facility] 2 [facility]  Room: 2009/2009-A  Today's Date: 5/5/2025     Discipline: Physical Therapy    PT Missed Visit: Yes     Missed Visit Reason: Missed Visit Reason: Other (Comment) (pt declined treatment at 1120 requesting PTA give her 10 min. When PTA returned at 1044 pt was performing breathing treatment.)    Missed Time: Attempt    Comment:

## 2025-05-05 NOTE — CARE PLAN
The patient's goals for the shift include      The clinical goals for the shift include patient will remain hemodynamically stable throughout the shift      Problem: Pain - Adult  Goal: Verbalizes/displays adequate comfort level or baseline comfort level  Outcome: Progressing     Problem: Safety - Adult  Goal: Free from fall injury  Outcome: Progressing     Problem: Discharge Planning  Goal: Discharge to home or other facility with appropriate resources  Outcome: Progressing     Problem: Chronic Conditions and Co-morbidities  Goal: Patient's chronic conditions and co-morbidity symptoms are monitored and maintained or improved  Outcome: Progressing     Problem: Nutrition  Goal: Nutrient intake appropriate for maintaining nutritional needs  Outcome: Progressing     Problem: Skin  Goal: Decreased wound size/increased tissue granulation at next dressing change  Outcome: Progressing  Flowsheets (Taken 5/5/2025 0200)  Decreased wound size/increased tissue granulation at next dressing change: Protective dressings over bony prominences  Goal: Participates in plan/prevention/treatment measures  Outcome: Progressing  Flowsheets (Taken 5/5/2025 0200)  Participates in plan/prevention/treatment measures: Increase activity/out of bed for meals  Goal: Prevent/manage excess moisture  Outcome: Progressing  Flowsheets (Taken 5/5/2025 0200)  Prevent/manage excess moisture:   Follow provider orders for dressing changes   Monitor for/manage infection if present  Goal: Prevent/minimize sheer/friction injuries  Outcome: Progressing  Flowsheets (Taken 5/5/2025 0200)  Prevent/minimize sheer/friction injuries: Increase activity/out of bed for meals  Goal: Promote/optimize nutrition  Outcome: Progressing  Flowsheets (Taken 5/5/2025 0200)  Promote/optimize nutrition:   Consume > 50% meals/supplements   Offer water/supplements/favorite foods  Goal: Promote skin healing  Outcome: Progressing  Flowsheets (Taken 5/5/2025 0200)  Promote skin  healing:   Protective dressings over bony prominences   Assess skin/pad under line(s)/device(s)

## 2025-05-05 NOTE — PROGRESS NOTES
Occupational Therapy    Evaluation    Patient Name: Esla Hinson  MRN: [id number]  Today's Date: 5/5/2025  Time Calculation  Start Time: 1305  Stop Time: 1325  Time Calculation (min): 20 min  2009/2009-A    Assessment  IP OT Assessment  OT Assessment: Pt. Min.A for ADLs and amb. with FWW with 4 liters  Prognosis: Good  Barriers to Discharge Home: Caregiver assistance  Caregiver Assistance: Patient lives alone and/or does not have reliable caregiver assistance  End of Session Patient Position: Up in chair       05/05/25 1305   Inpatient Plan   Treatment Interventions Functional transfer training;ADL retraining;Endurance training   OT Frequency 3 times per week   OT Discharge Recommendations Moderate intensity level of continued care   OT Recommended Transfer Status Minimal assist   OT - OK to Discharge Yes  ((when medically approp.))     Subjective     Current Problem:  1. Hypercapnia        2. COPD exacerbation (Multi)            General:  General  Reason for Referral: hypercapnia, elev. CO2  Referred By: Abdirizak  Past Medical History Relevant to Rehab: Hx. of  CAD, STEMI, COPD, L hip ORIF 3/25/25  Patient Position Received: Up in chair (just seen by P.T.)  General Comment: Pt. talkative, agreeable , 4 liters O2 (wounds coccyc and L ankle)    Precautions:  LE Weight Bearing Status: Weight Bearing as Tolerated  Medical Precautions: Fall precautions, Oxygen therapy device and L/min    Vital Signs:see nurses notes        Pain:  Pain Assessment  Pain Type:  (c/o L hip area chronic pain)    Objective     Cognition:  Overall Cognitive Status: Within Functional Limits  Orientation Level: Oriented X4  Safety/Judgement: Within Functional Limits         Home Living:  Type of Home: House  Lives With: Alone  Home Adaptive Equipment: Walker rolling or standard  Home Layout: One level     Prior Function:  Level of [place]: Independent with ADLs and functional transfers, Independent with homemaking with ambulation  ADL Assistance:  Independent  Homemaking Assistance: Independent  Ambulatory Assistance: Independent  Prior Function Comments: came from [facility] in [place], where pt. has been after hip fx.    IADL History:  Homemaking Responsibilities: Yes  Meal Prep Responsibility: Primary  Laundry Responsibility: Primary  Cleaning Responsibility: Primary    ADL:  LE Dressing Assistance: Moderate  Toileting Assistance with Device:  (Min.A to/from toilet with FWW and O2)    Activity Tolerance:  Endurance: Tolerates less than 10 min exercise with changes in vital signs    Bed Mobility/Transfers:      Transfers  Transfer:  (CGA sit-to-stand)    Ambulation/Gait Training: Min.A amb. In room with FWW         Standing Balance:  Dynamic Standing Balance  Dynamic Standing-Balance Support:  ([facility])    Vision: Vision - Basic Assessment  Current Vision: No visual deficits       Sensation:  Light Touch: No apparent deficits    Strength:  Strength Comments: UEs WFL    Perception:  Inattention/Neglect: Appears intact    Coordination:  Movements are Fluid and Coordinated: Yes     Hand Function:  Hand Function  Gross Grasp: Functional      Outcome Measures: [facility] Daily Activity  Putting on and taking off regular lower body clothing: A lot  Bathing (including washing, rinsing, drying): A lot  Putting on and taking off regular upper body clothing: None  Toileting, which includes using toilet, bedpan or urinal: A lot  Taking care of personal grooming such as brushing teeth: None  Eating Meals: None  Daily Activity - Total Score: 18                 EDUCATION:     Education Documentation  Precautions, taught by Taylor Sanchez OT at 5/5/2025  1:58 PM.  Learner: Patient  Readiness: Acceptance  Method: Demonstration  Response: Demonstrated Understanding  Comment: use of call light    Education Comments  No comments found.        Goals:   Encounter Problems       Encounter Problems (Active)       ADLs       SBA LB ADLs with a.e. PRN.  (Progressing)       Start:   05/05/25    Expected End:  05/12/25               MOBILITY       SBA func. mobility with FWW and O2  (Progressing)       Start:  05/05/25    Expected End:  05/12/25

## 2025-05-05 NOTE — PROGRESS NOTES
05/05/25 1350   Discharge Planning   Living Arrangements Other (Comment)  ([facility] rehab)   Support Systems Children   Assistance Needed Skilled Rehab   Type of Residence Skilled nursing facility   Do you have animals or pets at home? No   Who is requesting discharge planning? Provider   Home or Post Acute Services Post acute facilities (Rehab/SNF/etc)   Type of Post Acute Facility Services Skilled nursing   Expected Discharge Disposition SNF   Does the patient need discharge transport arranged? Yes   RoundTrip coordination needed? Yes   Has discharge transport been arranged? No   Financial Resource Strain   How hard is it for you to pay for the very basics like food, housing, medical care, and heating? Not very   Housing Stability   In the last 12 months, was there a time when you were not able to pay the mortgage or rent on time? N   In the past 12 months, how many times have you moved where you were living? 1   At any time in the past 12 months, were you homeless or living in a shelter (including now)? N   Transportation Needs   In the past 12 months, has lack of transportation kept you from medical appointments or from getting medications? no   In the past 12 months, has lack of transportation kept you from meetings, work, or from getting things needed for daily living? No   Patient Choice   Patient / Family choosing to utilize agency / facility established prior to hospitalization Yes   Stroke Family Assessment   Stroke Family Assessment Needed No   Intensity of Service   Intensity of Service 0-30 min     Spoke with pt explained TCC role in Care Transitions and verified address, phone number and emergency contact information. PCP is [facility] and preferred pharmacy is [facility], denies issues obtaining or affording medications and takes as ordered. Pt will be returning to [facility] to complete her rehab as previously started. [facility] has save her bed and a referral has been placed through [facility]. [facility]  20/17 per therapy. ADOD is 24 hrs. CT to follow. Jen Jones BSN/RN-TCC

## 2025-05-05 NOTE — PROGRESS NOTES
Elsa Hinson is a 75 y.o. female admitted for Hypercapnia. Pharmacy reviewed the patient's [id number] medications and allergies for accuracy.    The list below reflects the PTA list prior to pharmacy medication history. A summary a changes to the PTA medication list has been listed below. Please review each medication in order reconciliation for additional clarification and justification.    Source of information:  [facility]    Medications added:  Tylenol 325mg 2t bid  Albuterol 2.5mg/3ml 3ml qid  Hydroxyzine 25mg q8 prn  Metoprolol Succ  25mg every day   Multivitamin every day   Pro stat 30ml bid   Mirtazapine 7.5mg hs  D3 50,000 units 1c on Tues  Medications modified:    Medications to be removed:  Atorvastatin 40mg   [id number]  Dok 100mg  Metoprolol Tart 25mg  Miralax powder  Senna/dok 8.6/50mg  Medications of concern:      Prior to Admission Medications   Prescriptions Last Dose Informant Patient Reported? Taking?   acetaminophen (Tylenol) 325 mg tablet   No No   Sig: Take 2 tablets (650 mg) by mouth every 4 hours if needed for mild pain (1 - 3).   albuterol (ProAir HFA) 90 mcg/actuation inhaler   No No   Sig: Inhale 2 puffs every 4 hours if needed for wheezing or shortness of breath.   aspirin (Aftab Low Dose Aspirin) 81 mg EC tablet   No No   Sig: Take 1 tablet (81 mg) by mouth once daily.   atorvastatin (Lipitor) 40 mg tablet   No No   Sig: Take 1 tablet (40 mg) by mouth once daily.   docusate sodium (Colace) 100 mg capsule   No No   Sig: Take 1 capsule (100 mg) by mouth 2 times a day.   empagliflozin (Jardiance) 10 mg   No No   Sig: Take 1 tablet (10 mg) by mouth once daily.   ferrous sulfate 325 (65 Fe) mg tablet   No No   Sig: Take 1 tablet (325 mg) by mouth once daily with breakfast.   fluticasone-umeclidin-vilanter (Trelegy Ellipta) 100-62.5-25 mcg blister with device   No No   Sig: Inhale 1 puff once daily.   meloxicam (Mobic) 15 mg tablet   No No    Sig: Take 1 tablet (15 mg) by mouth once daily.   metoprolol tartrate (Lopressor) 25 mg tablet   No No   Sig: Take 1 tablet (25 mg) by mouth once daily.   naloxone (Narcan) 4 mg/0.1 mL nasal spray   No No   Sig: Administer 1 spray (4 mg) into affected nostril(s) if needed for opioid reversal or respiratory depression. May repeat every 2-3 minutes if needed, alternating nostrils, until medical assistance becomes available.   oxygen (O2) gas therapy   No No   Sig: Inhale 2 L/min continuously.   polyethylene glycol (Glycolax, Miralax) 17 gram packet   No No   Sig: Take 17 g by mouth once daily.   sennosides-docusate sodium (Fior-Colace) 8.6-50 mg tablet   No No   Sig: Take 1 tablet by mouth once daily at bedtime.      Facility-Administered Medications: None       Albertina Ford

## 2025-05-05 NOTE — CARE PLAN
The patient's goals for the shift include      The clinical goals for the shift include patient will remain hemodynamically stable throughout the shift    Over the shift, the patient did not make progress toward the following goals.

## 2025-05-05 NOTE — CONSULTS
Wound Care Consult     Visit Date: 5/5/2025      Patient Name: Elsa Hinson         MRN: [id number]             Assessment:  Wound 03/24/25 Pressure Injury Coccyx Medial (Active)   Date First Assessed/Time First Assessed: 03/24/25 2230   Present on Original Admission: Yes  Hand Hygiene Completed: Yes  Primary Wound Type: Pressure Injury  Location: Coccyx  Wound Location Orientation: Medial      Assessments 5/5/2025 10:52 AM   Wound Image     Present on Admission to Healthcare Facility Yes   Site Assessment Yellow;White;Fibrinous;Pink;Red   Fior-Wound Assessment Non-blanchable erythema;Blanchable erythema   Pressure Injury Stage Stage 3   Wound Length (cm) 1.2 cm   Wound Width (cm) 0.7 cm   Wound Surface Area (cm^2) 0.66 cm^2   Wound Depth (cm) 0.2 cm   Wound Volume (cm^3) 0.088 cm^3   Margins Well-defined edges   Drainage Description Serosanguineous   Drainage Amount Scant   Dressing Other (Comment)   Dressing Changed Changed       Active Orders   Date Order Priority Status Authorizing Provider   05/05/25 1120 Change dressing Pressure Injury Medial Sacrum Routine Active Enoch Mehta MD       Inactive Orders   Date Order Priority Status Authorizing Provider   05/03/25 1943 Inpatient Consult to Wound and Ostomy Nurse Routine Completed Mago Puente MD     - Reason for Consult?:    Wound   05/03/25 1943 Change dressing Pressure Injury Medial Sacrum Routine Discontinued Mago Puente MD   03/25/25 1045 Wound Care Pressure Injury Medial Sacrum Routine Discontinued Olaf العراقي DO     - Wound Complexity:    Simple       Wound 03/25/25 Skin Tear Ankle Left;Anterior (Active)   Date First Assessed: 03/25/25   Present on Original Admission: No  Hand Hygiene Completed: Yes  Primary Wound Type: Skin Tear  Location: Ankle  Wound Location Orientation: Left;Anterior      Assessments 5/5/2025 10:49 AM   Wound Image     Present on Admission to Healthcare Facility Yes   Site Assessment Red;Pink;Yellow   Non-staged Wound  Description Full thickness   Wound Length (cm) 5 cm   Wound Width (cm) 5.3 cm   Wound Surface Area (cm^2) 20.81 cm^2   Wound Depth (cm) 0.1 cm   Wound Volume (cm^3) 1.388 cm^3   Drainage Description Serosanguineous   Drainage Amount Small   Dressing Xeroform;Gauze;Kerlix/rolled gauze   Dressing Changed Changed       Active Orders   Date Order Priority Status Authorizing Provider   05/05/25 1120 Change dressing Skin Tear Left;Anterior Ankle Routine Active Enoch Mehta MD       Inactive Orders   Date Order Priority Status Authorizing Provider   05/03/25 1943 Inpatient Consult to Wound and Ostomy Nurse Routine Completed Mago Puente MD     - Reason for Consult?:    Wound   05/03/25 1943 Change dressing Skin Tear Left;Anterior Ankle Routine Discontinued Mago Puente MD   03/25/25 1756 Wound Care Skin Tear (3in x 3in. skin tear) Left;Anterior Ankle Routine Discontinued Olaf Norman MD     - Wound Complexity:    Simple     - Cleanse with:    Wound Cleanser     Patient seen for pressure injury and skin tear (present on admission) complicated by PMH: CAD status post PCI, history of STEMI, hypertension, hyperlipidemia, COPD per chart. Patient known to this RN from prior admission and wound consult on 3/25 (see prior note). Stage 3 pressure injury noted to coccyx, and full thickness skin tear to left lower leg/ankle. Skin hygiene and dressing care preformed. See detailed assessment above from flowsheet. Recommendations below, reviewed with Dr. Mehta.     Wound Treatment protocols recommended:  Left ankle- Cleanse with vashe, cover with xeroform, gauze and Kerlix, change every other day/prn.   Coccyx- Cleanse wound with vashe, apply medihoney and cover with clean dry padded dressing. Change every other day/prn.  Outpatient wound care follow up upon discharge.   Continue to off load, turning at least every 2 hours. Offload heels.    Therapeutic surface: Patient on [place] Dream Air during exam. Patient demonstates  ability to turn/reposition self. Patient refused foams to bilateral heels for prevention. Heels offloaded with pillow. Staff to continue to reposition patient atleast every 2 hours, offload heels, limit layers beneath patient and keep skin clean and dry.    Nursing updated, continue pressure injury preventions, wound care to be completed by nursing per orders and re-consult wound RN if needed as wound care is not following.     Please contact me with questions or changes in patient condition.   Danielle Sadler. RN  Wound/Ostomy Care  [phone]

## 2025-05-05 NOTE — DISCHARGE INSTRUCTIONS
Wound Treatment protocols recommended:  Left ankle- Cleanse with vashe, cover with xeroform, gauze and Kerlix, change every other day/prn.   Coccyx- Cleanse wound with vashe, apply medihoney and cover with clean dry padded dressing. Change every other day/prn.  Outpatient wound care follow up upon discharge.   Continue to off load, turning at least every 2 hours. Offload heels.

## 2025-05-05 NOTE — CONSULTS
"Nutrition Initial Assessment:   Nutrition Assessment    Reason for Assessment: Admission nursing screening    Patient is a 75 y.o. female presenting with Hypercapnia. Consulted by [facility] for wound. Noting wound to coccyx. Pt eating poorly. Hx of poor PO since last admission back in February. Pt with significant wt loss -10 lbs or 10% in 3 months. Rec; magic cup 2x daily with L/D (290 kcals and 9g protein each).     Nutrition History:  Energy Intake: Poor < 50 %  Food and Nutrient History: Pt has been eating a few small meals daily. Is having increased fatigue in doing things due to COPD. Has denied ensure in the past, Willing to try magic cup cup, rec; 2x daily with L/D.    Anthropometrics:  Height: 154.9 cm (5' 1\")   Weight: (!) 39.5 kg (87 lb 1.3 oz)   BMI (Calculated): 16.46    Weight Change %:  Weight History / % Weight Change: Pt -10 lbs or 10% in 3 months noted.  Significant Weight Loss: Yes  Interpretation of Weight Loss: >7.5% in 3 months    Nutrition Focused Physical Exam Findings:  Subcutaneous Fat Loss:   Defer Subcutaneous Fat Loss Assessment: Defer all  Defer All Reason: unable to assess  Muscle Wasting:  Defer Muscle Wasting Assessment: Defer all  Defer All Reason: unable to assess  Edema:  Edema: none  Physical Findings:  Skin: Positive (noting wound to coccyx)    Nutrition Significant Labs:  CBC Trend:   Results from last 7 days   Lab Units 05/05/25 0434 05/04/25 0552 05/03/25  1245   WBC AUTO x10*3/uL 16.9* 6.8 13.3*   RBC AUTO x10*6/uL 3.02* 3.07* 3.89*   HEMOGLOBIN g/dL 9.2* 9.5* 11.7*   HEMATOCRIT % 31.4* 31.1* 39.4   MCV fL 104* 101* 101*   PLATELETS AUTO x10*3/uL 291 316 367    , BMP Trend:   Results from last 7 days   Lab Units 05/05/25 0434 05/04/25 2008 05/04/25 0552 05/03/25  1245   GLUCOSE mg/dL 166* 179* 162* 126*   CALCIUM mg/dL 9.6 9.6 9.0 9.7   SODIUM mmol/L 145 144 148* 145   POTASSIUM mmol/L 3.5 3.3* 3.0* 3.4*   CO2 mmol/L 44* >45* >45* 45*   CHLORIDE mmol/L 95* 93* 90* 90*   BUN " mg/dL 13 14 15 19   CREATININE mg/dL 0.45* 0.56 0.45* 0.47*    , A1C:  Lab Results   Component Value Date    HGBA1C 5.6 02/14/2025     Nutrition Specific Medications:  Scheduled Medications[1]     Dietary Orders (From admission, onward)       Start     Ordered    05/05/25 1258  Oral nutritional supplements  Until discontinued        Question Answer Comment   Deliver with Lunch    Deliver with Dinner    Select supplement: Magic Cup        05/05/25 1258    05/03/25 1456  Adult diet Regular  Diet effective now        Question:  Diet type  Answer:  Regular    05/03/25 1455    05/03/25 1450  May Participate in Room Service  ( ROOM SERVICE MAY PARTICIPATE)  Once        Question:  .  Answer:  Yes    05/03/25 1449                  Estimated Needs:   Total Energy Estimated Needs in 24 hours (kCal):  (5781-1180)  Method for Estimating Needs: 35-40kcals/kg ABW  Total Protein Estimated Needs in 24 Hours (g):  (40-60)  Method for Estimating 24 Hour Protein Needs: 1.1-1.5 ABW     Method for Estimating 24 Hour Fluid Needs: 1 ml/kcal or per MD        Nutrition Diagnosis   Malnutrition Diagnosis  Patient has Malnutrition Diagnosis: Yes  Diagnosis Status: New  Malnutrition Diagnosis: Severe malnutrition related to chronic disease or condition  Related to: COPD  As Evidenced by: <75% of estimated needs x3 months, 10% weight loss in 3 months.    Nutrition Diagnosis  Patient has Nutrition Diagnosis: Yes  Diagnosis Status (1): New  Nutrition Diagnosis 1: Inadequate oral intake  Related to (1): COPD  As Evidenced by (1): <75% PO       Nutrition Interventions/Recommendations   Nutrition prescription for oral nutrition    Nutrition Recommendations:  Individualized Nutrition Prescription Provided for : Continue with regular diet order. Magic cup 2x daily,    Nutrition Interventions/Goals:   Meals and Snacks: General healthful diet  Goal: >50% of meals  Medical Food Supplement: Commercial food medical food supplement therapy  Goal: magic  cup 2x daily.      Education Documentation  No documentation found.       Nutrition Monitoring and Evaluation   Food/Nutrient Related History Monitoring  Monitoring and Evaluation Plan: Intake / amount of food, Estimated Energy Intake  Estimated Energy Intake: Energy intake 50 -75% of estimated energy needs  Intake / Amount of food: Consumes at least 50% or more of meals/snacks/supplements  Additional Plans: magic cup 2x daily.    Anthropometric Measurements  Monitoring and Evaluation Plan: Body weight  Body Weight: Body weight - Maintain stable weight    Biochemical Data, Medical Tests and Procedures  Monitoring and Evaluation Plan: Electrolyte/renal panel  Electrolyte and Renal Panel: Electrolytes within normal limits    Time Spent (min): 60 minutes       [1] aspirin, 81 mg, oral, Daily  atorvastatin, 40 mg, oral, Nightly  azithromycin, 500 mg, intravenous, q24h  cefTRIAXone, 1 g, intravenous, q24h  [START ON 5/11/2025] cholecalciferol, 1.25 mg, oral, Every Sunday  empagliflozin, 10 mg, oral, Daily  ferrous sulfate, 65 mg of iron, oral, Daily with breakfast  tiotropium, 2 puff, inhalation, Daily   And  fluticasone furoate-vilanteroL, 1 puff, inhalation, Daily  heparin (porcine), 5,000 Units, subcutaneous, q8h USHA  ipratropium-albuteroL, 3 mL, nebulization, TID  [START ON 5/6/2025] metoprolol succinate XL, 25 mg, oral, Daily  mirtazapine, 7.5 mg, oral, Nightly  oxygen, , inhalation, Continuous - Inhalation  pantoprazole, 40 mg, oral, Daily before breakfast   Or  pantoprazole, 40 mg, intravenous, Daily before breakfast  predniSONE, 40 mg, oral, Daily

## 2025-05-06 LAB
ANION GAP SERPL CALC-SCNC: 12 MMOL/L (ref 10–20)
BACTERIA UR CULT: NO GROWTH
BASOPHILS # BLD AUTO: 0.01 X10*3/UL (ref 0–0.1)
BASOPHILS NFR BLD AUTO: 0.1 %
BUN SERPL-MCNC: 13 MG/DL (ref 6–23)
CALCIUM SERPL-MCNC: 9.2 MG/DL (ref 8.6–10.3)
CHLORIDE SERPL-SCNC: 96 MMOL/L (ref 98–107)
CO2 SERPL-SCNC: 42 MMOL/L (ref 21–32)
CREAT SERPL-MCNC: 0.48 MG/DL (ref 0.5–1.05)
EGFRCR SERPLBLD CKD-EPI 2021: >90 ML/MIN/1.73M*2
EOSINOPHIL # BLD AUTO: 0 X10*3/UL (ref 0–0.4)
EOSINOPHIL NFR BLD AUTO: 0 %
ERYTHROCYTE [DISTWIDTH] IN BLOOD BY AUTOMATED COUNT: 16.3 % (ref 11.5–14.5)
GLUCOSE SERPL-MCNC: 95 MG/DL (ref 74–99)
HCT VFR BLD AUTO: 31.7 % (ref 36–46)
HGB BLD-MCNC: 9.4 G/DL (ref 12–16)
IMM GRANULOCYTES # BLD AUTO: 0.11 X10*3/UL (ref 0–0.5)
IMM GRANULOCYTES NFR BLD AUTO: 0.8 % (ref 0–0.9)
LEGIONELLA AG UR QL: NEGATIVE
LYMPHOCYTES # BLD AUTO: 1.48 X10*3/UL (ref 0.8–3)
LYMPHOCYTES NFR BLD AUTO: 10.2 %
MAGNESIUM SERPL-MCNC: 2.43 MG/DL (ref 1.6–2.4)
MCH RBC QN AUTO: 30.3 PG (ref 26–34)
MCHC RBC AUTO-ENTMCNC: 29.7 G/DL (ref 32–36)
MCV RBC AUTO: 102 FL (ref 80–100)
MONOCYTES # BLD AUTO: 1.23 X10*3/UL (ref 0.05–0.8)
MONOCYTES NFR BLD AUTO: 8.4 %
NEUTROPHILS # BLD AUTO: 11.74 X10*3/UL (ref 1.6–5.5)
NEUTROPHILS NFR BLD AUTO: 80.5 %
NRBC BLD-RTO: 0 /100 WBCS (ref 0–0)
PLATELET # BLD AUTO: 275 X10*3/UL (ref 150–450)
POTASSIUM SERPL-SCNC: 4.5 MMOL/L (ref 3.5–5.3)
RBC # BLD AUTO: 3.1 X10*6/UL (ref 4–5.2)
S PNEUM AG UR QL: NEGATIVE
SODIUM SERPL-SCNC: 145 MMOL/L (ref 136–145)
WBC # BLD AUTO: 14.6 X10*3/UL (ref 4.4–11.3)

## 2025-05-06 PROCEDURE — 2500000001 HC RX 250 WO HCPCS SELF ADMINISTERED DRUGS (ALT 637 FOR MEDICARE OP): Performed by: INTERNAL MEDICINE

## 2025-05-06 PROCEDURE — 36415 COLL VENOUS BLD VENIPUNCTURE: CPT | Performed by: INTERNAL MEDICINE

## 2025-05-06 PROCEDURE — 2500000005 HC RX 250 GENERAL PHARMACY W/O HCPCS

## 2025-05-06 PROCEDURE — 94640 AIRWAY INHALATION TREATMENT: CPT

## 2025-05-06 PROCEDURE — 2500000004 HC RX 250 GENERAL PHARMACY W/ HCPCS (ALT 636 FOR OP/ED)

## 2025-05-06 PROCEDURE — 80048 BASIC METABOLIC PNL TOTAL CA: CPT | Performed by: INTERNAL MEDICINE

## 2025-05-06 PROCEDURE — 1200000002 HC GENERAL ROOM WITH TELEMETRY DAILY

## 2025-05-06 PROCEDURE — 2500000002 HC RX 250 W HCPCS SELF ADMINISTERED DRUGS (ALT 637 FOR MEDICARE OP, ALT 636 FOR OP/ED)

## 2025-05-06 PROCEDURE — 99239 HOSP IP/OBS DSCHRG MGMT >30: CPT | Performed by: INTERNAL MEDICINE

## 2025-05-06 PROCEDURE — 2500000004 HC RX 250 GENERAL PHARMACY W/ HCPCS (ALT 636 FOR OP/ED): Performed by: INTERNAL MEDICINE

## 2025-05-06 PROCEDURE — 85025 COMPLETE CBC W/AUTO DIFF WBC: CPT | Performed by: INTERNAL MEDICINE

## 2025-05-06 PROCEDURE — 83735 ASSAY OF MAGNESIUM: CPT | Performed by: INTERNAL MEDICINE

## 2025-05-06 PROCEDURE — 2500000001 HC RX 250 WO HCPCS SELF ADMINISTERED DRUGS (ALT 637 FOR MEDICARE OP)

## 2025-05-06 RX ORDER — PREDNISONE 20 MG/1
40 TABLET ORAL DAILY
Qty: 10 TABLET | Refills: 0 | Status: SHIPPED | OUTPATIENT
Start: 2025-05-07 | End: 2025-05-12

## 2025-05-06 RX ORDER — CEFDINIR 300 MG/1
300 CAPSULE ORAL 2 TIMES DAILY
Qty: 10 CAPSULE | Refills: 0 | Status: SHIPPED | OUTPATIENT
Start: 2025-05-06 | End: 2025-05-11

## 2025-05-06 RX ORDER — GUAIFENESIN 600 MG/1
1200 TABLET, EXTENDED RELEASE ORAL EVERY 12 HOURS PRN
Qty: 40 TABLET | Refills: 0 | Status: SHIPPED | OUTPATIENT
Start: 2025-05-06 | End: 2025-05-16

## 2025-05-06 RX ORDER — IPRATROPIUM BROMIDE AND ALBUTEROL SULFATE 2.5; .5 MG/3ML; MG/3ML
3 SOLUTION RESPIRATORY (INHALATION)
Qty: 270 ML | Refills: 0 | Status: SHIPPED | OUTPATIENT
Start: 2025-05-06 | End: 2025-06-05

## 2025-05-06 RX ADMIN — Medication 4 L/MIN: at 20:58

## 2025-05-06 RX ADMIN — METOPROLOL SUCCINATE 25 MG: 25 TABLET, EXTENDED RELEASE ORAL at 08:40

## 2025-05-06 RX ADMIN — PANTOPRAZOLE SODIUM 40 MG: 40 TABLET, DELAYED RELEASE ORAL at 06:28

## 2025-05-06 RX ADMIN — PREDNISONE 40 MG: 20 TABLET ORAL at 08:40

## 2025-05-06 RX ADMIN — ASPIRIN 81 MG: 81 TABLET, COATED ORAL at 08:40

## 2025-05-06 RX ADMIN — Medication 4 L/MIN: at 09:32

## 2025-05-06 RX ADMIN — IPRATROPIUM BROMIDE AND ALBUTEROL SULFATE 3 ML: 2.5; .5 SOLUTION RESPIRATORY (INHALATION) at 07:54

## 2025-05-06 RX ADMIN — Medication 4 L/MIN: at 11:19

## 2025-05-06 RX ADMIN — TIOTROPIUM BROMIDE INHALATION SPRAY 2 PUFF: 3.12 SPRAY, METERED RESPIRATORY (INHALATION) at 08:51

## 2025-05-06 RX ADMIN — MIRTAZAPINE 7.5 MG: 7.5 TABLET, FILM COATED ORAL at 20:25

## 2025-05-06 RX ADMIN — IPRATROPIUM BROMIDE AND ALBUTEROL SULFATE 3 ML: 2.5; .5 SOLUTION RESPIRATORY (INHALATION) at 11:19

## 2025-05-06 RX ADMIN — HEPARIN SODIUM 5000 UNITS: 5000 INJECTION INTRAVENOUS; SUBCUTANEOUS at 06:28

## 2025-05-06 RX ADMIN — HEPARIN SODIUM 5000 UNITS: 5000 INJECTION INTRAVENOUS; SUBCUTANEOUS at 23:00

## 2025-05-06 RX ADMIN — HEPARIN SODIUM 5000 UNITS: 5000 INJECTION INTRAVENOUS; SUBCUTANEOUS at 14:40

## 2025-05-06 RX ADMIN — EMPAGLIFLOZIN 10 MG: 10 TABLET, FILM COATED ORAL at 08:40

## 2025-05-06 RX ADMIN — IPRATROPIUM BROMIDE AND ALBUTEROL SULFATE 3 ML: 2.5; .5 SOLUTION RESPIRATORY (INHALATION) at 20:58

## 2025-05-06 RX ADMIN — ATORVASTATIN CALCIUM 40 MG: 40 TABLET, FILM COATED ORAL at 20:25

## 2025-05-06 RX ADMIN — FLUTICASONE FUROATE AND VILANTEROL TRIFENATATE 1 PUFF: 100; 25 POWDER RESPIRATORY (INHALATION) at 08:52

## 2025-05-06 RX ADMIN — CEFTRIAXONE 1 G: 1 INJECTION, SOLUTION INTRAVENOUS at 14:45

## 2025-05-06 ASSESSMENT — COGNITIVE AND FUNCTIONAL STATUS - GENERAL
TURNING FROM BACK TO SIDE WHILE IN FLAT BAD: A LITTLE
PERSONAL GROOMING: A LITTLE
WALKING IN HOSPITAL ROOM: A LOT
PERSONAL GROOMING: A LITTLE
DAILY ACTIVITIY SCORE: 18
MOVING TO AND FROM BED TO CHAIR: A LOT
CLIMB 3 TO 5 STEPS WITH RAILING: TOTAL
TURNING FROM BACK TO SIDE WHILE IN FLAT BAD: A LITTLE
HELP NEEDED FOR BATHING: A LITTLE
CLIMB 3 TO 5 STEPS WITH RAILING: TOTAL
DRESSING REGULAR LOWER BODY CLOTHING: A LOT
TOILETING: A LITTLE
MOBILITY SCORE: 14
DRESSING REGULAR UPPER BODY CLOTHING: A LITTLE
DAILY ACTIVITIY SCORE: 18
DRESSING REGULAR UPPER BODY CLOTHING: A LITTLE
WALKING IN HOSPITAL ROOM: A LOT
DRESSING REGULAR LOWER BODY CLOTHING: A LOT
STANDING UP FROM CHAIR USING ARMS: A LOT
STANDING UP FROM CHAIR USING ARMS: A LOT
TOILETING: A LITTLE
MOBILITY SCORE: 14
MOVING TO AND FROM BED TO CHAIR: A LOT
HELP NEEDED FOR BATHING: A LITTLE

## 2025-05-06 ASSESSMENT — PAIN SCALES - GENERAL
PAINLEVEL_OUTOF10: 0 - NO PAIN
PAINLEVEL_OUTOF10: 0 - NO PAIN
PAINLEVEL_OUTOF10: 1

## 2025-05-06 ASSESSMENT — PAIN - FUNCTIONAL ASSESSMENT
PAIN_FUNCTIONAL_ASSESSMENT: 0-10
PAIN_FUNCTIONAL_ASSESSMENT: 0-10

## 2025-05-06 NOTE — PROGRESS NOTES
"   05/06/25 1240   Discharge Planning   Type of Residence Skilled nursing facility   Type of Post Acute Facility Services Skilled nursing   Expected Discharge Disposition SNF     Spoke with pt through nurse. Pt confirms her plan is to return to [facility] in [place] on discharge. Pt hopeful to stay another night if possible. Instructed pt to discuss same with provider. Still awaiting precert as well. Facility to notify once received. Discharge orders are in. Facility asked for Bipap orders, attached same in careport. CT will follow.     5705- still awaiting precert. Facility asks, \"When pt does discharge, Can you please send her bipap mask with her?\" CT will follow.   "

## 2025-05-06 NOTE — CARE PLAN
Problem: Pain - Adult  Goal: Verbalizes/displays adequate comfort level or baseline comfort level  Outcome: Progressing  Flowsheets (Taken 5/6/2025 0735)  Verbalizes/displays adequate comfort level or baseline comfort level:   Assess pain using appropriate pain scale   Administer analgesics based on type and severity of pain and evaluate response   Consider cultural and social influences on pain and pain management   Implement non-pharmacological measures as appropriate and evaluate response     Problem: Safety - Adult  Goal: Free from fall injury  Outcome: Progressing     Problem: Discharge Planning  Goal: Discharge to home or other facility with appropriate resources  Outcome: Progressing     Problem: Chronic Conditions and Co-morbidities  Goal: Patient's chronic conditions and co-morbidity symptoms are monitored and maintained or improved  Outcome: Progressing     Problem: Nutrition  Goal: Nutrient intake appropriate for maintaining nutritional needs  Outcome: Progressing     Problem: Skin  Goal: Decreased wound size/increased tissue granulation at next dressing change  Outcome: Progressing  Flowsheets (Taken 5/6/2025 0735)  Decreased wound size/increased tissue granulation at next dressing change:   Promote sleep for wound healing   Protective dressings over bony prominences  Goal: Participates in plan/prevention/treatment measures  Outcome: Progressing  Flowsheets (Taken 5/6/2025 0735)  Participates in plan/prevention/treatment measures:   Discuss with provider PT/OT consult   Elevate heels  Goal: Prevent/manage excess moisture  Outcome: Progressing  Flowsheets (Taken 5/6/2025 0735)  Prevent/manage excess moisture:   Monitor for/manage infection if present   Follow provider orders for dressing changes  Goal: Prevent/minimize sheer/friction injuries  Outcome: Progressing  Flowsheets (Taken 5/6/2025 0735)  Prevent/minimize sheer/friction injuries:   Increase activity/out of bed for meals   Turn/reposition every  2 hours/use positioning/transfer devices  Goal: Promote/optimize nutrition  Outcome: Progressing  Flowsheets (Taken 5/6/2025 0735)  Promote/optimize nutrition:   Monitor/record intake including meals   Offer water/supplements/favorite foods  Goal: Promote skin healing  Outcome: Progressing  Flowsheets (Taken 5/6/2025 0735)  Promote skin healing: Assess skin/pad under line(s)/device(s)     Problem: Fall/Injury  Goal: Not fall by end of shift  Outcome: Progressing  Goal: Be free from injury by end of the shift  Outcome: Progressing  Goal: Verbalize understanding of personal risk factors for fall in the hospital  Outcome: Progressing  Goal: Verbalize understanding of risk factor reduction measures to prevent injury from fall in the home  Outcome: Progressing  Goal: Use assistive devices by end of the shift  Outcome: Progressing  Goal: Pace activities to prevent fatigue by end of the shift  Outcome: Progressing

## 2025-05-06 NOTE — DOCUMENTATION CLARIFICATION NOTE
"    PATIENT:               SARAH LANGFORD  ACCT #:                  [id number]  MRN:                       [id number]  :                       1950  ADMIT DATE:       5/3/2025 12:17 PM  DISCH DATE:  RESPONDING PROVIDER #:        [id number]          PROVIDER RESPONSE TEXT:    Metabolic encephalopathy 2/2 pneumonia/hypercapnia, now resolving/resolved    CDI QUERY TEXT:    Clarification    Instruction:    Based on your assessment of the patient and the clinical information, please provide the requested documentation by clicking on the appropriate radio button and enter any additional information if prompted.    Question: Please further clarify the most likely etiology of the altered mental status as    When answering this query, please exercise your independent professional judgment. The fact that a question is being asked, does not imply that any particular answer is desired or expected.    The patient's clinical indicators include:  Clinical Information: Patient presenting with shortness of breath, weakness, and confusion admitted with COPD exacerbation, hypercapnia, and pneumonia    Clinical Indicators: Per ED, \"female with a history of O2 dependent COPD, on 4 L chronically presents with shortness of breath and elevated CO2 from a rehab facility...    Hypercapnia  COPD exacerbation.\"    Per H and P, \"Her family reports that she has decreased appetite/poor oral intake and has increased generalized weakness and confusion...    Psychiatric/Behavioral: Positive for confusion...    Pneumonia of left lower lobe.\"    Per discharge summary, \"Neurological: Alert awake oriented, no focal deficit, cranial nerves grossly intact.\"    Treatment: IV zithromax- completed, IV rocephin- completed, duonebs, IV solumedrol- d/jodi, PO prednisone- active, monitoring mental status    Risk Factors: 75yof presenting with shortness of breath, weakness, and confusion admitted with COPD exacerbation, hypercapnia, and pneumonia  Options " provided:  -- Metabolic encephalopathy 2/2 pneumonia/hypercapnia, now resolving/resolved  -- Other - I will add my own diagnosis  -- Refer to Clinical Documentation Reviewer    Query created by: Ninoska Beckman on 5/6/2025 12:59 PM      Electronically signed by:  ARMANDO STILL MD 5/6/2025 1:07 PM

## 2025-05-06 NOTE — CARE PLAN
Problem: Pain - Adult  Goal: Verbalizes/displays adequate comfort level or baseline comfort level  Outcome: Progressing     Problem: Safety - Adult  Goal: Free from fall injury  Outcome: Progressing     Problem: Chronic Conditions and Co-morbidities  Goal: Patient's chronic conditions and co-morbidity symptoms are monitored and maintained or improved  Outcome: Progressing     Problem: Nutrition  Goal: Nutrient intake appropriate for maintaining nutritional needs  Outcome: Progressing

## 2025-05-06 NOTE — CARE PLAN
Problem: Pain - Adult  Goal: Verbalizes/displays adequate comfort level or baseline comfort level  Outcome: Progressing     Problem: Safety - Adult  Goal: Free from fall injury  Outcome: Progressing     Problem: Discharge Planning  Goal: Discharge to home or other facility with appropriate resources  Outcome: Progressing     Problem: Chronic Conditions and Co-morbidities  Goal: Patient's chronic conditions and co-morbidity symptoms are monitored and maintained or improved  Outcome: Progressing     Problem: Nutrition  Goal: Nutrient intake appropriate for maintaining nutritional needs  Outcome: Progressing     Problem: Skin  Goal: Decreased wound size/increased tissue granulation at next dressing change  Outcome: Progressing  Goal: Participates in plan/prevention/treatment measures  Outcome: Progressing  Goal: Prevent/manage excess moisture  Outcome: Progressing  Goal: Prevent/minimize sheer/friction injuries  Outcome: Progressing  Flowsheets (Taken 5/6/2025 0527)  Prevent/minimize sheer/friction injuries: Use pull sheet  Goal: Promote/optimize nutrition  Outcome: Progressing  Goal: Promote skin healing  Outcome: Progressing     Problem: Fall/Injury  Goal: Not fall by end of shift  Outcome: Progressing  Goal: Be free from injury by end of the shift  Outcome: Progressing  Goal: Verbalize understanding of personal risk factors for fall in the hospital  Outcome: Progressing  Goal: Verbalize understanding of risk factor reduction measures to prevent injury from fall in the home  Outcome: Progressing  Goal: Use assistive devices by end of the shift  Outcome: Progressing  Goal: Pace activities to prevent fatigue by end of the shift  Outcome: Progressing

## 2025-05-06 NOTE — DISCHARGE SUMMARY
Discharge Diagnosis  Hypercapnia    Malnutrition Diagnosis Status: New  Malnutrition Diagnosis: Severe malnutrition related to chronic disease or condition  Related to: COPD  As Evidenced by: <75% of estimated needs x3 months, 10% weight loss in 3 months.  I agree with the dietitian's malnutrition diagnosis.  COPD exacerbation  Acute on chronic hypoxemic respiratory failure      Issues Requiring Follow-Up  Follow-up with primary care provider    Discharge Meds     Medication List      START taking these medications     atorvastatin 40 mg tablet; Commonly known as: Lipitor; Take 1 tablet (40   mg) by mouth once daily.   cefdinir 300 mg capsule; Commonly known as: Omnicef; Take 1 capsule (300   mg) by mouth 2 times a day for 5 days.   guaiFENesin 600 mg 12 hr tablet; Commonly known as: Mucinex; Take 2   tablets (1,200 mg) by mouth every 12 hours if needed for congestion for up   to 10 days. Do not crush, chew, or split.   ipratropium-albuteroL 0.5-2.5 mg/3 mL nebulizer solution; Commonly known   as: Duo-Neb; Take 3 mL by nebulization 3 times a day.   predniSONE 20 mg tablet; Commonly known as: Deltasone; Take 2 tablets   (40 mg) by mouth once daily for 5 days.; Start taking on: May 7, 2025     CONTINUE taking these medications     * acetaminophen 325 mg tablet; Commonly known as: Tylenol   * acetaminophen 325 mg tablet; Commonly known as: Tylenol; Take 2   tablets (650 mg) by mouth every 4 hours if needed for mild pain (1 - 3).   * albuterol 2.5 mg /3 mL (0.083 %) nebulizer solution   * albuterol 90 mcg/actuation inhaler; Commonly known as: ProAir HFA;   Inhale 2 puffs every 4 hours if needed for wheezing or shortness of   breath.   aspirin 81 mg EC tablet; Commonly known as: Aftab Low Dose Aspirin; Take   1 tablet (81 mg) by mouth once daily.   cholecalciferol 1.25 mg (50,000 units) tablet; Commonly known as:   Vitamin D-3   docusate sodium 100 mg capsule; Commonly known as: Colace; Take 1   capsule (100 mg) by mouth  2 times a day.   empagliflozin 10 mg tablet; Commonly known as: Jardiance; Take 1 tablet   (10 mg) by mouth once daily.   hydrOXYzine HCL 25 mg tablet; Commonly known as: Atarax   metoprolol succinate XL 25 mg 24 hr tablet; Commonly known as: Toprol-XL   mirtazapine 7.5 mg tablet; Commonly known as: Remeron   multivitamin tablet   naloxone 4 mg/0.1 mL nasal spray; Commonly known as: Narcan; Administer   1 spray (4 mg) into affected nostril(s) if needed for opioid reversal or   respiratory depression. May repeat every 2-3 minutes if needed,   alternating nostrils, until medical assistance becomes available.   oxygen gas therapy; Commonly known as: O2; Inhale 2 L/min continuously.   polyethylene glycol 17 gram packet; Commonly known as: Glycolax,   Miralax; Take 17 g by mouth once daily.   Pro-Stat AWC  gram-kcal/30 mL liquid; Generic drug: amino   acids-protein hydrolys   Trelegy Ellipta 100-62.5-25 mcg blister with device; Generic drug:   fluticasone-umeclidin-vilanter; Inhale 1 puff once daily.  * This list has 4 medication(s) that are the same as other medications   prescribed for you. Read the directions carefully, and ask your doctor or   other care provider to review them with you.     STOP taking these medications     ferrous sulfate 325 mg (65 mg elemental) tablet   meloxicam 15 mg tablet; Commonly known as: Mobic   metoprolol tartrate 25 mg tablet; Commonly known as: Lopressor   sennosides-docusate sodium 8.6-50 mg tablet; Commonly known as:   Fior-Colace       Test Results Pending At Discharge  Pending Labs       Order Current Status    Legionella Antigen, Urine In process    Streptococcus pneumoniae Antigen, Urine In process    Urine Culture In process            Hospital Course  Elsa Hinson is a 75 y.o. female with past medical history of CAD status post PCI, history of STEMI, hypertension, hyperlipidemia, COPD presented for increased CO2 from rehab facility.  Patient reported that at the facility  she was getting anxious with her recent hip surgery and having pain and was going through spells where she was not eating and drinking.  She is already quite underweight.  We talked about overall prognosis and worked with her to improve her ability to cope with the situation.  Patient was treated for COPD exacerbation with systemic steroids and antibiotics.  She has completed 3 days of azithromycin and will be discharged on pulse dose of prednisone as well as short course of antibiotic with cefdinir and her chronic anxiety medication.  She is in stable condition for discharge to rehab.  She has a high chances of being rehospitalized due to underlying anxiety as well as being severely underweight with BMI of 16.45.  We have prescribed her Magic berry cup with each meal as well as boost strawberry with 2 meals to help with increasing her weight.    39 minutes spent in discharge timing    Pertinent Physical Exam At Time of Discharge  General: Not in acute distress, alert.  Thin frail female on nasal cannula  HEENT: PERRLA, head intact and normocephalic  Neck: Normal to inspection  Lungs: Clear to auscultation, work of breathing within normal limit  Cardiac: Regular rate and rhythm  Abdomen: Soft nontender, positive bowel sounds  : Exam deferred  Skin: Intact  Hematology: No petechia or excessive ecchymosis  Musculoskeletal: Without significant trauma  Neurological: Alert awake oriented, no focal deficit, cranial nerves grossly intact  Psych: Anxious    Outpatient Follow-Up  Future Appointments   Date Time Provider Department [place]   5/12/2025  1:40 PM BENTLEY [id number]   5/16/2025 11:20 AM LARRY Hill [facility]         Enoch Mehta MD

## 2025-05-06 NOTE — DOCUMENTATION CLARIFICATION NOTE
"    PATIENT:               ASRAH LANGFORD  ACCT #:                  [id number]  MRN:                       [id number]  :                       1950  ADMIT DATE:       5/3/2025 12:17 PM  DISCH DATE:  RESPONDING PROVIDER #:        [id number]          PROVIDER RESPONSE TEXT:    Acute Respiratory Failure ruled out after further workup, patient with chronic respiratory failure only    CDI QUERY TEXT:    Clarification    Instruction:    Based on your assessment of the patient and the clinical information, please provide the requested documentation by clicking on the appropriate radio button and enter any additional information if prompted.    Question: Please clarify diagnosis of respiratory failure as    When answering this query, please exercise your independent professional judgment. The fact that a question is being asked, does not imply that any particular answer is desired or expected.    The patient's clinical indicators include:  Clinical Information: Patient admitted with COPD exacerbation and pneumonia    Documented Diagnosis: Per H and P, \"Acute on chronic hypoxic hypercapnic respiratory failure  -pt agreeable to start on BiPAP with a dose of atarax, currently on 4L NC (at baseline).\"    Clinical Indicators and Documentation:   -Vital Signs: T 98.2, P , R 16-22, /78, satting 95% on 4LNC  -ABG results: (venous) pH 7.44, pCO2 81, pO2 18, SO2 16, HCO3 55.0  -Physical Exam Findings: Per H and P, \"Nonlabored on 4L NC. Lungs sound diminished but clear to auscultation bilaterally without obvious adventitious sounds. Chest rise is equal.\"  -Breath sounds: Per ED, \"Wheezing present. No decreased breath sounds or rales.\"  -Distress: Per ED, \"Accessory muscle usage and respiratory distress present.\"  -Cyanosis: none noted  -Oxygen Therapy: 4LNC  -Pulmonary Consult: none    Per ED, \"Per ED, \"female with a history of O2 dependent COPD, on 4 L chronically presents with shortness of breath and elevated CO2 " "from a rehab facility.  She had a recent left hip fracture with intramedullary nail repair.  She was transferred back to the facility.  For the past several days she has been increasingly short of breath, weak, not eating or drinking much.  She had outpatient labs which revealed an elevated CO2 so she was sent to the emergency department.\"    Per H and P, \"Acute on chronic hypoxic hypercapnic respiratory failure  -pt agreeable to start on BiPAP with a dose of atarax, currently on 4L NC (at baseline).\"    Treatment: pt on 4LNC during hospitalization, alma, IV solumedrol- now d/jodi, PO prednisone- active    Risk Factors: 75yof admitted with COPD exacerbation and pneumonia  Options provided:  -- Acute Respiratory Failure ruled out after further workup, patient with chronic respiratory failure only  -- Acute on Chronic Respiratory Failure as evidenced by, Please specify additional information below  -- Other - I will add my own diagnosis  -- Refer to Clinical Documentation Reviewer    Query created by: Ninoska Beckman on 5/6/2025 12:51 PM      Electronically signed by:  ARMANDO STILL MD 5/6/2025 1:07 PM          "

## 2025-05-06 NOTE — HOSPITAL COURSE
Elsa Hinson is a 75 y.o. female with past medical history of CAD status post PCI, history of STEMI, hypertension, hyperlipidemia, COPD presented for increased CO2 from rehab facility.  Patient reported that at the facility she was getting anxious with her recent hip surgery and having pain and was going through spells where she was not eating and drinking.  She is already quite underweight.  We talked about overall prognosis and worked with her to improve her ability to cope with the situation.  Patient was treated for COPD exacerbation with systemic steroids and antibiotics.  She has completed 3 days of azithromycin and will be discharged on pulse dose of prednisone as well as short course of antibiotic with cefdinir and her chronic anxiety medication.  She is in stable condition for discharge to rehab.  She has a high chances of being rehospitalized due to underlying anxiety as well as being severely underweight with BMI of 16.45.  We have prescribed her Magic berry cup with each meal as well as boost strawberry with 2 meals to help with increasing her weight.    39 minutes spent in discharge timing

## 2025-05-07 ENCOUNTER — APPOINTMENT (OUTPATIENT)
Dept: PULMONOLOGY | Facility: HOSPITAL | Age: 75
End: 2025-05-07
Payer: COMMERCIAL

## 2025-05-07 PROCEDURE — 2500000002 HC RX 250 W HCPCS SELF ADMINISTERED DRUGS (ALT 637 FOR MEDICARE OP, ALT 636 FOR OP/ED)

## 2025-05-07 PROCEDURE — 2500000001 HC RX 250 WO HCPCS SELF ADMINISTERED DRUGS (ALT 637 FOR MEDICARE OP)

## 2025-05-07 PROCEDURE — 97116 GAIT TRAINING THERAPY: CPT | Mod: CQ,GP

## 2025-05-07 PROCEDURE — 94640 AIRWAY INHALATION TREATMENT: CPT

## 2025-05-07 PROCEDURE — 2500000001 HC RX 250 WO HCPCS SELF ADMINISTERED DRUGS (ALT 637 FOR MEDICARE OP): Performed by: INTERNAL MEDICINE

## 2025-05-07 PROCEDURE — 2500000004 HC RX 250 GENERAL PHARMACY W/ HCPCS (ALT 636 FOR OP/ED): Performed by: INTERNAL MEDICINE

## 2025-05-07 PROCEDURE — 2500000005 HC RX 250 GENERAL PHARMACY W/O HCPCS

## 2025-05-07 PROCEDURE — 1210000001 HC SEMI-PRIVATE ROOM DAILY

## 2025-05-07 PROCEDURE — 2500000004 HC RX 250 GENERAL PHARMACY W/ HCPCS (ALT 636 FOR OP/ED)

## 2025-05-07 PROCEDURE — 97530 THERAPEUTIC ACTIVITIES: CPT | Mod: GO,CO

## 2025-05-07 PROCEDURE — 99232 SBSQ HOSP IP/OBS MODERATE 35: CPT | Performed by: INTERNAL MEDICINE

## 2025-05-07 RX ADMIN — Medication 4 L/MIN: at 20:53

## 2025-05-07 RX ADMIN — METOPROLOL SUCCINATE 25 MG: 25 TABLET, EXTENDED RELEASE ORAL at 08:57

## 2025-05-07 RX ADMIN — FLUTICASONE FUROATE AND VILANTEROL TRIFENATATE 1 PUFF: 100; 25 POWDER RESPIRATORY (INHALATION) at 09:03

## 2025-05-07 RX ADMIN — HEPARIN SODIUM 5000 UNITS: 5000 INJECTION INTRAVENOUS; SUBCUTANEOUS at 06:15

## 2025-05-07 RX ADMIN — HEPARIN SODIUM 5000 UNITS: 5000 INJECTION INTRAVENOUS; SUBCUTANEOUS at 21:05

## 2025-05-07 RX ADMIN — IPRATROPIUM BROMIDE AND ALBUTEROL SULFATE 3 ML: 2.5; .5 SOLUTION RESPIRATORY (INHALATION) at 11:49

## 2025-05-07 RX ADMIN — MIRTAZAPINE 7.5 MG: 7.5 TABLET, FILM COATED ORAL at 21:05

## 2025-05-07 RX ADMIN — ATORVASTATIN CALCIUM 40 MG: 40 TABLET, FILM COATED ORAL at 21:05

## 2025-05-07 RX ADMIN — PREDNISONE 40 MG: 20 TABLET ORAL at 08:57

## 2025-05-07 RX ADMIN — EMPAGLIFLOZIN 10 MG: 10 TABLET, FILM COATED ORAL at 08:58

## 2025-05-07 RX ADMIN — Medication 4 L/MIN: at 09:04

## 2025-05-07 RX ADMIN — PANTOPRAZOLE SODIUM 40 MG: 40 TABLET, DELAYED RELEASE ORAL at 06:15

## 2025-05-07 RX ADMIN — TIOTROPIUM BROMIDE INHALATION SPRAY 2 PUFF: 3.12 SPRAY, METERED RESPIRATORY (INHALATION) at 09:03

## 2025-05-07 RX ADMIN — HEPARIN SODIUM 5000 UNITS: 5000 INJECTION INTRAVENOUS; SUBCUTANEOUS at 14:21

## 2025-05-07 RX ADMIN — CEFTRIAXONE 1 G: 1 INJECTION, SOLUTION INTRAVENOUS at 14:21

## 2025-05-07 RX ADMIN — IPRATROPIUM BROMIDE AND ALBUTEROL SULFATE 3 ML: 2.5; .5 SOLUTION RESPIRATORY (INHALATION) at 19:36

## 2025-05-07 RX ADMIN — ASPIRIN 81 MG: 81 TABLET, COATED ORAL at 08:57

## 2025-05-07 RX ADMIN — IPRATROPIUM BROMIDE AND ALBUTEROL SULFATE 3 ML: 2.5; .5 SOLUTION RESPIRATORY (INHALATION) at 07:13

## 2025-05-07 ASSESSMENT — ENCOUNTER SYMPTOMS
SHORTNESS OF BREATH: 0
FATIGUE: 0
RHINORRHEA: 0
WHEEZING: 0
COUGH: 0
UNEXPECTED WEIGHT CHANGE: 0
FEVER: 0
CHILLS: 0

## 2025-05-07 ASSESSMENT — COGNITIVE AND FUNCTIONAL STATUS - GENERAL
TOILETING: A LOT
DAILY ACTIVITIY SCORE: 20
TOILETING: A LITTLE
MOVING TO AND FROM BED TO CHAIR: A LITTLE
HELP NEEDED FOR BATHING: A LITTLE
CLIMB 3 TO 5 STEPS WITH RAILING: TOTAL
HELP NEEDED FOR BATHING: A LITTLE
STANDING UP FROM CHAIR USING ARMS: A LOT
DAILY ACTIVITIY SCORE: 18
MOBILITY SCORE: 16
DAILY ACTIVITIY SCORE: 20
STANDING UP FROM CHAIR USING ARMS: A LOT
MOBILITY SCORE: 16
MOVING TO AND FROM BED TO CHAIR: A LITTLE
CLIMB 3 TO 5 STEPS WITH RAILING: A LOT
MOBILITY SCORE: 15
STANDING UP FROM CHAIR USING ARMS: A LOT
TOILETING: A LITTLE
WALKING IN HOSPITAL ROOM: A LOT
HELP NEEDED FOR BATHING: A LOT
DRESSING REGULAR LOWER BODY CLOTHING: A LOT
MOVING FROM LYING ON BACK TO SITTING ON SIDE OF FLAT BED WITH BEDRAILS: A LITTLE
DRESSING REGULAR UPPER BODY CLOTHING: A LITTLE
MOVING TO AND FROM BED TO CHAIR: A LITTLE
DRESSING REGULAR LOWER BODY CLOTHING: A LITTLE
TURNING FROM BACK TO SIDE WHILE IN FLAT BAD: A LITTLE
DRESSING REGULAR UPPER BODY CLOTHING: A LITTLE
CLIMB 3 TO 5 STEPS WITH RAILING: TOTAL
WALKING IN HOSPITAL ROOM: A LOT
WALKING IN HOSPITAL ROOM: A LOT
DRESSING REGULAR LOWER BODY CLOTHING: A LITTLE

## 2025-05-07 ASSESSMENT — PAIN SCALES - GENERAL
PAINLEVEL_OUTOF10: 0 - NO PAIN

## 2025-05-07 ASSESSMENT — PAIN - FUNCTIONAL ASSESSMENT
PAIN_FUNCTIONAL_ASSESSMENT: 0-10
PAIN_FUNCTIONAL_ASSESSMENT: 0-10

## 2025-05-07 NOTE — PROGRESS NOTES
Subjective   Patient ID: Elsa Hinson is a 75 y.o. female who presents for NPV for COPD.     HPI: Patient has PMH of COPD, STEMI s/p PCI, HTN, HLD, and anxiety. She was hospitalized from 5/3/25-5/6/25 for COPD exacerbation.     Review of Systems   Constitutional:  Negative for chills, fatigue, fever and unexpected weight change.   HENT:  Negative for congestion, postnasal drip and rhinorrhea.    Respiratory:  Negative for cough (denies hemoptysis.), shortness of breath and wheezing.    Cardiovascular:  Negative for chest pain and leg swelling.   All other systems reviewed and are negative.      Objective   Physical Exam  Vitals reviewed.   Constitutional:       Appearance: Normal appearance.   HENT:      Head: Normocephalic.   Cardiovascular:      Rate and Rhythm: Normal rate and regular rhythm.   Pulmonary:      Effort: Pulmonary effort is normal.      Breath sounds: Normal breath sounds.   Skin:     General: Skin is warm and dry.   Neurological:      Mental Status: She is alert.         Assessment/Plan

## 2025-05-07 NOTE — PROGRESS NOTES
Occupational Therapy    Occupational Therapy Treatment    Name: Elsa Hinson  MRN: [id number]  Department: [facility] [address]  Room: [address] [place] [address]  Date: 05/07/25  Time Calculation  Start Time: 0848  Stop Time: 0917  Time Calculation (min): 29 min    Assessment:  OT Assessment: Pt min A for functional mobility and transfers w/FWW. Monitor 02 throughout session per nursing. Requires cues for deep breathing secondary to anxiety.  Barriers to Discharge Home: Caregiver assistance  Caregiver Assistance: Patient lives alone and/or does not have reliable caregiver assistance  End of Session Communication: Bedside nurse  End of Session Patient Position: Up in chair, Alarm on  Plan:  Treatment Interventions: Functional transfer training, ADL retraining, Endurance training  OT Frequency: 3 times per week  OT Discharge Recommendations: Moderate intensity level of continued care  OT Recommended Transfer Status: Minimal assist  OT - OK to Discharge: Yes ((when medically approp.))    Subjective   Previous Visit Info:  OT Last Visit  OT Received On: 05/07/25  General:  General  Prior to Session Communication: Bedside nurse  Patient Position Received: Bed, 3 rail up, Alarm on  General Comment: Pt agreeable for therapy. Monitor sp02 per RN. Increased time for encouragement and slow/hesitant movement fear of falling.  Precautions:  LE Weight Bearing Status: Weight Bearing as Tolerated  Medical Precautions: Fall precautions, Oxygen therapy device and L/min         Pain Assessment:  Pain Assessment  Pain Assessment: 0-10  0-10 (Numeric) Pain Score:  (not rated)    Objective   Cognition:  Orientation Level: Oriented X4  Activities of Daily Living:        LE Dressing  LE Dressing: Yes  LE Dressing Where Assessed: Bed level  LE Dressing Comments: States she did not want to don own soclks. Demo'd cross leg strategy to simulate she could complete.         Functional Standing Tolerance:  Functional Standing Tolerance  Time: 2min  Activity: standing w/FWW  to bear weight through LLE as tolerated  Bed Mobility/Transfers: Bed Mobility  Bed Mobility: Yes  Bed Mobility 1  Bed Mobility 1: Supine to sitting  Level of Assistance 1: Contact guard, Moderate verbal cues  Bed Mobility Comments 1: Cues for technique    Transfers  Transfer: Yes  Transfer 1  Transfer From 1: Bed to  Transfer to 1: Stand  Technique 1: Sit to stand  Transfer Device 1: Walker  Transfer Level of Assistance 1: Minimum assistance  Trials/Comments 1: Encouragement required secondary to feeling anxious.  Transfers 2  Transfer From 2: Stand to, Sit to  Transfer to 2: Chair with arms  Technique 2: Stand to sit, Sit to stand  Transfer Device 2: Walker  Transfer Level of Assistance 2: Minimum assistance  Trials/Comments 2: x2 trials      Functional Mobility:  Functional Mobility  Functional Mobility Performed: Yes  Functional Mobility 1  Surface 1: Level tile  Device 1: Rolling walker  Assistance 1: Minimum assistance  Comments 1: Hesitant to WB on LLE, states a fear of falling. Completed functional mobility within household distance, assist for 02 cord management.  Sitting Balance:  Static Sitting Balance  Static Sitting-Balance Support: No upper extremity supported  Static Sitting-Level of Assistance: Close supervision  Static Sitting-Comment/Number of Minutes: 4 min       Therapy/Activity:      Therapeutic Activity  Therapeutic Activity Performed: Yes  Therapeutic Activity 1: Pt sat EOB for 4 min      Outcome Measures:  [facility] Daily Activity  Putting on and taking off regular lower body clothing: A lot  Bathing (including washing, rinsing, drying): A lot  Putting on and taking off regular upper body clothing: None  Toileting, which includes using toilet, bedpan or urinal: A lot  Taking care of personal grooming such as brushing teeth: None  Eating Meals: None  Daily Activity - Total Score: 18        Education Documentation  Body Mechanics, taught by BORIS Demarco at 5/7/2025 10:20 AM.  Learner:  Patient  Readiness: Acceptance  Method: Explanation  Response: Needs Reinforcement    Precautions, taught by BORIS Demarco at 5/7/2025 10:20 AM.  Learner: Patient  Readiness: Acceptance  Method: Explanation  Response: Needs Reinforcement    ADL Training, taught by BORIS Demarco at 5/7/2025 10:20 AM.  Learner: Patient  Readiness: Acceptance  Method: Explanation  Response: Needs Reinforcement    Education Comments  No comments found.      Goals:  Encounter Problems       Encounter Problems (Active)       ADLs       SBA LB ADLs with a.e. PRN.  (Progressing)       Start:  05/05/25    Expected End:  05/12/25               MOBILITY       SBA func. mobility with FWW and O2  (Progressing)       Start:  05/05/25    Expected End:  05/12/25

## 2025-05-07 NOTE — CARE PLAN
Problem: Pain - Adult  Goal: Verbalizes/displays adequate comfort level or baseline comfort level  Outcome: Progressing  Flowsheets (Taken 5/7/2025 0802)  Verbalizes/displays adequate comfort level or baseline comfort level:   Assess pain using appropriate pain scale   Administer analgesics based on type and severity of pain and evaluate response   Consider cultural and social influences on pain and pain management   Implement non-pharmacological measures as appropriate and evaluate response     Problem: Safety - Adult  Goal: Free from fall injury  Outcome: Progressing     Problem: Discharge Planning  Goal: Discharge to home or other facility with appropriate resources  Outcome: Progressing     Problem: Chronic Conditions and Co-morbidities  Goal: Patient's chronic conditions and co-morbidity symptoms are monitored and maintained or improved  Outcome: Progressing     Problem: Nutrition  Goal: Nutrient intake appropriate for maintaining nutritional needs  Outcome: Progressing     Problem: Skin  Goal: Decreased wound size/increased tissue granulation at next dressing change  Outcome: Progressing  Flowsheets (Taken 5/6/2025 0735)  Decreased wound size/increased tissue granulation at next dressing change:   Promote sleep for wound healing   Protective dressings over bony prominences  Goal: Participates in plan/prevention/treatment measures  Outcome: Progressing  Flowsheets (Taken 5/7/2025 0802)  Participates in plan/prevention/treatment measures:   Discuss with provider PT/OT consult   Elevate heels  Goal: Prevent/manage excess moisture  Outcome: Progressing  Flowsheets (Taken 5/7/2025 0802)  Prevent/manage excess moisture: Monitor for/manage infection if present  Goal: Prevent/minimize sheer/friction injuries  Outcome: Progressing  Flowsheets (Taken 5/7/2025 0802)  Prevent/minimize sheer/friction injuries: Increase activity/out of bed for meals  Goal: Promote/optimize nutrition  Outcome: Progressing  Flowsheets  (Taken 5/7/2025 0802)  Promote/optimize nutrition:   Monitor/record intake including meals   Consume > 50% meals/supplements  Goal: Promote skin healing  Outcome: Progressing  Flowsheets (Taken 5/7/2025 0802)  Promote skin healing: Assess skin/pad under line(s)/device(s)     Problem: Fall/Injury  Goal: Not fall by end of shift  Outcome: Progressing  Goal: Be free from injury by end of the shift  Outcome: Progressing  Goal: Verbalize understanding of personal risk factors for fall in the hospital  Outcome: Progressing  Goal: Verbalize understanding of risk factor reduction measures to prevent injury from fall in the home  Outcome: Progressing  Goal: Use assistive devices by end of the shift  Outcome: Progressing  Goal: Pace activities to prevent fatigue by end of the shift  Outcome: Progressing

## 2025-05-07 NOTE — CARE PLAN
Problem: Pain - Adult  Goal: Verbalizes/displays adequate comfort level or baseline comfort level  Outcome: Progressing     Problem: Safety - Adult  Goal: Free from fall injury  Outcome: Progressing     Problem: Discharge Planning  Goal: Discharge to home or other facility with appropriate resources  Outcome: Progressing     Problem: Chronic Conditions and Co-morbidities  Goal: Patient's chronic conditions and co-morbidity symptoms are monitored and maintained or improved  Outcome: Progressing     Problem: Nutrition  Goal: Nutrient intake appropriate for maintaining nutritional needs  Outcome: Progressing     Problem: Skin  Goal: Decreased wound size/increased tissue granulation at next dressing change  Outcome: Progressing  Goal: Participates in plan/prevention/treatment measures  Outcome: Progressing  Goal: Prevent/manage excess moisture  Outcome: Progressing  Goal: Prevent/minimize sheer/friction injuries  Outcome: Progressing  Goal: Promote/optimize nutrition  Outcome: Progressing  Flowsheets (Taken 5/7/2025 0513)  Promote/optimize nutrition: Consume > 50% meals/supplements  Goal: Promote skin healing  Outcome: Progressing     Problem: Fall/Injury  Goal: Not fall by end of shift  Outcome: Progressing  Goal: Be free from injury by end of the shift  Outcome: Progressing  Goal: Verbalize understanding of personal risk factors for fall in the hospital  Outcome: Progressing  Goal: Verbalize understanding of risk factor reduction measures to prevent injury from fall in the home  Outcome: Progressing  Goal: Use assistive devices by end of the shift  Outcome: Progressing  Goal: Pace activities to prevent fatigue by end of the shift  Outcome: Progressing

## 2025-05-07 NOTE — PROGRESS NOTES
"Nutrition Follow Up Assessment:   Nutrition Assessment         Patient is a 75 y.o. female presenting with Hypercapnia. Consulted by [facility]ST for wound. Noting wound to coccyx. Pt eating poorly. Hx of poor PO since last admission back in February. Pt with significant wt loss -10 lbs or 10% in 3 months. Rec; magic cup 2x daily with L/D (290 kcals and 9g protein each).     5/7: Pt continues with regular diet order and magic cup 2x daily with boost VHC 2x daily per MD. Discharging to facility, waiting for auth. No nutritional concerns. Continue to monitor.     Anthropometrics:  Height: 154.9 cm (5' 1\")   Weight: (!) 39.5 kg (87 lb 1.3 oz)   BMI (Calculated): 16.46    Nutrition Significant Labs:  CBC Trend:   Results from last 7 days   Lab Units 05/06/25  0638 05/05/25  0434 05/04/25  0552 05/03/25  1245   WBC AUTO x10*3/uL 14.6* 16.9* 6.8 13.3*   RBC AUTO x10*6/uL 3.10* 3.02* 3.07* 3.89*   HEMOGLOBIN g/dL 9.4* 9.2* 9.5* 11.7*   HEMATOCRIT % 31.7* 31.4* 31.1* 39.4   MCV fL 102* 104* 101* 101*   PLATELETS AUTO x10*3/uL 275 291 316 367    , BMP Trend:   Results from last 7 days   Lab Units 05/06/25  0638 05/05/25 0434 05/04/25 2008 05/04/25  0552   GLUCOSE mg/dL 95 166* 179* 162*   CALCIUM mg/dL 9.2 9.6 9.6 9.0   SODIUM mmol/L 145 145 144 148*   POTASSIUM mmol/L 4.5 3.5 3.3* 3.0*   CO2 mmol/L 42* 44* >45* >45*   CHLORIDE mmol/L 96* 95* 93* 90*   BUN mg/dL 13 13 14 15   CREATININE mg/dL 0.48* 0.45* 0.56 0.45*    , A1C:  Lab Results   Component Value Date    HGBA1C 5.6 02/14/2025       Nutrition Specific Medications:  Scheduled Medications[1]       Dietary Orders (From admission, onward)       Start     Ordered    05/05/25 1258  Oral nutritional supplements  Until discontinued        Question Answer Comment   Deliver with Lunch    Deliver with Dinner    Select supplement: Magic Cup        05/05/25 1258    05/03/25 1456  Adult diet Regular  Diet effective now        Question:  Diet type  Answer:  Regular    05/03/25 1455    " 05/03/25 1450  May Participate in Room Service  ( ROOM SERVICE MAY PARTICIPATE)  Once        Question:  .  Answer:  Yes    05/03/25 1449                    Estimated Needs:   Total Energy Estimated Needs in 24 hours (kCal):  (9436-0361)  Method for Estimating Needs: 35-40kcals/kg ABW  Total Protein Estimated Needs in 24 Hours (g):  (40-60)  Method for Estimating 24 Hour Protein Needs: 1.1-1.5 ABW     Method for Estimating 24 Hour Fluid Needs: 1 ml/kcal or per MD        Nutrition Diagnosis   Malnutrition Diagnosis  Patient has Malnutrition Diagnosis: Yes  Diagnosis Status: New  Malnutrition Diagnosis: Severe malnutrition related to chronic disease or condition  Related to: COPD  As Evidenced by: <75% of estimated needs x3 months, 10% weight loss in 3 months.    Nutrition Diagnosis  Patient has Nutrition Diagnosis: Yes  Diagnosis Status (1): Active  Nutrition Diagnosis 1: Inadequate oral intake  Related to (1): COPD  As Evidenced by (1): <75% PO       Nutrition Interventions/Recommendations   Nutrition prescription for oral nutrition    Nutrition Recommendations:  Individualized Nutrition Prescription Provided for : Continue with regular diet order. Magic cup 2x daily,    Nutrition Interventions/Goals:   Meals and Snacks: General healthful diet  Goal: consume >50% PO  Medical Food Supplement: Commercial food medical food supplement therapy  Goal: magic cup 2x daily      Education Documentation  No documentation found.       Nutrition Monitoring and Evaluation   Food/Nutrient Related History Monitoring  Monitoring and Evaluation Plan: Intake / amount of food, Estimated Energy Intake  Estimated Energy Intake: Energy intake 50 -75% of estimated energy needs  Intake / Amount of food: Consumes at least 50% or more of meals/snacks/supplements  Additional Plans: boost vhc 2x daily, magic cup 2x daily    Anthropometric Measurements  Monitoring and Evaluation Plan: Body weight  Body Weight: Body weight - Maintain stable  weight    Biochemical Data, Medical Tests and Procedures  Monitoring and Evaluation Plan: Electrolyte/renal panel  Electrolyte and Renal Panel: Electrolytes within normal limits    Time Spent (min): 30 minutes       [1] aspirin, 81 mg, oral, Daily  atorvastatin, 40 mg, oral, Nightly  cefTRIAXone, 1 g, intravenous, q24h  [START ON 5/11/2025] cholecalciferol, 1.25 mg, oral, Every Sunday  empagliflozin, 10 mg, oral, Daily  ferrous sulfate, 65 mg of iron, oral, Daily with breakfast  tiotropium, 2 puff, inhalation, Daily   And  fluticasone furoate-vilanteroL, 1 puff, inhalation, Daily  heparin (porcine), 5,000 Units, subcutaneous, q8h USHA  ipratropium-albuteroL, 3 mL, nebulization, TID  metoprolol succinate XL, 25 mg, oral, Daily  mirtazapine, 7.5 mg, oral, Nightly  oxygen, , inhalation, Continuous - Inhalation  pantoprazole, 40 mg, oral, Daily before breakfast   Or  pantoprazole, 40 mg, intravenous, Daily before breakfast  predniSONE, 40 mg, oral, Daily

## 2025-05-07 NOTE — PROGRESS NOTES
Elsa Hinson is a 75 y.o. female on day 4 of admission presenting with Hypercapnia.      Subjective   Reports that her primary concern is her anxiety.  Talked about coping mechanism and working through this.  Otherwise awaiting insurance precertification for rehab.  Encouraging increasing oral intake       Objective     Last Recorded Vitals  /83 (BP Location: Left arm, Patient Position: Sitting)   Pulse 77   Temp 36.6 °C (97.8 °F) (Temporal)   Resp 17   Wt (!) 39.5 kg (87 lb 1.3 oz)   SpO2 94% Comment: 94% 4LNC  Intake/Output last 3 Shifts:    Intake/Output Summary (Last 24 hours) at 5/7/2025 1241  Last data filed at 5/7/2025 0800  Gross per 24 hour   Intake 50 ml   Output 1 ml   Net 49 ml       Admission Weight  Weight: (!) 39.5 kg (87 lb) (05/03/25 1220)    Daily Weight  05/05/25 : (!) 39.5 kg (87 lb 1.3 oz)      Physical Exam:  General: Thin, frail female on 3 L nasal cannula  HEENT: PERRLA, head intact and normocephalic  Neck: Normal to inspection  Lungs: Clear to auscultation, work of breathing within normal limit  Cardiac: Regular rate and rhythm  Abdomen: Soft nontender, positive bowel sounds  : Exam deferred  Skin: Intact  Hematology: No petechia or excessive ecchymosis  Musculoskeletal: Without significant trauma.  Muscle wasting noted  Neurological: Alert awake oriented, no focal deficit, cranial nerves grossly intact  Psych: No suicidal ideation or homicidal ideation    Relevant Results  Scheduled medications  Scheduled Medications[1]  Continuous medications  Continuous Medications[2]  PRN medications  PRN Medications[3]   Labs  Results from last 7 days   Lab Units 05/06/25  0638 05/05/25 0434 05/04/25  0552   WBC AUTO x10*3/uL 14.6* 16.9* 6.8   HEMOGLOBIN g/dL 9.4* 9.2* 9.5*   HEMATOCRIT % 31.7* 31.4* 31.1*   PLATELETS AUTO x10*3/uL 275 291 316     Results from last 7 days   Lab Units 05/06/25  0638 05/05/25  0434 05/04/25 2008 05/04/25  0552 05/03/25  1245   SODIUM mmol/L 145 145 144  148* 145   POTASSIUM mmol/L 4.5 3.5 3.3* 3.0* 3.4*   CHLORIDE mmol/L 96* 95* 93* 90* 90*   CO2 mmol/L 42* 44* >45* >45* 45*   BUN mg/dL 13 13 14 15 19   CREATININE mg/dL 0.48* 0.45* 0.56 0.45* 0.47*   CALCIUM mg/dL 9.2 9.6 9.6 9.0 9.7   PROTEIN TOTAL g/dL  --   --   --  5.4* 6.5   BILIRUBIN TOTAL mg/dL  --   --   --  0.3 0.6   ALK PHOS U/L  --   --   --  73 98   ALT U/L  --   --   --  7 10   AST U/L  --   --   --  15 22   GLUCOSE mg/dL 95 166* 179* 162* 126*       Imaging  XR chest 1 view  Result Date: 5/3/2025  Extensive chronic lung changes.   Redemonstration of 8 mm nodule in the left midlung.   New irregular density in the left lung base measuring 1.7 cm. This may be related to a focal infiltrate though neoplasm can not be excluded and further evaluation with CT of the chest is suggested.       MACRO: None   Signed by: Beronica Tadeo 5/3/2025 1:28 PM Dictation workstation:   [id number]      Cardiology, Vascular, and Other Imaging  ECG 12 Lead  Result Date: 5/5/2025  Sinus rhythm Short MO interval Consider left ventricular hypertrophy                      Assessment/Plan   Elsa Hinson is a 75 y.o. female with past medical history of CAD status post PCI, history of STEMI, hypertension, hyperlipidemia, COPD presented for increased CO2 from rehab facility  Assessment & Plan  Hypercapnia    Assessment:   Acute on chronic COPD exacerbation  Acute on chronic hypoxemic and hypercapnic respiratory failure  Left lower lobe pneumonia  CAD status post PCI  Multiple pulmonary nodule  Hypokalemia  Hypertension  Chronically underweight  Hyperlipidemia  Generalized anxiety    Plan:  Anxiety is primary component  Medically ready for discharge on oral antibiotics and prednisone for short duration  Continue with Atarax  Continue Remeron  Switch to oral prednisone  Patient completed azithromycin  Supplemental O2 and wean as tolerated  Continue scheduled breathing treatment  Incentive spirometry, flutter valve  Continue chronic  medication  Protein supplement  Patient is quite underweight with overall poor outlook  Medically ready for discharge to skilled nursing facility pending insurance precertification       Plan discussed with patient at bedside    Moderate level of MDM based on above issue and discussing plan    This note is created using voice recognition software. All efforts are made to minimize errors, if there are errors there due to transcription.    Enoch Mehta  Hospitalist           [1] aspirin, 81 mg, oral, Daily  atorvastatin, 40 mg, oral, Nightly  cefTRIAXone, 1 g, intravenous, q24h  [START ON 5/11/2025] cholecalciferol, 1.25 mg, oral, Every Sunday  empagliflozin, 10 mg, oral, Daily  ferrous sulfate, 65 mg of iron, oral, Daily with breakfast  tiotropium, 2 puff, inhalation, Daily   And  fluticasone furoate-vilanteroL, 1 puff, inhalation, Daily  heparin (porcine), 5,000 Units, subcutaneous, q8h USHA  ipratropium-albuteroL, 3 mL, nebulization, TID  metoprolol succinate XL, 25 mg, oral, Daily  mirtazapine, 7.5 mg, oral, Nightly  oxygen, , inhalation, Continuous - Inhalation  pantoprazole, 40 mg, oral, Daily before breakfast   Or  pantoprazole, 40 mg, intravenous, Daily before breakfast  predniSONE, 40 mg, oral, Daily     [2]    [3] PRN medications: acetaminophen, albuterol, guaiFENesin, hydrOXYzine HCL, melatonin, ondansetron, polyethylene glycol

## 2025-05-07 NOTE — PROGRESS NOTES
Updated RN that pt's auth is still pending at this time. Pt can not discharge at this time. Facility requested Bipap mask be returned with patient on discharge. RN checking to see if mask is at bedside. TCC following for transport setup once auth approved.     1401: Confirmed with Dr. Mehta that pt will not be discharging with BiPAP to facility. Facility updated that BiPAP will not be required once discharged since pt is refusing to utilize.     1508: Updated RN that auth still pending with insurance pt highly unlikely to leave today. TCC following.

## 2025-05-07 NOTE — DOCUMENTATION CLARIFICATION NOTE
"    PATIENT:               SARAH LANGFORD  ACCT #:                  [id number]  MRN:                       [id number]  :                       1950  ADMIT DATE:       5/3/2025 12:17 PM  DISCH DATE:  RESPONDING PROVIDER #:        [id number]          PROVIDER RESPONSE TEXT:    Sepsis with neurological organ dysfunction of Metabolic Encephalopathy, POA    CDI QUERY TEXT:    Clarification    Instruction:  Based on your assessment of the patient and the clinical information, please provide the requested documentation by clicking on the appropriate radio button and enter any additional information if prompted.    Question: Is there a diagnosis indicative of a patient meeting SIRS criteria and with organ dysfunction in the setting of Pneumonia    When answering this query, please exercise your independent professional judgment. The fact that a question is being asked, does not imply that any particular answer is desired or expected.    The patient's clinical indicators include:  Clinical Information: Patient admitted with COPD exacerbation, acute respiratory failure, and pneumonia with noted metabolic encephalopathy    Clinical Indicators: Per H and P, \"COPD with Acute Exacerbation  Acute on chronic hypoxic hypercapnic respiratory failure...  Pneumonia of left lower lobe  -Continue antibiotic coverage with IV Rocephin, Zithromax.\"    : T 98.2, P ,  16-22, /78, satting 96% on supplemental O2, WBC 13.3, abs neutrophils 11.29    Per MD resp to  CDI query, \"Metabolic encephalopathy 2/2 pneumonia/hypercapnia, now resolving/resolved.\"    Treatment: IV rocephin- active, IV zithromax- completed, monitoring mental status, monitoring labs/vitals    Risk Factors: 75yof admitted with COPD exacerbation, acute respiratory failure, and pneumonia with noted metabolic encephalopathy  Options provided:  -- Sepsis with neurological organ dysfunction of Metabolic Encephalopathy, POA  -- Sepsis with other organ " dysfunction, POA, Please specify sepsis associated organ dysfunction below  -- Patient treated for Pneumonia without Sepsis  -- Other - I will add my own diagnosis  -- Refer to Clinical Documentation Reviewer    Query created by: Ninoska Beckman on 5/7/2025 9:53 AM      Electronically signed by:  ARMANDO STILL MD 5/7/2025 9:59 AM

## 2025-05-07 NOTE — PROGRESS NOTES
Physical Therapy  Physical Therapy Treatment    Patient Name: Elsa Hinson  MRN: [id number]  Department: [address]  Room: [address] [place] [address]  Today's Date: 5/7/2025  Time Calculation  Start Time: 1337  Stop Time: 1416  Time Calculation (min): 39 min    PT Plan  Treatment/Interventions: Transfer training, Gait training, Neuromuscular re-education, Therapeutic exercise, Therapeutic activity  PT Plan: Ongoing PT  PT Frequency: 4 times per week  PT - OK to Discharge: Yes    General Visit Information:   PT  Visit  PT Received On: 05/07/25  Response to Previous Treatment: Patient with no complaints from previous session.    Reason for Referral:   hypercapnia, elev. CO2    Precautions:  Falls    O2   recent L hip fx, s/p ORIF 3/25/25    Pain:  No pain    Cognition:  Oriented X4    Activity Tolerance:  Activity Tolerance  Endurance: Tolerates 10 - 20 min exercise with multiple rests    Treatments:  Therapeutic exercise  7 minute sit EOB, Supervision  X3 reps sit<>stand    Bed Mobility  Sitting to supine: Minimum assistance  Scooting: Minimum assistance    Transfers  Sit to stand: Minimum assistance  Stand to sit: Minimum assistance    Ambulation/Gait Training  15 feet x1 rep with FWW, Sebastián  Slow maninder, decreased step length  Moderate SOB    Pt pleasant and cooperative. Pt motivated for PT tx. Following tx pt in bed. Alarm on and call light in reach.    Outcome Measures:  [facility] Basic Mobility  Turning from your back to your side while in a flat bed without using bedrails: A little  Moving from lying on your back to sitting on the side of a flat bed without using bedrails: A little  Moving to and from bed to chair (including a wheelchair): A little  Standing up from a chair using your arms (e.g. wheelchair or bedside chair): A lot  To walk in hospital room: A lot  Climbing 3-5 steps with railing: A lot  Basic Mobility - Total Score: 15    Education Documentation  Mobility Training, taught by Gabe Stanley PTA at 5/7/2025  2:34  PM.  Learner: Patient  Readiness: Acceptance  Method: Explanation, Demonstration  Response: Verbalizes Understanding    Education Comments  No comments found.        OP EDUCATION:       Encounter Problems       Encounter Problems (Active)       Mobility       STG - Patient will ambulate household distance using walker at all times, no more than SB assist x1 needed (Progressing)       Start:  05/04/25    Expected End:  05/18/25               PT Transfers       STG - Patient will demonstrate SAFE transfer techniques using walker at all times, no more than SB assist x1 needed (Progressing)       Start:  05/04/25    Expected End:  05/18/25               Pain - Adult          Strengthening        Pt will perform 10+ reps AAROM/AROM/RROM BLE to improve functional strength needed for improved mobility.  (Progressing)       Start:  05/04/25    Expected End:  05/18/25

## 2025-05-08 VITALS
DIASTOLIC BLOOD PRESSURE: 63 MMHG | RESPIRATION RATE: 15 BRPM | HEIGHT: 61 IN | SYSTOLIC BLOOD PRESSURE: 139 MMHG | WEIGHT: 87.08 LBS | BODY MASS INDEX: 16.44 KG/M2 | HEART RATE: 100 BPM | TEMPERATURE: 98.1 F | OXYGEN SATURATION: 98 %

## 2025-05-08 PROCEDURE — 99231 SBSQ HOSP IP/OBS SF/LOW 25: CPT | Performed by: INTERNAL MEDICINE

## 2025-05-08 PROCEDURE — 2500000004 HC RX 250 GENERAL PHARMACY W/ HCPCS (ALT 636 FOR OP/ED): Performed by: INTERNAL MEDICINE

## 2025-05-08 PROCEDURE — 2500000001 HC RX 250 WO HCPCS SELF ADMINISTERED DRUGS (ALT 637 FOR MEDICARE OP): Performed by: INTERNAL MEDICINE

## 2025-05-08 PROCEDURE — 2500000004 HC RX 250 GENERAL PHARMACY W/ HCPCS (ALT 636 FOR OP/ED)

## 2025-05-08 PROCEDURE — 94640 AIRWAY INHALATION TREATMENT: CPT

## 2025-05-08 PROCEDURE — 2500000005 HC RX 250 GENERAL PHARMACY W/O HCPCS

## 2025-05-08 PROCEDURE — 2500000002 HC RX 250 W HCPCS SELF ADMINISTERED DRUGS (ALT 637 FOR MEDICARE OP, ALT 636 FOR OP/ED)

## 2025-05-08 PROCEDURE — 2500000001 HC RX 250 WO HCPCS SELF ADMINISTERED DRUGS (ALT 637 FOR MEDICARE OP)

## 2025-05-08 RX ADMIN — Medication 4 L/MIN: at 07:28

## 2025-05-08 RX ADMIN — EMPAGLIFLOZIN 10 MG: 10 TABLET, FILM COATED ORAL at 09:03

## 2025-05-08 RX ADMIN — PANTOPRAZOLE SODIUM 40 MG: 40 TABLET, DELAYED RELEASE ORAL at 06:49

## 2025-05-08 RX ADMIN — IPRATROPIUM BROMIDE AND ALBUTEROL SULFATE 3 ML: 2.5; .5 SOLUTION RESPIRATORY (INHALATION) at 11:06

## 2025-05-08 RX ADMIN — TIOTROPIUM BROMIDE INHALATION SPRAY 2 PUFF: 3.12 SPRAY, METERED RESPIRATORY (INHALATION) at 09:03

## 2025-05-08 RX ADMIN — IPRATROPIUM BROMIDE AND ALBUTEROL SULFATE 3 ML: 2.5; .5 SOLUTION RESPIRATORY (INHALATION) at 07:28

## 2025-05-08 RX ADMIN — Medication 4 L/MIN: at 09:06

## 2025-05-08 RX ADMIN — ASPIRIN 81 MG: 81 TABLET, COATED ORAL at 09:03

## 2025-05-08 RX ADMIN — PREDNISONE 40 MG: 20 TABLET ORAL at 09:03

## 2025-05-08 RX ADMIN — METOPROLOL SUCCINATE 25 MG: 25 TABLET, EXTENDED RELEASE ORAL at 09:03

## 2025-05-08 RX ADMIN — FLUTICASONE FUROATE AND VILANTEROL TRIFENATATE 1 PUFF: 100; 25 POWDER RESPIRATORY (INHALATION) at 09:02

## 2025-05-08 RX ADMIN — HEPARIN SODIUM 5000 UNITS: 5000 INJECTION INTRAVENOUS; SUBCUTANEOUS at 06:49

## 2025-05-08 ASSESSMENT — COGNITIVE AND FUNCTIONAL STATUS - GENERAL
MOBILITY SCORE: 16
DRESSING REGULAR UPPER BODY CLOTHING: A LITTLE
WALKING IN HOSPITAL ROOM: A LOT
CLIMB 3 TO 5 STEPS WITH RAILING: TOTAL
DRESSING REGULAR LOWER BODY CLOTHING: A LITTLE
DAILY ACTIVITIY SCORE: 20
HELP NEEDED FOR BATHING: A LITTLE
TOILETING: A LITTLE
MOVING TO AND FROM BED TO CHAIR: A LITTLE
STANDING UP FROM CHAIR USING ARMS: A LOT

## 2025-05-08 ASSESSMENT — PAIN SCALES - GENERAL: PAINLEVEL_OUTOF10: 0 - NO PAIN

## 2025-05-08 NOTE — CARE PLAN
Problem: Pain - Adult  Goal: Verbalizes/displays adequate comfort level or baseline comfort level  Outcome: Progressing     Problem: Safety - Adult  Goal: Free from fall injury  Outcome: Progressing     Problem: Discharge Planning  Goal: Discharge to home or other facility with appropriate resources  Outcome: Progressing     Problem: Chronic Conditions and Co-morbidities  Goal: Patient's chronic conditions and co-morbidity symptoms are monitored and maintained or improved  Outcome: Progressing     Problem: Nutrition  Goal: Nutrient intake appropriate for maintaining nutritional needs  Outcome: Progressing     Problem: Skin  Goal: Decreased wound size/increased tissue granulation at next dressing change  Outcome: Progressing  Goal: Participates in plan/prevention/treatment measures  Outcome: Progressing  Goal: Prevent/manage excess moisture  Outcome: Progressing  Goal: Prevent/minimize sheer/friction injuries  Outcome: Progressing  Goal: Promote/optimize nutrition  Outcome: Progressing  Flowsheets (Taken 5/7/2025 2203)  Promote/optimize nutrition: Consume > 50% meals/supplements  Goal: Promote skin healing  Outcome: Progressing     Problem: Fall/Injury  Goal: Not fall by end of shift  Outcome: Progressing  Goal: Be free from injury by end of the shift  Outcome: Progressing  Goal: Verbalize understanding of personal risk factors for fall in the hospital  Outcome: Progressing  Goal: Verbalize understanding of risk factor reduction measures to prevent injury from fall in the home  Outcome: Progressing  Goal: Use assistive devices by end of the shift  Outcome: Progressing  Goal: Pace activities to prevent fatigue by end of the shift  Outcome: Progressing

## 2025-05-08 NOTE — NURSING NOTE
Pt being discharged; IV removed per policy,  Pt belongings(no home meds) and discharge paperwork sent with [facility] ambulance, report called to nurse @ [facility]. No further questions at this time. Pt discharged off unit safely with physicians.

## 2025-05-08 NOTE — PROGRESS NOTES
05/08/25 1039   Discharge Planning   Does the patient need discharge transport arranged? Yes   RoundTrip coordination needed? Yes   Has discharge transport been arranged? Yes   What day is the transport expected? 05/08/25   What time is the transport expected? 1130     Notified RN, Patient and patients daughter(per request) of transport time of 11:30AM to [place] . Answered all questions and verbalized understanding.  Report number is  [phone]

## 2025-05-08 NOTE — CARE PLAN
Problem: Pain - Adult  Goal: Verbalizes/displays adequate comfort level or baseline comfort level  Outcome: Progressing  Flowsheets (Taken 5/8/2025 0713)  Verbalizes/displays adequate comfort level or baseline comfort level:   Assess pain using appropriate pain scale   Administer analgesics based on type and severity of pain and evaluate response   Implement non-pharmacological measures as appropriate and evaluate response   Consider cultural and social influences on pain and pain management     Problem: Safety - Adult  Goal: Free from fall injury  Outcome: Progressing     Problem: Discharge Planning  Goal: Discharge to home or other facility with appropriate resources  Outcome: Progressing     Problem: Chronic Conditions and Co-morbidities  Goal: Patient's chronic conditions and co-morbidity symptoms are monitored and maintained or improved  Outcome: Progressing     Problem: Nutrition  Goal: Nutrient intake appropriate for maintaining nutritional needs  Outcome: Progressing     Problem: Skin  Goal: Decreased wound size/increased tissue granulation at next dressing change  Outcome: Progressing  Flowsheets (Taken 5/8/2025 0713)  Decreased wound size/increased tissue granulation at next dressing change:   Promote sleep for wound healing   Protective dressings over bony prominences  Goal: Participates in plan/prevention/treatment measures  Outcome: Progressing  Flowsheets (Taken 5/8/2025 0713)  Participates in plan/prevention/treatment measures:   Discuss with provider PT/OT consult   Elevate heels   Increase activity/out of bed for meals  Goal: Prevent/manage excess moisture  Outcome: Progressing  Flowsheets (Taken 5/8/2025 0713)  Prevent/manage excess moisture:   Monitor for/manage infection if present   Follow provider orders for dressing changes  Goal: Prevent/minimize sheer/friction injuries  Outcome: Progressing  Flowsheets (Taken 5/8/2025 0713)  Prevent/minimize sheer/friction injuries:   Increase activity/out  of bed for meals   Turn/reposition every 2 hours/use positioning/transfer devices  Goal: Promote/optimize nutrition  Outcome: Progressing  Flowsheets (Taken 5/8/2025 0713)  Promote/optimize nutrition:   Monitor/record intake including meals   Consume > 50% meals/supplements   Offer water/supplements/favorite foods  Goal: Promote skin healing  Outcome: Progressing  Flowsheets (Taken 5/8/2025 0713)  Promote skin healing: Assess skin/pad under line(s)/device(s)     Problem: Fall/Injury  Goal: Not fall by end of shift  Outcome: Progressing  Goal: Be free from injury by end of the shift  Outcome: Progressing  Goal: Verbalize understanding of personal risk factors for fall in the hospital  Outcome: Progressing  Goal: Verbalize understanding of risk factor reduction measures to prevent injury from fall in the home  Outcome: Progressing  Goal: Use assistive devices by end of the shift  Outcome: Progressing  Goal: Pace activities to prevent fatigue by end of the shift  Outcome: Progressing

## 2025-05-08 NOTE — PROGRESS NOTES
Elsa Hinson is a 75 y.o. female on day 5 of admission presenting with Hypercapnia.      Subjective   Doing well.  Working on her anxiety.  Still refusing to use BiPAP.  Encouraging oral intake.  Insurance precertification after rounding this morning has been obtained and patient remains medically stable for discharge       Objective     Last Recorded Vitals  /63 (BP Location: Left arm, Patient Position: Lying)   Pulse 100   Temp 36.7 °C (98.1 °F) (Temporal)   Resp 15   Wt (!) 39.5 kg (87 lb 1.3 oz)   SpO2 98%   Intake/Output last 3 Shifts:    Intake/Output Summary (Last 24 hours) at 5/8/2025 1100  Last data filed at 5/8/2025 0915  Gross per 24 hour   Intake 1211.5 ml   Output --   Net 1211.5 ml       Admission Weight  Weight: (!) 39.5 kg (87 lb) (05/03/25 1220)    Daily Weight  05/05/25 : (!) 39.5 kg (87 lb 1.3 oz)      Physical Exam:  General: Thin, frail female on 3 L nasal cannula  HEENT: PERRLA, head intact and normocephalic  Neck: Normal to inspection  Lungs: Clear to auscultation, work of breathing within normal limit  Cardiac: Regular rate and rhythm  Abdomen: Soft nontender, positive bowel sounds  : Exam deferred  Skin: Intact  Hematology: No petechia or excessive ecchymosis  Musculoskeletal: Without significant trauma.  Muscle wasting noted  Neurological: Alert awake oriented, no focal deficit, cranial nerves grossly intact  Psych: Anxious overall    Relevant Results  Scheduled medications  Scheduled Medications[1]  Continuous medications  Continuous Medications[2]  PRN medications  PRN Medications[3]   Labs  Results from last 7 days   Lab Units 05/06/25  0638 05/05/25 0434 05/04/25  0552   WBC AUTO x10*3/uL 14.6* 16.9* 6.8   HEMOGLOBIN g/dL 9.4* 9.2* 9.5*   HEMATOCRIT % 31.7* 31.4* 31.1*   PLATELETS AUTO x10*3/uL 275 291 316     Results from last 7 days   Lab Units 05/06/25  0638 05/05/25  0434 05/04/25 2008 05/04/25  0552 05/03/25  1245   SODIUM mmol/L 145 145 144 148* 145   POTASSIUM  mmol/L 4.5 3.5 3.3* 3.0* 3.4*   CHLORIDE mmol/L 96* 95* 93* 90* 90*   CO2 mmol/L 42* 44* >45* >45* 45*   BUN mg/dL 13 13 14 15 19   CREATININE mg/dL 0.48* 0.45* 0.56 0.45* 0.47*   CALCIUM mg/dL 9.2 9.6 9.6 9.0 9.7   PROTEIN TOTAL g/dL  --   --   --  5.4* 6.5   BILIRUBIN TOTAL mg/dL  --   --   --  0.3 0.6   ALK PHOS U/L  --   --   --  73 98   ALT U/L  --   --   --  7 10   AST U/L  --   --   --  15 22   GLUCOSE mg/dL 95 166* 179* 162* 126*       Imaging  XR chest 1 view  Result Date: 5/3/2025  Extensive chronic lung changes.   Redemonstration of 8 mm nodule in the left midlung.   New irregular density in the left lung base measuring 1.7 cm. This may be related to a focal infiltrate though neoplasm can not be excluded and further evaluation with CT of the chest is suggested.       MACRO: None   Signed by: Beronica Tadeo 5/3/2025 1:28 PM Dictation workstation:   [id number]      Cardiology, Vascular, and Other Imaging  ECG 12 Lead  Result Date: 5/5/2025  Sinus rhythm Short NE interval Consider left ventricular hypertrophy                      Assessment/Plan   Elsa Hinson is a 75 y.o. female with past medical history of CAD status post PCI, history of STEMI, hypertension, hyperlipidemia, COPD presented for increased CO2 from rehab facility  Assessment & Plan  Hypercapnia    Assessment:   Acute on chronic COPD exacerbation  Acute on chronic hypoxemic and hypercapnic respiratory failure  Left lower lobe pneumonia  CAD status post PCI  Multiple pulmonary nodule  Hypokalemia  Hypertension  Chronically underweight  Hyperlipidemia  Generalized anxiety    Plan:  Anxiety is primary component  Refusing BiPAP  Medically ready for discharge on oral antibiotics and prednisone for short duration  Continue with Atarax  Continue Remeron  Patient completed azithromycin  Supplemental O2 and wean as tolerated  Continue scheduled breathing treatment  Incentive spirometry, flutter valve  Continue chronic medication  Protein  supplement  Patient is quite underweight with overall poor outlook  Patient has obtained insurance precertification and she will be discharged to skilled nursing facility and she is refusing BiPAP on discharge and even in the hospital       Plan discussed with patient at bedside    Low level of MDM based on above issue and discussing plan    This note is created using voice recognition software. All efforts are made to minimize errors, if there are errors there due to transcription.    Enoch Mehta  Hospitalist           [1] aspirin, 81 mg, oral, Daily  atorvastatin, 40 mg, oral, Nightly  cefTRIAXone, 1 g, intravenous, q24h  [START ON 5/11/2025] cholecalciferol, 1.25 mg, oral, Every Sunday  empagliflozin, 10 mg, oral, Daily  ferrous sulfate, 65 mg of iron, oral, Daily with breakfast  tiotropium, 2 puff, inhalation, Daily   And  fluticasone furoate-vilanteroL, 1 puff, inhalation, Daily  heparin (porcine), 5,000 Units, subcutaneous, q8h USHA  ipratropium-albuteroL, 3 mL, nebulization, TID  metoprolol succinate XL, 25 mg, oral, Daily  mirtazapine, 7.5 mg, oral, Nightly  oxygen, , inhalation, Continuous - Inhalation  pantoprazole, 40 mg, oral, Daily before breakfast   Or  pantoprazole, 40 mg, intravenous, Daily before breakfast  predniSONE, 40 mg, oral, Daily     [2]    [3] PRN medications: acetaminophen, albuterol, guaiFENesin, hydrOXYzine HCL, melatonin, ondansetron, polyethylene glycol

## 2025-05-09 LAB
ATRIAL RATE: 87 BPM
P AXIS: 83 DEGREES
PR INTERVAL: 107 MS
Q ONSET: 249 MS
QRS COUNT: 14 BEATS
QRS DURATION: 83 MS
QT INTERVAL: 372 MS
QTC CALCULATION(BAZETT): 448 MS
QTC FREDERICIA: 420 MS
R AXIS: 80 DEGREES
T AXIS: 19 DEGREES
T OFFSET: 435 MS
VENTRICULAR RATE: 87 BPM

## 2025-05-12 ENCOUNTER — APPOINTMENT (OUTPATIENT)
Dept: PULMONOLOGY | Facility: HOSPITAL | Age: 75
End: 2025-05-12
Payer: COMMERCIAL

## 2025-05-14 DIAGNOSIS — S72.142D INTERTROCHANTERIC FRACTURE OF LEFT HIP, CLOSED, WITH ROUTINE HEALING, SUBSEQUENT ENCOUNTER: ICD-10-CM

## 2025-05-16 ENCOUNTER — HOSPITAL ENCOUNTER (OUTPATIENT)
Dept: RADIOLOGY | Facility: HOSPITAL | Age: 75
Discharge: HOME | End: 2025-05-16
Payer: COMMERCIAL

## 2025-05-16 ENCOUNTER — APPOINTMENT (OUTPATIENT)
Dept: ORTHOPEDIC SURGERY | Facility: HOSPITAL | Age: 75
End: 2025-05-16
Payer: COMMERCIAL

## 2025-05-16 VITALS — HEIGHT: 61 IN | BODY MASS INDEX: 16.44 KG/M2 | WEIGHT: 87.08 LBS

## 2025-05-16 DIAGNOSIS — S72.142D INTERTROCHANTERIC FRACTURE OF LEFT HIP, CLOSED, WITH ROUTINE HEALING, SUBSEQUENT ENCOUNTER: Primary | ICD-10-CM

## 2025-05-16 DIAGNOSIS — S72.142D INTERTROCHANTERIC FRACTURE OF LEFT HIP, CLOSED, WITH ROUTINE HEALING, SUBSEQUENT ENCOUNTER: ICD-10-CM

## 2025-05-16 PROCEDURE — 1159F MED LIST DOCD IN RCRD: CPT

## 2025-05-16 PROCEDURE — 1111F DSCHRG MED/CURRENT MED MERGE: CPT

## 2025-05-16 PROCEDURE — 73502 X-RAY EXAM HIP UNI 2-3 VIEWS: CPT | Mod: LT

## 2025-05-16 PROCEDURE — 99212 OFFICE O/P EST SF 10 MIN: CPT

## 2025-05-16 PROCEDURE — 99024 POSTOP FOLLOW-UP VISIT: CPT

## 2025-05-16 ASSESSMENT — PAIN - FUNCTIONAL ASSESSMENT: PAIN_FUNCTIONAL_ASSESSMENT: NO/DENIES PAIN

## 2025-05-16 NOTE — PROGRESS NOTES
POST-OPERATIVE FOLLOW UP      ============================  IMPRESSION/PLAN:  ============================  75 y.o. female s/p left hip ORIF completed on 3/25/2024 by Dr. العراقي    PLAN:  -Weightbearing: Weight bearing as tolerated  -Radiology Studies: Radiographs of the left hip were taken and reviewed today  -Therapies: Continue therapy at the nursing home.  I encouraged the patient to attempt to increase her walking and exercises to gain some strength and function back.  I let her know that she has no restrictions regarding the hip, and that she can do what ever exercises/rehab she is able to tolerate.  -I did advise the patient that if she is discharged from her nursing facility prior to her follow-up visit, that I would be happy to provide her with the home health or outpatient therapy referral if needed.  -Follow-up: In 2 months, no x-rays if doing well        Elsa Hinson presents today for follow up of the above operation.  She just had a recent hospital admission for COPD exacerbation and severe malnutrition.  She has had a lot of anxiety surrounding the hip fracture/surgery, and this caused her to stop eating for periods of time.  She is working with a dietitian to increase calories and gain some weight.  Because of all this, she has not been walking much but she has been doing exercises in her bed and off the side of the bed as tolerated.  She started on some anxiety medication as well which has been helping and she is feeling more optimistic about rehab now.    Review of Systems:   Constitutional: See HPI for pain assessment, No significant weight loss, recent trauma. Denies fevers/chills  Cardiovascular: No chest pain, shortness of breath  Respiratory: No difficulty breathing, cough  Gastrointestinal: No nausea, vomiting, diarrhea, constipation  Musculoskeletal: Noted in HPI, no arthralgias   Integumentary: No rashes, easy bruising, redness   Neurological: no numbness or tingling in extremities, no  gait disturbances     Problem List[1]    =================================  EXAM  =================================  Constitutional   General appearance: Alert and in no acute distress.  Malnourished.  Eyes   External Eye, Conjunctiva and lids: Normal external exam - extraocular movements intact (EOMI).   Ears, Nose, Mouth, and Throat   Hearing: Normal.   Neck   Neck: No neck mass was observed. Supple.   Pulmonary   Respiratory effort: Patient becomes short of breath during conversation  Cardiovascular   Examination of extremities: No peripheral edema.   Psychiatric   Judgment and insight: Intact.   Orientation to person, place, and time: Alert and oriented x 3.   Musculoskeletal:   Operative lower extremity in neutral alignment.   Active range of motion with mild discomfort.  Dermis is intact.  Neurovascular status is intact.  Minimal swelling, no signs of compartment syndrome.  Negative Homans.     IMAGING:  Multiple views of the affected left hip demonstrate: Postsurgical changes of the left hip ORIF.  Hardware in good positioning.  Interval healing of the intertrochanteric hip fracture.   X-rays were personally reviewed and interpreted by me.  Radiology reports were reviewed by me as well, if readily available at the time.        Pricilla George PA-C  Physician Assistant  Orthopedic Surgery  [facility]       [1]   Patient Active Problem List  Diagnosis    Chronic obstructive pulmonary disease (Multi)    Essential (primary) hypertension    Anemia    Atherosclerotic heart disease of native coronary artery without angina pectoris    Hyperlipidemia    PAD (peripheral artery disease) (CMS-[facility])    Past myocardial infarction    Lung nodule    Chronic combined systolic (congestive) and diastolic (congestive) heart failure    Acute systolic (congestive) heart failure    Cognitive communication deficit    Iron deficiency anemia, unspecified    Moderate protein-calorie malnutrition (Multi)     Paroxysmal atrial fibrillation (Multi)    Hypercapnia

## 2025-05-16 NOTE — PROGRESS NOTES
F/U left hip ORIF completed on 3/25/2024 by Dr. العراقي   Patient is still at rehab facility   Still doing PT once /twice daily - 5 days a week  Xrays done today

## 2025-05-20 ENCOUNTER — APPOINTMENT (OUTPATIENT)
Dept: PULMONOLOGY | Facility: HOSPITAL | Age: 75
End: 2025-05-20
Payer: COMMERCIAL

## 2025-06-06 ENCOUNTER — APPOINTMENT (OUTPATIENT)
Dept: PULMONOLOGY | Facility: HOSPITAL | Age: 75
End: 2025-06-06
Payer: COMMERCIAL

## 2025-06-06 VITALS
WEIGHT: 87 LBS | HEART RATE: 94 BPM | RESPIRATION RATE: 16 BRPM | HEIGHT: 61 IN | DIASTOLIC BLOOD PRESSURE: 71 MMHG | SYSTOLIC BLOOD PRESSURE: 130 MMHG | BODY MASS INDEX: 16.42 KG/M2 | OXYGEN SATURATION: 98 %

## 2025-06-06 DIAGNOSIS — R91.8 MULTIPLE LUNG NODULES: ICD-10-CM

## 2025-06-06 DIAGNOSIS — J44.9 CHRONIC OBSTRUCTIVE PULMONARY DISEASE, UNSPECIFIED COPD TYPE (MULTI): Primary | ICD-10-CM

## 2025-06-06 DIAGNOSIS — J96.12 CHRONIC RESPIRATORY FAILURE WITH HYPOXIA AND HYPERCAPNIA: ICD-10-CM

## 2025-06-06 DIAGNOSIS — Z87.891 FORMER SMOKER: ICD-10-CM

## 2025-06-06 DIAGNOSIS — J96.11 CHRONIC RESPIRATORY FAILURE WITH HYPOXIA AND HYPERCAPNIA: ICD-10-CM

## 2025-06-06 PROCEDURE — 3075F SYST BP GE 130 - 139MM HG: CPT | Performed by: NURSE PRACTITIONER

## 2025-06-06 PROCEDURE — 1159F MED LIST DOCD IN RCRD: CPT | Performed by: NURSE PRACTITIONER

## 2025-06-06 PROCEDURE — 1111F DSCHRG MED/CURRENT MED MERGE: CPT | Performed by: NURSE PRACTITIONER

## 2025-06-06 PROCEDURE — 99212 OFFICE O/P EST SF 10 MIN: CPT

## 2025-06-06 PROCEDURE — 3078F DIAST BP <80 MM HG: CPT | Performed by: NURSE PRACTITIONER

## 2025-06-06 PROCEDURE — 99205 OFFICE O/P NEW HI 60 MIN: CPT | Performed by: NURSE PRACTITIONER

## 2025-06-06 PROCEDURE — 1036F TOBACCO NON-USER: CPT | Performed by: NURSE PRACTITIONER

## 2025-06-06 RX ORDER — ALBUTEROL SULFATE 0.83 MG/ML
3 SOLUTION RESPIRATORY (INHALATION) ONCE
OUTPATIENT
Start: 2025-06-06 | End: 2025-06-06

## 2025-06-06 RX ORDER — ALBUTEROL SULFATE 90 UG/1
2 INHALANT RESPIRATORY (INHALATION) EVERY 4 HOURS PRN
Qty: 18 G | Refills: 11 | Status: SHIPPED | OUTPATIENT
Start: 2025-06-06 | End: 2025-06-06 | Stop reason: SDUPTHER

## 2025-06-06 RX ORDER — ALBUTEROL SULFATE 90 UG/1
4 INHALANT RESPIRATORY (INHALATION) ONCE
OUTPATIENT
Start: 2025-06-06

## 2025-06-06 RX ORDER — ALBUTEROL SULFATE 90 UG/1
2 INHALANT RESPIRATORY (INHALATION) EVERY 4 HOURS PRN
Qty: 3 G | Refills: 3 | Status: SHIPPED | OUTPATIENT
Start: 2025-06-06

## 2025-06-06 RX ORDER — ALBUTEROL SULFATE 90 UG/1
2 INHALANT RESPIRATORY (INHALATION) EVERY 4 HOURS PRN
Qty: 3 G | Refills: 3 | Status: SHIPPED | OUTPATIENT
Start: 2025-06-06 | End: 2025-06-06 | Stop reason: SDUPTHER

## 2025-06-06 ASSESSMENT — ENCOUNTER SYMPTOMS
FEVER: 0
RHINORRHEA: 0
SHORTNESS OF BREATH: 1
UNEXPECTED WEIGHT CHANGE: 0
WHEEZING: 0
CHILLS: 0
COUGH: 0
FATIGUE: 0

## 2025-06-06 NOTE — PROGRESS NOTES
Subjective   Patient ID: Elsa Hinson is a 75 y.o. female who presents for NPV for COPD.     HPI: Patient has PMH of CAD s/p STEMI, HTN, HLD, and COPD. She was referred for COPD management. She is currently on Trelegy. She is not currently taking albuterol or nebulizer, she did not know she could still take this. She has been hospitalized twice this year for COPD exacerbations. The Trelegy is new for her prior she has been on proair alone and Symbicort at one time. She states that she has shortness of breath on exertion and has an occasional dry cough. She is on 3-4L continuous oxygen. She reports significant dry mouth with oxygen use. She also has sinus drainage on occasion but does not take anything for this. She denies hearing herself wheezing. She is currently at SNF for rehab but typically lives at home alone. She states that she has a BiPAP at home but does not use it and states she is unwilling to start using. She reports significant fatigue and difficulty getting up in the mornings. She has smoked at 1 ppd from 8430-8233 and also exposed to second hand smoke. She has worked in factories and around chemicals.     Review of Systems   Constitutional:  Negative for chills, fatigue, fever and unexpected weight change.   HENT:  Negative for congestion, postnasal drip and rhinorrhea.    Respiratory:  Positive for shortness of breath. Negative for cough (denies hemoptysis.) and wheezing.    Cardiovascular:  Negative for chest pain and leg swelling.   All other systems reviewed and are negative.      Objective   Physical Exam  Vitals reviewed.   Constitutional:       Appearance: Normal appearance.   HENT:      Head: Normocephalic.   Cardiovascular:      Rate and Rhythm: Normal rate and regular rhythm.   Pulmonary:      Effort: Pulmonary effort is normal.      Breath sounds: Decreased breath sounds present.   Skin:     General: Skin is warm and dry.   Neurological:      Mental Status: She is alert.          Assessment/Plan   COPD  Former smoker  Chronic respiratory failure with hypoxia and hypercapnia   Multiple lung nodules   Pulmonary cachexia   CAD s/p STEMI     Plan:    -PFTs/6MWT ordered.  -Check AAT.   -Continue on Trelegy one puff once a day.   -Start back on albuterol MDI and nebulizer prn.   -Recommend start on Ohtuvayre at this time for severe emphysema and frequent exacerbations.   -She is currently on 3-4L continuous oxygen.   -She has been hospitalized twice this year for COPD exacerbations, she has significant hypercapnia reported and recommended to wear BiPAP nightly however she is noncompliant with this and unwilling to consider. I explained at length that with not wearing BiPAP as recommended she will likely continue with recurrent hospitalizations for hypercapnia. This is likely the cause of her hypersomnia and fatigue.   -She had a CT chest in February 2025 with severe emphysematous changes. There are two 1.1 cm nodular opacities in the posterior LLL. Recommendation was for repeat CT chest in 3-6 months, I ordered this for her today.   -She has BMI of 16 and working on gaining weight.   -She has history of CAD s/p STEMI.    Overall she clinically has very severe COPD, I will obtain testing for her and optimize management. I will bring her back with Dr. Frank in 3-4 months. I instructed patient to call sooner if needed.      Total time:  60 min.

## 2025-06-06 NOTE — PATIENT INSTRUCTIONS
Continue on Trelegy one puff once a day, everyday.  Start taking albuterol inhaler as needed for shortness of breath, you can use this every 4 hours if needed.  Start on albuterol nebulizer as needed, you can use these up to four times per day as needed.  Start on Ohtuvayre nebulizer twice a day, everyday when you get this.   Please get lung and oxygen test done.   Please get CT scan of your chest for follow up done soon.  Please get blood work next time you have this drawn.   Call with any questions or concerns.  Follow up with Dr. Frank in 3-4 months.

## 2025-06-10 NOTE — TELEPHONE ENCOUNTER
Mark BALDWIN acknowledged understanding. They took the appointment for 9/10. All questions answered at this time.     ----- Message from Mi Rodriguez sent at 6/10/2025 10:08 AM EDT -----  I would recommend 5 mg daily prednisone if they do this to start. For some reason her appointment did not get entered with Dr. Frank can you check on this?  ----- Message -----  From: Dyana Olguin RN  Sent: 6/10/2025   9:43 AM EDT  To: [facility] called today stating the NP there wanted to inquire about daily prednisone. They have had the patient a few times on tapers. Patient is refusing all nebulizers due to claustrophobia. (They state it does not matter if she wears a mask or does the mouth piece) state that they are also unsure if she is doing her inhalers properly. They are inquiring for some input.

## 2025-06-11 NOTE — TELEPHONE ENCOUNTER
Called and spoke with [facility] in regards to patients Ohtuvayre Prescription. Prescription is ready to be shipped. [facility] to call SNF Facility to set up shipment.

## 2025-06-15 NOTE — ED PROCEDURE NOTE
Procedure  Critical Care    Performed by: Marty R Lejeune, DO  Authorized by: Marty R Lejeune, DO    Critical care provider statement:     Critical care time (minutes):  36    Critical care time was exclusive of:  Separately billable procedures and treating other patients    Critical care was necessary to treat or prevent imminent or life-threatening deterioration of the following conditions:  Respiratory failure and cardiac failure    Critical care was time spent personally by me on the following activities:  Development of treatment plan with patient or surrogate, discussions with consultants, discussions with primary provider, evaluation of patient's response to treatment, examination of patient, obtaining history from patient or surrogate, review of old charts, re-evaluation of patient's condition, pulse oximetry, ordering and review of radiographic studies, ordering and review of laboratory studies and ordering and performing treatments and interventions    I assumed direction of critical care for this patient from another provider in my specialty: no                 Marty R Lejeune, DO  Resident  06/15/25 1108

## 2025-06-15 NOTE — ED PROVIDER NOTES
Emergency Department Provider Note        MEDICAL DECISION MAKING:  Medical Decision Making       6/15/25     Chief Complaint   Patient presents with    Respiratory Distress      History/Exam limitations: acuity of illness and respiratory distress.   Additional history was obtained from EMS personnel and nursing home.    HPI: Elsa Hinson  is a 75 y.o. presents with history of COPD, found to be in respiratory distress per nursing home staff.  Sats when EMS arrived 98%.  They gave her 125 Solu-Medrol, continuous breathing treatments of DuoNeb as well as magnesium infusion.  Placed her on BiPAP.  Despite this symptoms have gotten worse.  She is now confused.  Past medical records, Past medical history and surgical history reviewed and as documented.  History reviewed and as noted. past medical records reviewed.    Active Ambulatory Problems     Diagnosis Date Noted    Chronic obstructive pulmonary disease (Multi) 04/30/2024    Essential (primary) hypertension 04/30/2024    Anemia 04/30/2024    Atherosclerotic heart disease of native coronary artery without angina pectoris 02/13/2025    Hyperlipidemia 02/13/2025    PAD (peripheral artery disease) 02/13/2025    Past myocardial infarction 02/13/2025    Lung nodule 02/13/2025    Chronic combined systolic (congestive) and diastolic (congestive) heart failure 03/25/2025    Acute systolic (congestive) heart failure 02/19/2025    Cognitive communication deficit 02/20/2025    Iron deficiency anemia, unspecified 05/03/2025    Moderate protein-calorie malnutrition (Multi) 02/19/2025    Paroxysmal atrial fibrillation (Multi) 02/19/2025    Hypercapnia 05/03/2025     Resolved Ambulatory Problems     Diagnosis Date Noted    Acute hypoxic respiratory failure 02/13/2025    Displaced intertrochanteric fracture of left femur, initial encounter for closed fracture 03/24/2025     Past Medical History:   Diagnosis Date    CAD (coronary artery disease)     COPD (chronic obstructive  pulmonary disease) (Multi)     Hypertension     Mixed hyperlipidemia     Old myocardial infarction     S/p left hip fracture     STEMI (ST elevation myocardial infarction) (Multi)         Surgical History[1]     Family History[2]     Social History[3]     RX Allergies[4]     Unless otherwise stated in this report the patient's positive and negative responses for review of systems for constitutional, eyes, ENT, cardiovascular, respiratory, gastrointestinal, neurological, genitourinary, musculoskeletal, and integument systems and related systems to the presenting problem are either as stated in the HPI or were not pertinent or were negative for the symptoms and/or complaints related to the presenting medical problem.    PHYSICAL EXAM  Triage/nursing notes and vital signs reviewed as available and as noted    Vitals:    06/15/25 0919 06/15/25 0925 06/15/25 0928 06/15/25 0930   BP: (!) 111/46   (!) 197/126   Pulse:  (!) 48 (!) 187 (!) 192   Resp:       Temp:    36.4 °C (97.5 °F)   SpO2:  100% 100% 100%   Weight:       Height:             Physical Exam  Vitals and nursing note reviewed.   Constitutional:       General: She is in acute distress.      Appearance: She is ill-appearing.   HENT:      Head: Atraumatic.      Nose: Nose normal.      Mouth/Throat:      Mouth: Mucous membranes are moist.      Pharynx: Oropharynx is clear.   Eyes:      Conjunctiva/sclera: Conjunctivae normal.   Cardiovascular:      Rate and Rhythm: Regular rhythm. Tachycardia present.      Pulses: Normal pulses.      Heart sounds: Normal heart sounds.   Pulmonary:      Effort: Tachypnea, accessory muscle usage and respiratory distress present.      Breath sounds: Decreased breath sounds present.   Abdominal:      General: Abdomen is flat. There is no distension.      Palpations: Abdomen is soft.      Tenderness: There is no abdominal tenderness.   Musculoskeletal:         General: No deformity.      Right lower leg: No edema.      Left lower leg:  No edema.   Skin:     General: Skin is warm and dry.      Coloration: Skin is pale.   Neurological:      Mental Status: She is lethargic.      GCS: GCS eye subscore is 4. GCS verbal subscore is 3. GCS motor subscore is 6.        ED COURSE        Work-up performed to evaluate for differential diagnosis as clinically indicated   Orders Placed This Encounter   Procedures    XR chest 1 view    CBC and Auto Differential    Phosphorus    Magnesium    Comprehensive metabolic panel    Protime-INR    Blood Gas Arterial Full Panel    Urinalysis with Reflex Culture and Microscopic    Urinalysis with Reflex Culture and Microscopic    Extra Urine Gray Tube    Vital Signs    ECG 12 lead    Insert and maintain peripheral IV          Labs and imaging reviewed by me  and note         Labs Reviewed   CBC WITH AUTO DIFFERENTIAL - Abnormal       Result Value    WBC 23.3 (*)     nRBC 0.0      RBC 3.84 (*)     Hemoglobin 11.6 (*)     Hematocrit 41.4       (*)     MCH 30.2      MCHC 28.0 (*)     RDW 13.1      Platelets 294      Neutrophils % 69.9      Immature Granulocytes %, Automated 0.8      Lymphocytes % 21.8      Monocytes % 7.0      Eosinophils % 0.2      Basophils % 0.3      Neutrophils Absolute 16.30 (*)     Immature Granulocytes Absolute, Automated 0.19      Lymphocytes Absolute 5.09 (*)     Monocytes Absolute 1.64 (*)     Eosinophils Absolute 0.05      Basophils Absolute 0.06     MAGNESIUM - Abnormal    Magnesium 2.52 (*)    COMPREHENSIVE METABOLIC PANEL - Abnormal    Glucose 257 (*)     Sodium 147 (*)     Potassium 2.6 (*)     Chloride 80 (*)     Bicarbonate >45 (*)     Anion Gap        Urea Nitrogen 20      Creatinine 0.53      eGFR >90      Calcium 9.3      Albumin 3.7      Alkaline Phosphatase 90      Total Protein 6.2 (*)     AST 34      Bilirubin, Total 0.9      ALT 21     BLOOD GAS ARTERIAL FULL PANEL - Abnormal    POCT pH, Arterial 7.51 (*)     POCT pCO2, Arterial 86 (*)     POCT pO2, Arterial 378 (*)     POCT  SO2, Arterial 100      POCT Oxy Hemoglobin, Arterial 98.1 (*)     POCT Hematocrit Calculated, Arterial 33.0 (*)     POCT Sodium, Arterial 144      POCT Potassium, Arterial 3.4 (*)     POCT Chloride, Arterial 85 (*)     POCT Ionized Calcium, Arterial 0.98 (*)     POCT Glucose, Arterial 264 (*)     POCT Lactate, Arterial 10.8 (*)     POCT Base Excess, Arterial 39.2 (*)     POCT HCO3 Calculated, Arterial 68.6 (*)     POCT Hemoglobin, Arterial 11.0 (*)     POCT Anion Gap, Arterial -6 (*)     Patient Temperature 37.0      FiO2 100     PHOSPHORUS - Normal    Phosphorus 4.5     PROTIME-INR - Normal    Protime 11.2      INR 1.0     URINALYSIS WITH REFLEX CULTURE AND MICROSCOPIC    Narrative:     The following orders were created for panel order Urinalysis with Reflex Culture and Microscopic.  Procedure                               Abnormality         Status                     ---------                               -----------         ------                     Urinalysis with Reflex C...[[id number]]                                                 Extra Urine Gray Tube[[id number]]                                                         Please view results for these tests on the individual orders.   URINALYSIS WITH REFLEX CULTURE AND MICROSCOPIC   EXTRA URINE GRAY TUBE   BLOOD GAS LACTIC ACID, VENOUS        XR chest 1 view    (Results Pending)            Pt course which Intervention and treatment included :    Procedure  Intubation    Performed by: Marty R Lejeune, DO  Authorized by: Marty R Lejeune, DO    Consent:     Consent obtained:  Emergent situation  Universal protocol:     Patient identity confirmed:  Provided demographic data and hospital-assigned identification number  Pre-procedure details:     Indications: altered consciousness and respiratory distress      Patient status:  Unresponsive    Mouth opening - incisor distance:  2 finger widths    Hyoid-mental distance: 2 finger widths      Hyoid-thyroid distance: 1  finger width      Mallampati score:  II    Obstruction: none      Neck mobility: normal      Pharmacologic strategy: DSI      Induction agents:  Etomidate    Paralytics:  Rocuronium  Procedure details:     Preoxygenation:  BiLevel    CPR in progress: yes      Number of attempts:  1  Successful intubation attempt details:     Intubation method:  Oral    Intubation technique: direct      Laryngoscope blade:  Mac 3    Bougie used: no      Grade view: II      Tube size (mm):  7.0    Tube type:  Cuffed    Tube visualized through cords: yes    Placement assessment:     ETT at teeth/gumline (cm):  24    Tube secured with:  ETT sebastian    Breath sounds:  Equal and absent over the epigastrium    Placement verification: chest rise, colorimetric ETCO2 and numeric ETCO2    Post-procedure details:     Complications: cardiac arrest        Medications   etomidate (Amidate) injection 2 mg/mL  - Omnicell Override Pull (has no administration in time range)   rocuronium (ZeMuron) injection 10 mg/mL  - Omnicell Override Pull (has no administration in time range)   sodium chloride 0.9 % bolus 1,000 mL (1,000 mL intravenous New Bag 6/15/25 0933)   amiodarone (Nexterone) 360 mg in dextrose,iso-osm 200 mL (1.8 mg/mL) infusion (premix) (1 mg/min intravenous New Bag 6/15/25 0941)     Followed by   amiodarone (Nexterone) 360 mg in dextrose,iso-osm 200 mL (1.8 mg/mL) infusion (premix) (has no administration in time range)   etomidate (Amidate) injection (20 mg intravenous Given 6/15/25 0915)   rocuronium (ZeMuron) injection (70 mg intravenous Given 6/15/25 0916)   EPINEPHrine (Adrenalin) 1 mg/10mL injection (1 mg intravenous Given 6/15/25 0922)   sodium bicarbonate injection (50 mEq intravenous Given 6/15/25 0920)        ED Course as of 06/15/25 1105   Sun Herb 15, 2025   0958 Spoke with family at bedside, discussed outcome with the family. [ML]   0958 Patient presented in respiratory distress, she was confused, at this point BiPAP did not seem  to be effective.  Went to intubate the patient.  When giving medications despite stable vital signs other than tachycardia patient went to cardiac arrest.  CPR was started initially and immediately.  Bag valve ventilations were performed prior to intubation, 30-2.  Epinephrine was given.  Patient was subsequently intubated during pulse check.  Amp of bicarb and another dose of epinephrine was administered.  After another 3 minutes another dose of epinephrine was given, patient did regain pulses.  Patient noted to be in , hypertensive.  Waited for momentarily to see if epinephrine was contributing factor and allowed to wear off however if 3 minutes pulse rate was still over 170.  Electively decided to cardiovert the patient, was cardioverted using 100 J synchronized x 1.  Given the unstable nature. [ML]   1005 Given persistent tachycardia despite synchronized cardioversion amiodarone was initiated.  Heart rate did improve, however blood pressure started to drop again.  I went in and stop the amiodarone.  Discussed with family at bedside, after shared decision making was made in regards to patient's care and poor prognosis we did electively make the patient DNR CCA at this time in accordance with family's wishes as well as best interest of the patient [ML]   1014 Family refusing x-ray at this time [ML]   1102 Time of death [ML]      ED Course User Index  [ML] Marty R Lejeune, DO         Diagnoses as of 06/15/25 1105   COPD exacerbation (Multi)   Acute respiratory failure with hypoxia and hypercapnia   Cardiac arrest          DISPOSITION:  time of death 1102    1. COPD exacerbation (Multi)        2. Acute respiratory failure with hypoxia and hypercapnia        3. Cardiac arrest           -------------------------------------------------------------------    06/15/25 at 9:43 AM - Marty R LeJeune, DO   Internal & Emergency Medicine              [1]   Past Surgical History:  Procedure Laterality Date      SECTION, CLASSIC      CORONARY ANGIOPLASTY WITH STENT PLACEMENT      ORIF HIP FRACTURE  2025    TONSILLECTOMY     [2] No family history on file.  [3]   Social History  Tobacco Use    Smoking status: Former     Current packs/day: 0.00     Average packs/day: 1 pack/day for 52.0 years (52.0 ttl pk-yrs)     Types: Cigarettes     Start date:      Quit date:      Years since quittin.4    Smokeless tobacco: Never   Substance Use Topics    Alcohol use: Never    Drug use: Never   [4]   Allergies  Allergen Reactions    Sulfa (Sulfonamide Antibiotics) Anaphylaxis        Marty R Lejeune, DO  Resident  06/15/25 1100

## 2025-06-15 NOTE — ED PROCEDURE NOTE
Procedure  Electrical Cardioversion    Performed by: Marty R Lejeune, DO  Authorized by: Marty R Lejeune, DO    Consent:     Consent obtained:  Emergent situation  Universal protocol:     Patient identity confirmed:  Arm band, verbally with patient and hospital-assigned identification number  Pre-procedure details:     Cardioversion basis:  Emergent    Rhythm:  Supraventricular tachycardia  Attempt one:     Cardioversion mode:  Synchronous    Shock (Joules):  120    Shock outcome:  No change in rhythm  Post-procedure details:     Patient status:  Unresponsive    Procedure completion:  Tolerated               Marty R Lejeune, DO  Resident  06/15/25 1017

## 2025-06-15 NOTE — ED TRIAGE NOTES
Pt. To the ED via EMS from [facility] for c/o respiratory distress. Pt has a hx of copd. Staff found her at 78% on 6L. EMS gave 2 due-nebs, 125mg of solumedrol, and started her on CPAP. EMS administered 2g Mag prior to arrival to the ED

## 2025-06-16 ENCOUNTER — APPOINTMENT (OUTPATIENT)
Dept: RADIOLOGY | Facility: CLINIC | Age: 75
End: 2025-06-16
Payer: COMMERCIAL

## 2025-06-16 LAB
ATRIAL RATE: 178 BPM
P AXIS: 94 DEGREES
PR INTERVAL: 152 MS
Q ONSET: 215 MS
QRS COUNT: 29 BEATS
QRS DURATION: 76 MS
QT INTERVAL: 292 MS
QTC CALCULATION(BAZETT): 495 MS
QTC FREDERICIA: 415 MS
R AXIS: 91 DEGREES
T AXIS: 268 DEGREES
T OFFSET: 361 MS
VENTRICULAR RATE: 173 BPM

## 2025-07-18 ENCOUNTER — APPOINTMENT (OUTPATIENT)
Dept: ORTHOPEDIC SURGERY | Facility: HOSPITAL | Age: 75
End: 2025-07-18
Payer: COMMERCIAL

## 2025-09-10 ENCOUNTER — APPOINTMENT (OUTPATIENT)
Dept: PULMONOLOGY | Facility: HOSPITAL | Age: 75
End: 2025-09-10
Payer: COMMERCIAL

## (undated) DEVICE — GUIDEWIRE, 3.2 X 400

## (undated) DEVICE — SUTURE, VICRYL 2-0, TAPER POINT, CT-1 UNDYED 27 INCH

## (undated) DEVICE — DRILL BIT, 4.2MM 3-FLUTED QC 330MM 100MM CALB

## (undated) DEVICE — SOLUTION, IRRIGATION, SODIUM CHLORIDE 0.9%, 1000 ML, POUR BOTTLE

## (undated) DEVICE — DRESSING, AQUACEL AG, HYDROFIBER W/SILVER, 3.5 X 9.75 IN

## (undated) DEVICE — APPLICATOR, CHLORAPREP, W/ORANGE TINT, 26ML

## (undated) DEVICE — GLOVE, PROTEXIS PI CLASSIC, SZ-8.0, PF, PF, LF

## (undated) DEVICE — SUTURE, VICRYL, 0, 36 IN, CT-1, UNDYED

## (undated) DEVICE — TOWEL PACK, STERILE, 4/PACK, BLUE

## (undated) DEVICE — DRAPE COVER, C ARM, FLOUROSCAN IMAGING SYS

## (undated) DEVICE — COVER HANDLE LIGHT, STERIS, BLUE, STERILE

## (undated) DEVICE — DRILL BIT, STEPPED, 6MM/9MM CANNULATED

## (undated) DEVICE — SYRINGE, 20 CC, LUER LOCK

## (undated) DEVICE — KIT, COMPRESSION HIP, CUSTOM, PORTAGE

## (undated) DEVICE — NEEDLE, HYPODERMIC, REGULAR WALL, REGULAR BEVEL, 22 G X 1.5 IN